# Patient Record
Sex: FEMALE | Race: WHITE | NOT HISPANIC OR LATINO | Employment: UNEMPLOYED | URBAN - METROPOLITAN AREA
[De-identification: names, ages, dates, MRNs, and addresses within clinical notes are randomized per-mention and may not be internally consistent; named-entity substitution may affect disease eponyms.]

---

## 2023-04-09 ENCOUNTER — HOSPITAL ENCOUNTER (EMERGENCY)
Facility: HOSPITAL | Age: 63
Discharge: HOME | End: 2023-04-09
Attending: EMERGENCY MEDICINE
Payer: COMMERCIAL

## 2023-04-09 VITALS
HEIGHT: 63 IN | SYSTOLIC BLOOD PRESSURE: 179 MMHG | TEMPERATURE: 98 F | BODY MASS INDEX: 27.46 KG/M2 | OXYGEN SATURATION: 98 % | WEIGHT: 155 LBS | RESPIRATION RATE: 18 BRPM | HEART RATE: 101 BPM | DIASTOLIC BLOOD PRESSURE: 89 MMHG

## 2023-04-09 DIAGNOSIS — M25.50 ARTHRALGIA, UNSPECIFIED JOINT: Primary | ICD-10-CM

## 2023-04-09 DIAGNOSIS — R52 PAIN: ICD-10-CM

## 2023-04-09 LAB
BASOPHILS # BLD AUTO: 0.09 K/UL (ref 0–0.2)
BASOPHILS NFR BLD: 1 % (ref 0–1.9)
CRP SERPL-MCNC: 1.77 MG/DL
DIFFERENTIAL METHOD: ABNORMAL
EOSINOPHIL # BLD AUTO: 0.1 K/UL (ref 0–0.5)
EOSINOPHIL NFR BLD: 1.5 % (ref 0–8)
ERYTHROCYTE [DISTWIDTH] IN BLOOD BY AUTOMATED COUNT: 12.9 % (ref 11.5–14.5)
ERYTHROCYTE [SEDIMENTATION RATE] IN BLOOD BY WESTERGREN METHOD: 72 MM/HR (ref 0–20)
HCT VFR BLD AUTO: 30.9 % (ref 37–48.5)
HGB BLD-MCNC: 10.3 G/DL (ref 12–16)
IMM GRANULOCYTES # BLD AUTO: 0.02 K/UL (ref 0–0.04)
IMM GRANULOCYTES NFR BLD AUTO: 0.2 % (ref 0–0.5)
LYMPHOCYTES # BLD AUTO: 2.2 K/UL (ref 1–4.8)
LYMPHOCYTES NFR BLD: 24.7 % (ref 18–48)
MCH RBC QN AUTO: 31.9 PG (ref 27–31)
MCHC RBC AUTO-ENTMCNC: 33.3 G/DL (ref 32–36)
MCV RBC AUTO: 96 FL (ref 82–98)
MONOCYTES # BLD AUTO: 0.8 K/UL (ref 0.3–1)
MONOCYTES NFR BLD: 9.2 % (ref 4–15)
NEUTROPHILS # BLD AUTO: 5.6 K/UL (ref 1.8–7.7)
NEUTROPHILS NFR BLD: 63.4 % (ref 38–73)
NRBC BLD-RTO: 0 /100 WBC
OB PNL STL: NEGATIVE
PLATELET # BLD AUTO: 484 K/UL (ref 150–450)
PMV BLD AUTO: 8.5 FL (ref 9.2–12.9)
RBC # BLD AUTO: 3.23 M/UL (ref 4–5.4)
WBC # BLD AUTO: 8.79 K/UL (ref 3.9–12.7)

## 2023-04-09 PROCEDURE — 85651 RBC SED RATE NONAUTOMATED: CPT | Performed by: EMERGENCY MEDICINE

## 2023-04-09 PROCEDURE — 86140 C-REACTIVE PROTEIN: CPT | Performed by: EMERGENCY MEDICINE

## 2023-04-09 PROCEDURE — 82272 OCCULT BLD FECES 1-3 TESTS: CPT | Performed by: EMERGENCY MEDICINE

## 2023-04-09 PROCEDURE — 99284 EMERGENCY DEPT VISIT MOD MDM: CPT

## 2023-04-09 PROCEDURE — 85025 COMPLETE CBC W/AUTO DIFF WBC: CPT | Performed by: EMERGENCY MEDICINE

## 2023-04-09 RX ORDER — MELOXICAM 15 MG/1
15 TABLET ORAL DAILY
Qty: 14 TABLET | Refills: 0 | Status: SHIPPED | OUTPATIENT
Start: 2023-04-09 | End: 2023-04-19

## 2023-04-09 NOTE — DISCHARGE INSTRUCTIONS
Take 20 mg Pepcid twice daily.  Watch for any other dark or bloody stools.  Contact Dr. Fall's office for an appointment.

## 2023-04-09 NOTE — ED PROVIDER NOTES
Encounter Date: 4/9/2023       History     Chief Complaint   Patient presents with    Joint Pain     ALL JOINTS X 6 MOS. 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  No history of trauma.  No fever or chills.  She states that she is not seen a physician for over 20 years and last she did see was OBGYN.  No known history of any connective tissue disease.  No severe restricted range of motion, however has discomfort with range of motion of the right knee and hip along with her fingers and wrist.    Review of patient's allergies indicates:  No Known Allergies  No past medical history on file.  No past surgical history on file.  No family history on file.  Social History     Tobacco Use    Smoking status: Every Day     Types: Cigarettes    Smokeless tobacco: Current    Tobacco comments:     1 PACK PER DAY   Substance Use Topics    Alcohol use: Yes     Comment: OCCASIONALLY     Review of Systems   Constitutional:  Positive for activity change. Negative for chills, diaphoresis and fever.   HENT: Negative.  Negative for sore throat.    Respiratory: Negative.  Negative for shortness of breath.    Cardiovascular: Negative.  Negative for chest pain.   Gastrointestinal:  Positive for blood in stool. Negative for nausea and rectal pain.   Genitourinary:  Negative for dysuria and frequency.   Musculoskeletal:  Positive for arthralgias. Negative for back pain, joint swelling and myalgias.   Skin:  Negative for rash.   Neurological:  Negative for dizziness, weakness and light-headedness.   Hematological:  Does not bruise/bleed easily.   All other systems reviewed and are negative.    Physical Exam     Initial Vitals [04/09/23 0741]   BP Pulse Resp Temp SpO2   (!) 197/105 (!) 117 18 97.9 °F (36.6 °C) 99 %      MAP       --         Physical  Exam    Vitals reviewed.  Constitutional: She appears well-developed and well-nourished. No distress.   HENT:   Head: Normocephalic and atraumatic.   Eyes: Conjunctivae are normal. Pupils are equal, round, and reactive to light.   Neck: Neck supple.   Normal range of motion.  Cardiovascular:  Normal rate, regular rhythm, normal heart sounds and intact distal pulses.           Pulmonary/Chest: Breath sounds normal. No respiratory distress. She has no wheezes. She has no rhonchi. She has no rales. She exhibits no tenderness.   Abdominal: Abdomen is soft. Bowel sounds are normal. She exhibits no distension. There is no abdominal tenderness. There is no rebound and no guarding.   Musculoskeletal:      Cervical back: Normal range of motion and neck supple.      Comments: Painful range of motion to the wrist, fingers, hip and right knee.  Neurovascular status intact.     Neurological: She is alert and oriented to person, place, and time.   Skin: Skin is warm and dry. Capillary refill takes less than 2 seconds.       ED Course   Procedures  Labs Reviewed   CBC W/ AUTO DIFFERENTIAL - Abnormal; Notable for the following components:       Result Value    RBC 3.23 (*)     Hemoglobin 10.3 (*)     Hematocrit 30.9 (*)     MCH 31.9 (*)     Platelets 484 (*)     MPV 8.5 (*)     All other components within normal limits   SEDIMENTATION RATE - Abnormal; Notable for the following components:    Sed Rate 72 (*)     All other components within normal limits   C-REACTIVE PROTEIN - Abnormal; Notable for the following components:    CRP 1.77 (*)     All other components within normal limits   OCCULT BLOOD X 1, STOOL          Imaging Results              X-Ray Knee Complete 4 or more Views Right (Final result)  Result time 04/09/23 09:10:25   Procedure changed from X-Ray Knee 3 View Right     Final result by Mario Sifuentes MD (04/09/23 09:10:25)                   Impression:      Right knee effusion.      Electronically signed by: Mario  Bear NOLEN  Date:    04/09/2023  Time:    09:10               Narrative:    EXAMINATION:  XR KNEE COMP 4 OR MORE VIEWS RIGHT    CLINICAL HISTORY:  discomfort; Pain, unspecified    FINDINGS:  Four views of right knee show no fracture, dislocation, or destructive osseous lesion. Right knee joint effusion is present.  Minimal calcification posterior to distal right femur may be of vascular etiology.                                       X-Ray Wrist Complete Bilateral (Final result)  Result time 04/09/23 09:11:58   Procedure changed from X-Ray Wrist Complete Left     Final result by Mario Sifuentes MD (04/09/23 09:11:58)                   Impression:      1. No acute abnormality throughout bilateral wrists.  2. 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      Electronically signed by: Mario Sifuentes MD  Date:    04/09/2023  Time:    09:11               Narrative:    EXAMINATION:  XR WRIST COMPLETE 3 VIEWS BILATERAL    CLINICAL HISTORY:  fall; Pain, unspecified    FINDINGS:  Four views of bilateral wrists    Left wrist:    No acute fracture or dislocation.  Corticated ossicle adjacent to ulnar styloid suggest old ununited fracture.  Severe joint space narrowing marginal osteophyte formation affects left 1st CMC joint, with mild osteoarthrosis at left 1st MCP joint.  Bones appear mildly diffusely demineralized.  Soft tissues are unremarkable.    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  No acute fracture or dislocation.  Moderate joint space narrowing and marginal osteophyte formation affects the 1st CMC joint.  Soft tissues are unremarkable.                                       X-Ray Hip 2 or 3 views Right (with Pelvis when performed) (Final result)  Result time 04/09/23 09:00:37      Final result by Mario Sifuentes MD (04/09/23 09:00:37)                   Impression:      Negative right hip.      Electronically signed by: Mario Sifuentes  MD  Date:    04/09/2023  Time:    09:00               Narrative:    EXAMINATION:  XR HIP WITH PELVIS WHEN PERFORMED, 2 OR 3  VIEWS RIGHT    CLINICAL HISTORY:  Pain, unspecified    FINDINGS:  AP pelvis and two views of right hip show no fracture, dislocation, or destructive osseous lesion. Soft tissues are unremarkable. Hip joint spaces are maintained as is pubic symphysis and sacroiliac joints.  Few pelvic phleboliths are noted.                                       Medications - No data to display             Attending Attestation:             Attending ED Notes:   ED course an MDM:  This patient presents with a six-month history of diffuse joint pain and who has never seen a physician, has a good potential of this being secondary to some connective tissue disease.  The patient stated that she had some dark stool however on digital exam today was light brown mucus which is heme-negative.  She had labs obtained showed a CRP is slightly elevated at 1.77 and a sed rate of 72 which is elevated.  She has a normal white count of 8.79 and an H&H of 10.3 and 30.9.  X-rays of the hands show osteoarthritis.  The right knee has an effusion in the right hip has no apparent acute abnormalities.  The patient will be discharged and given Mobic and advised to follow up with Rheumatology.    Differential diagnosis includes osteoarthritis, rheumatoid arthritis, mixed connective tissue disease                 Clinical Impression:   Final diagnoses:  [R52] Pain  [M25.50] Arthralgia, unspecified joint (Primary)        ED Disposition Condition    Discharge Stable          ED Prescriptions       Medication Sig Dispense Start Date End Date Auth. Provider    meloxicam (MOBIC) 15 MG tablet Take 1 tablet (15 mg total) by mouth once daily. 14 tablet 4/9/2023 -- Otoniel Leal Jr., MD          Follow-up Information       Follow up With Specialties Details Why Contact Info    Fatuma Fall MD Rheumatology Schedule an appointment as  soon as possible for a visit   [address] [facility]  [address] [place] [id number]  [phone]               Otoniel Leal Jr., MD  04/09/23 1034

## 2023-04-09 NOTE — Clinical Note
"Crow Huitron"Donny was seen and treated in our emergency department on 4/9/2023.  She may return to work on 04/13/2023.       If you have any questions or concerns, please don't hesitate to call.      Otoniel Leal Jr., MD"

## 2023-04-13 ENCOUNTER — PATIENT MESSAGE (OUTPATIENT)
Dept: FAMILY MEDICINE | Facility: CLINIC | Age: 63
End: 2023-04-13
Payer: COMMERCIAL

## 2023-04-13 ENCOUNTER — TELEPHONE (OUTPATIENT)
Dept: FAMILY MEDICINE | Facility: CLINIC | Age: 63
End: 2023-04-13
Payer: COMMERCIAL

## 2023-04-13 NOTE — TELEPHONE ENCOUNTER
Patient was called and message left for him to call back regarding the need to change him appointment to Dr Chika Powell. Since he did not answer, I also sent him a message through Epic regarding the change in his appointment to Dr Powell 04/19/2023 at 3:00 with details of address and what to bring to the appointment.

## 2023-04-19 ENCOUNTER — OFFICE VISIT (OUTPATIENT)
Dept: FAMILY MEDICINE | Facility: CLINIC | Age: 63
End: 2023-04-19
Payer: COMMERCIAL

## 2023-04-19 VITALS
WEIGHT: 127.19 LBS | BODY MASS INDEX: 22.54 KG/M2 | HEART RATE: 91 BPM | TEMPERATURE: 99 F | SYSTOLIC BLOOD PRESSURE: 150 MMHG | HEIGHT: 63 IN | DIASTOLIC BLOOD PRESSURE: 82 MMHG | OXYGEN SATURATION: 96 %

## 2023-04-19 DIAGNOSIS — R79.82 ELEVATED C-REACTIVE PROTEIN (CRP): Primary | ICD-10-CM

## 2023-04-19 DIAGNOSIS — F17.200 TOBACCO DEPENDENCE: ICD-10-CM

## 2023-04-19 DIAGNOSIS — R00.0 TACHYCARDIA: ICD-10-CM

## 2023-04-19 DIAGNOSIS — R70.0 ELEVATED SED RATE: ICD-10-CM

## 2023-04-19 DIAGNOSIS — R63.4 WEIGHT LOSS, UNINTENTIONAL: ICD-10-CM

## 2023-04-19 DIAGNOSIS — M25.462 EFFUSION OF BOTH KNEE JOINTS: ICD-10-CM

## 2023-04-19 DIAGNOSIS — M25.50 ARTHRALGIA, UNSPECIFIED JOINT: ICD-10-CM

## 2023-04-19 DIAGNOSIS — M25.461 EFFUSION OF BOTH KNEE JOINTS: ICD-10-CM

## 2023-04-19 DIAGNOSIS — Z00.00 ENCOUNTER FOR MEDICAL EXAMINATION TO ESTABLISH CARE: ICD-10-CM

## 2023-04-19 DIAGNOSIS — I10 HYPERTENSION, UNSPECIFIED TYPE: ICD-10-CM

## 2023-04-19 PROCEDURE — 1159F MED LIST DOCD IN RCRD: CPT | Mod: ,,, | Performed by: FAMILY MEDICINE

## 2023-04-19 PROCEDURE — 99205 OFFICE O/P NEW HI 60 MIN: CPT | Mod: ,,, | Performed by: FAMILY MEDICINE

## 2023-04-19 PROCEDURE — 3079F PR MOST RECENT DIASTOLIC BLOOD PRESSURE 80-89 MM HG: ICD-10-PCS | Mod: ,,, | Performed by: FAMILY MEDICINE

## 2023-04-19 PROCEDURE — 3008F PR BODY MASS INDEX (BMI) DOCUMENTED: ICD-10-PCS | Mod: ,,, | Performed by: FAMILY MEDICINE

## 2023-04-19 PROCEDURE — 3079F DIAST BP 80-89 MM HG: CPT | Mod: ,,, | Performed by: FAMILY MEDICINE

## 2023-04-19 PROCEDURE — 3077F PR MOST RECENT SYSTOLIC BLOOD PRESSURE >= 140 MM HG: ICD-10-PCS | Mod: ,,, | Performed by: FAMILY MEDICINE

## 2023-04-19 PROCEDURE — 4010F ACE/ARB THERAPY RXD/TAKEN: CPT | Mod: ,,, | Performed by: FAMILY MEDICINE

## 2023-04-19 PROCEDURE — 1159F PR MEDICATION LIST DOCUMENTED IN MEDICAL RECORD: ICD-10-PCS | Mod: ,,, | Performed by: FAMILY MEDICINE

## 2023-04-19 PROCEDURE — 3008F BODY MASS INDEX DOCD: CPT | Mod: ,,, | Performed by: FAMILY MEDICINE

## 2023-04-19 PROCEDURE — 99205 PR OFFICE/OUTPT VISIT, NEW, LEVL V, 60-74 MIN: ICD-10-PCS | Mod: ,,, | Performed by: FAMILY MEDICINE

## 2023-04-19 PROCEDURE — 3077F SYST BP >= 140 MM HG: CPT | Mod: ,,, | Performed by: FAMILY MEDICINE

## 2023-04-19 PROCEDURE — 4010F PR ACE/ARB THEARPY RXD/TAKEN: ICD-10-PCS | Mod: ,,, | Performed by: FAMILY MEDICINE

## 2023-04-19 RX ORDER — MULTIVITAMIN
1 TABLET ORAL DAILY
Status: ON HOLD | COMMUNITY
End: 2024-02-26 | Stop reason: HOSPADM

## 2023-04-19 RX ORDER — LISINOPRIL 20 MG/1
20 TABLET ORAL DAILY
Qty: 90 TABLET | Refills: 3 | Status: SHIPPED | OUTPATIENT
Start: 2023-04-19 | End: 2024-02-24

## 2023-04-19 RX ORDER — MULTIVIT WITH IRON,MINERALS
TABLET,CHEWABLE ORAL
COMMUNITY
End: 2024-02-24

## 2023-04-19 RX ORDER — IBUPROFEN 200 MG
600 TABLET ORAL 2 TIMES DAILY
COMMUNITY
End: 2023-05-22

## 2023-04-19 RX ORDER — METOPROLOL SUCCINATE 25 MG/1
25 TABLET, EXTENDED RELEASE ORAL DAILY
Qty: 30 TABLET | Refills: 11 | Status: SHIPPED | OUTPATIENT
Start: 2023-04-19 | End: 2023-07-27

## 2023-04-19 NOTE — PROGRESS NOTES
Subjective:       Patient ID: Crow Hines is a 62 y.o. female.    Chief Complaint: Establish Care (Pt here to est.care htn,joint pain/Went to ER 4-9-23 see summary) and Hypertension    Hypertension  Pertinent negatives include no chest pain, palpitations or shortness of breath.   Review of Systems   Constitutional:  Positive for fatigue. Negative for activity change, appetite change, chills, diaphoresis and fever.   HENT:  Negative for congestion, ear pain, postnasal drip, rhinorrhea and sore throat.         Voice changing over the years, very gravely   Eyes:  Negative for pain, discharge, redness and itching.   Respiratory:  Negative for cough and shortness of breath.    Cardiovascular:  Negative for chest pain, palpitations and leg swelling.        Tachycardia   Gastrointestinal:  Negative for abdominal distention, abdominal pain, constipation, diarrhea and nausea.        Gas pain   Genitourinary:  Negative for difficulty urinating, dysuria, frequency and urgency.   Musculoskeletal:  Positive for arthralgias, back pain, joint swelling and myalgias.        Right hip pain, bilateral knee pain, hand pain   Skin:  Negative for color change, rash and wound.   Neurological:  Positive for dizziness.   Psychiatric/Behavioral:  Negative for decreased concentration. The patient is not hyperactive.    All other systems reviewed and are negative.    There is no problem list on file for this patient.      Objective:      Physical Exam  Constitutional:       Comments: thin   HENT:      Head: Normocephalic.      Nose: Nose normal.   Eyes:      Pupils: Pupils are equal, round, and reactive to light.   Cardiovascular:      Rate and Rhythm: Tachycardia present.   Pulmonary:      Breath sounds: No wheezing or rhonchi.      Comments: Decreased breath sounds consistent with severe COPD.  Abdominal:      Palpations: Abdomen is soft.   Musculoskeletal:         General: Swelling present. No tenderness.      Comments: Painful MCP  "joints, knees, neck pain, back pain.   Skin:     General: Skin is warm and dry.   Neurological:      General: No focal deficit present.      Mental Status: She is alert and oriented to person, place, and time.   Psychiatric:         Mood and Affect: Mood normal.         Behavior: Behavior normal.       Lab Results   Component Value Date    WBC 9.64 04/26/2023    HGB 10.4 (L) 04/26/2023    HCT 32.2 (L) 04/26/2023     (H) 04/26/2023    CHOL 168 04/26/2023    TRIG 92 04/26/2023    HDL 39 (L) 04/26/2023    ALT 14 04/26/2023    AST 15 04/26/2023     04/26/2023    K 3.9 04/26/2023     04/26/2023    CREATININE 0.8 04/26/2023    BUN 16 04/26/2023    CO2 25 04/26/2023    TSH 0.432 04/26/2023    HGBA1C 5.2 04/26/2023     The 10-year ASCVD risk score (Jared PEPE, et al., 2019) is: 11.5%    Values used to calculate the score:      Age: 62 years      Sex: Female      Is Non- : No      Diabetic: No      Tobacco smoker: Yes      Systolic Blood Pressure: 128 mmHg      Is BP treated: Yes      HDL Cholesterol: 39 mg/dL      Total Cholesterol: 168 mg/dL  Visit Vitals  BP (!) 150/82 (BP Location: Left arm, Patient Position: Sitting, BP Method: Medium (Manual))   Pulse 91   Temp 98.5 °F (36.9 °C)   Ht 5' 3" (1.6 m)   Wt 57.7 kg (127 lb 3.3 oz)   SpO2 96%   BMI 22.53 kg/m²      Assessment:       1. Elevated C-reactive protein (CRP)    2. Elevated sed rate    3. Encounter for medical examination to establish care    4. Arthralgia, unspecified joint    5. Effusion of both knee joints    6. Tobacco dependence    7. Weight loss, unintentional    8. Hypertension, unspecified type    9. Tachycardia        Plan:       1. Elevated C-reactive protein (CRP)  -     HINA Screen w/Reflex; Future; Expected date: 04/19/2023  -     Rheumatoid Factor; Future; Expected date: 04/19/2023    2. Elevated sed rate  -     HINA Screen w/Reflex; Future; Expected date: 04/19/2023  -     Rheumatoid Factor; Future; Expected " date: 04/19/2023  -     Cyclic citrul peptide antibody, IgG; Future; Expected date: 04/19/2023    3. Encounter for medical examination to establish care  -     Lipid Panel; Future; Expected date: 04/19/2023  -     CBC Auto Differential; Future; Expected date: 04/19/2023  -     Comprehensive Metabolic Panel; Future; Expected date: 04/19/2023  -     Hemoglobin A1C; Future; Expected date: 04/19/2023  -     TSH; Future; Expected date: 04/19/2023  -     Microalbumin/Creatinine Ratio, Urine; Future; Expected date: 04/19/2023    4. Arthralgia, unspecified joint  -     HINA Screen w/Reflex; Future; Expected date: 04/19/2023  -     Rheumatoid Factor; Future; Expected date: 04/19/2023  -     Cyclic citrul peptide antibody, IgG; Future; Expected date: 04/19/2023    5. Effusion of both knee joints  -     Cyclic citrul peptide antibody, IgG; Future; Expected date: 04/19/2023    6. Tobacco dependence  -     Ambulatory referral/consult to Smoking Cessation Program; Future; Expected date: 04/26/2023  -     CT Chest Lung Screening Low Dose; Future; Expected date: 04/19/2023    7. Weight loss, unintentional  -     CT Chest Lung Screening Low Dose; Future; Expected date: 04/19/2023    8. Hypertension, unspecified type  -     lisinopriL (PRINIVIL,ZESTRIL) 20 MG tablet; Take 1 tablet (20 mg total) by mouth once daily.  Dispense: 90 tablet; Refill: 3  -     metoprolol succinate (TOPROL-XL) 25 MG 24 hr tablet; Take 1 tablet (25 mg total) by mouth once daily.  Dispense: 30 tablet; Refill: 11  -     IN OFFICE EKG 12-LEAD (to Muse)    9. Tachycardia  -     IN OFFICE EKG 12-LEAD (to Muse)       Follow up in about 2 weeks (around 5/3/2023), or if symptoms worsen or fail to improve.      Future Appointments       Date Provider Specialty Appt Notes    5/22/2023 Delores Gonzales MD Rheumatology Elevated rheumatoid factor Anti-cyclic citrullinated peptide antibody positive    7/27/2023 Chika Powell MD Family Medicine 3 month ck RA and  f/u on referral

## 2023-04-21 ENCOUNTER — TELEPHONE (OUTPATIENT)
Dept: FAMILY MEDICINE | Facility: CLINIC | Age: 63
End: 2023-04-21
Payer: COMMERCIAL

## 2023-04-21 NOTE — TELEPHONE ENCOUNTER
Returned reply to pre service that CT medically necessary per Dr Powell, attempted also to call and pre service at meeting

## 2023-04-24 ENCOUNTER — PATIENT MESSAGE (OUTPATIENT)
Dept: ADMINISTRATIVE | Facility: HOSPITAL | Age: 63
End: 2023-04-24
Payer: COMMERCIAL

## 2023-04-24 DIAGNOSIS — Z12.11 SCREENING FOR COLON CANCER: ICD-10-CM

## 2023-04-26 ENCOUNTER — CLINICAL SUPPORT (OUTPATIENT)
Dept: FAMILY MEDICINE | Facility: CLINIC | Age: 63
End: 2023-04-26
Payer: COMMERCIAL

## 2023-04-26 ENCOUNTER — LAB VISIT (OUTPATIENT)
Dept: LAB | Facility: CLINIC | Age: 63
End: 2023-04-26
Payer: COMMERCIAL

## 2023-04-26 VITALS — SYSTOLIC BLOOD PRESSURE: 140 MMHG | DIASTOLIC BLOOD PRESSURE: 80 MMHG

## 2023-04-26 DIAGNOSIS — R70.0 ELEVATED SED RATE: ICD-10-CM

## 2023-04-26 DIAGNOSIS — Z01.30 BP CHECK: Primary | ICD-10-CM

## 2023-04-26 DIAGNOSIS — R79.82 ELEVATED C-REACTIVE PROTEIN (CRP): ICD-10-CM

## 2023-04-26 DIAGNOSIS — M25.461 EFFUSION OF BOTH KNEE JOINTS: ICD-10-CM

## 2023-04-26 DIAGNOSIS — Z12.31 OTHER SCREENING MAMMOGRAM: ICD-10-CM

## 2023-04-26 DIAGNOSIS — M25.50 ARTHRALGIA, UNSPECIFIED JOINT: ICD-10-CM

## 2023-04-26 DIAGNOSIS — Z00.00 ENCOUNTER FOR MEDICAL EXAMINATION TO ESTABLISH CARE: ICD-10-CM

## 2023-04-26 DIAGNOSIS — M25.462 EFFUSION OF BOTH KNEE JOINTS: ICD-10-CM

## 2023-04-26 LAB
ALBUMIN SERPL BCP-MCNC: 3.3 G/DL (ref 3.5–5.2)
ALBUMIN/CREAT UR: 7.7 UG/MG (ref 0–30)
ALP SERPL-CCNC: 75 U/L (ref 55–135)
ALT SERPL W/O P-5'-P-CCNC: 14 U/L (ref 10–44)
ANION GAP SERPL CALC-SCNC: 8 MMOL/L (ref 8–16)
AST SERPL-CCNC: 15 U/L (ref 10–40)
BASOPHILS # BLD AUTO: 0.1 K/UL (ref 0–0.2)
BASOPHILS NFR BLD: 1 % (ref 0–1.9)
BILIRUB SERPL-MCNC: 0.3 MG/DL (ref 0.1–1)
BUN SERPL-MCNC: 16 MG/DL (ref 8–23)
CALCIUM SERPL-MCNC: 9.8 MG/DL (ref 8.7–10.5)
CCP AB SER IA-ACNC: 512.4 U/ML
CHLORIDE SERPL-SCNC: 106 MMOL/L (ref 95–110)
CHOLEST SERPL-MCNC: 168 MG/DL (ref 120–199)
CHOLEST/HDLC SERPL: 4.3 {RATIO} (ref 2–5)
CO2 SERPL-SCNC: 25 MMOL/L (ref 23–29)
CREAT SERPL-MCNC: 0.8 MG/DL (ref 0.5–1.4)
CREAT UR-MCNC: 130 MG/DL (ref 15–325)
DIFFERENTIAL METHOD: ABNORMAL
EOSINOPHIL # BLD AUTO: 0.1 K/UL (ref 0–0.5)
EOSINOPHIL NFR BLD: 1.3 % (ref 0–8)
ERYTHROCYTE [DISTWIDTH] IN BLOOD BY AUTOMATED COUNT: 12.6 % (ref 11.5–14.5)
EST. GFR  (NO RACE VARIABLE): >60 ML/MIN/1.73 M^2
ESTIMATED AVG GLUCOSE: 103 MG/DL (ref 68–131)
GLUCOSE SERPL-MCNC: 107 MG/DL (ref 70–110)
HBA1C MFR BLD: 5.2 % (ref 4–5.6)
HCT VFR BLD AUTO: 32.2 % (ref 37–48.5)
HDLC SERPL-MCNC: 39 MG/DL (ref 40–75)
HDLC SERPL: 23.2 % (ref 20–50)
HGB BLD-MCNC: 10.4 G/DL (ref 12–16)
IMM GRANULOCYTES # BLD AUTO: 0.03 K/UL (ref 0–0.04)
IMM GRANULOCYTES NFR BLD AUTO: 0.3 % (ref 0–0.5)
LDLC SERPL CALC-MCNC: 110.6 MG/DL (ref 63–159)
LYMPHOCYTES # BLD AUTO: 2.2 K/UL (ref 1–4.8)
LYMPHOCYTES NFR BLD: 22.9 % (ref 18–48)
MCH RBC QN AUTO: 31.1 PG (ref 27–31)
MCHC RBC AUTO-ENTMCNC: 32.3 G/DL (ref 32–36)
MCV RBC AUTO: 96 FL (ref 82–98)
MICROALBUMIN UR DL<=1MG/L-MCNC: 10 UG/ML
MONOCYTES # BLD AUTO: 0.6 K/UL (ref 0.3–1)
MONOCYTES NFR BLD: 6.6 % (ref 4–15)
NEUTROPHILS # BLD AUTO: 6.5 K/UL (ref 1.8–7.7)
NEUTROPHILS NFR BLD: 67.9 % (ref 38–73)
NONHDLC SERPL-MCNC: 129 MG/DL
NRBC BLD-RTO: 0 /100 WBC
PLATELET # BLD AUTO: 687 K/UL (ref 150–450)
PMV BLD AUTO: 9 FL (ref 9.2–12.9)
POTASSIUM SERPL-SCNC: 3.9 MMOL/L (ref 3.5–5.1)
PROT SERPL-MCNC: 8 G/DL (ref 6–8.4)
RBC # BLD AUTO: 3.34 M/UL (ref 4–5.4)
RHEUMATOID FACT SERPL-ACNC: 235 IU/ML (ref 0–15)
SODIUM SERPL-SCNC: 139 MMOL/L (ref 136–145)
TRIGL SERPL-MCNC: 92 MG/DL (ref 30–150)
TSH SERPL DL<=0.005 MIU/L-ACNC: 0.43 UIU/ML (ref 0.4–4)
WBC # BLD AUTO: 9.64 K/UL (ref 3.9–12.7)

## 2023-04-26 PROCEDURE — 83036 HEMOGLOBIN GLYCOSYLATED A1C: CPT | Performed by: FAMILY MEDICINE

## 2023-04-26 PROCEDURE — 93005 ELECTROCARDIOGRAM TRACING: CPT | Mod: ,,, | Performed by: FAMILY MEDICINE

## 2023-04-26 PROCEDURE — 86038 ANTINUCLEAR ANTIBODIES: CPT | Performed by: FAMILY MEDICINE

## 2023-04-26 PROCEDURE — 84443 ASSAY THYROID STIM HORMONE: CPT | Performed by: FAMILY MEDICINE

## 2023-04-26 PROCEDURE — 93010 EKG 12-LEAD: ICD-10-PCS | Mod: ,,, | Performed by: INTERNAL MEDICINE

## 2023-04-26 PROCEDURE — 86431 RHEUMATOID FACTOR QUANT: CPT | Performed by: FAMILY MEDICINE

## 2023-04-26 PROCEDURE — 82570 ASSAY OF URINE CREATININE: CPT | Performed by: FAMILY MEDICINE

## 2023-04-26 PROCEDURE — 86200 CCP ANTIBODY: CPT | Performed by: FAMILY MEDICINE

## 2023-04-26 PROCEDURE — 85025 COMPLETE CBC W/AUTO DIFF WBC: CPT | Performed by: FAMILY MEDICINE

## 2023-04-26 PROCEDURE — 80061 LIPID PANEL: CPT | Performed by: FAMILY MEDICINE

## 2023-04-26 PROCEDURE — 80053 COMPREHEN METABOLIC PANEL: CPT | Performed by: FAMILY MEDICINE

## 2023-04-26 PROCEDURE — 93005 EKG 12-LEAD: ICD-10-PCS | Mod: ,,, | Performed by: FAMILY MEDICINE

## 2023-04-26 PROCEDURE — 93010 ELECTROCARDIOGRAM REPORT: CPT | Mod: ,,, | Performed by: INTERNAL MEDICINE

## 2023-04-26 NOTE — PROGRESS NOTES
Patient here for B/P check and EKG, also in pain, muscular, patient wearing hand braces and ambulating  with cane

## 2023-04-27 LAB — ANA SER QL IF: NORMAL

## 2023-04-28 ENCOUNTER — OFFICE VISIT (OUTPATIENT)
Dept: FAMILY MEDICINE | Facility: CLINIC | Age: 63
End: 2023-04-28
Payer: COMMERCIAL

## 2023-04-28 VITALS
TEMPERATURE: 98 F | WEIGHT: 126.13 LBS | DIASTOLIC BLOOD PRESSURE: 80 MMHG | HEART RATE: 102 BPM | OXYGEN SATURATION: 95 % | BODY MASS INDEX: 22.35 KG/M2 | HEIGHT: 63 IN | SYSTOLIC BLOOD PRESSURE: 128 MMHG

## 2023-04-28 DIAGNOSIS — M25.561 PAIN IN JOINT INVOLVING RIGHT LOWER LEG: ICD-10-CM

## 2023-04-28 DIAGNOSIS — M47.816 SPONDYLOSIS OF LUMBAR REGION WITHOUT MYELOPATHY OR RADICULOPATHY: ICD-10-CM

## 2023-04-28 DIAGNOSIS — R76.8 ANTI-CYCLIC CITRULLINATED PEPTIDE ANTIBODY POSITIVE: Primary | ICD-10-CM

## 2023-04-28 DIAGNOSIS — R76.8 ELEVATED RHEUMATOID FACTOR: ICD-10-CM

## 2023-04-28 PROCEDURE — 3074F SYST BP LT 130 MM HG: CPT | Mod: ,,, | Performed by: FAMILY MEDICINE

## 2023-04-28 PROCEDURE — 3066F NEPHROPATHY DOC TX: CPT | Mod: ,,, | Performed by: FAMILY MEDICINE

## 2023-04-28 PROCEDURE — 3066F PR DOCUMENTATION OF TREATMENT FOR NEPHROPATHY: ICD-10-PCS | Mod: ,,, | Performed by: FAMILY MEDICINE

## 2023-04-28 PROCEDURE — 1159F PR MEDICATION LIST DOCUMENTED IN MEDICAL RECORD: ICD-10-PCS | Mod: ,,, | Performed by: FAMILY MEDICINE

## 2023-04-28 PROCEDURE — 3079F DIAST BP 80-89 MM HG: CPT | Mod: ,,, | Performed by: FAMILY MEDICINE

## 2023-04-28 PROCEDURE — 99214 OFFICE O/P EST MOD 30 MIN: CPT | Mod: ,,, | Performed by: FAMILY MEDICINE

## 2023-04-28 PROCEDURE — 4010F ACE/ARB THERAPY RXD/TAKEN: CPT | Mod: ,,, | Performed by: FAMILY MEDICINE

## 2023-04-28 PROCEDURE — 3044F HG A1C LEVEL LT 7.0%: CPT | Mod: ,,, | Performed by: FAMILY MEDICINE

## 2023-04-28 PROCEDURE — 3044F PR MOST RECENT HEMOGLOBIN A1C LEVEL <7.0%: ICD-10-PCS | Mod: ,,, | Performed by: FAMILY MEDICINE

## 2023-04-28 PROCEDURE — 99214 PR OFFICE/OUTPT VISIT, EST, LEVL IV, 30-39 MIN: ICD-10-PCS | Mod: ,,, | Performed by: FAMILY MEDICINE

## 2023-04-28 PROCEDURE — 3074F PR MOST RECENT SYSTOLIC BLOOD PRESSURE < 130 MM HG: ICD-10-PCS | Mod: ,,, | Performed by: FAMILY MEDICINE

## 2023-04-28 PROCEDURE — 3008F PR BODY MASS INDEX (BMI) DOCUMENTED: ICD-10-PCS | Mod: ,,, | Performed by: FAMILY MEDICINE

## 2023-04-28 PROCEDURE — 1159F MED LIST DOCD IN RCRD: CPT | Mod: ,,, | Performed by: FAMILY MEDICINE

## 2023-04-28 PROCEDURE — 4010F PR ACE/ARB THEARPY RXD/TAKEN: ICD-10-PCS | Mod: ,,, | Performed by: FAMILY MEDICINE

## 2023-04-28 PROCEDURE — 3079F PR MOST RECENT DIASTOLIC BLOOD PRESSURE 80-89 MM HG: ICD-10-PCS | Mod: ,,, | Performed by: FAMILY MEDICINE

## 2023-04-28 PROCEDURE — 3061F PR NEG MICROALBUMINURIA RESULT DOCUMENTED/REVIEW: ICD-10-PCS | Mod: ,,, | Performed by: FAMILY MEDICINE

## 2023-04-28 PROCEDURE — 3008F BODY MASS INDEX DOCD: CPT | Mod: ,,, | Performed by: FAMILY MEDICINE

## 2023-04-28 PROCEDURE — 3061F NEG MICROALBUMINURIA REV: CPT | Mod: ,,, | Performed by: FAMILY MEDICINE

## 2023-04-28 RX ORDER — GABAPENTIN 100 MG/1
100 CAPSULE ORAL 3 TIMES DAILY
Qty: 90 CAPSULE | Refills: 11 | Status: ON HOLD | OUTPATIENT
Start: 2023-04-28 | End: 2024-03-17 | Stop reason: HOSPADM

## 2023-04-28 RX ORDER — TIZANIDINE 4 MG/1
4 TABLET ORAL EVERY 6 HOURS PRN
Qty: 60 TABLET | Refills: 2 | Status: SHIPPED | OUTPATIENT
Start: 2023-04-28 | End: 2023-07-27

## 2023-04-28 RX ORDER — MELOXICAM 15 MG/1
15 TABLET ORAL DAILY
COMMUNITY
Start: 2023-04-19 | End: 2023-05-22

## 2023-04-28 NOTE — PROGRESS NOTES
Subjective:       Patient ID: Crow Hines is a 62 y.o. female.    Chief Complaint: Follow-up (F/u labs)    Ms. Hines is a 62-year-old female who is here earlier this week to establish care.  She is back today to go over her lab results.  Her labs were done on 04/26/2023 results are as follows:  1. CBC-shows mild anemia  2. CMP-essentially normal except for an albumin of 3.3 which is slightly low   3. Hemoglobin A1c-5.2%  4. TSH 0.432  5. HINA-negative   6. CCP antibodies-elevated at 512  7. RA-elevated at 236  8. Microalbumin creatinine ratio 7.7 (within normal limits)   8. EKG-sinus tachycardia with a nonspecific ST wave abnormality  9. A low-dose CT chest was ordered due to patient's long-term smoking, however, she did not get it done as her co-pay was going to be too high  10. Mammogram was also ordered and not done due to cost to the patient    Today we will discuss the elevated rheumatoid markers, refer to rheumatology if she does not have one.  We will also discuss her x-rays that were done earlier in the week.  She had a right hip x-ray done which was negative.  She had a right knee x-ray which showed an effusion, arthritic changes, and vascular calcifications were noted in the knee.  This patient has been having a significant amount of trouble with her right lower extremity having spasms, weakness, and neuropathic type pain.  I feel it is due to peripheral vascular disease, but it could also be neuropathy from arthritic changes in the lumbar spine.  She needs a workup to determine the cause of this constant moderate to severe lower extremity pain.  She can not afford to do the studies that are required to get a diagnosis, because she is not been able to work due to the pain in her lower extremities.  She is not worked for over 3 weeks.  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  She works 12 hour shifts in his been unable to physically do the job due to her chronic leg pain.  She is  also having the same type pain in her bilateral upper extremities along with numbness and tingling.    She also has a very gravelly and gruff voice.  I have concerns about laryngeal cancer as well as possible lung issues due to her long-term smoking.  I am going to refer her to care case management and financial assistance so hopefully we can get these issues worked up in a timely manner.    Follow-up  Associated symptoms include arthralgias, coughing, fatigue, joint swelling, myalgias and neck pain.   Review of Systems   Constitutional:  Positive for fatigue.   Respiratory:  Positive for cough, shortness of breath and wheezing.    Cardiovascular:  Positive for leg swelling.   Musculoskeletal:  Positive for arthralgias, back pain, gait problem, joint swelling, myalgias, neck pain and neck stiffness.     There is no problem list on file for this patient.      Objective:      Physical Exam  Constitutional:       General: She is not in acute distress.     Comments: Small, thin and frail-appearing.  Also uses a cane to ambulate   HENT:      Head: Normocephalic and atraumatic.      Mouth/Throat:      Mouth: Mucous membranes are moist.   Eyes:      Pupils: Pupils are equal, round, and reactive to light.   Cardiovascular:      Rate and Rhythm: Regular rhythm. Tachycardia present.   Pulmonary:      Effort: No respiratory distress.      Breath sounds: No wheezing or rhonchi.   Musculoskeletal:         General: Swelling present.   Neurological:      Mental Status: She is oriented to person, place, and time. Mental status is at baseline.      Motor: Weakness present.   Psychiatric:      Comments: Anxiety due to her current financial and physical situation.  Since she is been unable to work she is having anxiety about how to pay her bills, as well as have to get worked up for her current medical problems.  She continues to state I just need to get back to work       Lab Results   Component Value Date    WBC 9.64 04/26/2023     "HGB 10.4 (L) 04/26/2023    HCT 32.2 (L) 04/26/2023     (H) 04/26/2023    CHOL 168 04/26/2023    TRIG 92 04/26/2023    HDL 39 (L) 04/26/2023    ALT 14 04/26/2023    AST 15 04/26/2023     04/26/2023    K 3.9 04/26/2023     04/26/2023    CREATININE 0.8 04/26/2023    BUN 16 04/26/2023    CO2 25 04/26/2023    TSH 0.432 04/26/2023    HGBA1C 5.2 04/26/2023     The 10-year ASCVD risk score (Jared PEPE, et al., 2019) is: 11.5%    Values used to calculate the score:      Age: 62 years      Sex: Female      Is Non- : No      Diabetic: No      Tobacco smoker: Yes      Systolic Blood Pressure: 128 mmHg      Is BP treated: Yes      HDL Cholesterol: 39 mg/dL      Total Cholesterol: 168 mg/dL  Visit Vitals  /80 (BP Location: Right arm, Patient Position: Sitting, BP Method: Medium (Manual))   Pulse 102   Temp 98.4 °F (36.9 °C)   Ht 5' 3" (1.6 m)   Wt 57.2 kg (126 lb 1.7 oz)   SpO2 95%   BMI 22.34 kg/m²      Assessment:       1. Anti-cyclic citrullinated peptide antibody positive    2. Elevated rheumatoid factor    3. Pain in joint involving right lower leg    4. Spondylosis of lumbar region without myelopathy or radiculopathy        Plan:       1. Anti-cyclic citrullinated peptide antibody positive  -     Ambulatory referral/consult to Rheumatology; Future; Expected date: 05/05/2023  -     Ambulatory referral/consult to Outpatient Case Management    2. Elevated rheumatoid factor  -     Ambulatory referral/consult to Rheumatology; Future; Expected date: 05/05/2023  -     Ambulatory referral/consult to Outpatient Case Management    3. Pain in joint involving right lower leg    4. Spondylosis of lumbar region without myelopathy or radiculopathy  -     Ambulatory referral/consult to Outpatient Case Management    Other orders  -     tiZANidine (ZANAFLEX) 4 MG tablet; Take 1 tablet (4 mg total) by mouth every 6 (six) hours as needed.  Dispense: 60 tablet; Refill: 2  -     gabapentin " (NEURONTIN) 100 MG capsule; Take 1 capsule (100 mg total) by mouth 3 (three) times daily.  Dispense: 90 capsule; Refill: 11       Follow up in about 3 months (around 7/28/2023), or if symptoms worsen or fail to improve.      Future Appointments       Date Provider Specialty Appt Notes    7/27/2023 Chika Powell MD Family Medicine 3 month ck RA and f/u on referral

## 2023-05-01 ENCOUNTER — PATIENT MESSAGE (OUTPATIENT)
Dept: ADMINISTRATIVE | Facility: HOSPITAL | Age: 63
End: 2023-05-01
Payer: COMMERCIAL

## 2023-05-02 ENCOUNTER — TELEPHONE (OUTPATIENT)
Dept: FAMILY MEDICINE | Facility: CLINIC | Age: 63
End: 2023-05-02
Payer: COMMERCIAL

## 2023-05-02 NOTE — TELEPHONE ENCOUNTER
Called patient - she states she is needing to kali access to all of her medical records to [facility] [employer]. Explained to patient that she would need to sign release of information form either with [facility] or with [facility] [employer]. Patient states [facility] [employer] already has their own documents/forms that they will be sending over to us. She states that if anything does wind up requiring her signature to please let her know so that she can come sign. As of right now we have not received anything from [employer] that I am aware of.

## 2023-05-02 NOTE — TELEPHONE ENCOUNTER
----- Message from Kvng Hays sent at 5/2/2023  2:32 PM CDT -----  Regarding: permissions  Type:  Needs Medical Advice    Who Called: Pt    Would the patient rather a call back or a response via [facility]? Call back  Best Call Back Number: [phone]     Additional Information: Sts she needs to give permission for [employer] short term Disability to be able to access all her records.  Please advise -- Thank you

## 2023-05-15 ENCOUNTER — TELEPHONE (OUTPATIENT)
Dept: FAMILY MEDICINE | Facility: CLINIC | Age: 63
End: 2023-05-15
Payer: COMMERCIAL

## 2023-05-15 NOTE — TELEPHONE ENCOUNTER
----- Message from Yfn Ahmadi sent at 5/15/2023 12:18 PM CDT -----  Regarding: Return Call  Contact: Patient  Type:  Patient Returning Call    Who Called:Patient  Who Left Message for Patient:office staff please call  Does the patient know what this is regarding?:paper-work for disability  Would the patient rather a call back or a response via [facility]? call  Best Call Back Number:[phone]  Additional Information: Please call patient today due to time limit.

## 2023-05-15 NOTE — TELEPHONE ENCOUNTER
Patient states paperwork was faxed last week to us requesting all of her records for her disability claim. Explained to patient I still do not have anything for her from this company. Gave her information for our medical records department so release of information can be sent to them directly so may be processed. Patient took down phone and fax number for HIM.

## 2023-05-16 ENCOUNTER — TELEPHONE (OUTPATIENT)
Dept: FAMILY MEDICINE | Facility: CLINIC | Age: 63
End: 2023-05-16
Payer: COMMERCIAL

## 2023-05-16 NOTE — TELEPHONE ENCOUNTER
----- Message from Amelia Khoury, Patient Care Assistant sent at 5/16/2023  2:31 PM CDT -----  Regarding: advice  Contact: pt  Type: Needs Medical Advice    Who Called:  pt     Best Call Back Number: [phone] (home)       Additional Information: pt states she would like a callback regarding short term disability form to be completed. Please call pt to advise. Thanks!

## 2023-05-16 NOTE — TELEPHONE ENCOUNTER
Document was received and completed by Dr. Powell. Dr. Powell did have to leave several areas blank due to inability to fill them out with lack of available information. Patient has only been seen by Dr. Powell twice. Faxed document to [phone] as listed on the form. Original entered for scanning into the patient's chart. Dr. Powell also kept a copy of this form. Per Dr. Powell if any additional information is needed may have to wait until patient is seen by specialty provider as she was referred.     Called patient to make aware - explained situation to patient. Patient verbalized understanding.

## 2023-05-17 ENCOUNTER — PATIENT MESSAGE (OUTPATIENT)
Dept: ADMINISTRATIVE | Facility: HOSPITAL | Age: 63
End: 2023-05-17
Payer: COMMERCIAL

## 2023-05-18 NOTE — PROGRESS NOTES
Subjective:      Patient ID: Crow Hines is a 62 y.o. female.    Chief Complaint: Disease Management (Abnormal labs)    HPI    Rheumatologic History:   - Diagnosis/es:   - Seropositive RA diagnosed in 5/2023 and characterized by polyarticular inflammatory arthritis  - Family History: No autoimmune conditions   - Positive serologies: + RF (235), + CCP (512.4)  - Negative serologies: HINA  - Infectious screening labs: -  - Imaging:   - Xray right hip (4/2023) negative   - Xray bilateral wrists (4/2023): severe CMC OA  - Xray right knee (5/2023): right knee effusion  - Previous Treatments:   - Current Treatments:     Interval History:   She has had pain and swelling in her, hands, wrists, feet, knees, hips and shoulders since April 2022. She has been taking ibuprofen 600mg 4 times per day, Tylenol, and Zanaflex since then without relief. She has been ambulating with a cane and has been unable to work as an RN.     Objective:   BP (!) 156/98 Comment: asymptomatic/ pt took B/P meds this AM  Pulse (!) 116   Wt 56.6 kg (124 lb 11.1 oz)   BMI 22.09 kg/m²   Physical Exam   Constitutional: normal appearance.   HENT:   Head: Normocephalic and atraumatic.   Cardiovascular: Normal rate, regular rhythm and normal heart sounds.   Pulmonary/Chest: Effort normal and breath sounds normal.   Musculoskeletal:      Comments: Tenderness and swelling in bilateral shoulders, elbows, 1st to 5th MCPs, wrists, knees, and ankles   Neurological: She is alert.   Skin: Skin is warm and dry. No rash noted.      5/22/2023   Tender (READ-28) 18 / 28    Swollen (READ-28) 14 / 28    Provider Global 90 mm   Patient Global 90 mm   ESR 72 mm/hr   CRP 1.77 mg/L   READ-28 (ESR) 7.68 (High disease activity)   READ-28 (CRP) 6.01 (High disease activity)   CDAI Score 50      Labs reviewed by me (4/9/23)  CBC Hgb 10.4, Plt 687 <- 484, WBC WNL  CMP albumin 3.3  CRP 1.77  ESR 72    Assessment:     1. Seropositive rheumatoid arthritis    2. Tobacco abuse  counseling    3. Immunization counseling    4. High risk medication use    5. Rheumatoid arthritis flare    6. Immunosuppression      This is a 62-year-old woman with history of anemia, tobacco use (cutting down), who was referred to Rheumatology for polyarthralgia, positive RF (236) and, and a positive CCP (512). She has seropositive nonerosive RA. CDAI, DAS28 ESR and CRP are consistent with high disease activity.  I will have her start methotrexate and prednisone.    Plan:     Problem List Items Addressed This Visit          Immunology/Multi System    Seropositive rheumatoid arthritis - Primary    Relevant Medications    methotrexate 2.5 MG Tab    folic acid (FOLVITE) 1 MG tablet    predniSONE (DELTASONE) 5 MG tablet    triamcinolone acetonide injection 40 mg    Other Relevant Orders    CBC Auto Differential    Comprehensive Metabolic Panel    Hepatitis B surface antigen    HBcAB    Hepatitis B surface antibody    Hepatitis C antibody    Quantiferon Gold TB    Comprehensive Metabolic Panel     Other Visit Diagnoses       Tobacco abuse counseling        Immunization counseling        High risk medication use        Relevant Medications    methotrexate 2.5 MG Tab    folic acid (FOLVITE) 1 MG tablet    Other Relevant Orders    CBC Auto Differential    Comprehensive Metabolic Panel    Hepatitis B surface antigen    HBcAB    Hepatitis B surface antibody    Hepatitis C antibody    Quantiferon Gold TB    Comprehensive Metabolic Panel    Rheumatoid arthritis flare        Relevant Medications    predniSONE (DELTASONE) 5 MG tablet    Immunosuppression              - Start methotrexate 15mg weekly plus folic acid once pre-DMARD labs result. I discussed potential side effects including infection, blood count abnormalities, liver/kidney abnormalities, and GI upset. I discussed that methotrexate should be taken only once a week and should be taken with daily folic acid to prevent medication toxicity. I discussed that monthly  monitoring will be necessary with each dose increase, and that monitoring will transition to every 3 months once on a stable dose.  The ACR patient information sheet on methotrexate was provided for additional information.  - Kenalog 40mg IM now  - Prednisone 10mg PO daily until MTX takes effect then plan to taper  - CBC, CMP, ESR, CRP now, in 4 weeks, then every 12 weeks  - Pre-DMARD labs  - DEXA at follow up  - Immunizations:  I recommended the flu, COVID, pneumonia, and shingles vaccines.  She declined on the basis of Yarsani reasons.   - attending Physician statement filled out today    Follow up in 3 months    70 minutes of total time spent on the encounter, which includes face to face time and non-face to face time preparing to see the patient (eg, review of tests), Obtaining and/or reviewing separately obtained history, Documenting clinical information in the electronic or other health record, Independently interpreting results (not separately reported) and communicating results to the patient/family/caregiver, or Care coordination (not separately reported).       Delores Gonzales M.D.  Rheumatology Dept  [place], [place]

## 2023-05-22 ENCOUNTER — OFFICE VISIT (OUTPATIENT)
Dept: RHEUMATOLOGY | Facility: CLINIC | Age: 63
End: 2023-05-22
Payer: COMMERCIAL

## 2023-05-22 ENCOUNTER — LAB VISIT (OUTPATIENT)
Dept: LAB | Facility: HOSPITAL | Age: 63
End: 2023-05-22
Attending: STUDENT IN AN ORGANIZED HEALTH CARE EDUCATION/TRAINING PROGRAM
Payer: COMMERCIAL

## 2023-05-22 VITALS
HEART RATE: 116 BPM | BODY MASS INDEX: 22.09 KG/M2 | SYSTOLIC BLOOD PRESSURE: 156 MMHG | DIASTOLIC BLOOD PRESSURE: 98 MMHG | WEIGHT: 124.69 LBS

## 2023-05-22 DIAGNOSIS — Z71.6 TOBACCO ABUSE COUNSELING: ICD-10-CM

## 2023-05-22 DIAGNOSIS — M05.9 SEROPOSITIVE RHEUMATOID ARTHRITIS: Primary | ICD-10-CM

## 2023-05-22 DIAGNOSIS — M06.9 RHEUMATOID ARTHRITIS FLARE: ICD-10-CM

## 2023-05-22 DIAGNOSIS — M05.9 SEROPOSITIVE RHEUMATOID ARTHRITIS: ICD-10-CM

## 2023-05-22 DIAGNOSIS — Z79.899 HIGH RISK MEDICATION USE: ICD-10-CM

## 2023-05-22 DIAGNOSIS — D84.9 IMMUNOSUPPRESSION: ICD-10-CM

## 2023-05-22 DIAGNOSIS — Z71.85 IMMUNIZATION COUNSELING: ICD-10-CM

## 2023-05-22 PROCEDURE — 3061F NEG MICROALBUMINURIA REV: CPT | Mod: CPTII,S$GLB,, | Performed by: STUDENT IN AN ORGANIZED HEALTH CARE EDUCATION/TRAINING PROGRAM

## 2023-05-22 PROCEDURE — 3044F HG A1C LEVEL LT 7.0%: CPT | Mod: CPTII,S$GLB,, | Performed by: STUDENT IN AN ORGANIZED HEALTH CARE EDUCATION/TRAINING PROGRAM

## 2023-05-22 PROCEDURE — 1160F PR REVIEW ALL MEDS BY PRESCRIBER/CLIN PHARMACIST DOCUMENTED: ICD-10-PCS | Mod: CPTII,S$GLB,, | Performed by: STUDENT IN AN ORGANIZED HEALTH CARE EDUCATION/TRAINING PROGRAM

## 2023-05-22 PROCEDURE — 3080F PR MOST RECENT DIASTOLIC BLOOD PRESSURE >= 90 MM HG: ICD-10-PCS | Mod: CPTII,S$GLB,, | Performed by: STUDENT IN AN ORGANIZED HEALTH CARE EDUCATION/TRAINING PROGRAM

## 2023-05-22 PROCEDURE — 3044F PR MOST RECENT HEMOGLOBIN A1C LEVEL <7.0%: ICD-10-PCS | Mod: CPTII,S$GLB,, | Performed by: STUDENT IN AN ORGANIZED HEALTH CARE EDUCATION/TRAINING PROGRAM

## 2023-05-22 PROCEDURE — 3077F PR MOST RECENT SYSTOLIC BLOOD PRESSURE >= 140 MM HG: ICD-10-PCS | Mod: CPTII,S$GLB,, | Performed by: STUDENT IN AN ORGANIZED HEALTH CARE EDUCATION/TRAINING PROGRAM

## 2023-05-22 PROCEDURE — 99205 OFFICE O/P NEW HI 60 MIN: CPT | Mod: 25,S$GLB,, | Performed by: STUDENT IN AN ORGANIZED HEALTH CARE EDUCATION/TRAINING PROGRAM

## 2023-05-22 PROCEDURE — 86480 TB TEST CELL IMMUN MEASURE: CPT | Performed by: STUDENT IN AN ORGANIZED HEALTH CARE EDUCATION/TRAINING PROGRAM

## 2023-05-22 PROCEDURE — 3080F DIAST BP >= 90 MM HG: CPT | Mod: CPTII,S$GLB,, | Performed by: STUDENT IN AN ORGANIZED HEALTH CARE EDUCATION/TRAINING PROGRAM

## 2023-05-22 PROCEDURE — 3061F PR NEG MICROALBUMINURIA RESULT DOCUMENTED/REVIEW: ICD-10-PCS | Mod: CPTII,S$GLB,, | Performed by: STUDENT IN AN ORGANIZED HEALTH CARE EDUCATION/TRAINING PROGRAM

## 2023-05-22 PROCEDURE — 99999 PR PBB SHADOW E&M-EST. PATIENT-LVL IV: CPT | Mod: PBBFAC,,, | Performed by: STUDENT IN AN ORGANIZED HEALTH CARE EDUCATION/TRAINING PROGRAM

## 2023-05-22 PROCEDURE — 4010F ACE/ARB THERAPY RXD/TAKEN: CPT | Mod: CPTII,S$GLB,, | Performed by: STUDENT IN AN ORGANIZED HEALTH CARE EDUCATION/TRAINING PROGRAM

## 2023-05-22 PROCEDURE — 3008F PR BODY MASS INDEX (BMI) DOCUMENTED: ICD-10-PCS | Mod: CPTII,S$GLB,, | Performed by: STUDENT IN AN ORGANIZED HEALTH CARE EDUCATION/TRAINING PROGRAM

## 2023-05-22 PROCEDURE — 1159F MED LIST DOCD IN RCRD: CPT | Mod: CPTII,S$GLB,, | Performed by: STUDENT IN AN ORGANIZED HEALTH CARE EDUCATION/TRAINING PROGRAM

## 2023-05-22 PROCEDURE — 3066F PR DOCUMENTATION OF TREATMENT FOR NEPHROPATHY: ICD-10-PCS | Mod: CPTII,S$GLB,, | Performed by: STUDENT IN AN ORGANIZED HEALTH CARE EDUCATION/TRAINING PROGRAM

## 2023-05-22 PROCEDURE — 3077F SYST BP >= 140 MM HG: CPT | Mod: CPTII,S$GLB,, | Performed by: STUDENT IN AN ORGANIZED HEALTH CARE EDUCATION/TRAINING PROGRAM

## 2023-05-22 PROCEDURE — 1159F PR MEDICATION LIST DOCUMENTED IN MEDICAL RECORD: ICD-10-PCS | Mod: CPTII,S$GLB,, | Performed by: STUDENT IN AN ORGANIZED HEALTH CARE EDUCATION/TRAINING PROGRAM

## 2023-05-22 PROCEDURE — 3008F BODY MASS INDEX DOCD: CPT | Mod: CPTII,S$GLB,, | Performed by: STUDENT IN AN ORGANIZED HEALTH CARE EDUCATION/TRAINING PROGRAM

## 2023-05-22 PROCEDURE — 1160F RVW MEDS BY RX/DR IN RCRD: CPT | Mod: CPTII,S$GLB,, | Performed by: STUDENT IN AN ORGANIZED HEALTH CARE EDUCATION/TRAINING PROGRAM

## 2023-05-22 PROCEDURE — 96372 THER/PROPH/DIAG INJ SC/IM: CPT | Mod: S$GLB,,, | Performed by: STUDENT IN AN ORGANIZED HEALTH CARE EDUCATION/TRAINING PROGRAM

## 2023-05-22 PROCEDURE — 99205 PR OFFICE/OUTPT VISIT, NEW, LEVL V, 60-74 MIN: ICD-10-PCS | Mod: 25,S$GLB,, | Performed by: STUDENT IN AN ORGANIZED HEALTH CARE EDUCATION/TRAINING PROGRAM

## 2023-05-22 PROCEDURE — 99999 PR PBB SHADOW E&M-EST. PATIENT-LVL IV: ICD-10-PCS | Mod: PBBFAC,,, | Performed by: STUDENT IN AN ORGANIZED HEALTH CARE EDUCATION/TRAINING PROGRAM

## 2023-05-22 PROCEDURE — 3066F NEPHROPATHY DOC TX: CPT | Mod: CPTII,S$GLB,, | Performed by: STUDENT IN AN ORGANIZED HEALTH CARE EDUCATION/TRAINING PROGRAM

## 2023-05-22 PROCEDURE — 4010F PR ACE/ARB THEARPY RXD/TAKEN: ICD-10-PCS | Mod: CPTII,S$GLB,, | Performed by: STUDENT IN AN ORGANIZED HEALTH CARE EDUCATION/TRAINING PROGRAM

## 2023-05-22 PROCEDURE — 96372 PR INJECTION,THERAP/PROPH/DIAG2ST, IM OR SUBCUT: ICD-10-PCS | Mod: S$GLB,,, | Performed by: STUDENT IN AN ORGANIZED HEALTH CARE EDUCATION/TRAINING PROGRAM

## 2023-05-22 RX ORDER — METHOTREXATE 2.5 MG/1
15 TABLET ORAL
Qty: 72 TABLET | Refills: 1 | Status: SHIPPED | OUTPATIENT
Start: 2023-05-22 | End: 2023-08-31

## 2023-05-22 RX ORDER — FOLIC ACID 1 MG/1
1 TABLET ORAL DAILY
Qty: 90 TABLET | Refills: 3 | Status: ON HOLD | OUTPATIENT
Start: 2023-05-22 | End: 2024-03-22 | Stop reason: HOSPADM

## 2023-05-22 RX ORDER — TRIAMCINOLONE ACETONIDE 40 MG/ML
40 INJECTION, SUSPENSION INTRA-ARTICULAR; INTRAMUSCULAR
Status: COMPLETED | OUTPATIENT
Start: 2023-05-22 | End: 2023-05-22

## 2023-05-22 RX ORDER — PREDNISONE 5 MG/1
10 TABLET ORAL DAILY
Qty: 180 TABLET | Refills: 1 | Status: SHIPPED | OUTPATIENT
Start: 2023-05-22 | End: 2023-11-08 | Stop reason: SDUPTHER

## 2023-05-22 RX ADMIN — TRIAMCINOLONE ACETONIDE 40 MG: 40 INJECTION, SUSPENSION INTRA-ARTICULAR; INTRAMUSCULAR at 10:05

## 2023-05-24 LAB
GAMMA INTERFERON BACKGROUND BLD IA-ACNC: 0 IU/ML
M TB IFN-G CD4+ BCKGRND COR BLD-ACNC: 0.01 IU/ML
MITOGEN IGNF BCKGRD COR BLD-ACNC: 2.27 IU/ML
TB GOLD PLUS: NEGATIVE
TB2 - NIL: 0.02 IU/ML

## 2023-05-25 ENCOUNTER — PATIENT MESSAGE (OUTPATIENT)
Dept: ADMINISTRATIVE | Facility: HOSPITAL | Age: 63
End: 2023-05-25
Payer: COMMERCIAL

## 2023-05-26 ENCOUNTER — PATIENT MESSAGE (OUTPATIENT)
Dept: FAMILY MEDICINE | Facility: CLINIC | Age: 63
End: 2023-05-26
Payer: COMMERCIAL

## 2023-05-30 ENCOUNTER — PATIENT MESSAGE (OUTPATIENT)
Dept: ADMINISTRATIVE | Facility: HOSPITAL | Age: 63
End: 2023-05-30
Payer: COMMERCIAL

## 2023-06-21 ENCOUNTER — LAB VISIT (OUTPATIENT)
Dept: LAB | Facility: CLINIC | Age: 63
End: 2023-06-21
Payer: COMMERCIAL

## 2023-06-21 DIAGNOSIS — Z79.899 HIGH RISK MEDICATION USE: ICD-10-CM

## 2023-06-21 DIAGNOSIS — M05.9 SEROPOSITIVE RHEUMATOID ARTHRITIS: ICD-10-CM

## 2023-06-21 LAB
ALBUMIN SERPL BCP-MCNC: 3.7 G/DL (ref 3.5–5.2)
ALP SERPL-CCNC: 72 U/L (ref 55–135)
ALT SERPL W/O P-5'-P-CCNC: 16 U/L (ref 10–44)
ANION GAP SERPL CALC-SCNC: 12 MMOL/L (ref 8–16)
AST SERPL-CCNC: 15 U/L (ref 10–40)
BASOPHILS # BLD AUTO: 0.07 K/UL (ref 0–0.2)
BASOPHILS NFR BLD: 0.5 % (ref 0–1.9)
BILIRUB SERPL-MCNC: 0.3 MG/DL (ref 0.1–1)
BUN SERPL-MCNC: 14 MG/DL (ref 8–23)
CALCIUM SERPL-MCNC: 10.1 MG/DL (ref 8.7–10.5)
CHLORIDE SERPL-SCNC: 106 MMOL/L (ref 95–110)
CO2 SERPL-SCNC: 22 MMOL/L (ref 23–29)
CREAT SERPL-MCNC: 0.8 MG/DL (ref 0.5–1.4)
DIFFERENTIAL METHOD: ABNORMAL
EOSINOPHIL # BLD AUTO: 0.1 K/UL (ref 0–0.5)
EOSINOPHIL NFR BLD: 0.7 % (ref 0–8)
ERYTHROCYTE [DISTWIDTH] IN BLOOD BY AUTOMATED COUNT: 15.8 % (ref 11.5–14.5)
EST. GFR  (NO RACE VARIABLE): >60 ML/MIN/1.73 M^2
GLUCOSE SERPL-MCNC: 109 MG/DL (ref 70–110)
HCT VFR BLD AUTO: 35.7 % (ref 37–48.5)
HGB BLD-MCNC: 11 G/DL (ref 12–16)
IMM GRANULOCYTES # BLD AUTO: 0.05 K/UL (ref 0–0.04)
IMM GRANULOCYTES NFR BLD AUTO: 0.4 % (ref 0–0.5)
LYMPHOCYTES # BLD AUTO: 2.8 K/UL (ref 1–4.8)
LYMPHOCYTES NFR BLD: 20.6 % (ref 18–48)
MCH RBC QN AUTO: 30.4 PG (ref 27–31)
MCHC RBC AUTO-ENTMCNC: 30.8 G/DL (ref 32–36)
MCV RBC AUTO: 99 FL (ref 82–98)
MONOCYTES # BLD AUTO: 0.8 K/UL (ref 0.3–1)
MONOCYTES NFR BLD: 5.7 % (ref 4–15)
NEUTROPHILS # BLD AUTO: 9.7 K/UL (ref 1.8–7.7)
NEUTROPHILS NFR BLD: 72.1 % (ref 38–73)
NRBC BLD-RTO: 0 /100 WBC
PLATELET # BLD AUTO: 575 K/UL (ref 150–450)
PMV BLD AUTO: 8.4 FL (ref 9.2–12.9)
POTASSIUM SERPL-SCNC: 4.3 MMOL/L (ref 3.5–5.1)
PROT SERPL-MCNC: 7.9 G/DL (ref 6–8.4)
RBC # BLD AUTO: 3.62 M/UL (ref 4–5.4)
SODIUM SERPL-SCNC: 140 MMOL/L (ref 136–145)
WBC # BLD AUTO: 13.48 K/UL (ref 3.9–12.7)

## 2023-06-21 PROCEDURE — 80053 COMPREHEN METABOLIC PANEL: CPT | Performed by: STUDENT IN AN ORGANIZED HEALTH CARE EDUCATION/TRAINING PROGRAM

## 2023-06-21 PROCEDURE — 85025 COMPLETE CBC W/AUTO DIFF WBC: CPT | Performed by: STUDENT IN AN ORGANIZED HEALTH CARE EDUCATION/TRAINING PROGRAM

## 2023-06-23 ENCOUNTER — PATIENT MESSAGE (OUTPATIENT)
Dept: RHEUMATOLOGY | Facility: CLINIC | Age: 63
End: 2023-06-23
Payer: COMMERCIAL

## 2023-06-23 ENCOUNTER — PATIENT MESSAGE (OUTPATIENT)
Dept: FAMILY MEDICINE | Facility: CLINIC | Age: 63
End: 2023-06-23
Payer: COMMERCIAL

## 2023-06-23 DIAGNOSIS — Z79.899 HIGH RISK MEDICATION USE: ICD-10-CM

## 2023-06-23 DIAGNOSIS — M05.9 SEROPOSITIVE RHEUMATOID ARTHRITIS: Primary | ICD-10-CM

## 2023-06-23 DIAGNOSIS — M06.9 RHEUMATOID ARTHRITIS FLARE: ICD-10-CM

## 2023-06-25 ENCOUNTER — PATIENT MESSAGE (OUTPATIENT)
Dept: FAMILY MEDICINE | Facility: CLINIC | Age: 63
End: 2023-06-25
Payer: COMMERCIAL

## 2023-07-27 ENCOUNTER — TELEPHONE (OUTPATIENT)
Dept: FAMILY MEDICINE | Facility: CLINIC | Age: 63
End: 2023-07-27
Payer: COMMERCIAL

## 2023-07-27 ENCOUNTER — OFFICE VISIT (OUTPATIENT)
Dept: FAMILY MEDICINE | Facility: CLINIC | Age: 63
End: 2023-07-27
Payer: COMMERCIAL

## 2023-07-27 VITALS
RESPIRATION RATE: 18 BRPM | HEART RATE: 122 BPM | DIASTOLIC BLOOD PRESSURE: 90 MMHG | SYSTOLIC BLOOD PRESSURE: 136 MMHG | BODY MASS INDEX: 21.76 KG/M2 | TEMPERATURE: 98 F | OXYGEN SATURATION: 97 % | WEIGHT: 122.81 LBS | HEIGHT: 63 IN

## 2023-07-27 DIAGNOSIS — R00.0 TACHYCARDIA: ICD-10-CM

## 2023-07-27 DIAGNOSIS — M06.9 RHEUMATOID ARTHRITIS, INVOLVING UNSPECIFIED SITE, UNSPECIFIED WHETHER RHEUMATOID FACTOR PRESENT: Primary | ICD-10-CM

## 2023-07-27 PROCEDURE — 3044F PR MOST RECENT HEMOGLOBIN A1C LEVEL <7.0%: ICD-10-PCS | Mod: ,,, | Performed by: FAMILY MEDICINE

## 2023-07-27 PROCEDURE — 3075F SYST BP GE 130 - 139MM HG: CPT | Mod: ,,, | Performed by: FAMILY MEDICINE

## 2023-07-27 PROCEDURE — 1159F MED LIST DOCD IN RCRD: CPT | Mod: ,,, | Performed by: FAMILY MEDICINE

## 2023-07-27 PROCEDURE — 3080F PR MOST RECENT DIASTOLIC BLOOD PRESSURE >= 90 MM HG: ICD-10-PCS | Mod: ,,, | Performed by: FAMILY MEDICINE

## 2023-07-27 PROCEDURE — 3066F PR DOCUMENTATION OF TREATMENT FOR NEPHROPATHY: ICD-10-PCS | Mod: ,,, | Performed by: FAMILY MEDICINE

## 2023-07-27 PROCEDURE — 99214 OFFICE O/P EST MOD 30 MIN: CPT | Mod: ,,, | Performed by: FAMILY MEDICINE

## 2023-07-27 PROCEDURE — 4010F ACE/ARB THERAPY RXD/TAKEN: CPT | Mod: ,,, | Performed by: FAMILY MEDICINE

## 2023-07-27 PROCEDURE — 3066F NEPHROPATHY DOC TX: CPT | Mod: ,,, | Performed by: FAMILY MEDICINE

## 2023-07-27 PROCEDURE — 3080F DIAST BP >= 90 MM HG: CPT | Mod: ,,, | Performed by: FAMILY MEDICINE

## 2023-07-27 PROCEDURE — 3008F BODY MASS INDEX DOCD: CPT | Mod: ,,, | Performed by: FAMILY MEDICINE

## 2023-07-27 PROCEDURE — 4010F PR ACE/ARB THEARPY RXD/TAKEN: ICD-10-PCS | Mod: ,,, | Performed by: FAMILY MEDICINE

## 2023-07-27 PROCEDURE — 1159F PR MEDICATION LIST DOCUMENTED IN MEDICAL RECORD: ICD-10-PCS | Mod: ,,, | Performed by: FAMILY MEDICINE

## 2023-07-27 PROCEDURE — 3008F PR BODY MASS INDEX (BMI) DOCUMENTED: ICD-10-PCS | Mod: ,,, | Performed by: FAMILY MEDICINE

## 2023-07-27 PROCEDURE — 3061F PR NEG MICROALBUMINURIA RESULT DOCUMENTED/REVIEW: ICD-10-PCS | Mod: ,,, | Performed by: FAMILY MEDICINE

## 2023-07-27 PROCEDURE — 3075F PR MOST RECENT SYSTOLIC BLOOD PRESS GE 130-139MM HG: ICD-10-PCS | Mod: ,,, | Performed by: FAMILY MEDICINE

## 2023-07-27 PROCEDURE — 3061F NEG MICROALBUMINURIA REV: CPT | Mod: ,,, | Performed by: FAMILY MEDICINE

## 2023-07-27 PROCEDURE — 99214 PR OFFICE/OUTPT VISIT, EST, LEVL IV, 30-39 MIN: ICD-10-PCS | Mod: ,,, | Performed by: FAMILY MEDICINE

## 2023-07-27 PROCEDURE — 3044F HG A1C LEVEL LT 7.0%: CPT | Mod: ,,, | Performed by: FAMILY MEDICINE

## 2023-07-27 RX ORDER — TIZANIDINE 4 MG/1
4 TABLET ORAL NIGHTLY
Qty: 90 TABLET | Refills: 3 | Status: ON HOLD | OUTPATIENT
Start: 2023-07-27 | End: 2024-02-26 | Stop reason: HOSPADM

## 2023-07-27 RX ORDER — ATENOLOL 50 MG/1
50 TABLET ORAL DAILY
Qty: 30 TABLET | Refills: 11 | Status: ON HOLD | OUTPATIENT
Start: 2023-07-27 | End: 2024-03-22 | Stop reason: HOSPADM

## 2023-07-27 NOTE — PROGRESS NOTES
Subjective:       Patient ID: Crow Hines is a 63 y.o. female.    Chief Complaint: Follow-up (Pt presents to the clinic for a 3m f/u. /Pt states she has stopped taking her metoporolol 2.5 months ago due to irregular heart beat and chest heaviness when she was taking the RX) and burp (Pt c/o frequent burping lately. PT states this is something new. /)    Ms. Hines is a 63-year-old female who is here today for her three-month follow-up.  She has a diagnosis of seropositive rheumatoid arthritis, hypertension and peripheral neuropathy.  She is up-to-date on labs as they were last done on 06/21/2023.  These labs have been reviewed and the results are as follows:    1. CBC was remarkable for a WBC 13.48, platelets 575, hemoglobin 11 and hematocrit of 35.7  2. CMP was within normal limits   3. Lipid panel (04/26/2023) total cholesterol 168 HDL 39  triglycerides 92 total/HDL ratio 5.2    Ms. Hines was on metoprolol as well as losartan for her blood pressure however metoprolol gave her an irregular pulse and pressure on her chest so she quit taking today her blood pressure is 136/90 and her pulse is 122.  We discussed the benefits versus risks of beta-blockers and she is willing to try a different beta-blocker so we will go with atenolol.  Ms. Hines he is not working currently due to her rheumatoid arthritis, and chronic knee pain.  She is worried that she will lose her insurance as she is not been able to pay her monthly insurance premium.  Atenolol is an older beta-blocker and inexpensive, so atenolol was sent to her pharmacy today    Follow-up  Associated symptoms include arthralgias and myalgias. Pertinent negatives include no abdominal pain, chest pain, chills, congestion, coughing, diaphoresis, fever, nausea, rash or sore throat.   Review of Systems   Constitutional:  Negative for activity change, appetite change, chills, diaphoresis and fever.   HENT:  Negative for congestion, ear pain, postnasal drip,  rhinorrhea and sore throat.    Eyes:  Negative for pain, discharge, redness and itching.   Respiratory:  Negative for cough and shortness of breath.    Cardiovascular:  Negative for chest pain, palpitations and leg swelling.        Tachycardia and when she took toprol 25mg it made her HR irregular   Gastrointestinal:  Negative for abdominal distention, abdominal pain, constipation, diarrhea and nausea.   Genitourinary:  Negative for difficulty urinating, dysuria, frequency and urgency.   Musculoskeletal:  Positive for arthralgias and myalgias.        Chronic right knee pain due to RA   Skin:  Negative for color change, rash and wound.   All other systems reviewed and are negative.    Patient Active Problem List   Diagnosis    Seropositive rheumatoid arthritis       Objective:      Physical Exam  Vitals and nursing note reviewed.   Constitutional:       Appearance: Normal appearance. She is normal weight.   HENT:      Head: Normocephalic.      Nose: Nose normal.   Eyes:      Extraocular Movements: Extraocular movements intact.      Conjunctiva/sclera: Conjunctivae normal.      Pupils: Pupils are equal, round, and reactive to light.   Cardiovascular:      Rate and Rhythm: Regular rhythm. Tachycardia present.      Heart sounds: Normal heart sounds. No murmur heard.  Pulmonary:      Effort: Pulmonary effort is normal. No respiratory distress.      Breath sounds: Normal breath sounds.   Musculoskeletal:         General: Swelling and tenderness present.      Cervical back: Normal range of motion and neck supple.      Comments: Right knee pain and swelling   Skin:     General: Skin is warm and dry.   Neurological:      General: No focal deficit present.      Mental Status: She is alert and oriented to person, place, and time.   Psychiatric:         Mood and Affect: Mood normal.         Behavior: Behavior normal.       Lab Results   Component Value Date    WBC 13.48 (H) 06/21/2023    HGB 11.0 (L) 06/21/2023    HCT 35.7 (L)  "06/21/2023     (H) 06/21/2023    CHOL 168 04/26/2023    TRIG 92 04/26/2023    HDL 39 (L) 04/26/2023    ALT 16 06/21/2023    AST 15 06/21/2023     06/21/2023    K 4.3 06/21/2023     06/21/2023    CREATININE 0.8 06/21/2023    BUN 14 06/21/2023    CO2 22 (L) 06/21/2023    TSH 0.432 04/26/2023    HGBA1C 5.2 04/26/2023     The 10-year ASCVD risk score (Jared PEPE, et al., 2019) is: 13.8%    Values used to calculate the score:      Age: 63 years      Sex: Female      Is Non- : No      Diabetic: No      Tobacco smoker: Yes      Systolic Blood Pressure: 136 mmHg      Is BP treated: Yes      HDL Cholesterol: 39 mg/dL      Total Cholesterol: 168 mg/dL  Visit Vitals  BP (!) 136/90 (BP Location: Left arm, Patient Position: Sitting, BP Method: Medium (Manual))   Pulse (!) 122   Temp 98 °F (36.7 °C) (Oral)   Resp 18   Ht 5' 3" (1.6 m)   Wt 55.7 kg (122 lb 12.7 oz)   SpO2 97%   BMI 21.75 kg/m²      Assessment:       1. Rheumatoid arthritis, involving unspecified site, unspecified whether rheumatoid factor present    2. Tachycardia        Plan:       1. Rheumatoid arthritis, involving unspecified site, unspecified whether rheumatoid factor present  -     KNEE BRACE FOR HOME USE    2. Tachycardia  -     Ambulatory referral/consult to Cardiology; Future; Expected date: 08/03/2023  -     atenoloL (TENORMIN) 50 MG tablet; Take 1 tablet (50 mg total) by mouth once daily.  Dispense: 30 tablet; Refill: 11    Other orders  -     tiZANidine (ZANAFLEX) 4 MG tablet; Take 1 tablet (4 mg total) by mouth every evening.  Dispense: 90 tablet; Refill: 3       Follow up in about 6 months (around 1/27/2024), or if symptoms worsen or fail to improve.      Future Appointments       Date Provider Specialty Appt Notes    8/25/2023  Lab dr perez labs    8/31/2023 Delores Gonzales MD Rheumatology              "

## 2023-07-28 ENCOUNTER — TELEPHONE (OUTPATIENT)
Dept: FAMILY MEDICINE | Facility: CLINIC | Age: 63
End: 2023-07-28
Payer: COMMERCIAL

## 2023-07-31 ENCOUNTER — TELEPHONE (OUTPATIENT)
Dept: FAMILY MEDICINE | Facility: CLINIC | Age: 63
End: 2023-07-31
Payer: COMMERCIAL

## 2023-08-25 ENCOUNTER — LAB VISIT (OUTPATIENT)
Dept: LAB | Facility: CLINIC | Age: 63
End: 2023-08-25
Payer: COMMERCIAL

## 2023-08-25 DIAGNOSIS — M06.9 RHEUMATOID ARTHRITIS FLARE: ICD-10-CM

## 2023-08-25 DIAGNOSIS — Z79.899 HIGH RISK MEDICATION USE: ICD-10-CM

## 2023-08-25 DIAGNOSIS — M05.9 SEROPOSITIVE RHEUMATOID ARTHRITIS: ICD-10-CM

## 2023-08-25 LAB
ALBUMIN SERPL BCP-MCNC: 3.8 G/DL (ref 3.5–5.2)
ALP SERPL-CCNC: 67 U/L (ref 55–135)
ALT SERPL W/O P-5'-P-CCNC: 17 U/L (ref 10–44)
ANION GAP SERPL CALC-SCNC: 11 MMOL/L (ref 8–16)
AST SERPL-CCNC: 19 U/L (ref 10–40)
BASOPHILS # BLD AUTO: 0.07 K/UL (ref 0–0.2)
BASOPHILS NFR BLD: 0.6 % (ref 0–1.9)
BILIRUB SERPL-MCNC: 0.4 MG/DL (ref 0.1–1)
BUN SERPL-MCNC: 16 MG/DL (ref 8–23)
CALCIUM SERPL-MCNC: 10.1 MG/DL (ref 8.7–10.5)
CHLORIDE SERPL-SCNC: 104 MMOL/L (ref 95–110)
CO2 SERPL-SCNC: 25 MMOL/L (ref 23–29)
CREAT SERPL-MCNC: 1 MG/DL (ref 0.5–1.4)
DIFFERENTIAL METHOD: ABNORMAL
EOSINOPHIL # BLD AUTO: 0.1 K/UL (ref 0–0.5)
EOSINOPHIL NFR BLD: 1.1 % (ref 0–8)
ERYTHROCYTE [DISTWIDTH] IN BLOOD BY AUTOMATED COUNT: 15.9 % (ref 11.5–14.5)
EST. GFR  (NO RACE VARIABLE): >60 ML/MIN/1.73 M^2
GLUCOSE SERPL-MCNC: 88 MG/DL (ref 70–110)
HCT VFR BLD AUTO: 38.1 % (ref 37–48.5)
HGB BLD-MCNC: 12.1 G/DL (ref 12–16)
IMM GRANULOCYTES # BLD AUTO: 0.06 K/UL (ref 0–0.04)
IMM GRANULOCYTES NFR BLD AUTO: 0.5 % (ref 0–0.5)
LYMPHOCYTES # BLD AUTO: 3.8 K/UL (ref 1–4.8)
LYMPHOCYTES NFR BLD: 32.9 % (ref 18–48)
MCH RBC QN AUTO: 33.4 PG (ref 27–31)
MCHC RBC AUTO-ENTMCNC: 31.8 G/DL (ref 32–36)
MCV RBC AUTO: 105 FL (ref 82–98)
MONOCYTES # BLD AUTO: 0.6 K/UL (ref 0.3–1)
MONOCYTES NFR BLD: 5.4 % (ref 4–15)
NEUTROPHILS # BLD AUTO: 6.8 K/UL (ref 1.8–7.7)
NEUTROPHILS NFR BLD: 59.5 % (ref 38–73)
NRBC BLD-RTO: 0 /100 WBC
PLATELET # BLD AUTO: 532 K/UL (ref 150–450)
PMV BLD AUTO: 8.9 FL (ref 9.2–12.9)
POTASSIUM SERPL-SCNC: 4 MMOL/L (ref 3.5–5.1)
PROT SERPL-MCNC: 7.8 G/DL (ref 6–8.4)
RBC # BLD AUTO: 3.62 M/UL (ref 4–5.4)
SODIUM SERPL-SCNC: 140 MMOL/L (ref 136–145)
WBC # BLD AUTO: 11.44 K/UL (ref 3.9–12.7)

## 2023-08-25 PROCEDURE — 80053 COMPREHEN METABOLIC PANEL: CPT | Performed by: STUDENT IN AN ORGANIZED HEALTH CARE EDUCATION/TRAINING PROGRAM

## 2023-08-25 PROCEDURE — 85025 COMPLETE CBC W/AUTO DIFF WBC: CPT | Performed by: STUDENT IN AN ORGANIZED HEALTH CARE EDUCATION/TRAINING PROGRAM

## 2023-08-30 NOTE — PROGRESS NOTES
"Subjective:      Patient ID: Crow Hines is a 63 y.o. female.    Chief Complaint: Disease Management    HPI    Rheumatologic History:   - Diagnosis/es:              - Seropositive RA diagnosed in 5/2023 and characterized by polyarticular inflammatory arthritis  - Family History: No autoimmune conditions   - Positive serologies: + RF (235), + CCP (512.4)  - Negative serologies: HINA  - Infectious screening labs:  Negative hepatitis-B, C, and QuantiFERON (05/2023)  - Imaging:              - Xray right hip (4/2023) negative              - Xray bilateral wrists (4/2023): severe CMC OA  - Xray right knee (5/2023): right knee effusion  - Previous Treatments: -  - Current Treatments:   - methotrexate 15mg weekly plus folic acid (5/2023- )  - Prednisone 10mg PO daily  Interval History:   She has had 30% improvement in her joint pain and swelling since starting methotrexate but still has significant pain and swelling in her right knee.     Objective:   BP (!) 159/92   Pulse 95   Ht 5' 3" (1.6 m)   Wt 57 kg (125 lb 10.6 oz)   BMI 22.26 kg/m²   Physical Exam   Constitutional: normal appearance.   HENT:   Head: Normocephalic and atraumatic.   Pulmonary/Chest: Effort normal.   Musculoskeletal:      Comments: Warmth, tenderness and swelling of the right knee   Neurological: She is alert.   Skin: Skin is warm and dry. No rash noted.      5/22/2023 8/31/2023   Tender (READ-28) 18 / 28 1 / 28    Swollen (READ-28) 14 / 28 1 / 28    Provider Global 90 mm 80 mm   Patient Global 90 mm 80 mm   ESR 72 mm/hr --   CRP 1.77 mg/L --   READ-28 (ESR) 7.68 (High disease activity) --   READ-28 (CRP) 6.01 (High disease activity) --   CDAI Score 50  18      Labs independently reviewed by me (08/25/2023)  CBC Hgb and WBC WNL, Plt 532 <- 575  CMP WNL     Assessment:     1. Seropositive rheumatoid arthritis    2. Rheumatoid arthritis flare    3. High risk medication use    4. Immunosuppression      This is a 62-year-old woman with history of " anemia, tobacco use (cutting down), and seropositive RA on MTX 15mg weekly plus folic acid daily and prednisone 10mg daily. CDAI is consistent with moderate disease activity.  Given monoarticular nature, I attempted joint aspiration but was not able to obtain any fluid.  CSI was completed.  Will increase methotrexate to 20 mg split dose weekly and add tramadol p.r.n. for now    Plan:     Problem List Items Addressed This Visit          Immunology/Multi System    Seropositive rheumatoid arthritis - Primary     Other Visit Diagnoses       Rheumatoid arthritis flare        High risk medication use        Immunosuppression              - Right knee CSI completed   - methotrexate 20mg split dose weekly plus folic acid  - Prednisone 10mg PO daily  - CBC, CMP, ESR, CRP every 12 weeks  - Pre-DMARD labs due 5/2024  - Immunizations: I recommended the flu, COVID, pneumonia, and shingles vaccines. She declined on the basis of Sabianist reasons.   - Obtain DEXA    Follow up in 3 months    30 minutes of total time spent on the encounter, which includes face to face time and non-face to face time preparing to see the patient (eg, review of tests), Obtaining and/or reviewing separately obtained history, Documenting clinical information in the electronic or other health record, Independently interpreting results (not separately reported) and communicating results to the patient/family/caregiver, or Care coordination (not separately reported).       Delores Gonzales M.D.  Rheumatology Dept  [place], [place]

## 2023-08-31 ENCOUNTER — OFFICE VISIT (OUTPATIENT)
Dept: RHEUMATOLOGY | Facility: CLINIC | Age: 63
End: 2023-08-31
Payer: COMMERCIAL

## 2023-08-31 VITALS
HEART RATE: 95 BPM | SYSTOLIC BLOOD PRESSURE: 159 MMHG | DIASTOLIC BLOOD PRESSURE: 92 MMHG | HEIGHT: 63 IN | WEIGHT: 125.69 LBS | BODY MASS INDEX: 22.27 KG/M2

## 2023-08-31 DIAGNOSIS — M05.9 SEROPOSITIVE RHEUMATOID ARTHRITIS: Primary | ICD-10-CM

## 2023-08-31 DIAGNOSIS — M06.9 RHEUMATOID ARTHRITIS FLARE: ICD-10-CM

## 2023-08-31 DIAGNOSIS — Z78.0 ASYMPTOMATIC MENOPAUSE: ICD-10-CM

## 2023-08-31 DIAGNOSIS — Z79.899 HIGH RISK MEDICATION USE: ICD-10-CM

## 2023-08-31 DIAGNOSIS — D84.9 IMMUNOSUPPRESSION: ICD-10-CM

## 2023-08-31 PROCEDURE — 3061F PR NEG MICROALBUMINURIA RESULT DOCUMENTED/REVIEW: ICD-10-PCS | Mod: CPTII,S$GLB,, | Performed by: STUDENT IN AN ORGANIZED HEALTH CARE EDUCATION/TRAINING PROGRAM

## 2023-08-31 PROCEDURE — 3008F BODY MASS INDEX DOCD: CPT | Mod: CPTII,S$GLB,, | Performed by: STUDENT IN AN ORGANIZED HEALTH CARE EDUCATION/TRAINING PROGRAM

## 2023-08-31 PROCEDURE — 20610 LARGE JOINT ASPIRATION/INJECTION: R KNEE: ICD-10-PCS | Mod: RT,S$GLB,, | Performed by: STUDENT IN AN ORGANIZED HEALTH CARE EDUCATION/TRAINING PROGRAM

## 2023-08-31 PROCEDURE — 99215 PR OFFICE/OUTPT VISIT, EST, LEVL V, 40-54 MIN: ICD-10-PCS | Mod: 25,S$GLB,, | Performed by: STUDENT IN AN ORGANIZED HEALTH CARE EDUCATION/TRAINING PROGRAM

## 2023-08-31 PROCEDURE — 99999 PR PBB SHADOW E&M-EST. PATIENT-LVL IV: CPT | Mod: PBBFAC,,, | Performed by: STUDENT IN AN ORGANIZED HEALTH CARE EDUCATION/TRAINING PROGRAM

## 2023-08-31 PROCEDURE — 1159F PR MEDICATION LIST DOCUMENTED IN MEDICAL RECORD: ICD-10-PCS | Mod: CPTII,S$GLB,, | Performed by: STUDENT IN AN ORGANIZED HEALTH CARE EDUCATION/TRAINING PROGRAM

## 2023-08-31 PROCEDURE — 3044F PR MOST RECENT HEMOGLOBIN A1C LEVEL <7.0%: ICD-10-PCS | Mod: CPTII,S$GLB,, | Performed by: STUDENT IN AN ORGANIZED HEALTH CARE EDUCATION/TRAINING PROGRAM

## 2023-08-31 PROCEDURE — 20610 DRAIN/INJ JOINT/BURSA W/O US: CPT | Mod: RT,S$GLB,, | Performed by: STUDENT IN AN ORGANIZED HEALTH CARE EDUCATION/TRAINING PROGRAM

## 2023-08-31 PROCEDURE — 3066F NEPHROPATHY DOC TX: CPT | Mod: CPTII,S$GLB,, | Performed by: STUDENT IN AN ORGANIZED HEALTH CARE EDUCATION/TRAINING PROGRAM

## 2023-08-31 PROCEDURE — 4010F ACE/ARB THERAPY RXD/TAKEN: CPT | Mod: CPTII,S$GLB,, | Performed by: STUDENT IN AN ORGANIZED HEALTH CARE EDUCATION/TRAINING PROGRAM

## 2023-08-31 PROCEDURE — 99999 PR PBB SHADOW E&M-EST. PATIENT-LVL IV: ICD-10-PCS | Mod: PBBFAC,,, | Performed by: STUDENT IN AN ORGANIZED HEALTH CARE EDUCATION/TRAINING PROGRAM

## 2023-08-31 PROCEDURE — 3066F PR DOCUMENTATION OF TREATMENT FOR NEPHROPATHY: ICD-10-PCS | Mod: CPTII,S$GLB,, | Performed by: STUDENT IN AN ORGANIZED HEALTH CARE EDUCATION/TRAINING PROGRAM

## 2023-08-31 PROCEDURE — 1160F PR REVIEW ALL MEDS BY PRESCRIBER/CLIN PHARMACIST DOCUMENTED: ICD-10-PCS | Mod: CPTII,S$GLB,, | Performed by: STUDENT IN AN ORGANIZED HEALTH CARE EDUCATION/TRAINING PROGRAM

## 2023-08-31 PROCEDURE — 99215 OFFICE O/P EST HI 40 MIN: CPT | Mod: 25,S$GLB,, | Performed by: STUDENT IN AN ORGANIZED HEALTH CARE EDUCATION/TRAINING PROGRAM

## 2023-08-31 PROCEDURE — 3080F DIAST BP >= 90 MM HG: CPT | Mod: CPTII,S$GLB,, | Performed by: STUDENT IN AN ORGANIZED HEALTH CARE EDUCATION/TRAINING PROGRAM

## 2023-08-31 PROCEDURE — 3077F SYST BP >= 140 MM HG: CPT | Mod: CPTII,S$GLB,, | Performed by: STUDENT IN AN ORGANIZED HEALTH CARE EDUCATION/TRAINING PROGRAM

## 2023-08-31 PROCEDURE — 3080F PR MOST RECENT DIASTOLIC BLOOD PRESSURE >= 90 MM HG: ICD-10-PCS | Mod: CPTII,S$GLB,, | Performed by: STUDENT IN AN ORGANIZED HEALTH CARE EDUCATION/TRAINING PROGRAM

## 2023-08-31 PROCEDURE — 3061F NEG MICROALBUMINURIA REV: CPT | Mod: CPTII,S$GLB,, | Performed by: STUDENT IN AN ORGANIZED HEALTH CARE EDUCATION/TRAINING PROGRAM

## 2023-08-31 PROCEDURE — 1159F MED LIST DOCD IN RCRD: CPT | Mod: CPTII,S$GLB,, | Performed by: STUDENT IN AN ORGANIZED HEALTH CARE EDUCATION/TRAINING PROGRAM

## 2023-08-31 PROCEDURE — 3008F PR BODY MASS INDEX (BMI) DOCUMENTED: ICD-10-PCS | Mod: CPTII,S$GLB,, | Performed by: STUDENT IN AN ORGANIZED HEALTH CARE EDUCATION/TRAINING PROGRAM

## 2023-08-31 PROCEDURE — 3044F HG A1C LEVEL LT 7.0%: CPT | Mod: CPTII,S$GLB,, | Performed by: STUDENT IN AN ORGANIZED HEALTH CARE EDUCATION/TRAINING PROGRAM

## 2023-08-31 PROCEDURE — 4010F PR ACE/ARB THEARPY RXD/TAKEN: ICD-10-PCS | Mod: CPTII,S$GLB,, | Performed by: STUDENT IN AN ORGANIZED HEALTH CARE EDUCATION/TRAINING PROGRAM

## 2023-08-31 PROCEDURE — 3077F PR MOST RECENT SYSTOLIC BLOOD PRESSURE >= 140 MM HG: ICD-10-PCS | Mod: CPTII,S$GLB,, | Performed by: STUDENT IN AN ORGANIZED HEALTH CARE EDUCATION/TRAINING PROGRAM

## 2023-08-31 PROCEDURE — 1160F RVW MEDS BY RX/DR IN RCRD: CPT | Mod: CPTII,S$GLB,, | Performed by: STUDENT IN AN ORGANIZED HEALTH CARE EDUCATION/TRAINING PROGRAM

## 2023-08-31 RX ORDER — LIDOCAINE HYDROCHLORIDE 10 MG/ML
4 INJECTION INFILTRATION; PERINEURAL
Status: DISCONTINUED | OUTPATIENT
Start: 2023-08-31 | End: 2023-08-31 | Stop reason: HOSPADM

## 2023-08-31 RX ORDER — TRAMADOL HYDROCHLORIDE 50 MG/1
50 TABLET ORAL
Qty: 30 TABLET | Refills: 0 | Status: SHIPPED | OUTPATIENT
Start: 2023-08-31 | End: 2023-12-13 | Stop reason: SDUPTHER

## 2023-08-31 RX ORDER — METHOTREXATE 2.5 MG/1
20 TABLET ORAL
Qty: 96 TABLET | Refills: 1 | Status: SHIPPED | OUTPATIENT
Start: 2023-08-31 | End: 2023-10-19 | Stop reason: SDUPTHER

## 2023-08-31 RX ORDER — TRIAMCINOLONE ACETONIDE 40 MG/ML
40 INJECTION, SUSPENSION INTRA-ARTICULAR; INTRAMUSCULAR
Status: DISCONTINUED | OUTPATIENT
Start: 2023-08-31 | End: 2023-08-31 | Stop reason: HOSPADM

## 2023-08-31 RX ADMIN — LIDOCAINE HYDROCHLORIDE 4 ML: 10 INJECTION INFILTRATION; PERINEURAL at 01:08

## 2023-08-31 RX ADMIN — TRIAMCINOLONE ACETONIDE 40 MG: 40 INJECTION, SUSPENSION INTRA-ARTICULAR; INTRAMUSCULAR at 01:08

## 2023-08-31 NOTE — PROCEDURES
Large Joint Aspiration/Injection: R knee    Date/Time: 8/31/2023 1:30 PM    Performed by: Delores Gonzales MD  Authorized by: Delores Gonzales MD    Consent Done?:  Yes (Verbal)  Indications:  Pain  Site marked: the procedure site was marked    Timeout: prior to procedure the correct patient, procedure, and site was verified      Details:  Needle Size:  25 G  Ultrasonic Guidance for needle placement?: No    Approach:  Anterolateral  Location:  Knee  Site:  R knee  Medications:  4 mL LIDOcaine HCL 10 mg/ml (1%) 10 mg/mL (1 %); 40 mg triamcinolone acetonide 40 mg/mL  Patient tolerance:  Patient tolerated the procedure well with no immediate complications

## 2023-09-09 ENCOUNTER — PATIENT MESSAGE (OUTPATIENT)
Dept: RHEUMATOLOGY | Facility: CLINIC | Age: 63
End: 2023-09-09
Payer: COMMERCIAL

## 2023-09-11 DIAGNOSIS — D84.9 IMMUNOSUPPRESSION: ICD-10-CM

## 2023-09-11 DIAGNOSIS — Z79.899 HIGH RISK MEDICATION USE: ICD-10-CM

## 2023-09-11 DIAGNOSIS — M05.9 SEROPOSITIVE RHEUMATOID ARTHRITIS: Primary | ICD-10-CM

## 2023-10-03 ENCOUNTER — LAB VISIT (OUTPATIENT)
Dept: LAB | Facility: HOSPITAL | Age: 63
End: 2023-10-03
Attending: STUDENT IN AN ORGANIZED HEALTH CARE EDUCATION/TRAINING PROGRAM
Payer: COMMERCIAL

## 2023-10-03 DIAGNOSIS — M05.9 SEROPOSITIVE RHEUMATOID ARTHRITIS: ICD-10-CM

## 2023-10-03 DIAGNOSIS — Z79.899 HIGH RISK MEDICATION USE: ICD-10-CM

## 2023-10-03 DIAGNOSIS — D84.9 IMMUNOSUPPRESSION: ICD-10-CM

## 2023-10-03 DIAGNOSIS — M06.9 RHEUMATOID ARTHRITIS FLARE: ICD-10-CM

## 2023-10-03 LAB
ALBUMIN SERPL BCP-MCNC: 3.7 G/DL (ref 3.5–5.2)
ALP SERPL-CCNC: 76 U/L (ref 55–135)
ALT SERPL W/O P-5'-P-CCNC: 13 U/L (ref 10–44)
ANION GAP SERPL CALC-SCNC: 10 MMOL/L (ref 8–16)
AST SERPL-CCNC: 18 U/L (ref 10–40)
BASOPHILS # BLD AUTO: 0.09 K/UL (ref 0–0.2)
BASOPHILS NFR BLD: 0.7 % (ref 0–1.9)
BILIRUB SERPL-MCNC: 0.2 MG/DL (ref 0.1–1)
BUN SERPL-MCNC: 16 MG/DL (ref 8–23)
CALCIUM SERPL-MCNC: 10.3 MG/DL (ref 8.7–10.5)
CHLORIDE SERPL-SCNC: 102 MMOL/L (ref 95–110)
CO2 SERPL-SCNC: 27 MMOL/L (ref 23–29)
CREAT SERPL-MCNC: 1 MG/DL (ref 0.5–1.4)
CRP SERPL-MCNC: 3.3 MG/L (ref 0–8.2)
DIFFERENTIAL METHOD: ABNORMAL
EOSINOPHIL # BLD AUTO: 0.1 K/UL (ref 0–0.5)
EOSINOPHIL NFR BLD: 0.8 % (ref 0–8)
ERYTHROCYTE [DISTWIDTH] IN BLOOD BY AUTOMATED COUNT: 13.7 % (ref 11.5–14.5)
ERYTHROCYTE [SEDIMENTATION RATE] IN BLOOD BY WESTERGREN METHOD: 62 MM/HR (ref 0–20)
EST. GFR  (NO RACE VARIABLE): >60 ML/MIN/1.73 M^2
GLUCOSE SERPL-MCNC: 119 MG/DL (ref 70–110)
HCT VFR BLD AUTO: 40.9 % (ref 37–48.5)
HGB BLD-MCNC: 13.4 G/DL (ref 12–16)
IMM GRANULOCYTES # BLD AUTO: 0.09 K/UL (ref 0–0.04)
IMM GRANULOCYTES NFR BLD AUTO: 0.7 % (ref 0–0.5)
LYMPHOCYTES # BLD AUTO: 2.8 K/UL (ref 1–4.8)
LYMPHOCYTES NFR BLD: 20.8 % (ref 18–48)
MCH RBC QN AUTO: 34.8 PG (ref 27–31)
MCHC RBC AUTO-ENTMCNC: 32.8 G/DL (ref 32–36)
MCV RBC AUTO: 106 FL (ref 82–98)
MONOCYTES # BLD AUTO: 1.2 K/UL (ref 0.3–1)
MONOCYTES NFR BLD: 9.2 % (ref 4–15)
NEUTROPHILS # BLD AUTO: 9.1 K/UL (ref 1.8–7.7)
NEUTROPHILS NFR BLD: 67.8 % (ref 38–73)
NRBC BLD-RTO: 0 /100 WBC
PLATELET # BLD AUTO: 514 K/UL (ref 150–450)
PMV BLD AUTO: 8.7 FL (ref 9.2–12.9)
POTASSIUM SERPL-SCNC: 4 MMOL/L (ref 3.5–5.1)
PROT SERPL-MCNC: 7.5 G/DL (ref 6–8.4)
RBC # BLD AUTO: 3.85 M/UL (ref 4–5.4)
SODIUM SERPL-SCNC: 139 MMOL/L (ref 136–145)
WBC # BLD AUTO: 13.32 K/UL (ref 3.9–12.7)

## 2023-10-03 PROCEDURE — 36415 COLL VENOUS BLD VENIPUNCTURE: CPT | Mod: PO | Performed by: STUDENT IN AN ORGANIZED HEALTH CARE EDUCATION/TRAINING PROGRAM

## 2023-10-03 PROCEDURE — 80053 COMPREHEN METABOLIC PANEL: CPT | Performed by: STUDENT IN AN ORGANIZED HEALTH CARE EDUCATION/TRAINING PROGRAM

## 2023-10-03 PROCEDURE — 85651 RBC SED RATE NONAUTOMATED: CPT | Mod: PO | Performed by: STUDENT IN AN ORGANIZED HEALTH CARE EDUCATION/TRAINING PROGRAM

## 2023-10-03 PROCEDURE — 86140 C-REACTIVE PROTEIN: CPT | Performed by: STUDENT IN AN ORGANIZED HEALTH CARE EDUCATION/TRAINING PROGRAM

## 2023-10-03 PROCEDURE — 85025 COMPLETE CBC W/AUTO DIFF WBC: CPT | Performed by: STUDENT IN AN ORGANIZED HEALTH CARE EDUCATION/TRAINING PROGRAM

## 2023-10-19 ENCOUNTER — PATIENT MESSAGE (OUTPATIENT)
Dept: ADMINISTRATIVE | Facility: HOSPITAL | Age: 63
End: 2023-10-19

## 2023-10-19 DIAGNOSIS — Z79.899 HIGH RISK MEDICATION USE: ICD-10-CM

## 2023-10-19 DIAGNOSIS — M05.9 SEROPOSITIVE RHEUMATOID ARTHRITIS: ICD-10-CM

## 2023-10-19 RX ORDER — METHOTREXATE 2.5 MG/1
20 TABLET ORAL
Qty: 96 TABLET | Refills: 1 | Status: SHIPPED | OUTPATIENT
Start: 2023-10-19 | End: 2023-12-13 | Stop reason: SDUPTHER

## 2023-10-19 NOTE — TELEPHONE ENCOUNTER
Pharmacy requesting refill on Methotrexate 2.5mg  Pt's LOV 08/31/2023  Pt's NOV 12/13/2023  Medication pending

## 2023-11-08 DIAGNOSIS — M05.9 SEROPOSITIVE RHEUMATOID ARTHRITIS: ICD-10-CM

## 2023-11-08 DIAGNOSIS — M06.9 RHEUMATOID ARTHRITIS FLARE: ICD-10-CM

## 2023-11-08 RX ORDER — PREDNISONE 5 MG/1
10 TABLET ORAL DAILY
Qty: 180 TABLET | Refills: 1 | Status: ON HOLD | OUTPATIENT
Start: 2023-11-08 | End: 2024-02-26 | Stop reason: HOSPADM

## 2023-12-01 ENCOUNTER — LAB VISIT (OUTPATIENT)
Dept: LAB | Facility: HOSPITAL | Age: 63
End: 2023-12-01
Attending: STUDENT IN AN ORGANIZED HEALTH CARE EDUCATION/TRAINING PROGRAM

## 2023-12-01 DIAGNOSIS — D84.9 IMMUNOSUPPRESSION: ICD-10-CM

## 2023-12-01 DIAGNOSIS — Z79.899 HIGH RISK MEDICATION USE: ICD-10-CM

## 2023-12-01 DIAGNOSIS — M05.9 SEROPOSITIVE RHEUMATOID ARTHRITIS: ICD-10-CM

## 2023-12-01 DIAGNOSIS — M06.9 RHEUMATOID ARTHRITIS FLARE: ICD-10-CM

## 2023-12-01 LAB
ALBUMIN SERPL BCP-MCNC: 3.4 G/DL (ref 3.5–5.2)
ALP SERPL-CCNC: 77 U/L (ref 55–135)
ALT SERPL W/O P-5'-P-CCNC: 13 U/L (ref 10–44)
ANION GAP SERPL CALC-SCNC: 15 MMOL/L (ref 8–16)
AST SERPL-CCNC: 23 U/L (ref 10–40)
BASOPHILS # BLD AUTO: 0.09 K/UL (ref 0–0.2)
BASOPHILS NFR BLD: 0.6 % (ref 0–1.9)
BILIRUB SERPL-MCNC: 0.4 MG/DL (ref 0.1–1)
BUN SERPL-MCNC: 12 MG/DL (ref 8–23)
CALCIUM SERPL-MCNC: 10.5 MG/DL (ref 8.7–10.5)
CHLORIDE SERPL-SCNC: 102 MMOL/L (ref 95–110)
CO2 SERPL-SCNC: 22 MMOL/L (ref 23–29)
CREAT SERPL-MCNC: 0.8 MG/DL (ref 0.5–1.4)
CRP SERPL-MCNC: 28.1 MG/L (ref 0–8.2)
DIFFERENTIAL METHOD: ABNORMAL
EOSINOPHIL # BLD AUTO: 0.2 K/UL (ref 0–0.5)
EOSINOPHIL NFR BLD: 1 % (ref 0–8)
ERYTHROCYTE [DISTWIDTH] IN BLOOD BY AUTOMATED COUNT: 12.4 % (ref 11.5–14.5)
ERYTHROCYTE [SEDIMENTATION RATE] IN BLOOD BY WESTERGREN METHOD: 99 MM/HR (ref 0–20)
EST. GFR  (NO RACE VARIABLE): >60 ML/MIN/1.73 M^2
GLUCOSE SERPL-MCNC: 110 MG/DL (ref 70–110)
HCT VFR BLD AUTO: 38.4 % (ref 37–48.5)
HGB BLD-MCNC: 12.3 G/DL (ref 12–16)
IMM GRANULOCYTES # BLD AUTO: 0.06 K/UL (ref 0–0.04)
IMM GRANULOCYTES NFR BLD AUTO: 0.4 % (ref 0–0.5)
LYMPHOCYTES # BLD AUTO: 1.2 K/UL (ref 1–4.8)
LYMPHOCYTES NFR BLD: 7.9 % (ref 18–48)
MCH RBC QN AUTO: 33.4 PG (ref 27–31)
MCHC RBC AUTO-ENTMCNC: 32 G/DL (ref 32–36)
MCV RBC AUTO: 104 FL (ref 82–98)
MONOCYTES # BLD AUTO: 0.4 K/UL (ref 0.3–1)
MONOCYTES NFR BLD: 2.9 % (ref 4–15)
NEUTROPHILS # BLD AUTO: 13.2 K/UL (ref 1.8–7.7)
NEUTROPHILS NFR BLD: 87.2 % (ref 38–73)
NRBC BLD-RTO: 0 /100 WBC
PLATELET # BLD AUTO: 651 K/UL (ref 150–450)
PMV BLD AUTO: 9.2 FL (ref 9.2–12.9)
POTASSIUM SERPL-SCNC: 4.2 MMOL/L (ref 3.5–5.1)
PROT SERPL-MCNC: 7.5 G/DL (ref 6–8.4)
RBC # BLD AUTO: 3.68 M/UL (ref 4–5.4)
SODIUM SERPL-SCNC: 139 MMOL/L (ref 136–145)
WBC # BLD AUTO: 15.12 K/UL (ref 3.9–12.7)

## 2023-12-01 PROCEDURE — 86140 C-REACTIVE PROTEIN: CPT | Performed by: STUDENT IN AN ORGANIZED HEALTH CARE EDUCATION/TRAINING PROGRAM

## 2023-12-01 PROCEDURE — 80053 COMPREHEN METABOLIC PANEL: CPT | Performed by: STUDENT IN AN ORGANIZED HEALTH CARE EDUCATION/TRAINING PROGRAM

## 2023-12-01 PROCEDURE — 85651 RBC SED RATE NONAUTOMATED: CPT | Mod: PO | Performed by: STUDENT IN AN ORGANIZED HEALTH CARE EDUCATION/TRAINING PROGRAM

## 2023-12-01 PROCEDURE — 85025 COMPLETE CBC W/AUTO DIFF WBC: CPT | Performed by: STUDENT IN AN ORGANIZED HEALTH CARE EDUCATION/TRAINING PROGRAM

## 2023-12-01 PROCEDURE — 36415 COLL VENOUS BLD VENIPUNCTURE: CPT | Mod: PO | Performed by: STUDENT IN AN ORGANIZED HEALTH CARE EDUCATION/TRAINING PROGRAM

## 2023-12-04 ENCOUNTER — PATIENT MESSAGE (OUTPATIENT)
Dept: RHEUMATOLOGY | Facility: CLINIC | Age: 63
End: 2023-12-04

## 2023-12-11 NOTE — PROGRESS NOTES
"Subjective:      Patient ID: Crow Hines is a 63 y.o. female.    Chief Complaint: Disease Management    HPI    Rheumatologic History:   - Diagnosis/es:              - Seropositive RA diagnosed in 5/2023 and characterized by polyarticular inflammatory arthritis  - Family History: No autoimmune conditions   - Positive serologies: + RF (235), + CCP (512.4)  - Negative serologies: HINA  - Infectious screening labs:  Negative hepatitis-B, C, and QuantiFERON (05/2023)  - Imaging:              - Xray right hip (4/2023) negative              - Xray bilateral wrists (4/2023): severe CMC OA  - Xray right knee (5/2023): right knee effusion  - Previous Treatments: -  - Current Treatments:   - methotrexate 15mg weekly plus folic acid (5/2023- ): higher doses caused GI upset  - Prednisone 10mg PO daily  Interval History:   The patient states that when she was on 20 mg of methotrexate, she developed gastrointestinal upset and tarry stools.  Since decreasing her methotrexate to 15 mg weekly, her gastrointestinal issues have resolved.  However, she has had worsening of her joint pain and swelling in the hands, wrists, shoulders, and knees.  She is still smoking but has cut down to 10-12 cigarettes per day.    Objective:   /75   Pulse 105   Ht 5' 3" (1.6 m)   Wt 56 kg (123 lb 7.3 oz)   BMI 21.87 kg/m²   Physical Exam   Constitutional: normal appearance.   HENT:   Head: Normocephalic and atraumatic.   Cardiovascular: Normal rate, regular rhythm and normal heart sounds.   Pulmonary/Chest: Effort normal and breath sounds normal.   Musculoskeletal:      Comments:  Tenderness and swelling of bilateral wrists, bilateral 2nd and 3rd MCPs, and bilateral knees right greater than left   Neurological: She is alert.   Skin: Skin is warm and dry. No rash noted.        5/22/2023 8/31/2023 12/13/2023   Tender (READ-28) 18 / 28  1 / 28  10 / 28    Swollen (READ-28) 14 / 28 1 / 28 8 / 28    Provider Global 90 mm 80 mm 70 mm   Patient " Global 90 mm 80 mm 70 mm   ESR 72 mm/hr -- 99 mm/hr   CRP 1.77 mg/L -- 28.1 mg/L   READ-28 (ESR) 7.68 (High disease activity) -- 6.76 (High disease activity)   READ-28 (CRP) 6.01 (High disease activity) -- 5.72 (High disease activity)   CDAI Score 50  18  32      Labs independently reviewed by me (12/1/2023)   CBC WBC 15.12, , HGB WNL  CMP CR, LFTs WNL   ESR 99 <- 62  CRP 28.1 <- 3.3    Assessment:     1. Seropositive rheumatoid arthritis    2. High risk medication use    3. Immunosuppression    4. Rheumatoid arthritis flare    5. Tobacco abuse counseling    6. Uninsured      This is a 63-year-old woman with history of anemia, tobacco use (cutting down), and seropositive RA on MTX 15mg weekly plus folic acid daily and prednisone 10mg daily. DAS28 ESR, CRP, and CDAI  are consistent with high disease activity.  She was intolerant of full-dose methotrexate, and I do not think she will tolerate leflunomide.  She needs to be on a biologic.  She recently lost her insurance due to inability to work.  I will see if we can get financial assistance to add Humira.    Plan:     Problem List Items Addressed This Visit          Immunology/Multi System    Seropositive rheumatoid arthritis - Primary    Relevant Medications    adalimumab (HUMIRA,CF, PEN) 40 mg/0.4 mL PnKt    methotrexate 2.5 MG Tab    Other Relevant Orders    CBC Auto Differential    Comprehensive Metabolic Panel    Sedimentation rate    C-Reactive Protein     Other Visit Diagnoses       High risk medication use        Relevant Medications    methotrexate 2.5 MG Tab    Other Relevant Orders    CBC Auto Differential    Comprehensive Metabolic Panel    Sedimentation rate    C-Reactive Protein    Immunosuppression        Relevant Orders    CBC Auto Differential    Comprehensive Metabolic Panel    Sedimentation rate    C-Reactive Protein    Rheumatoid arthritis flare        Tobacco abuse counseling        Uninsured              - Request PA for Humira biweekly. I  discussed potential side effects including injection site reactions, malignancy, infection, blood count, liver and kidney function abnormalities.  Hold for infection. ACR patient information sheet on TNF inhibitors was provided.  - methotrexate 15mg weekly plus folic acid  - Prednisone 10mg PO daily  - CBC, CMP, ESR, CRP every 12 weeks  - Pre-DMARD labs due 5/2024  - Immunizations: I recommended the flu, COVID, pneumonia, and shingles vaccines. She declined on the basis of Cheondoism reasons.   - Obtain DEXA once patient has insurance again  - I strongly encouraged smoking cessation     Follow up in 3 months    30 minutes of total time spent on the encounter, which includes face to face time and non-face to face time preparing to see the patient (eg, review of tests), Obtaining and/or reviewing separately obtained history, Documenting clinical information in the electronic or other health record, Independently interpreting results (not separately reported) and communicating results to the patient/family/caregiver, or Care coordination (not separately reported).     This note was prepared with [employer] voice recognition transcription software. Garbled syntax, mangled pronouns, and other bizarre constructions may be attributed to that software system       Delores Gonzales M.D.  Rheumatology Dept  [place], [place]     [id number]

## 2023-12-13 ENCOUNTER — OFFICE VISIT (OUTPATIENT)
Dept: RHEUMATOLOGY | Facility: CLINIC | Age: 63
End: 2023-12-13

## 2023-12-13 VITALS
WEIGHT: 123.44 LBS | HEART RATE: 105 BPM | HEIGHT: 63 IN | BODY MASS INDEX: 21.87 KG/M2 | DIASTOLIC BLOOD PRESSURE: 75 MMHG | SYSTOLIC BLOOD PRESSURE: 110 MMHG

## 2023-12-13 DIAGNOSIS — Z71.6 TOBACCO ABUSE COUNSELING: ICD-10-CM

## 2023-12-13 DIAGNOSIS — M05.9 SEROPOSITIVE RHEUMATOID ARTHRITIS: Primary | ICD-10-CM

## 2023-12-13 DIAGNOSIS — Z59.89 UNINSURED: ICD-10-CM

## 2023-12-13 DIAGNOSIS — M06.9 RHEUMATOID ARTHRITIS FLARE: ICD-10-CM

## 2023-12-13 DIAGNOSIS — Z79.899 HIGH RISK MEDICATION USE: ICD-10-CM

## 2023-12-13 DIAGNOSIS — D84.9 IMMUNOSUPPRESSION: ICD-10-CM

## 2023-12-13 PROCEDURE — 99999 PR PBB SHADOW E&M-EST. PATIENT-LVL IV: CPT | Mod: PBBFAC,,, | Performed by: STUDENT IN AN ORGANIZED HEALTH CARE EDUCATION/TRAINING PROGRAM

## 2023-12-13 PROCEDURE — 99215 OFFICE O/P EST HI 40 MIN: CPT | Mod: S$PBB,,, | Performed by: STUDENT IN AN ORGANIZED HEALTH CARE EDUCATION/TRAINING PROGRAM

## 2023-12-13 PROCEDURE — 99999 PR PBB SHADOW E&M-EST. PATIENT-LVL IV: ICD-10-PCS | Mod: PBBFAC,,, | Performed by: STUDENT IN AN ORGANIZED HEALTH CARE EDUCATION/TRAINING PROGRAM

## 2023-12-13 PROCEDURE — 99215 PR OFFICE/OUTPT VISIT, EST, LEVL V, 40-54 MIN: ICD-10-PCS | Mod: S$PBB,,, | Performed by: STUDENT IN AN ORGANIZED HEALTH CARE EDUCATION/TRAINING PROGRAM

## 2023-12-13 PROCEDURE — 99214 OFFICE O/P EST MOD 30 MIN: CPT | Mod: PBBFAC,PN | Performed by: STUDENT IN AN ORGANIZED HEALTH CARE EDUCATION/TRAINING PROGRAM

## 2023-12-13 RX ORDER — TRAMADOL HYDROCHLORIDE 50 MG/1
50 TABLET ORAL
Qty: 90 TABLET | Refills: 1 | Status: ON HOLD | OUTPATIENT
Start: 2023-12-13 | End: 2024-02-26 | Stop reason: HOSPADM

## 2023-12-13 RX ORDER — ADALIMUMAB 40MG/0.4ML
40 KIT SUBCUTANEOUS
Qty: 6 PEN | Refills: 1 | Status: ON HOLD | COMMUNITY
Start: 2023-12-13 | End: 2024-02-26 | Stop reason: HOSPADM

## 2023-12-13 RX ORDER — METHOTREXATE 2.5 MG/1
15 TABLET ORAL
Qty: 72 TABLET | Refills: 1 | Status: ON HOLD | OUTPATIENT
Start: 2023-12-13 | End: 2024-02-26 | Stop reason: HOSPADM

## 2023-12-13 SDOH — SOCIAL DETERMINANTS OF HEALTH (SDOH): OTHER PROBLEMS RELATED TO HOUSING AND ECONOMIC CIRCUMSTANCES: Z59.89

## 2023-12-14 ENCOUNTER — PATIENT MESSAGE (OUTPATIENT)
Dept: RHEUMATOLOGY | Facility: CLINIC | Age: 63
End: 2023-12-14

## 2023-12-20 NOTE — TELEPHONE ENCOUNTER
Rx for Humira was sent 12/13/23 to [facility]. Sent message to [facility] to check on status of Humira rx. Awaiting response    Patient is also able to call [facility] at [phone] to check on status of Humira rx

## 2023-12-21 ENCOUNTER — PATIENT MESSAGE (OUTPATIENT)
Dept: RHEUMATOLOGY | Facility: CLINIC | Age: 63
End: 2023-12-21

## 2024-01-22 ENCOUNTER — PATIENT MESSAGE (OUTPATIENT)
Dept: RHEUMATOLOGY | Facility: CLINIC | Age: 64
End: 2024-01-22

## 2024-01-24 ENCOUNTER — TELEPHONE (OUTPATIENT)
Dept: RHEUMATOLOGY | Facility: CLINIC | Age: 64
End: 2024-01-24

## 2024-01-24 ENCOUNTER — PATIENT MESSAGE (OUTPATIENT)
Dept: RHEUMATOLOGY | Facility: CLINIC | Age: 64
End: 2024-01-24

## 2024-02-24 ENCOUNTER — HOSPITAL ENCOUNTER (INPATIENT)
Facility: HOSPITAL | Age: 64
LOS: 2 days | Discharge: ANOTHER HOSPITAL | DRG: 435 | End: 2024-02-27
Attending: EMERGENCY MEDICINE | Admitting: HOSPITALIST
Payer: MEDICAID

## 2024-02-24 DIAGNOSIS — C79.70 MALIGNANT NEOPLASM METASTATIC TO ADRENAL GLAND, UNSPECIFIED LATERALITY: ICD-10-CM

## 2024-02-24 DIAGNOSIS — R07.9 CHEST PAIN: ICD-10-CM

## 2024-02-24 DIAGNOSIS — Z87.39 HISTORY OF RHEUMATOID ARTHRITIS: ICD-10-CM

## 2024-02-24 DIAGNOSIS — R10.9 ABDOMINAL PAIN: Primary | ICD-10-CM

## 2024-02-24 DIAGNOSIS — R91.8 PULMONARY MASS: ICD-10-CM

## 2024-02-24 PROBLEM — I10 HTN (HYPERTENSION): Status: ACTIVE | Noted: 2024-02-24

## 2024-02-24 PROBLEM — M48.56XA COMPRESSION FRACTURE OF LUMBAR SPINE, NON-TRAUMATIC: Status: ACTIVE | Noted: 2024-02-24

## 2024-02-24 PROBLEM — C79.9 METASTATIC NEOPLASTIC DISEASE: Status: ACTIVE | Noted: 2024-02-24

## 2024-02-24 LAB
ALBUMIN SERPL BCP-MCNC: 3.6 G/DL (ref 3.5–5.2)
ALP SERPL-CCNC: 203 U/L (ref 55–135)
ALT SERPL W/O P-5'-P-CCNC: 40 U/L (ref 10–44)
ANION GAP SERPL CALC-SCNC: 11 MMOL/L (ref 8–16)
AST SERPL-CCNC: 57 U/L (ref 10–40)
BASOPHILS # BLD AUTO: 0.04 K/UL (ref 0–0.2)
BASOPHILS NFR BLD: 0.3 % (ref 0–1.9)
BILIRUB SERPL-MCNC: 0.5 MG/DL (ref 0.1–1)
BILIRUB UR QL STRIP: NEGATIVE
BUN SERPL-MCNC: 11 MG/DL (ref 8–23)
CALCIUM SERPL-MCNC: 9.5 MG/DL (ref 8.7–10.5)
CHLORIDE SERPL-SCNC: 98 MMOL/L (ref 95–110)
CLARITY UR: CLEAR
CO2 SERPL-SCNC: 23 MMOL/L (ref 23–29)
COLOR UR: YELLOW
CREAT SERPL-MCNC: 0.6 MG/DL (ref 0.5–1.4)
DIFFERENTIAL METHOD BLD: ABNORMAL
EOSINOPHIL # BLD AUTO: 0 K/UL (ref 0–0.5)
EOSINOPHIL NFR BLD: 0.2 % (ref 0–8)
ERYTHROCYTE [DISTWIDTH] IN BLOOD BY AUTOMATED COUNT: 13.3 % (ref 11.5–14.5)
EST. GFR  (NO RACE VARIABLE): >60 ML/MIN/1.73 M^2
GLUCOSE SERPL-MCNC: 92 MG/DL (ref 70–110)
GLUCOSE UR QL STRIP: NEGATIVE
HCT VFR BLD AUTO: 37.8 % (ref 37–48.5)
HGB BLD-MCNC: 12.3 G/DL (ref 12–16)
HGB UR QL STRIP: NEGATIVE
IMM GRANULOCYTES # BLD AUTO: 0.06 K/UL (ref 0–0.04)
IMM GRANULOCYTES NFR BLD AUTO: 0.5 % (ref 0–0.5)
KETONES UR QL STRIP: ABNORMAL
LEUKOCYTE ESTERASE UR QL STRIP: NEGATIVE
LIPASE SERPL-CCNC: 39 U/L (ref 4–60)
LYMPHOCYTES # BLD AUTO: 0.6 K/UL (ref 1–4.8)
LYMPHOCYTES NFR BLD: 5.3 % (ref 18–48)
MCH RBC QN AUTO: 32.3 PG (ref 27–31)
MCHC RBC AUTO-ENTMCNC: 32.5 G/DL (ref 32–36)
MCV RBC AUTO: 99 FL (ref 82–98)
MONOCYTES # BLD AUTO: 0.8 K/UL (ref 0.3–1)
MONOCYTES NFR BLD: 6.3 % (ref 4–15)
NEUTROPHILS # BLD AUTO: 10.6 K/UL (ref 1.8–7.7)
NEUTROPHILS NFR BLD: 87.4 % (ref 38–73)
NITRITE UR QL STRIP: NEGATIVE
NRBC BLD-RTO: 0 /100 WBC
PH UR STRIP: 7 [PH] (ref 5–8)
PLATELET # BLD AUTO: 586 K/UL (ref 150–450)
PMV BLD AUTO: 8.3 FL (ref 9.2–12.9)
POTASSIUM SERPL-SCNC: 4.1 MMOL/L (ref 3.5–5.1)
PROT SERPL-MCNC: 7.1 G/DL (ref 6–8.4)
PROT UR QL STRIP: NEGATIVE
RBC # BLD AUTO: 3.81 M/UL (ref 4–5.4)
SODIUM SERPL-SCNC: 132 MMOL/L (ref 136–145)
SP GR UR STRIP: 1.03 (ref 1–1.03)
TROPONIN I SERPL HS-MCNC: 5.2 PG/ML (ref 0–14.9)
URN SPEC COLLECT METH UR: ABNORMAL
UROBILINOGEN UR STRIP-ACNC: NEGATIVE EU/DL
WBC # BLD AUTO: 12.1 K/UL (ref 3.9–12.7)

## 2024-02-24 PROCEDURE — 93005 ELECTROCARDIOGRAM TRACING: CPT | Performed by: INTERNAL MEDICINE

## 2024-02-24 PROCEDURE — 99285 EMERGENCY DEPT VISIT HI MDM: CPT | Mod: 25

## 2024-02-24 PROCEDURE — 80053 COMPREHEN METABOLIC PANEL: CPT | Performed by: EMERGENCY MEDICINE

## 2024-02-24 PROCEDURE — 96361 HYDRATE IV INFUSION ADD-ON: CPT

## 2024-02-24 PROCEDURE — 83690 ASSAY OF LIPASE: CPT | Performed by: EMERGENCY MEDICINE

## 2024-02-24 PROCEDURE — 25500020 PHARM REV CODE 255: Performed by: EMERGENCY MEDICINE

## 2024-02-24 PROCEDURE — 25000003 PHARM REV CODE 250: Performed by: INTERNAL MEDICINE

## 2024-02-24 PROCEDURE — 81003 URINALYSIS AUTO W/O SCOPE: CPT | Performed by: EMERGENCY MEDICINE

## 2024-02-24 PROCEDURE — 63600175 PHARM REV CODE 636 W HCPCS: Performed by: NURSE PRACTITIONER

## 2024-02-24 PROCEDURE — 99204 OFFICE O/P NEW MOD 45 MIN: CPT | Mod: ,,, | Performed by: INTERNAL MEDICINE

## 2024-02-24 PROCEDURE — G0378 HOSPITAL OBSERVATION PER HR: HCPCS

## 2024-02-24 PROCEDURE — 25000003 PHARM REV CODE 250: Performed by: EMERGENCY MEDICINE

## 2024-02-24 PROCEDURE — 93010 ELECTROCARDIOGRAM REPORT: CPT | Mod: ,,, | Performed by: INTERNAL MEDICINE

## 2024-02-24 PROCEDURE — 84484 ASSAY OF TROPONIN QUANT: CPT | Performed by: EMERGENCY MEDICINE

## 2024-02-24 PROCEDURE — 96374 THER/PROPH/DIAG INJ IV PUSH: CPT

## 2024-02-24 PROCEDURE — 63600175 PHARM REV CODE 636 W HCPCS: Performed by: EMERGENCY MEDICINE

## 2024-02-24 PROCEDURE — 85025 COMPLETE CBC W/AUTO DIFF WBC: CPT | Performed by: EMERGENCY MEDICINE

## 2024-02-24 PROCEDURE — 25000003 PHARM REV CODE 250: Performed by: NURSE PRACTITIONER

## 2024-02-24 PROCEDURE — 96375 TX/PRO/DX INJ NEW DRUG ADDON: CPT

## 2024-02-24 RX ORDER — ACETAMINOPHEN 325 MG/1
650 TABLET ORAL EVERY 4 HOURS PRN
Status: DISCONTINUED | OUTPATIENT
Start: 2024-02-24 | End: 2024-02-27 | Stop reason: HOSPADM

## 2024-02-24 RX ORDER — OXYCODONE HYDROCHLORIDE 5 MG/1
10 TABLET ORAL EVERY 6 HOURS PRN
Status: DISCONTINUED | OUTPATIENT
Start: 2024-02-24 | End: 2024-02-24

## 2024-02-24 RX ORDER — PREDNISONE 5 MG/1
10 TABLET ORAL DAILY
Status: DISCONTINUED | OUTPATIENT
Start: 2024-02-24 | End: 2024-02-25

## 2024-02-24 RX ORDER — DIGOXIN 0.25 MG/ML
250 INJECTION INTRAMUSCULAR; INTRAVENOUS
Status: COMPLETED | OUTPATIENT
Start: 2024-02-24 | End: 2024-02-24

## 2024-02-24 RX ORDER — ATENOLOL 25 MG/1
50 TABLET ORAL DAILY
Status: DISCONTINUED | OUTPATIENT
Start: 2024-02-24 | End: 2024-02-27 | Stop reason: HOSPADM

## 2024-02-24 RX ORDER — MORPHINE SULFATE 4 MG/ML
4 INJECTION, SOLUTION INTRAMUSCULAR; INTRAVENOUS
Status: COMPLETED | OUTPATIENT
Start: 2024-02-24 | End: 2024-02-24

## 2024-02-24 RX ORDER — METOPROLOL TARTRATE 1 MG/ML
2.5 INJECTION, SOLUTION INTRAVENOUS
Status: COMPLETED | OUTPATIENT
Start: 2024-02-24 | End: 2024-02-24

## 2024-02-24 RX ORDER — SODIUM CHLORIDE, SODIUM LACTATE, POTASSIUM CHLORIDE, CALCIUM CHLORIDE 600; 310; 30; 20 MG/100ML; MG/100ML; MG/100ML; MG/100ML
1000 INJECTION, SOLUTION INTRAVENOUS
Status: COMPLETED | OUTPATIENT
Start: 2024-02-24 | End: 2024-02-24

## 2024-02-24 RX ORDER — SODIUM,POTASSIUM PHOSPHATES 280-250MG
2 POWDER IN PACKET (EA) ORAL
Status: DISCONTINUED | OUTPATIENT
Start: 2024-02-24 | End: 2024-02-27 | Stop reason: HOSPADM

## 2024-02-24 RX ORDER — TRAMADOL HYDROCHLORIDE 50 MG/1
50 TABLET ORAL
Status: DISCONTINUED | OUTPATIENT
Start: 2024-02-24 | End: 2024-02-24

## 2024-02-24 RX ORDER — SODIUM CHLORIDE 9 MG/ML
INJECTION, SOLUTION INTRAVENOUS CONTINUOUS
Status: DISCONTINUED | OUTPATIENT
Start: 2024-02-24 | End: 2024-02-24

## 2024-02-24 RX ORDER — ONDANSETRON HYDROCHLORIDE 2 MG/ML
4 INJECTION, SOLUTION INTRAVENOUS
Status: COMPLETED | OUTPATIENT
Start: 2024-02-24 | End: 2024-02-24

## 2024-02-24 RX ORDER — TALC
6 POWDER (GRAM) TOPICAL NIGHTLY PRN
Status: DISCONTINUED | OUTPATIENT
Start: 2024-02-24 | End: 2024-02-27 | Stop reason: HOSPADM

## 2024-02-24 RX ORDER — HYDROCODONE BITARTRATE AND ACETAMINOPHEN 5; 325 MG/1; MG/1
1 TABLET ORAL EVERY 6 HOURS PRN
Status: DISCONTINUED | OUTPATIENT
Start: 2024-02-24 | End: 2024-02-26

## 2024-02-24 RX ORDER — GADOBUTROL 604.72 MG/ML
4 INJECTION INTRAVENOUS
Status: DISCONTINUED | OUTPATIENT
Start: 2024-02-24 | End: 2024-02-27 | Stop reason: HOSPADM

## 2024-02-24 RX ORDER — FOLIC ACID 1 MG/1
1 TABLET ORAL DAILY
Status: DISCONTINUED | OUTPATIENT
Start: 2024-02-24 | End: 2024-02-27 | Stop reason: HOSPADM

## 2024-02-24 RX ORDER — FAMOTIDINE 20 MG/1
20 TABLET, FILM COATED ORAL 2 TIMES DAILY
Status: DISCONTINUED | OUTPATIENT
Start: 2024-02-24 | End: 2024-02-27 | Stop reason: HOSPADM

## 2024-02-24 RX ORDER — ONDANSETRON HYDROCHLORIDE 2 MG/ML
4 INJECTION, SOLUTION INTRAVENOUS EVERY 6 HOURS PRN
Status: DISCONTINUED | OUTPATIENT
Start: 2024-02-24 | End: 2024-02-27 | Stop reason: HOSPADM

## 2024-02-24 RX ORDER — SODIUM CHLORIDE 0.9 % (FLUSH) 0.9 %
2 SYRINGE (ML) INJECTION EVERY 12 HOURS PRN
Status: DISCONTINUED | OUTPATIENT
Start: 2024-02-24 | End: 2024-02-27 | Stop reason: HOSPADM

## 2024-02-24 RX ORDER — ALUMINUM HYDROXIDE, MAGNESIUM HYDROXIDE, AND SIMETHICONE 1200; 120; 1200 MG/30ML; MG/30ML; MG/30ML
30 SUSPENSION ORAL 4 TIMES DAILY PRN
Status: DISCONTINUED | OUTPATIENT
Start: 2024-02-24 | End: 2024-02-27 | Stop reason: HOSPADM

## 2024-02-24 RX ORDER — IBUPROFEN 200 MG
16 TABLET ORAL
Status: DISCONTINUED | OUTPATIENT
Start: 2024-02-24 | End: 2024-02-24

## 2024-02-24 RX ORDER — LANOLIN ALCOHOL/MO/W.PET/CERES
800 CREAM (GRAM) TOPICAL
Status: DISCONTINUED | OUTPATIENT
Start: 2024-02-24 | End: 2024-02-27 | Stop reason: HOSPADM

## 2024-02-24 RX ORDER — ACETAMINOPHEN 325 MG/1
650 TABLET ORAL EVERY 8 HOURS PRN
Status: DISCONTINUED | OUTPATIENT
Start: 2024-02-24 | End: 2024-02-27 | Stop reason: HOSPADM

## 2024-02-24 RX ORDER — GABAPENTIN 100 MG/1
100 CAPSULE ORAL 3 TIMES DAILY
Status: DISCONTINUED | OUTPATIENT
Start: 2024-02-24 | End: 2024-02-27 | Stop reason: HOSPADM

## 2024-02-24 RX ORDER — SODIUM CHLORIDE 9 MG/ML
INJECTION, SOLUTION INTRAVENOUS CONTINUOUS
Status: DISCONTINUED | OUTPATIENT
Start: 2024-02-24 | End: 2024-02-25

## 2024-02-24 RX ORDER — SODIUM CHLORIDE 9 MG/ML
1000 INJECTION, SOLUTION INTRAVENOUS
Status: COMPLETED | OUTPATIENT
Start: 2024-02-24 | End: 2024-02-24

## 2024-02-24 RX ORDER — HYDROMORPHONE HYDROCHLORIDE 1 MG/ML
0.5 INJECTION, SOLUTION INTRAMUSCULAR; INTRAVENOUS; SUBCUTANEOUS EVERY 6 HOURS PRN
Status: DISCONTINUED | OUTPATIENT
Start: 2024-02-24 | End: 2024-02-25

## 2024-02-24 RX ORDER — NALOXONE HCL 0.4 MG/ML
0.02 VIAL (ML) INJECTION
Status: DISCONTINUED | OUTPATIENT
Start: 2024-02-24 | End: 2024-02-27 | Stop reason: HOSPADM

## 2024-02-24 RX ORDER — ENOXAPARIN SODIUM 100 MG/ML
40 INJECTION SUBCUTANEOUS EVERY 24 HOURS
Status: DISCONTINUED | OUTPATIENT
Start: 2024-02-24 | End: 2024-02-24

## 2024-02-24 RX ORDER — TIZANIDINE 4 MG/1
4 TABLET ORAL NIGHTLY
Status: DISCONTINUED | OUTPATIENT
Start: 2024-02-24 | End: 2024-02-24

## 2024-02-24 RX ORDER — GLUCAGON 1 MG
1 KIT INJECTION
Status: DISCONTINUED | OUTPATIENT
Start: 2024-02-24 | End: 2024-02-24

## 2024-02-24 RX ORDER — IBUPROFEN 200 MG
24 TABLET ORAL
Status: DISCONTINUED | OUTPATIENT
Start: 2024-02-24 | End: 2024-02-24

## 2024-02-24 RX ADMIN — FOLIC ACID 1 MG: 1 TABLET ORAL at 04:02

## 2024-02-24 RX ADMIN — DIGOXIN 250 MCG: 0.25 INJECTION INTRAMUSCULAR; INTRAVENOUS at 01:02

## 2024-02-24 RX ADMIN — IOHEXOL 100 ML: 350 INJECTION, SOLUTION INTRAVENOUS at 11:02

## 2024-02-24 RX ADMIN — METOPROLOL TARTRATE 2.5 MG: 1 INJECTION, SOLUTION INTRAVENOUS at 01:02

## 2024-02-24 RX ADMIN — SODIUM CHLORIDE 1000 ML: 9 INJECTION, SOLUTION INTRAVENOUS at 09:02

## 2024-02-24 RX ADMIN — MORPHINE SULFATE 4 MG: 4 INJECTION, SOLUTION INTRAMUSCULAR; INTRAVENOUS at 01:02

## 2024-02-24 RX ADMIN — PREDNISONE 10 MG: 5 TABLET ORAL at 04:02

## 2024-02-24 RX ADMIN — GABAPENTIN 100 MG: 100 CAPSULE ORAL at 04:02

## 2024-02-24 RX ADMIN — FAMOTIDINE 20 MG: 20 TABLET ORAL at 10:02

## 2024-02-24 RX ADMIN — ATENOLOL 50 MG: 25 TABLET ORAL at 04:02

## 2024-02-24 RX ADMIN — MULTIVITAMIN TABLET 1 TABLET: TABLET at 04:02

## 2024-02-24 RX ADMIN — SODIUM CHLORIDE, POTASSIUM CHLORIDE, SODIUM LACTATE AND CALCIUM CHLORIDE 1000 ML: 600; 310; 30; 20 INJECTION, SOLUTION INTRAVENOUS at 01:02

## 2024-02-24 RX ADMIN — ONDANSETRON 4 MG: 2 INJECTION INTRAMUSCULAR; INTRAVENOUS at 01:02

## 2024-02-24 RX ADMIN — GABAPENTIN 100 MG: 100 CAPSULE ORAL at 10:02

## 2024-02-24 RX ADMIN — SODIUM CHLORIDE: 9 INJECTION, SOLUTION INTRAVENOUS at 04:02

## 2024-02-24 NOTE — H&P
[facility] - Emergency Dept  Hospital Medicine  History & Physical    Patient Name: Crow Hines  MRN: [id number]  Patient Class: OP- Observation  Admission Date: 2/24/2024  Attending Physician: Osman Lou MD   Primary Care Provider: Chika Powell MD         Patient information was obtained from patient and ER records.     Subjective:     Principal Problem:Abdominal pain    Chief Complaint:   Chief Complaint   Patient presents with    Abdominal Pain        HPI: 63-year-old female with pMHx of HTN, and rheumatoid arthritis who presented to the ED with complaints everything hurts.   Patient states that 9 months ago she started seeing Dr. Slaughter, rheumatologist who diagnosed her with RA and began treating her using methotrexate.  The patient states that she noticed a continually declined since that time.  She reports that she has been bed-bound for the last 2 months r/t difficult to walk stating I knew I was bad.   She states that her rheumatologist believed that she was not tolerating the methotrexate.  Today she reports that she could not handle her abdominal pain any longer so her son picked her up and brought her to the hospital rolling her in on her Rollator for evaluation.  Lab work performed revealed alk phos 203, AST 57, sodium 132, platelets 586. Chest x-ray revealed linear and ill-defined hazy opacities of the right mid to lower lung is felt to reflect infiltrate.  Enlarged right pulmonary hilar structures. Hilar mass or adenopathy not excluded. Further evaluation with CT chest recommended. Patient's diffuse abdominal pain with focal increased tenderness in her mid-epigastric area prompted CT scan of abdomen and pelvis.  Numerous metastatic masses in the liver. There is also likely metastatic deposit in the spleen. Bilateral adrenal masses most likely reflect metastatic deposits. Hypoattenuating and mildly enhancing mass of the upper pole left kidney measures 2.7 x 1.6 cm.  Intra-abdominal enlarged lymph nodes, consistent with metastatic infiltration. Masses of the right lung base with consolidation of the visualized right renal lower lobe. Right lower lobe 9 mm nodule. Lungs are not fully evaluated in this exam. Compression fracture of the L2 vertebra with mild sclerosis of the vertebra is likely reflecting a pathologic fracture. Faint sclerosis of the superior endplate of S1 likely reflects metastatic infiltration. Finding on examination of abdominal tenderness with diagnostic testing of CT scan of abdomen and pelvis with metastatic disease with unknown primary warranting continued evaluation.      Past Medical History:   Diagnosis Date    Arthritis        Past Surgical History:   Procedure Laterality Date    TUBAL LIGATION  2002       Review of patient's allergies indicates:  No Known Allergies    No current facility-administered medications on file prior to encounter.     Current Outpatient Medications on File Prior to Encounter   Medication Sig    adalimumab (HUMIRA,CF, PEN) 40 mg/0.4 mL PnKt Inject 0.4 mLs (40 mg total) into the skin every 14 (fourteen) days.    atenoloL (TENORMIN) 50 MG tablet Take 1 tablet (50 mg total) by mouth once daily.    folic acid (FOLVITE) 1 MG tablet Take 1 tablet (1 mg total) by mouth once daily.    gabapentin (NEURONTIN) 100 MG capsule Take 1 capsule (100 mg total) by mouth 3 (three) times daily.    methotrexate 2.5 MG Tab Take 6 tablets (15 mg total) by mouth every 7 days.    multivitamin (THERAGRAN) per tablet Take 1 tablet by mouth once daily.    predniSONE (DELTASONE) 5 MG tablet Take 2 tablets (10 mg total) by mouth once daily.    tiZANidine (ZANAFLEX) 4 MG tablet Take 1 tablet (4 mg total) by mouth every evening.    traMADoL (ULTRAM) 50 mg tablet Take 1 tablet (50 mg total) by mouth every 24 hours as needed for Pain.    TUMERIC-GING-OLIVE-OREG-CAPRYL ORAL Take by mouth.    [DISCONTINUED] glucosamine-chondroitin 250-200 mg Tab Take by mouth.     [DISCONTINUED] lisinopriL (PRINIVIL,ZESTRIL) 20 MG tablet Take 1 tablet (20 mg total) by mouth once daily.     Family History    None       Tobacco Use    Smoking status: Every Day     Current packs/day: 0.50     Types: Cigarettes    Smokeless tobacco: Current    Tobacco comments:     3/4 PPD   Substance and Sexual Activity    Alcohol use: Not Currently     Comment: OCCASIONALLY    Drug use: Never    Sexual activity: Not Currently     Review of Systems   Constitutional:  Positive for fatigue. Negative for activity change, chills, fever and unexpected weight change.        Generalized discomfort   HENT:  Negative for congestion, sneezing and sore throat.    Eyes:  Negative for visual disturbance.   Respiratory:  Positive for cough. Negative for shortness of breath.    Cardiovascular:  Negative for chest pain and palpitations.   Gastrointestinal:  Positive for abdominal pain. Negative for nausea and vomiting.   Genitourinary:  Negative for decreased urine volume, difficulty urinating, flank pain and frequency.   Musculoskeletal:  Positive for arthralgias, back pain, gait problem, joint swelling and myalgias.   Skin:  Negative for wound.   Neurological:  Positive for weakness. Negative for dizziness, numbness and headaches.   Psychiatric/Behavioral:  Negative for suicidal ideas. The patient is not nervous/anxious.      Objective:     Vital Signs (Most Recent):  Temp: 97.7 °F (36.5 °C) (02/24/24 0737)  Pulse: 92 (02/24/24 1323)  Resp: 18 (02/24/24 1323)  BP: (!) 151/93 (02/24/24 1323)  SpO2: 95 % (02/24/24 1237) Vital Signs (24h Range):  Temp:  [97.7 °F (36.5 °C)] 97.7 °F (36.5 °C)  Pulse:  [88-98] 92  Resp:  [16-25] 18  SpO2:  [95 %-98 %] 95 %  BP: (147-180)/() 151/93     Weight: 47.6 kg (105 lb)  Body mass index is 18.6 kg/m².     Physical Exam  Vitals reviewed.   Constitutional:       General: She is not in acute distress.     Appearance: Normal appearance. She is not toxic-appearing.   HENT:      Head:  Normocephalic and atraumatic.      Nose: Nose normal.      Mouth/Throat:      Mouth: Mucous membranes are dry.      Dentition: Abnormal dentition.      Pharynx: Oropharynx is clear.   Eyes:      Extraocular Movements: Extraocular movements intact.      Pupils: Pupils are equal, round, and reactive to light.   Cardiovascular:      Rate and Rhythm: Normal rate and regular rhythm.      Pulses: Normal pulses.      Heart sounds: Normal heart sounds.   Pulmonary:      Effort: Pulmonary effort is normal.      Breath sounds: Normal breath sounds.   Abdominal:      General: Abdomen is flat. There is no distension.      Palpations: Abdomen is soft. There is mass (see CT scan report).      Tenderness: There is abdominal tenderness. There is guarding.   Musculoskeletal:         General: No swelling. Normal range of motion.      Cervical back: Normal range of motion and neck supple.   Skin:     General: Skin is warm and dry.      Capillary Refill: Capillary refill takes less than 2 seconds.   Neurological:      Mental Status: She is alert and oriented to person, place, and time.      Motor: Weakness present.   Psychiatric:         Mood and Affect: Mood normal.         Behavior: Behavior normal.         Judgment: Judgment normal.              CRANIAL NERVES     CN III, IV, VI   Pupils are equal, round, and reactive to light.       Significant Labs: All pertinent labs within the past 24 hours have been reviewed.  Recent Lab Results         02/24/24  1255   02/24/24  0933        Albumin   3.6       ALP   203       ALT   40       Anion Gap   11       Appearance, UA Clear         AST   57       Baso #   0.04       Basophil %   0.3       Bilirubin (UA) Negative         BILIRUBIN TOTAL   0.5  Comment: For infants and newborns, interpretation of results should be based  on gestational age, weight and in agreement with clinical  observations.    Premature Infant recommended reference ranges:  Up to 24 hours.............<8.0 mg/dL  Up to  48 hours............<12.0 mg/dL  3-5 days..................<15.0 mg/dL  6-29 days.................<15.0 mg/dL         BUN   11       Calcium   9.5       Chloride   98       CO2   23       Color, UA Yellow         Creatinine   0.6       Differential Method   Automated       eGFR   >60.0       Eos #   0.0       Eos %   0.2       Glucose   92       Glucose, UA Negative         Gran # (ANC)   10.6       Gran %   87.4       Hematocrit   37.8       Hemoglobin   12.3       Immature Grans (Abs)   0.06  Comment: Mild elevation in immature granulocytes is non specific and   can be seen in a variety of conditions including stress response,   acute inflammation, trauma and pregnancy. Correlation with other   laboratory and clinical findings is essential.         Immature Granulocytes   0.5       Ketones, UA 1+         Leukocyte Esterase, UA Negative         Lipase   39       Lymph #   0.6       Lymph %   5.3       MCH   32.3       MCHC   32.5       MCV   99       Mono #   0.8       Mono %   6.3       MPV   8.3       NITRITE UA Negative         nRBC   0       Blood, UA Negative         pH, UA 7.0         Platelet Count   586       Potassium   4.1       PROTEIN TOTAL   7.1       Protein, UA Negative  Comment: Recommend a 24 hour urine protein or a urine   protein/creatinine ratio if globulin induced proteinuria is  clinically suspected.           RBC   3.81       RDW   13.3       Sodium   132       Specific [place], UA 1.030         Specimen UA Urine, Clean Catch         Troponin I High Sensitivity   5.2  Comment: Troponin results differ between methods. Do not use   results between Troponin methods interchangeably.    Alkaline Phospatase levels above 400 U/L may   cause false positive results.    Access hsTnI should not be used for patients taking   Asfotase donavan (Strensiq).         UROBILINOGEN UA Negative         WBC   12.10               Significant Imaging: I have reviewed all pertinent imaging results/findings within the  past 24 hours.  Assessment/Plan:     * Abdominal pain        Seropositive rheumatoid arthritis   Medications:continue present medication of Methotrexate  The patient has a documented glucocorticoid management plan within the past 12 months. Prednisone to complete 5/2024        Compression fracture of lumbar spine, non-traumatic  CT of abdomen and pelvis revealed compression fracture of L2 vertebrae with mild scoliosis of superior endplate of S1 reflecting possible metastatic infiltration  Compression fracture likely impeding patient's gait  Consult neurosurgery for evaluation          HTN (hypertension)  Chronic, controlled. Latest blood pressure and vitals reviewed-     Temp:  [97.7 °F (36.5 °C)]   Pulse:  [88-98]   Resp:  [16-25]   BP: (147-180)/()   SpO2:  [95 %-98 %] .   Home meds for hypertension were reviewed and noted below.   Hypertension Medications               atenoloL (TENORMIN) 50 MG tablet Take 1 tablet (50 mg total) by mouth once daily.            While in the hospital, will manage blood pressure as follows; Continue home antihypertensive regimen    Will utilize p.r.n. blood pressure medication only if patient's blood pressure greater than 160/100 and she develops symptoms such as worsening chest pain or shortness of breath.    Metastatic neoplastic disease  Patient has metastatic cancer of unknown primary. The cancer has metastasized to multiple areas. The patient is not under the care of an outpatient oncologist. The patient is not undergoing active chemotherapy.  Consult [facility] who recommends patient get MRI brain and Liver Biopsy. Will hydrate patient overnight and get a CT of chest with contrast in am.    Their staging information is listed below.   Cancer Staging   No matching staging information was found for the patient. This is a new finding    CT abdomen and pelvis 2/24/24  IMPRESSION:     1. Numerous metastatic masses in the liver. There is also likely metastatic deposit in the spleen  as described.   2.  Bilateral adrenal masses most likely reflect metastatic deposits.   3.  Hypoattenuating and mildly enhancing mass of the upper pole left kidney measures 2.7 x 1.6 cm.   4.  Intra-abdominal enlarged lymph nodes, consistent with metastatic infiltration.   5.  Masses of the right lung base with consolidation of the visualized right renal lower lobe. Right lower lobe 9 mm nodule. Lungs are not fully evaluated in this exam.   6.  Compression fracture of the L2 vertebra with mild sclerosis of the vertebra is likely reflecting a pathologic fracture. Faint sclerosis of the superior endplate of S1 likely reflects metastatic infiltration.     Electronically signed by:  Travon Headley DO  02/24/2024 11:48 AM CST Workstation: [id number]                CXR 02/24/24    IMPRESSION:     1.  Linear and ill-defined hazy opacities of the right mid to lower lung is felt to reflect infiltrate.  2.  Enlarged right pulmonary hilar structures. Hilar mass or adenopathy not excluded. Further evaluation with CT chest recommended.     Electronically signed by:  Travon Headley DO  02/24/2024 08:52 AM CST Workstation: [id number]      VTE Risk Mitigation (From admission, onward)           Ordered     IP VTE HIGH RISK PATIENT  Once         02/24/24 1335     Place sequential compression device  Until discontinued         02/24/24 1335                         On 02/24/2024, patient should be placed in hospital observation services under my care in collaboration with Dr. Lou.           Franchesca Hadley NP  Department of Hospital Medicine  [facility] - Emergency Dept

## 2024-02-24 NOTE — ED PROVIDER NOTES
Encounter Date: 2/24/2024       History     Chief Complaint   Patient presents with    Abdominal Pain     63-year-old female who has a history of rheumatoid arthritis, and who is not a good historian, presents with complaints primarily of abdominal pain to which she thinks is due to her methotrexate use.  The patient admits that she has had some progressive weight loss and anorexia.  She states she had had a bowel movement in 4 days.  She states she did have some vomiting 4-5 days ago has not persisted.  She readily admits that her pain which she has difficulty describing as to its character, is worsened whenever she eats.  She has had a prior tubal ligation but no other intra-abdominal surgeries.  She does admit smoking half pack cigarettes daily and denies any alcohol use.  No trouble urinating.  No complaints of any coughing or shortness of breath.  No chest pain or palpitations.  Denies any earache headache sore throat or sinus congestion.      Review of patient's allergies indicates:  No Known Allergies  No past medical history on file.  Past Surgical History:   Procedure Laterality Date    TUBAL LIGATION  2002     No family history on file.  Social History     Tobacco Use    Smoking status: Every Day     Current packs/day: 0.50     Types: Cigarettes    Smokeless tobacco: Current    Tobacco comments:     3/4 PPD   Substance Use Topics    Alcohol use: Not Currently     Comment: OCCASIONALLY     Review of Systems   Constitutional:  Positive for appetite change and fatigue. Negative for chills, diaphoresis and fever.   HENT:  Negative for congestion and trouble swallowing.    Respiratory:  Negative for cough, chest tightness and shortness of breath.    Cardiovascular:  Negative for chest pain.   Gastrointestinal:  Positive for abdominal pain. Negative for abdominal distention, blood in stool, nausea and vomiting.   Genitourinary:  Negative for difficulty urinating, dysuria and hematuria.   Musculoskeletal:   Negative for back pain and neck pain.   Skin:  Negative for rash.   Neurological:  Positive for weakness. Negative for dizziness, light-headedness and headaches.   Hematological:  Does not bruise/bleed easily.   All other systems reviewed and are negative.      Physical Exam     Initial Vitals [02/24/24 0737]   BP Pulse Resp Temp SpO2   (!) 165/87 92 18 97.7 °F (36.5 °C) 97 %      MAP       --         Physical Exam    Vitals reviewed.  Constitutional: She appears well-developed and well-nourished. She is not diaphoretic. No distress.   HENT:   Head: Normocephalic and atraumatic.   Nose: Nose normal.   Mouth/Throat: Oropharynx is clear and moist. No oropharyngeal exudate.   Eyes: Conjunctivae are normal. Pupils are equal, round, and reactive to light.   Neck: Neck supple. No thyromegaly present. No tracheal deviation present. No JVD present.   Normal range of motion.  Cardiovascular:  Normal rate, regular rhythm, normal heart sounds and intact distal pulses.           Pulmonary/Chest: Breath sounds normal. No stridor. No respiratory distress. She has no wheezes. She has no rhonchi. She has no rales. She exhibits no tenderness.   Abdominal: Abdomen is soft. Bowel sounds are normal. She exhibits no distension and no mass. There is abdominal tenderness. There is no rebound and no guarding.   Musculoskeletal:         General: No tenderness or edema. Normal range of motion.      Cervical back: Normal range of motion and neck supple.     Lymphadenopathy:     She has no cervical adenopathy.   Neurological: She is alert and oriented to person, place, and time. She has normal strength. GCS score is 15. GCS eye subscore is 4. GCS verbal subscore is 5. GCS motor subscore is 6.   Skin: Skin is warm and dry. Capillary refill takes less than 2 seconds. No rash noted. No erythema. No pallor.   Psychiatric: She has a normal mood and affect. Her behavior is normal. Judgment and thought content normal.         ED Course    Procedures  Labs Reviewed   CBC W/ AUTO DIFFERENTIAL - Abnormal; Notable for the following components:       Result Value    RBC 3.81 (*)     MCV 99 (*)     MCH 32.3 (*)     Platelets 586 (*)     MPV 8.3 (*)     Gran # (ANC) 10.6 (*)     Immature Grans (Abs) 0.06 (*)     Lymph # 0.6 (*)     Gran % 87.4 (*)     Lymph % 5.3 (*)     All other components within normal limits   COMPREHENSIVE METABOLIC PANEL - Abnormal; Notable for the following components:    Sodium 132 (*)     Alkaline Phosphatase 203 (*)     AST 57 (*)     All other components within normal limits   LIPASE   TROPONIN I HIGH SENSITIVITY   URINALYSIS, REFLEX TO URINE CULTURE        ECG Results              EKG 12-lead (In process)        Collection Time Result Time QRS Duration OHS QTC Calculation    02/24/24 08:07:10 02/24/24 08:34:39 78 430                     In process by Interface, Lab In [facility] (02/24/24 08:34:46)                   Narrative:    Test Reason : R10.9,    Vent. Rate : 088 BPM     Atrial Rate : 088 BPM     P-R Int : 126 ms          QRS Dur : 078 ms      QT Int : 356 ms       P-R-T Axes : 068 -05 043 degrees     QTc Int : 430 ms    Normal sinus rhythm  Possible Left atrial enlargement  Borderline Abnormal ECG  When compared with ECG of 26-APR-2023 08:28,  Nonspecific T wave abnormality now evident in Anterior leads    Referred By: [email or web address]   SELF           Confirmed By:                                   Imaging Results              CT Abdomen Pelvis With IV Contrast NO Oral Contrast (Final result)  Result time 02/24/24 11:48:52      Final result by Travon Headley MD (02/24/24 11:48:52)                   Narrative:    CMS MANDATED QUALITY DATA - CT RADIATION - 436    All CT scans at this facility utilize dose modulation, iterative reconstruction, and/or weight based dosing when appropriate to reduce radiation dose to as low as reasonably achievable.        Reason: Abdominal pain    TECHNIQUE: CT abdomen and pelvis with 100 mL  Omnipaque 350.    COMPARISON: None    FINDINGS:    There are heterogeneously enhancing masses of the right lung base with consolidation of the visualized right lower lobe. Nodule of the anterior right lower lobe measures 9 mm and does not fully evaluate this exam. These heterogeneously enhancing masses measure 6.4 cm and 4.3 cm. Small right pleural effusion. Left lung is clear. Heart size is normal.    There are numerous hypoenhancing masses throughout the liver felt to reflect metastatic deposits. Largest of these is in the right hepatic lobe measures 4.8 x 4.0 cm. The gallbladder and common bile duct are unremarkable. The pancreas is unremarkable. The spleen is normal size. Small focal area of hypoenhancement in the medial spleen measures 0.7 cm and could reflect a small metastatic deposit. There are bilateral adrenal masses measuring 1.5 cm on the right and 1.8 cm on the left. The kidneys are normal size. There are hypoattenuating lesions of the upper pole right kidney measuring 1.6 cm and 1.1 cm, most likely reflecting renal cysts. Ill-defined hypoattenuating lesion of the upper pole left kidney measures 2.7 x 1.6 cm with faint enhancement and is felt to reflect a renal mass (series 3 image 57). There is no hydronephrosis or nephrolithiasis. The ureters are normal caliber without obstructions. The bladder is fluid-filled with no focal abnormalities. The uterus and adnexal structures are unremarkable. No free fluid in the pelvis.    The large and small bowel are normal caliber. The appendix is normal caliber. No bowel wall thickening observed. The stomach is unremarkable.    The abdominal aorta is normal caliber with atherosclerosis. There are hypoenhancing enlarged periportal lymph nodes, largest measuring 2.7 x 1.7 cm. There is a and enlarged lymph node in the region of the left omental fat measuring 1.4 cm (series 3 image 67).    There are degenerative changes of the spine. There is a compression fracture of  the L2 vertebra with mild sclerosis of the vertebra. Most likely this reflects a pathologic fracture with metastatic deposit of the lumbar vertebra. There is faint sclerosis of the superior endplate of S1, possibly reflecting a metastatic infiltration.    IMPRESSION:    1.  Numerous metastatic masses in the liver. There is also likely metastatic deposit in the spleen as described.  2.  Bilateral adrenal masses most likely reflect metastatic deposits.  3.  Hypoattenuating and mildly enhancing mass of the upper pole left kidney measures 2.7 x 1.6 cm.  4.  Intra-abdominal enlarged lymph nodes, consistent with metastatic infiltration.  5.  Masses of the right lung base with consolidation of the visualized right renal lower lobe. Right lower lobe 9 mm nodule. Lungs are not fully evaluated in this exam.  6.  Compression fracture of the L2 vertebra with mild sclerosis of the vertebra is likely reflecting a pathologic fracture. Faint sclerosis of the superior endplate of S1 likely reflects metastatic infiltration.    Electronically signed by:  Travon Headley DO  02/24/2024 11:48 AM CST Workstation: [id number]                                     X-Ray Chest AP Portable (Final result)  Result time 02/24/24 08:52:56      Final result by Travon Headley MD (02/24/24 08:52:56)                   Narrative:    Reason: Pain    FINDINGS:    Portable chest without comparisons show normal cardiomediastinal silhouette.  There are linear and ill-defined hazy opacities of the right mid to lower lung. Enlarged right pulmonary hilar structures. Mild interstitial thickening of the left lung. Pulmonary vasculature is normal. No acute osseous abnormality.    IMPRESSION:    1.  Linear and ill-defined hazy opacities of the right mid to lower lung is felt to reflect infiltrate.  2.  Enlarged right pulmonary hilar structures. Hilar mass or adenopathy not excluded. Further evaluation with CT chest recommended.    Electronically signed by:   Travon Headley DO  02/24/2024 08:52 AM CST Workstation: [id number]                                     Medications   0.9%  NaCl infusion (1,000 mLs Intravenous New Bag 2/24/24 0931)   iohexoL (OMNIPAQUE 350) injection 100 mL (100 mLs Intravenous Given 2/24/24 1117)   morphine injection 4 mg (4 mg Intravenous Given 2/24/24 1323)   ondansetron injection 4 mg (4 mg Intravenous Given 2/24/24 1323)   lactated ringers infusion (1,000 mLs Intravenous New Bag 2/24/24 1324)   metoprolol injection 2.5 mg (2.5 mg Intravenous Given 2/24/24 1323)   digoxin injection 250 mcg (250 mcg Intravenous Given 2/24/24 1324)     Medical Decision Making  Amount and/or Complexity of Data Reviewed  Labs: ordered.  Radiology: ordered.    Risk  Prescription drug management.              Attending Attestation:             Attending ED Notes:   ED course and MDM:  This 63-year-old female who history of rheumatoid arthritis, presented with complaints of abdominal pain weight loss and anorexia.  The patient's screening labs obtained showed a mild elevation of AST at 57, alk-phos 203.  CBC had a white count of 12.1.  The patient had a CT scan of the abdomen and pelvis with contrast which showed diffuse metastatic disease involving the liver adrenals left upper pole of the kidney and masses in the right lung base.  Additionally there is evidence of intra-abdominal enlarged lymph nodes suggestive of metastatic disease.  During the ED course the patient was hydrated and given Zofran and morphine for pain.  A consult placed to Hospital Medicine for admission.  The patient will require workup to determine the primary source and as to whether or not she would be a candidate for either chemotherapy or other modalities.                               Clinical Impression:  Final diagnoses:  [R10.9] Abdominal pain (Primary)  [C79.70] Malignant neoplasm metastatic to adrenal gland, unspecified laterality  [R91.8] Pulmonary mass  [Z87.39] History of rheumatoid  arthritis          ED Disposition Condition    Observation Stable                Otoniel Leal Jr., MD  02/24/24 1326       Otoniel Leal Jr., MD  02/24/24 1275

## 2024-02-24 NOTE — PLAN OF CARE
HPI:  HPI: 63-year-old female with pMHx of HTN, and rheumatoid arthritis who presented to the ED with complaints everything hurts.   Patient states that 9 months ago she started seeing Dr. Slaughter, rheumatologist who diagnosed her with RA and began treating her using methotrexate.  The patient states that she noticed a continually declined since that time.  She reports that she has been bed-bound for the last 2 months r/t difficult to walk stating I knew I was bad.   She states that her rheumatologist believed that she was not tolerating the methotrexate.  Today she reports that she could not handle her abdominal pain any longer so her son picked her up and brought her to the hospital rolling her in on her Rollator for evaluation.  Lab work performed revealed alk phos 203, AST 57, sodium 132, platelets 586. Chest x-ray revealed linear and ill-defined hazy opacities of the right mid to lower lung is felt to reflect infiltrate.  Enlarged right pulmonary hilar structures. Hilar mass or adenopathy not excluded. Further evaluation with CT chest recommended. Patient's diffuse abdominal pain with focal increased tenderness in her mid-epigastric area prompted CT scan of abdomen and pelvis.  Numerous metastatic masses in the liver. There is also likely metastatic deposit in the spleen. Bilateral adrenal masses most likely reflect metastatic deposits. Hypoattenuating and mildly enhancing mass of the upper pole left kidney measures 2.7 x 1.6 cm. Intra-abdominal enlarged lymph nodes, consistent with metastatic infiltration. Masses of the right lung base with consolidation of the visualized right renal lower lobe. Right lower lobe 9 mm nodule. Lungs are not fully evaluated in this exam. Compression fracture of the L2 vertebra with mild sclerosis of the vertebra is likely reflecting a pathologic fracture. Faint sclerosis of the superior endplate of S1 likely reflects metastatic infiltration. Finding on examination of  abdominal tenderness with diagnostic testing of CT scan of abdomen and pelvis with metastatic disease with unknown primary warranting continued evaluation.      NSGY consulted for L2 compression fracture.    CT C/A/P: L2 compression deformity with 40% loss of height centrally, no retropulsion and no involvement of the posterior elements.  Numerous lesions in the liver, spleen, and adrenals concerning for metastatic disease.    Plan  TLSO brace while upright/OOB  MRI T/L with and without contrast  Upright/supine thoracolumbar xrays in brace  Pain control per primary  Consider IR biopsy of the metastatic lesions for diagnosis and to guide treatment.

## 2024-02-24 NOTE — ASSESSMENT & PLAN NOTE
Medications:continue present medication of Methotrexate  The patient has a documented glucocorticoid management plan within the past 12 months. Prednisone to complete 5/2024

## 2024-02-24 NOTE — Clinical Note
Diagnosis: Abdominal pain [999905]   Future Attending Provider: ROMY DAVIS [[id number]]   Place in Observation: [facility] [[id number]]

## 2024-02-24 NOTE — PLAN OF CARE
Case Management Note.    Pt has recently diagnosed with metastatic cancer. Request for resources for special needs son. Presented at pt's bedside discuss needs of son. Provided information on waiver services and insurance due to son not having insurance.     CM will continue to follow for further discharge planning needs

## 2024-02-24 NOTE — PROGRESS NOTES
Facial xray taken.spoke with our Radiologist Dr. Headley and we are not able to perform the MRI due to the metal object's close proximity to the pt's eye. The metal object could heat up or move inside the pt and it is too dangerous for us to perform.
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
## 2024-02-24 NOTE — PLAN OF CARE
[facility] - Emergency Dept  Initial Discharge Assessment       Primary Care Provider: Chika Powell MD    Admission Diagnosis: Abdominal pain [R10.9]    Admission Date: 2/24/2024  Expected Discharge Date:     Transition of Care Barriers: Unisured    Payor: /     Extended Emergency Contact Information  Primary Emergency Contact: dominick turner  Mobile Phone: [phone]  Relation: Daughter   needed? No    Discharge Plan A: Home  Discharge Plan B: Hospice/home      [id number] - [place], [place] - [address] AT [facility]  [address]  [place] [address]  Phone: [phone] Fax: [phone]      Initial Assessment (most recent)       Adult Discharge Assessment - 02/24/24 1636          Discharge Assessment    Assessment Type Discharge Planning Assessment     Confirmed/corrected address, phone number and insurance Yes     Confirmed Demographics Correct on Facesheet     Source of Information patient;health record     Reason For Admission Abdominal pain     People in Home child(vin), adult     Facility Arrived From: Home     Do you have help at home or someone to help you manage your care at home? No     Prior to hospitilization cognitive status: Alert/Oriented     Current cognitive status: Alert/Oriented     Walking or Climbing Stairs Difficulty no     Dressing/Bathing Difficulty no     Equipment Currently Used at Home none     Readmission within 30 days? No     Patient currently being followed by outpatient case management? No     Do you currently have service(s) that help you manage your care at home? No     Do you take prescription medications? No     Do you have prescription coverage? No     Do you have any problems affording any of your prescribed medications? TBD     Is the patient taking medications as prescribed? no     How do you get to doctors appointments? car, drives self     Are you on dialysis? No     Do you take coumadin? No     Discharge Plan A  Home     Discharge Plan B Hospice/home     DME Needed Upon Discharge  none     Discharge Plan discussed with: Patient     Transition of Care Barriers Unisured     SDOH Lack of primary/family support

## 2024-02-24 NOTE — PHARMACY MED REC
"Admission Medication History     The home medication history was taken by Ash Hwang.    You may go to "Admission" then "Reconcile Home Medications" tabs to review and/or act upon these items.     The home medication list has been updated by the Pharmacy department.   Please read ALL comments highlighted in yellow.   Please address this information as you see fit.    Feel free to contact us if you have any questions or require assistance.        Medications listed below were obtained from: Patient/family and Analytic software- [employer]  No current facility-administered medications on file prior to encounter.     Current Outpatient Medications on File Prior to Encounter   Medication Sig Dispense Refill    atenoloL (TENORMIN) 50 MG tablet Take 1 tablet (50 mg total) by mouth once daily. 30 tablet 11    folic acid (FOLVITE) 1 MG tablet Take 1 tablet (1 mg total) by mouth once daily. 90 tablet 3    gabapentin (NEURONTIN) 100 MG capsule Take 1 capsule (100 mg total) by mouth 3 (three) times daily. 90 capsule 11    multivitamin (THERAGRAN) per tablet Take 1 tablet by mouth once daily.      predniSONE (DELTASONE) 5 MG tablet Take 2 tablets (10 mg total) by mouth once daily. 180 tablet 1    tiZANidine (ZANAFLEX) 4 MG tablet Take 1 tablet (4 mg total) by mouth every evening. 90 tablet 3    traMADoL (ULTRAM) 50 mg tablet Take 1 tablet (50 mg total) by mouth every 24 hours as needed for Pain. 90 tablet 1    adalimumab (HUMIRA,CF, PEN) 40 mg/0.4 mL PnKt Inject 0.4 mLs (40 mg total) into the skin every 14 (fourteen) days. 6 pen 1    methotrexate 2.5 MG Tab Take 6 tablets (15 mg total) by mouth every 7 days. (Patient not taking: Reported on 2/24/2024) 72 tablet 1    [DISCONTINUED] glucosamine-chondroitin 250-200 mg Tab Take by mouth.      [DISCONTINUED] lisinopriL (PRINIVIL,ZESTRIL) 20 MG tablet Take 1 tablet (20 mg total) by mouth once daily. 90 tablet 3    [DISCONTINUED] TUMERIC-GING-OLIVE-OREG-CAPRYL ORAL Take by mouth.   "             Ash Hwang  EXT 1924                .

## 2024-02-24 NOTE — SUBJECTIVE & OBJECTIVE
Past Medical History:   Diagnosis Date    Arthritis        Past Surgical History:   Procedure Laterality Date    TUBAL LIGATION  2002       Review of patient's allergies indicates:  No Known Allergies    No current facility-administered medications on file prior to encounter.     Current Outpatient Medications on File Prior to Encounter   Medication Sig    adalimumab (HUMIRA,CF, PEN) 40 mg/0.4 mL PnKt Inject 0.4 mLs (40 mg total) into the skin every 14 (fourteen) days.    atenoloL (TENORMIN) 50 MG tablet Take 1 tablet (50 mg total) by mouth once daily.    folic acid (FOLVITE) 1 MG tablet Take 1 tablet (1 mg total) by mouth once daily.    gabapentin (NEURONTIN) 100 MG capsule Take 1 capsule (100 mg total) by mouth 3 (three) times daily.    methotrexate 2.5 MG Tab Take 6 tablets (15 mg total) by mouth every 7 days.    multivitamin (THERAGRAN) per tablet Take 1 tablet by mouth once daily.    predniSONE (DELTASONE) 5 MG tablet Take 2 tablets (10 mg total) by mouth once daily.    tiZANidine (ZANAFLEX) 4 MG tablet Take 1 tablet (4 mg total) by mouth every evening.    traMADoL (ULTRAM) 50 mg tablet Take 1 tablet (50 mg total) by mouth every 24 hours as needed for Pain.    TUMERIC-GING-OLIVE-OREG-CAPRYL ORAL Take by mouth.    [DISCONTINUED] glucosamine-chondroitin 250-200 mg Tab Take by mouth.    [DISCONTINUED] lisinopriL (PRINIVIL,ZESTRIL) 20 MG tablet Take 1 tablet (20 mg total) by mouth once daily.     Family History    None       Tobacco Use    Smoking status: Every Day     Current packs/day: 0.50     Types: Cigarettes    Smokeless tobacco: Current    Tobacco comments:     3/4 PPD   Substance and Sexual Activity    Alcohol use: Not Currently     Comment: OCCASIONALLY    Drug use: Never    Sexual activity: Not Currently     Review of Systems   Constitutional:  Positive for fatigue. Negative for activity change, chills, fever and unexpected weight change.        Generalized discomfort   HENT:  Negative for congestion,  sneezing and sore throat.    Eyes:  Negative for visual disturbance.   Respiratory:  Positive for cough. Negative for shortness of breath.    Cardiovascular:  Negative for chest pain and palpitations.   Gastrointestinal:  Positive for abdominal pain. Negative for nausea and vomiting.   Genitourinary:  Negative for decreased urine volume, difficulty urinating, flank pain and frequency.   Musculoskeletal:  Positive for arthralgias, back pain, gait problem, joint swelling and myalgias.   Skin:  Negative for wound.   Neurological:  Positive for weakness. Negative for dizziness, numbness and headaches.   Psychiatric/Behavioral:  Negative for suicidal ideas. The patient is not nervous/anxious.      Objective:     Vital Signs (Most Recent):  Temp: 97.7 °F (36.5 °C) (02/24/24 0737)  Pulse: 92 (02/24/24 1323)  Resp: 18 (02/24/24 1323)  BP: (!) 151/93 (02/24/24 1323)  SpO2: 95 % (02/24/24 1237) Vital Signs (24h Range):  Temp:  [97.7 °F (36.5 °C)] 97.7 °F (36.5 °C)  Pulse:  [88-98] 92  Resp:  [16-25] 18  SpO2:  [95 %-98 %] 95 %  BP: (147-180)/() 151/93     Weight: 47.6 kg (105 lb)  Body mass index is 18.6 kg/m².     Physical Exam  Vitals reviewed.   Constitutional:       General: She is not in acute distress.     Appearance: Normal appearance. She is not toxic-appearing.   HENT:      Head: Normocephalic and atraumatic.      Nose: Nose normal.      Mouth/Throat:      Mouth: Mucous membranes are dry.      Dentition: Abnormal dentition.      Pharynx: Oropharynx is clear.   Eyes:      Extraocular Movements: Extraocular movements intact.      Pupils: Pupils are equal, round, and reactive to light.   Cardiovascular:      Rate and Rhythm: Normal rate and regular rhythm.      Pulses: Normal pulses.      Heart sounds: Normal heart sounds.   Pulmonary:      Effort: Pulmonary effort is normal.      Breath sounds: Normal breath sounds.   Abdominal:      General: Abdomen is flat. There is no distension.      Palpations: Abdomen is  soft. There is mass (see CT scan report).      Tenderness: There is abdominal tenderness. There is guarding.   Musculoskeletal:         General: No swelling. Normal range of motion.      Cervical back: Normal range of motion and neck supple.   Skin:     General: Skin is warm and dry.      Capillary Refill: Capillary refill takes less than 2 seconds.   Neurological:      Mental Status: She is alert and oriented to person, place, and time.      Motor: Weakness present.   Psychiatric:         Mood and Affect: Mood normal.         Behavior: Behavior normal.         Judgment: Judgment normal.              CRANIAL NERVES     CN III, IV, VI   Pupils are equal, round, and reactive to light.       Significant Labs: All pertinent labs within the past 24 hours have been reviewed.  Recent Lab Results         02/24/24  1255   02/24/24  0933        Albumin   3.6       ALP   203       ALT   40       Anion Gap   11       Appearance, UA Clear         AST   57       Baso #   0.04       Basophil %   0.3       Bilirubin (UA) Negative         BILIRUBIN TOTAL   0.5  Comment: For infants and newborns, interpretation of results should be based  on gestational age, weight and in agreement with clinical  observations.    Premature Infant recommended reference ranges:  Up to 24 hours.............<8.0 mg/dL  Up to 48 hours............<12.0 mg/dL  3-5 days..................<15.0 mg/dL  6-29 days.................<15.0 mg/dL         BUN   11       Calcium   9.5       Chloride   98       CO2   23       Color, UA Yellow         Creatinine   0.6       Differential Method   Automated       eGFR   >60.0       Eos #   0.0       Eos %   0.2       Glucose   92       Glucose, UA Negative         Gran # (ANC)   10.6       Gran %   87.4       Hematocrit   37.8       Hemoglobin   12.3       Immature Grans (Abs)   0.06  Comment: Mild elevation in immature granulocytes is non specific and   can be seen in a variety of conditions including stress response,    acute inflammation, trauma and pregnancy. Correlation with other   laboratory and clinical findings is essential.         Immature Granulocytes   0.5       Ketones, UA 1+         Leukocyte Esterase, UA Negative         Lipase   39       Lymph #   0.6       Lymph %   5.3       MCH   32.3       MCHC   32.5       MCV   99       Mono #   0.8       Mono %   6.3       MPV   8.3       NITRITE UA Negative         nRBC   0       Blood, UA Negative         pH, UA 7.0         Platelet Count   586       Potassium   4.1       PROTEIN TOTAL   7.1       Protein, UA Negative  Comment: Recommend a 24 hour urine protein or a urine   protein/creatinine ratio if globulin induced proteinuria is  clinically suspected.           RBC   3.81       RDW   13.3       Sodium   132       Specific [place], UA 1.030         Specimen UA Urine, Clean Catch         Troponin I High Sensitivity   5.2  Comment: Troponin results differ between methods. Do not use   results between Troponin methods interchangeably.    Alkaline Phospatase levels above 400 U/L may   cause false positive results.    Access hsTnI should not be used for patients taking   Asfotase donavan (Strensiq).         UROBILINOGEN UA Negative         WBC   12.10               Significant Imaging: I have reviewed all pertinent imaging results/findings within the past 24 hours.

## 2024-02-24 NOTE — HPI
63-year-old female with pMHx of HTN, and rheumatoid arthritis who presented to the ED with complaints everything hurts.   Patient states that 9 months ago she started seeing Dr. Slaughter, rheumatologist who diagnosed her with RA and began treating her using methotrexate.  The patient states that she noticed a continually declined since that time.  She reports that she has been bed-bound for the last 2 months r/t difficult to walk stating I knew I was bad.   She states that her rheumatologist believed that she was not tolerating the methotrexate.  Today she reports that she could not handle her abdominal pain any longer so her son picked her up and brought her to the hospital rolling her in on her Rollator for evaluation.  Lab work performed revealed alk phos 203, AST 57, sodium 132, platelets 586. Chest x-ray revealed linear and ill-defined hazy opacities of the right mid to lower lung is felt to reflect infiltrate.  Enlarged right pulmonary hilar structures. Hilar mass or adenopathy not excluded. Further evaluation with CT chest recommended. Patient's diffuse abdominal pain with focal increased tenderness in her mid-epigastric area prompted CT scan of abdomen and pelvis.  Numerous metastatic masses in the liver. There is also likely metastatic deposit in the spleen. Bilateral adrenal masses most likely reflect metastatic deposits. Hypoattenuating and mildly enhancing mass of the upper pole left kidney measures 2.7 x 1.6 cm. Intra-abdominal enlarged lymph nodes, consistent with metastatic infiltration. Masses of the right lung base with consolidation of the visualized right renal lower lobe. Right lower lobe 9 mm nodule. Lungs are not fully evaluated in this exam. Compression fracture of the L2 vertebra with mild sclerosis of the vertebra is likely reflecting a pathologic fracture. Faint sclerosis of the superior endplate of S1 likely reflects metastatic infiltration. Finding on examination of abdominal  tenderness with diagnostic testing of CT scan of abdomen and pelvis with metastatic disease with unknown primary warranting continued evaluation.

## 2024-02-24 NOTE — ASSESSMENT & PLAN NOTE
CT of abdomen and pelvis revealed compression fracture of L2 vertebrae with mild scoliosis of superior endplate of S1 reflecting possible metastatic infiltration  Compression fracture likely impeding patient's gait  Consult neurosurgery for evaluation

## 2024-02-24 NOTE — ASSESSMENT & PLAN NOTE
Patient has metastatic cancer of unknown primary. The cancer has metastasized to multiple areas. The patient is not under the care of an outpatient oncologist. The patient is not undergoing active chemotherapy.  Consult Hemoc who recommends patient get MRI brain and Liver Biopsy. Will hydrate patient overnight and get a CT of chest with contrast in am.    Their staging information is listed below.   Cancer Staging   No matching staging information was found for the patient. This is a new finding    CT abdomen and pelvis 2/24/24  IMPRESSION:     1. Numerous metastatic masses in the liver. There is also likely metastatic deposit in the spleen as described.   2.  Bilateral adrenal masses most likely reflect metastatic deposits.   3.  Hypoattenuating and mildly enhancing mass of the upper pole left kidney measures 2.7 x 1.6 cm.   4.  Intra-abdominal enlarged lymph nodes, consistent with metastatic infiltration.   5.  Masses of the right lung base with consolidation of the visualized right renal lower lobe. Right lower lobe 9 mm nodule. Lungs are not fully evaluated in this exam.   6.  Compression fracture of the L2 vertebra with mild sclerosis of the vertebra is likely reflecting a pathologic fracture. Faint sclerosis of the superior endplate of S1 likely reflects metastatic infiltration.     Electronically signed by:  Travon Headley DO  02/24/2024 11:48 AM CST Workstation: [id number]                CXR 02/24/24    IMPRESSION:     1.  Linear and ill-defined hazy opacities of the right mid to lower lung is felt to reflect infiltrate.  2.  Enlarged right pulmonary hilar structures. Hilar mass or adenopathy not excluded. Further evaluation with CT chest recommended.     Electronically signed by:  Travon Headley DO  02/24/2024 08:52 AM CST Workstation: [id number]

## 2024-02-24 NOTE — ASSESSMENT & PLAN NOTE
Chronic, controlled. Latest blood pressure and vitals reviewed-     Temp:  [97.7 °F (36.5 °C)]   Pulse:  [88-98]   Resp:  [16-25]   BP: (147-180)/()   SpO2:  [95 %-98 %] .   Home meds for hypertension were reviewed and noted below.   Hypertension Medications               atenoloL (TENORMIN) 50 MG tablet Take 1 tablet (50 mg total) by mouth once daily.            While in the hospital, will manage blood pressure as follows; Continue home antihypertensive regimen    Will utilize p.r.n. blood pressure medication only if patient's blood pressure greater than 160/100 and she develops symptoms such as worsening chest pain or shortness of breath.

## 2024-02-25 PROBLEM — R10.9 ABDOMINAL PAIN: Status: RESOLVED | Noted: 2024-02-24 | Resolved: 2024-02-25

## 2024-02-25 PROBLEM — Z71.89 ACP (ADVANCE CARE PLANNING): Status: ACTIVE | Noted: 2024-02-25

## 2024-02-25 PROBLEM — D49.6 NEOPLASM OF BRAIN CAUSING MASS EFFECT ON ADJACENT STRUCTURES: Status: ACTIVE | Noted: 2024-02-25

## 2024-02-25 PROBLEM — I26.99 ACUTE PULMONARY EMBOLISM WITHOUT ACUTE COR PULMONALE: Status: ACTIVE | Noted: 2024-02-25

## 2024-02-25 LAB
ALBUMIN SERPL BCP-MCNC: 3.4 G/DL (ref 3.5–5.2)
ALP SERPL-CCNC: 200 U/L (ref 55–135)
ALT SERPL W/O P-5'-P-CCNC: 38 U/L (ref 10–44)
ANION GAP SERPL CALC-SCNC: 9 MMOL/L (ref 8–16)
AST SERPL-CCNC: 53 U/L (ref 10–40)
BASOPHILS # BLD AUTO: 0.05 K/UL (ref 0–0.2)
BASOPHILS NFR BLD: 0.4 % (ref 0–1.9)
BILIRUB SERPL-MCNC: 0.4 MG/DL (ref 0.1–1)
BUN SERPL-MCNC: 7 MG/DL (ref 8–23)
CALCIUM SERPL-MCNC: 9.2 MG/DL (ref 8.7–10.5)
CHLORIDE SERPL-SCNC: 99 MMOL/L (ref 95–110)
CO2 SERPL-SCNC: 25 MMOL/L (ref 23–29)
CREAT SERPL-MCNC: 0.7 MG/DL (ref 0.5–1.4)
DIFFERENTIAL METHOD BLD: ABNORMAL
EOSINOPHIL # BLD AUTO: 0 K/UL (ref 0–0.5)
EOSINOPHIL NFR BLD: 0.2 % (ref 0–8)
ERYTHROCYTE [DISTWIDTH] IN BLOOD BY AUTOMATED COUNT: 13.2 % (ref 11.5–14.5)
EST. GFR  (NO RACE VARIABLE): >60 ML/MIN/1.73 M^2
GLUCOSE SERPL-MCNC: 93 MG/DL (ref 70–110)
HCT VFR BLD AUTO: 36.2 % (ref 37–48.5)
HGB BLD-MCNC: 11.5 G/DL (ref 12–16)
IMM GRANULOCYTES # BLD AUTO: 0.08 K/UL (ref 0–0.04)
IMM GRANULOCYTES NFR BLD AUTO: 0.7 % (ref 0–0.5)
LYMPHOCYTES # BLD AUTO: 0.9 K/UL (ref 1–4.8)
LYMPHOCYTES NFR BLD: 7.7 % (ref 18–48)
MAGNESIUM SERPL-MCNC: 1.8 MG/DL (ref 1.6–2.6)
MCH RBC QN AUTO: 31.7 PG (ref 27–31)
MCHC RBC AUTO-ENTMCNC: 31.8 G/DL (ref 32–36)
MCV RBC AUTO: 100 FL (ref 82–98)
MONOCYTES # BLD AUTO: 0.9 K/UL (ref 0.3–1)
MONOCYTES NFR BLD: 7.2 % (ref 4–15)
NEUTROPHILS # BLD AUTO: 10.2 K/UL (ref 1.8–7.7)
NEUTROPHILS NFR BLD: 83.8 % (ref 38–73)
NRBC BLD-RTO: 0 /100 WBC
PLATELET # BLD AUTO: 620 K/UL (ref 150–450)
PMV BLD AUTO: 8.4 FL (ref 9.2–12.9)
POTASSIUM SERPL-SCNC: 4.4 MMOL/L (ref 3.5–5.1)
PROT SERPL-MCNC: 6.7 G/DL (ref 6–8.4)
RBC # BLD AUTO: 3.63 M/UL (ref 4–5.4)
SODIUM SERPL-SCNC: 133 MMOL/L (ref 136–145)
WBC # BLD AUTO: 12.14 K/UL (ref 3.9–12.7)

## 2024-02-25 PROCEDURE — 83735 ASSAY OF MAGNESIUM: CPT | Performed by: NURSE PRACTITIONER

## 2024-02-25 PROCEDURE — 99232 SBSQ HOSP IP/OBS MODERATE 35: CPT | Mod: ,,, | Performed by: STUDENT IN AN ORGANIZED HEALTH CARE EDUCATION/TRAINING PROGRAM

## 2024-02-25 PROCEDURE — 25000003 PHARM REV CODE 250: Performed by: NURSE PRACTITIONER

## 2024-02-25 PROCEDURE — 63600175 PHARM REV CODE 636 W HCPCS: Performed by: STUDENT IN AN ORGANIZED HEALTH CARE EDUCATION/TRAINING PROGRAM

## 2024-02-25 PROCEDURE — 25000003 PHARM REV CODE 250: Performed by: STUDENT IN AN ORGANIZED HEALTH CARE EDUCATION/TRAINING PROGRAM

## 2024-02-25 PROCEDURE — 25500020 PHARM REV CODE 255: Performed by: HOSPITALIST

## 2024-02-25 PROCEDURE — 85025 COMPLETE CBC W/AUTO DIFF WBC: CPT | Performed by: NURSE PRACTITIONER

## 2024-02-25 PROCEDURE — 63600175 PHARM REV CODE 636 W HCPCS: Performed by: INTERNAL MEDICINE

## 2024-02-25 PROCEDURE — 25000003 PHARM REV CODE 250: Performed by: INTERNAL MEDICINE

## 2024-02-25 PROCEDURE — 20000000 HC ICU ROOM

## 2024-02-25 PROCEDURE — 80053 COMPREHEN METABOLIC PANEL: CPT | Performed by: NURSE PRACTITIONER

## 2024-02-25 PROCEDURE — 63600175 PHARM REV CODE 636 W HCPCS: Performed by: NURSE PRACTITIONER

## 2024-02-25 PROCEDURE — 25000003 PHARM REV CODE 250: Performed by: HOSPITALIST

## 2024-02-25 PROCEDURE — 96374 THER/PROPH/DIAG INJ IV PUSH: CPT | Mod: 59

## 2024-02-25 PROCEDURE — 36415 COLL VENOUS BLD VENIPUNCTURE: CPT | Performed by: NURSE PRACTITIONER

## 2024-02-25 PROCEDURE — 96361 HYDRATE IV INFUSION ADD-ON: CPT

## 2024-02-25 RX ORDER — HYDROMORPHONE HYDROCHLORIDE 1 MG/ML
1 INJECTION, SOLUTION INTRAMUSCULAR; INTRAVENOUS; SUBCUTANEOUS EVERY 4 HOURS PRN
Status: DISCONTINUED | OUTPATIENT
Start: 2024-02-25 | End: 2024-02-26

## 2024-02-25 RX ORDER — DEXAMETHASONE SODIUM PHOSPHATE 4 MG/ML
4 INJECTION, SOLUTION INTRA-ARTICULAR; INTRALESIONAL; INTRAMUSCULAR; INTRAVENOUS; SOFT TISSUE EVERY 6 HOURS
Status: DISCONTINUED | OUTPATIENT
Start: 2024-02-25 | End: 2024-02-27 | Stop reason: HOSPADM

## 2024-02-25 RX ORDER — MUPIROCIN 20 MG/G
OINTMENT TOPICAL 2 TIMES DAILY
Status: DISCONTINUED | OUTPATIENT
Start: 2024-02-25 | End: 2024-02-27 | Stop reason: HOSPADM

## 2024-02-25 RX ORDER — NICARDIPINE HYDROCHLORIDE 0.2 MG/ML
0-15 INJECTION INTRAVENOUS CONTINUOUS
Status: DISCONTINUED | OUTPATIENT
Start: 2024-02-25 | End: 2024-02-27 | Stop reason: HOSPADM

## 2024-02-25 RX ORDER — LEVETIRACETAM 500 MG/1
500 TABLET ORAL 2 TIMES DAILY
Status: DISCONTINUED | OUTPATIENT
Start: 2024-02-25 | End: 2024-02-27 | Stop reason: HOSPADM

## 2024-02-25 RX ORDER — DEXAMETHASONE SODIUM PHOSPHATE 100 MG/10ML
10 INJECTION INTRAMUSCULAR; INTRAVENOUS ONCE
Status: COMPLETED | OUTPATIENT
Start: 2024-02-25 | End: 2024-02-25

## 2024-02-25 RX ORDER — POLYETHYLENE GLYCOL 3350 17 G/17G
17 POWDER, FOR SOLUTION ORAL 2 TIMES DAILY
Status: DISCONTINUED | OUTPATIENT
Start: 2024-02-25 | End: 2024-02-27 | Stop reason: HOSPADM

## 2024-02-25 RX ADMIN — HYDROCODONE BITARTRATE AND ACETAMINOPHEN 1 TABLET: 5; 325 TABLET ORAL at 09:02

## 2024-02-25 RX ADMIN — HYDROCODONE BITARTRATE AND ACETAMINOPHEN 1 TABLET: 5; 325 TABLET ORAL at 03:02

## 2024-02-25 RX ADMIN — DEXAMETHASONE SODIUM PHOSPHATE 10 MG: 10 INJECTION INTRAMUSCULAR; INTRAVENOUS at 03:02

## 2024-02-25 RX ADMIN — IOHEXOL 100 ML: 350 INJECTION, SOLUTION INTRAVENOUS at 10:02

## 2024-02-25 RX ADMIN — MULTIVITAMIN TABLET 1 TABLET: TABLET at 09:02

## 2024-02-25 RX ADMIN — GABAPENTIN 100 MG: 100 CAPSULE ORAL at 02:02

## 2024-02-25 RX ADMIN — SODIUM CHLORIDE: 9 INJECTION, SOLUTION INTRAVENOUS at 02:02

## 2024-02-25 RX ADMIN — FAMOTIDINE 20 MG: 20 TABLET ORAL at 09:02

## 2024-02-25 RX ADMIN — NICARDIPINE HYDROCHLORIDE 2.5 MG/HR: 0.2 INJECTION INTRAVENOUS at 09:02

## 2024-02-25 RX ADMIN — LEVETIRACETAM 500 MG: 500 TABLET, FILM COATED ORAL at 10:02

## 2024-02-25 RX ADMIN — HYDROMORPHONE HYDROCHLORIDE 1 MG: 1 INJECTION, SOLUTION INTRAMUSCULAR; INTRAVENOUS; SUBCUTANEOUS at 02:02

## 2024-02-25 RX ADMIN — FOLIC ACID 1 MG: 1 TABLET ORAL at 09:02

## 2024-02-25 RX ADMIN — GABAPENTIN 100 MG: 100 CAPSULE ORAL at 09:02

## 2024-02-25 RX ADMIN — HYDROMORPHONE HYDROCHLORIDE 0.5 MG: 0.5 INJECTION, SOLUTION INTRAMUSCULAR; INTRAVENOUS; SUBCUTANEOUS at 08:02

## 2024-02-25 RX ADMIN — HYDROMORPHONE HYDROCHLORIDE 1 MG: 1 INJECTION, SOLUTION INTRAMUSCULAR; INTRAVENOUS; SUBCUTANEOUS at 09:02

## 2024-02-25 RX ADMIN — MUPIROCIN 1 G: 20 OINTMENT TOPICAL at 10:02

## 2024-02-25 RX ADMIN — POLYETHYLENE GLYCOL 3350 17 G: 17 POWDER, FOR SOLUTION ORAL at 09:02

## 2024-02-25 NOTE — CARE UPDATE
Notified by radiology a large temporal lobe mass with mass effect and midline shift.  I discussed findings with neurosurgeon, Dr. Goff.  He recommends initiation of IV decadron 10 mg now; 4 mg q 6 hours.  No indication for antiepileptic at this time.    Discussed with patient.  She knows she has evidence of metastasis diffusely.  While she seems to NOT have unrealistic expectations regarding widely metastatic disease, but does want more info and management options-- particularly in light of brain mass.  She is agreeable to recommendation for transfer.  The transfer center was contacted.  She has been accepted for ED to ED transfer as recommended by neurosurgery.     There are no focal deficits on exam and mentation is stable.  She requests pain medication which has been ordered.    UPDATE: patient declining transfer until care arrives for her autistic son.  Transfer center and neurosurgery notified. Will arrange for  transfer as soon as family arrives.

## 2024-02-25 NOTE — CONSULTS
[facility] - Emergency Dept  Neurosurgery  Consult Note    Inpatient consult to Neurosurgery  Consult performed by: Michael Goff DO  Consult ordered by: Franchesca Hadley NP        Subjective:     Chief Complaint/Reason for Admission: Abdominal pain, generalized weakness, chronic back pain    History of Present Illness: 63-year-old female with pMHx of HTN, and rheumatoid arthritis who presented to the ED with complaints everything hurts.   Patient states that 9 months ago she started seeing Dr. Slaughter, rheumatologist who diagnosed her with RA and began treating her using methotrexate.  The patient states that she noticed a continually declined since that time.  She reports that she has been bed-bound for the last 2 months r/t difficult to walk stating I knew I was bad.   She states that her rheumatologist believed that she was not tolerating the methotrexate.  Today she reports that she could not handle her abdominal pain any longer so her son picked her up and brought her to the hospital rolling her in on her Rollator for evaluation.  Lab work performed revealed alk phos 203, AST 57, sodium 132, platelets 586. Chest x-ray revealed linear and ill-defined hazy opacities of the right mid to lower lung is felt to reflect infiltrate.  Enlarged right pulmonary hilar structures. Hilar mass or adenopathy not excluded. Further evaluation with CT chest recommended. Patient's diffuse abdominal pain with focal increased tenderness in her mid-epigastric area prompted CT scan of abdomen and pelvis.  Numerous metastatic masses in the liver. There is also likely metastatic deposit in the spleen. Bilateral adrenal masses most likely reflect metastatic deposits. Hypoattenuating and mildly enhancing mass of the upper pole left kidney measures 2.7 x 1.6 cm. Intra-abdominal enlarged lymph nodes, consistent with metastatic infiltration. Masses of the right lung base with consolidation of the visualized right  renal lower lobe. Right lower lobe 9 mm nodule. Lungs are not fully evaluated in this exam. Compression fracture of the L2 vertebra with mild sclerosis of the vertebra is likely reflecting a pathologic fracture. Faint sclerosis of the superior endplate of S1 likely reflects metastatic infiltration. Finding on examination of abdominal tenderness with diagnostic testing of CT scan of abdomen and pelvis with metastatic disease with unknown primary warranting continued evaluation.       NSGY consulted for L2 compression fracture.      Pt reports over 2 months of chronic back pain and around 3 weeks of worsening generalized weakness.  She recalls no trauma or falls in the past 2 months.  She has pain in bilateral knees but no radicular leg pain or numbness.  She has chronic urinary incontinence but no saddle anesthesia.  She feels that she is unable to ambulate d/t her back pain and generalized weakness. She is a smoker.    (Not in a hospital admission)      Review of patient's allergies indicates:  No Known Allergies    Past Medical History:   Diagnosis Date    Arthritis      Past Surgical History:   Procedure Laterality Date    TUBAL LIGATION  2002     Family History    None       Tobacco Use    Smoking status: Every Day     Current packs/day: 0.50     Types: Cigarettes    Smokeless tobacco: Current    Tobacco comments:     3/4 PPD   Substance and Sexual Activity    Alcohol use: Not Currently     Comment: OCCASIONALLY    Drug use: Never    Sexual activity: Not Currently     Review of Systems  14 point ROS was negative  Objective:     Weight: 47.6 kg (105 lb)  Body mass index is 18.6 kg/m².  Vital Signs (Most Recent):  Temp: 97.7 °F (36.5 °C) (02/24/24 0737)  Pulse: 85 (02/25/24 0600)  Resp: 18 (02/25/24 0801)  BP: 138/84 (02/25/24 0600)  SpO2: 95 % (02/25/24 0600) Vital Signs (24h Range):  Pulse:  [] 85  Resp:  [13-29] 18  SpO2:  [89 %-97 %] 95 %  BP: (122-180)/() 138/84                           "    Neurosurgery Physical Exam  AOX3  CNII-XII intact  5/5 in BUE  4+/5 in right HF (pain limited) otherwise 5/5 throughout  SILT  No frankel's or clonus  Reflexes 2+ throughout    Significant Labs:  Recent Labs   Lab 02/24/24  0933 02/25/24  0658   GLU 92 93   * 133*   K 4.1 4.4   CL 98 99   CO2 23 25   BUN 11 7*   CREATININE 0.6 0.7   CALCIUM 9.5 9.2   MG  --  1.8     Recent Labs   Lab 02/24/24  0933 02/25/24  0658   WBC 12.10 12.14   HGB 12.3 11.5*   HCT 37.8 36.2*   * 620*     No results for input(s): "LABPT", "INR", "APTT" in the last 48 hours.  Microbiology Results (last 7 days)       ** No results found for the last 168 hours. **            Significant Diagnostics:  L2 compression deformity with 40% loss of height centrally, no retropulsion and no involvement of the posterior elements.  Some sclerosis of the endplates at L2.  No bowing of the posterior cortex of L2.  Numerous lesions in the liver, spleen, and adrenals concerning for metastatic disease.   Reviewed    Assessment/Plan:     Active Diagnoses:    Diagnosis Date Noted POA    PRINCIPAL PROBLEM:  Abdominal pain [R10.9] 02/24/2024 Yes    Metastatic neoplastic disease [C79.9] 02/24/2024 Yes    HTN (hypertension) [I10] 02/24/2024 Yes    Compression fracture of lumbar spine, non-traumatic [M48.56XA] 02/24/2024 Yes    Seropositive rheumatoid arthritis [M05.9] 05/22/2023 Yes      Problems Resolved During this Admission:     63 F with hx of tobacco abuse, RA, chronic back pain who presents with generalized weakness and severe abdominal pain.  Her imaging is described above and she has multiple lesions concerning for metastatic process.  The L2 compression fracture doesn't appear overtly pathologic.  Ideally would obtain MRI T/L w and w/o to assess for acuity of the fracture and to assess for pathologic origin however, this isn't possible.  She has no clinical signs of cord compression or cauda equina.  Admission to medicine for mets khan  IR " biopsy of lesions  CT T/L myelogram  Upright/supine TL xrays in brace  TLSO brace while upright and OOB  PT/OT as tolerated        Michael Goff DO  Neurosurgery  Former[facility] - Emergency Dept

## 2024-02-25 NOTE — ASSESSMENT & PLAN NOTE
CT of abdomen and pelvis revealed compression fracture of L2 vertebrae with mild scoliosis of superior endplate of S1 reflecting possible metastatic infiltration  Compression fracture likely impeding patient's gait  Neurosurgery following  CT myelogram planned but should not delay transfer to [facility]

## 2024-02-25 NOTE — ASSESSMENT & PLAN NOTE
Chronic- maintained on methotrexate and prednisone outpatient  -Holding now; she is on dexamethasone acutely.

## 2024-02-25 NOTE — ASSESSMENT & PLAN NOTE
Patient has metastatic cancer of unknown primary. The cancer has metastasized to multiple areas. The patient is not under the care of an outpatient oncologist. The patient is not undergoing active chemotherapy. Reviewed heme/onc notes.  Unable to undergo MRI, but did undergo contrasted CT Head revealing for brain mass in the LEFT temporal region with vasogenic edema and midline shift.    Their staging information is listed below.    Cancer Staging   No matching staging information was found for the patient. This is a new finding    CT abdomen and pelvis 2/24/24  IMPRESSION:     1. Numerous metastatic masses in the liver. There is also likely metastatic deposit in the spleen as described.   2.  Bilateral adrenal masses most likely reflect metastatic deposits.   3.  Hypoattenuating and mildly enhancing mass of the upper pole left kidney measures 2.7 x 1.6 cm.   4.  Intra-abdominal enlarged lymph nodes, consistent with metastatic infiltration.   5.  Masses of the right lung base with consolidation of the visualized right renal lower lobe. Right lower lobe 9 mm nodule. Lungs are not fully evaluated in this exam.   6.  Compression fracture of the L2 vertebra with mild sclerosis of the vertebra is likely reflecting a pathologic fracture. Faint sclerosis of the superior endplate of S1 likely reflects metastatic infiltration.     Electronically signed by:  Travon Headley DO  02/24/2024 11:48 AM CST Workstation: [id number]                CT Chest reviewed as below:    1. Large irregular lobular enhancing mass in the posterior right lower lobe, highly suspicious for a primary malignancy.  2. Bulky conglomerate right hilar and mediastinal lymphadenopathy with index nodes outlined above. This appears to cause obstruction of the right lower lobe airway with complete atelectasis of the right lower lobe.  3. Small volume of pulmonary thromboembolus in the right middle lobe without finding of right heart strain/failure.  4.  Findings of diffuse metastatic disease (right upper lobe, throughout the liver, both adrenal glands, spleen, upper abdominal lymph nodes, and possibly upper pole the left kidney).    -A CT T/L myelogram is planned.  -Will need tissue diagnosis however this should not delay transfer to [facility] in light of brain findings.  -will transfer to [facility] tomorrow for ongoing workup.

## 2024-02-25 NOTE — HOSPITAL COURSE
Patient was admitted to hospital medicine after which a CT head w/wo showed a contrast enhancing brain mass with edema and shift.  Neurosurgery was consulted and recommended transfer to main [place] and initiation of steroids.  Transfer was delayed due to lack of caretaker availability for her autistic son.  Overnight, Pt had episodic confusion without focal neuro deficits.  She went for stat CT head which was unchanged.  She was back to her mental baseline upon arrival back to the floor and was transferred to ICU per Neurosurgery recommendations for q.1 hour neuro checks.  Did require initiation of Cardene infusion which was able to be titrated off.  She was initiated on Keppra per Neurosurgery recommendations given unknown cause of episodic confusion with no seizure activity witnessed.  Pt is at her mental baseline today.  She is very clear and lucid with no focal deficits.  She was able to secure  for her son and transfer center was updated.  She was transferred [facility] for ongoing evaluation and care.  Her code status was changed to DNR during this visit per her wishes.  She will need to follow up with palliative care at [facility] to complete healthcare power-of- designation per her wishes.  Dr. Chavira (Neuro ICU) was updated on condition and overnight events on day of transfer and has accepted the patient to neuro ICU.    Pt seen by myself and attending prior to transfer and is stable for transfer to [facility] for ongoing care.

## 2024-02-25 NOTE — ASSESSMENT & PLAN NOTE
Picked up on contrasted CT chest:  -Small volume of pulmonary thromboembolus in the right middle lobe without finding of right heart strain/failure.    Discussed with neurosurgery who recommends holding off on anticoagulation in light of brain mass and pending treatment plans if patient without evidence of respiratory distress (She is doing fine from this standpoint)  -would benefit from ECHO eventually (again should not delay transfer)  -Further decision making regarding anticoagulation pending clinical course.

## 2024-02-25 NOTE — PROGRESS NOTES
[facility] Medicine  Progress Note    Patient Name: Crow Hines  MRN: [id number]  Patient Class: IP- Inpatient   Admission Date: 2/24/2024  Length of Stay: 0 days  Attending Physician: Ai Eduardo MD  Primary Care Provider: Chika Powell MD        Subjective:     Principal Problem:Metastatic neoplastic disease        HPI:  63-year-old female with pMHx of HTN, and rheumatoid arthritis who presented to the ED with complaints everything hurts.   Patient states that 9 months ago she started seeing Dr. Slaughter, rheumatologist who diagnosed her with RA and began treating her using methotrexate.  The patient states that she noticed a continually declined since that time.  She reports that she has been bed-bound for the last 2 months r/t difficult to walk stating I knew I was bad.   She states that her rheumatologist believed that she was not tolerating the methotrexate.  Today she reports that she could not handle her abdominal pain any longer so her son picked her up and brought her to the hospital rolling her in on her Rollator for evaluation.  Lab work performed revealed alk phos 203, AST 57, sodium 132, platelets 586. Chest x-ray revealed linear and ill-defined hazy opacities of the right mid to lower lung is felt to reflect infiltrate.  Enlarged right pulmonary hilar structures. Hilar mass or adenopathy not excluded. Further evaluation with CT chest recommended. Patient's diffuse abdominal pain with focal increased tenderness in her mid-epigastric area prompted CT scan of abdomen and pelvis.  Numerous metastatic masses in the liver. There is also likely metastatic deposit in the spleen. Bilateral adrenal masses most likely reflect metastatic deposits. Hypoattenuating and mildly enhancing mass of the upper pole left kidney measures 2.7 x 1.6 cm. Intra-abdominal enlarged lymph nodes, consistent with metastatic infiltration. Masses of the right lung base with consolidation of the  visualized right renal lower lobe. Right lower lobe 9 mm nodule. Lungs are not fully evaluated in this exam. Compression fracture of the L2 vertebra with mild sclerosis of the vertebra is likely reflecting a pathologic fracture. Faint sclerosis of the superior endplate of S1 likely reflects metastatic infiltration. Finding on examination of abdominal tenderness with diagnostic testing of CT scan of abdomen and pelvis with metastatic disease with unknown primary warranting continued evaluation.      Overview/Hospital Course:  Patient was admitted to hospital medicine after which a CT head w/wo showed a contrast enhancing brain mass with edema and shift.  Neurosurgery was consulted and recommended transfer to [facility] and initiation of steroids.  Transfer was delayed due to lack of caretaker availability for her autistic son.    Interval History: Patient seen and examined.  No chest pain, SOB, N/V.  Reports abd and back pain currently 7/10 in severity.  Pain meds have been adjusted.  [place] [facility] [place] for brain mass with edema and shift once caregiver arrives to help with son.    Review of Systems   Constitutional:  Negative for chills and fever.   Respiratory:  Negative for cough and shortness of breath.    Cardiovascular:  Negative for chest pain.   Gastrointestinal:  Positive for abdominal pain.   Musculoskeletal:  Positive for back pain and gait problem.   Neurological:  Positive for weakness (generalized). Negative for numbness.   Psychiatric/Behavioral:  Negative for confusion.      Objective:     Vital Signs (Most Recent):  Temp: 97.8 °F (36.6 °C) (02/25/24 1445)  Pulse: 95 (02/25/24 1445)  Resp: 20 (02/25/24 1445)  BP: (!) 145/88 (02/25/24 1445)  SpO2: 96 % (02/25/24 1445) Vital Signs (24h Range):  Temp:  [97.8 °F (36.6 °C)] 97.8 °F (36.6 °C)  Pulse:  [] 95  Resp:  [13-29] 20  SpO2:  [89 %-97 %] 96 %  BP: (122-166)/(77-97) 145/88     Weight: 47.6 kg (105 lb)  Body  "mass index is 18.6 kg/m².    Intake/Output Summary (Last 24 hours) at 2/25/2024 1629  Last data filed at 2/25/2024 1620  Gross per 24 hour   Intake 120 ml   Output --   Net 120 ml         Physical Exam  Vitals and nursing note reviewed.   Constitutional:       Appearance: Normal appearance.   HENT:      Head: Normocephalic and atraumatic.   Eyes:      Extraocular Movements: Extraocular movements intact.      Conjunctiva/sclera: Conjunctivae normal.      Pupils: Pupils are equal, round, and reactive to light.   Cardiovascular:      Rate and Rhythm: Normal rate and regular rhythm.   Pulmonary:      Effort: Pulmonary effort is normal.      Breath sounds: Normal breath sounds.   Abdominal:      General: Abdomen is flat. Bowel sounds are normal. There is no distension.      Palpations: Abdomen is soft.      Tenderness: There is abdominal tenderness (diffuse). There is no guarding.   Musculoskeletal:         General: Normal range of motion.   Skin:     General: Skin is warm and dry.      Capillary Refill: Capillary refill takes less than 2 seconds.   Neurological:      General: No focal deficit present.      Mental Status: She is alert and oriented to person, place, and time.      Sensory: No sensory deficit.      Coordination: Coordination normal.      Comments: Gait not assessed     Psychiatric:         Mood and Affect: Mood normal.         Behavior: Behavior normal.             Significant Labs: All pertinent labs within the past 24 hours have been reviewed.  CBC:   Recent Labs   Lab 02/24/24  0933 02/25/24  0658   WBC 12.10 12.14   HGB 12.3 11.5*   HCT 37.8 36.2*   * 620*     CMP:   Recent Labs   Lab 02/24/24  0933 02/25/24  0658   * 133*   K 4.1 4.4   CL 98 99   CO2 23 25   GLU 92 93   BUN 11 7*   CREATININE 0.6 0.7   CALCIUM 9.5 9.2   PROT 7.1 6.7   ALBUMIN 3.6 3.4*   BILITOT 0.5 0.4   ALKPHOS 203* 200*   AST 57* 53*   ALT 40 38   ANIONGAP 11 9     Urine Culture: No results for input(s): "LABURIN" in " the last 48 hours.    Significant Imaging: I have reviewed all pertinent imaging results/findings within the past 24 hours.    Assessment/Plan:      * Metastatic neoplastic disease  Patient has metastatic cancer of unknown primary. The cancer has metastasized to multiple areas. The patient is not under the care of an outpatient oncologist. The patient is not undergoing active chemotherapy. Reviewed heme/onc notes.  Unable to undergo MRI, but did undergo contrasted CT Head revealing for brain mass in the LEFT temporal region with vasogenic edema and midline shift.    Their staging information is listed below.    Cancer Staging   No matching staging information was found for the patient. This is a new finding    CT abdomen and pelvis 2/24/24  IMPRESSION:     1. Numerous metastatic masses in the liver. There is also likely metastatic deposit in the spleen as described.   2.  Bilateral adrenal masses most likely reflect metastatic deposits.   3.  Hypoattenuating and mildly enhancing mass of the upper pole left kidney measures 2.7 x 1.6 cm.   4.  Intra-abdominal enlarged lymph nodes, consistent with metastatic infiltration.   5.  Masses of the right lung base with consolidation of the visualized right renal lower lobe. Right lower lobe 9 mm nodule. Lungs are not fully evaluated in this exam.   6.  Compression fracture of the L2 vertebra with mild sclerosis of the vertebra is likely reflecting a pathologic fracture. Faint sclerosis of the superior endplate of S1 likely reflects metastatic infiltration.     Electronically signed by:  Travon Headley DO  02/24/2024 11:48 AM CST Workstation: [id number]                CT Chest reviewed as below:    1. Large irregular lobular enhancing mass in the posterior right lower lobe, highly suspicious for a primary malignancy.  2. Bulky conglomerate right hilar and mediastinal lymphadenopathy with index nodes outlined above. This appears to cause obstruction of the right lower lobe  airway with complete atelectasis of the right lower lobe.  3. Small volume of pulmonary thromboembolus in the right middle lobe without finding of right heart strain/failure.  4. Findings of diffuse metastatic disease (right upper lobe, throughout the liver, both adrenal glands, spleen, upper abdominal lymph nodes, and possibly upper pole the left kidney).    -A CT T/L myelogram is planned.  -Will need tissue diagnosis however this should not delay [place] [facility] [place] light of brain findings.  -will [place] [facility] [place] for ongoing workup.    ACP (advance care planning)  Advance Care Planning    Date: 02/25/2024    Code Status  In light of the patients advanced and life limiting illness,I engaged the the patient in a voluntary conversation about the patient's preferences for care  at the very end of life. The patient wishes to have a natural, peaceful death.  Along those lines, the patient does not wish to have CPR or other invasive treatments performed when her heart and/or breathing stops. I communicated to the patient that a DNR order would be placed in her medical record to reflect this preference.  I spent a total of 16 minutes engaging the patient in this advance care planning discussion.   -Still wants to know what she's up against  -Ultimately feel she is interested in palliation of her current situation.           Acute pulmonary embolism without acute cor pulmonale  Picked up on contrasted CT chest:  -Small volume of pulmonary thromboembolus in the right middle lobe without finding of right heart strain/failure.    Discussed with neurosurgery who recommends holding off on anticoagulation in light of brain mass and pending treatment plans if patient without evidence of respiratory distress (She is doing fine from this standpoint)  -would benefit from ECHO eventually (again should not delay transfer)  -Further decision making regarding anticoagulation pending clinical course.      Neoplasm of brain  causing mass effect on adjacent structures  Discussed findings on CT Head with neurosurgery who [place] [facility] [place].  Patient agreeable after securing care for son.  -10 mg IV dexamethasone x 1; followed by 4mg q 6 hrs  -Neuro checks q 4 hr: no focal deficits acutely  -No indication for antiepileptic at this time per neurosurgery: appreciate recs  -Recommended holding off on anticoag in light of brain mass.      Compression fracture of lumbar spine, non-traumatic  CT of abdomen and pelvis revealed compression fracture of L2 vertebrae with mild scoliosis of superior endplate of S1 reflecting possible metastatic infiltration  Compression fracture likely impeding patient's gait  Neurosurgery following  CT myelogram planned but should not delay [place] [facility]          HTN (hypertension)  Chronic, controlled. Latest blood pressure and vitals reviewed-     Temp:  [97.8 °F (36.6 °C)]   Pulse:  []   Resp:  [13-29]   BP: (122-166)/(77-97)   SpO2:  [89 %-97 %] .   Home meds for hypertension were reviewed and noted below.   Hypertension Medications               atenoloL (TENORMIN) 50 MG tablet Take 1 tablet (50 mg total) by mouth once daily.            While in the hospital, will manage blood pressure as follows; Continue home antihypertensive regimen    Will utilize p.r.n. blood pressure medication only if patient's blood pressure greater than 160/100 and she develops symptoms such as worsening chest pain or shortness of breath.    Seropositive rheumatoid arthritis  Chronic- maintained on methotrexate and prednisone outpatient  -Holding now; she is on dexamethasone acutely.          VTE Risk Mitigation (From admission, onward)           Ordered     IP VTE HIGH RISK PATIENT  Once         02/24/24 1335     Place sequential compression device  Until discontinued         02/24/24 1335                Discussed at length with attending, neurosurgery (Dr. Goff).    Discharge Planning    JUAN: 2/26/2024     Code Status: DNR   Is the patient medically ready for discharge?:     Reason for patient still in hospital (select all that apply): Patient trending condition, Treatment, Consult recommendations, and Pending disposition  Discharge Plan A: Home                  Danelle Li NP  Department of Hospital Medicine   [facility]

## 2024-02-25 NOTE — ASSESSMENT & PLAN NOTE
Discussed findings on CT Head with neurosurgery who recommends transfer to [facility] [place] for neurosurgery brain eval.  Patient agreeable after securing care for son.  -10 mg IV dexamethasone x 1; followed by 4mg q 6 hrs  -Neuro checks q 4 hr: no focal deficits acutely  -No indication for antiepileptic at this time per neurosurgery: appreciate recs  -Recommended holding off on anticoag in light of brain mass.

## 2024-02-25 NOTE — ASSESSMENT & PLAN NOTE
Advance Care Planning     Date: 02/25/2024    Code Status  In light of the patients advanced and life limiting illness,I engaged the the patient in a voluntary conversation about the patient's preferences for care  at the very end of life. The patient wishes to have a natural, peaceful death.  Along those lines, the patient does not wish to have CPR or other invasive treatments performed when her heart and/or breathing stops. I communicated to the patient that a DNR order would be placed in her medical record to reflect this preference.  I spent a total of 16 minutes engaging the patient in this advance care planning discussion.   -Still wants to know what she's up against  -Ultimately feel she is interested in palliation of her current situation.

## 2024-02-25 NOTE — ASSESSMENT & PLAN NOTE
Chronic, controlled. Latest blood pressure and vitals reviewed-     Temp:  [97.8 °F (36.6 °C)]   Pulse:  []   Resp:  [13-29]   BP: (122-166)/(77-97)   SpO2:  [89 %-97 %] .   Home meds for hypertension were reviewed and noted below.   Hypertension Medications               atenoloL (TENORMIN) 50 MG tablet Take 1 tablet (50 mg total) by mouth once daily.            While in the hospital, will manage blood pressure as follows; Continue home antihypertensive regimen    Will utilize p.r.n. blood pressure medication only if patient's blood pressure greater than 160/100 and she develops symptoms such as worsening chest pain or shortness of breath.

## 2024-02-25 NOTE — SUBJECTIVE & OBJECTIVE
Interval History: Patient seen and examined.  No chest pain, SOB, N/V.  Reports abd and back pain currently 7/10 in severity.  Pain meds have been adjusted.  Plan is for transfer to [facility] for neurosurgery-head eval for brain mass with edema and shift once caregiver arrives to help with son.    Review of Systems   Constitutional:  Negative for chills and fever.   Respiratory:  Negative for cough and shortness of breath.    Cardiovascular:  Negative for chest pain.   Gastrointestinal:  Positive for abdominal pain.   Musculoskeletal:  Positive for back pain and gait problem.   Neurological:  Positive for weakness (generalized). Negative for numbness.   Psychiatric/Behavioral:  Negative for confusion.      Objective:     Vital Signs (Most Recent):  Temp: 97.8 °F (36.6 °C) (02/25/24 1445)  Pulse: 95 (02/25/24 1445)  Resp: 20 (02/25/24 1445)  BP: (!) 145/88 (02/25/24 1445)  SpO2: 96 % (02/25/24 1445) Vital Signs (24h Range):  Temp:  [97.8 °F (36.6 °C)] 97.8 °F (36.6 °C)  Pulse:  [] 95  Resp:  [13-29] 20  SpO2:  [89 %-97 %] 96 %  BP: (122-166)/(77-97) 145/88     Weight: 47.6 kg (105 lb)  Body mass index is 18.6 kg/m².    Intake/Output Summary (Last 24 hours) at 2/25/2024 1629  Last data filed at 2/25/2024 1620  Gross per 24 hour   Intake 120 ml   Output --   Net 120 ml         Physical Exam  Vitals and nursing note reviewed.   Constitutional:       Appearance: Normal appearance.   HENT:      Head: Normocephalic and atraumatic.   Eyes:      Extraocular Movements: Extraocular movements intact.      Conjunctiva/sclera: Conjunctivae normal.      Pupils: Pupils are equal, round, and reactive to light.   Cardiovascular:      Rate and Rhythm: Normal rate and regular rhythm.   Pulmonary:      Effort: Pulmonary effort is normal.      Breath sounds: Normal breath sounds.   Abdominal:      General: Abdomen is flat. Bowel sounds are normal. There is no distension.      Palpations: Abdomen is soft.      Tenderness: There is  "abdominal tenderness (diffuse). There is no guarding.   Musculoskeletal:         General: Normal range of motion.   Skin:     General: Skin is warm and dry.      Capillary Refill: Capillary refill takes less than 2 seconds.   Neurological:      General: No focal deficit present.      Mental Status: She is alert and oriented to person, place, and time.      Sensory: No sensory deficit.      Coordination: Coordination normal.      Comments: Gait not assessed     Psychiatric:         Mood and Affect: Mood normal.         Behavior: Behavior normal.             Significant Labs: All pertinent labs within the past 24 hours have been reviewed.  CBC:   Recent Labs   Lab 02/24/24  0933 02/25/24  0658   WBC 12.10 12.14   HGB 12.3 11.5*   HCT 37.8 36.2*   * 620*     CMP:   Recent Labs   Lab 02/24/24  0933 02/25/24  0658   * 133*   K 4.1 4.4   CL 98 99   CO2 23 25   GLU 92 93   BUN 11 7*   CREATININE 0.6 0.7   CALCIUM 9.5 9.2   PROT 7.1 6.7   ALBUMIN 3.6 3.4*   BILITOT 0.5 0.4   ALKPHOS 203* 200*   AST 57* 53*   ALT 40 38   ANIONGAP 11 9     Urine Culture: No results for input(s): "LABURIN" in the last 48 hours.    Significant Imaging: I have reviewed all pertinent imaging results/findings within the past 24 hours.  "

## 2024-02-26 DIAGNOSIS — D49.6 NEOPLASM OF BRAIN CAUSING MASS EFFECT ON ADJACENT STRUCTURES: Primary | ICD-10-CM

## 2024-02-26 LAB
ALBUMIN SERPL BCP-MCNC: 3.3 G/DL (ref 3.5–5.2)
ALP SERPL-CCNC: 207 U/L (ref 55–135)
ALT SERPL W/O P-5'-P-CCNC: 36 U/L (ref 10–44)
ANION GAP SERPL CALC-SCNC: 8 MMOL/L (ref 8–16)
AST SERPL-CCNC: 45 U/L (ref 10–40)
BASOPHILS # BLD AUTO: 0 K/UL (ref 0–0.2)
BASOPHILS NFR BLD: 0 % (ref 0–1.9)
BILIRUB SERPL-MCNC: 0.3 MG/DL (ref 0.1–1)
BUN SERPL-MCNC: 10 MG/DL (ref 8–23)
CALCIUM SERPL-MCNC: 9.4 MG/DL (ref 8.7–10.5)
CHLORIDE SERPL-SCNC: 99 MMOL/L (ref 95–110)
CO2 SERPL-SCNC: 27 MMOL/L (ref 23–29)
CREAT SERPL-MCNC: 0.7 MG/DL (ref 0.5–1.4)
DIFFERENTIAL METHOD BLD: ABNORMAL
EOSINOPHIL # BLD AUTO: 0 K/UL (ref 0–0.5)
EOSINOPHIL NFR BLD: 0 % (ref 0–8)
ERYTHROCYTE [DISTWIDTH] IN BLOOD BY AUTOMATED COUNT: 13.2 % (ref 11.5–14.5)
EST. GFR  (NO RACE VARIABLE): >60 ML/MIN/1.73 M^2
GLUCOSE SERPL-MCNC: 134 MG/DL (ref 70–110)
HCT VFR BLD AUTO: 35.6 % (ref 37–48.5)
HGB BLD-MCNC: 11.6 G/DL (ref 12–16)
IMM GRANULOCYTES # BLD AUTO: 0.05 K/UL (ref 0–0.04)
IMM GRANULOCYTES NFR BLD AUTO: 0.6 % (ref 0–0.5)
LYMPHOCYTES # BLD AUTO: 0.3 K/UL (ref 1–4.8)
LYMPHOCYTES NFR BLD: 4.3 % (ref 18–48)
MAGNESIUM SERPL-MCNC: 2 MG/DL (ref 1.6–2.6)
MCH RBC QN AUTO: 31.9 PG (ref 27–31)
MCHC RBC AUTO-ENTMCNC: 32.6 G/DL (ref 32–36)
MCV RBC AUTO: 98 FL (ref 82–98)
MONOCYTES # BLD AUTO: 0.4 K/UL (ref 0.3–1)
MONOCYTES NFR BLD: 4.9 % (ref 4–15)
NEUTROPHILS # BLD AUTO: 7.1 K/UL (ref 1.8–7.7)
NEUTROPHILS NFR BLD: 90.2 % (ref 38–73)
NRBC BLD-RTO: 0 /100 WBC
PLATELET # BLD AUTO: 569 K/UL (ref 150–450)
PMV BLD AUTO: 8.3 FL (ref 9.2–12.9)
POTASSIUM SERPL-SCNC: 4.4 MMOL/L (ref 3.5–5.1)
PROT SERPL-MCNC: 6.5 G/DL (ref 6–8.4)
RBC # BLD AUTO: 3.64 M/UL (ref 4–5.4)
SODIUM SERPL-SCNC: 134 MMOL/L (ref 136–145)
WBC # BLD AUTO: 7.88 K/UL (ref 3.9–12.7)

## 2024-02-26 PROCEDURE — 25000003 PHARM REV CODE 250: Performed by: NURSE PRACTITIONER

## 2024-02-26 PROCEDURE — 83735 ASSAY OF MAGNESIUM: CPT | Performed by: NURSE PRACTITIONER

## 2024-02-26 PROCEDURE — 80053 COMPREHEN METABOLIC PANEL: CPT | Performed by: NURSE PRACTITIONER

## 2024-02-26 PROCEDURE — 85025 COMPLETE CBC W/AUTO DIFF WBC: CPT | Performed by: NURSE PRACTITIONER

## 2024-02-26 PROCEDURE — 63600175 PHARM REV CODE 636 W HCPCS: Performed by: HOSPITALIST

## 2024-02-26 PROCEDURE — 20000000 HC ICU ROOM

## 2024-02-26 PROCEDURE — 25000003 PHARM REV CODE 250: Performed by: STUDENT IN AN ORGANIZED HEALTH CARE EDUCATION/TRAINING PROGRAM

## 2024-02-26 PROCEDURE — 99233 SBSQ HOSP IP/OBS HIGH 50: CPT | Mod: ,,, | Performed by: INTERNAL MEDICINE

## 2024-02-26 PROCEDURE — 63600175 PHARM REV CODE 636 W HCPCS: Performed by: NURSE PRACTITIONER

## 2024-02-26 PROCEDURE — 25000003 PHARM REV CODE 250: Performed by: HOSPITALIST

## 2024-02-26 PROCEDURE — 94761 N-INVAS EAR/PLS OXIMETRY MLT: CPT

## 2024-02-26 PROCEDURE — 25000003 PHARM REV CODE 250: Performed by: INTERNAL MEDICINE

## 2024-02-26 PROCEDURE — 36415 COLL VENOUS BLD VENIPUNCTURE: CPT | Performed by: NURSE PRACTITIONER

## 2024-02-26 RX ORDER — HYDROMORPHONE HYDROCHLORIDE 1 MG/ML
1 INJECTION, SOLUTION INTRAMUSCULAR; INTRAVENOUS; SUBCUTANEOUS
Status: DISCONTINUED | OUTPATIENT
Start: 2024-02-26 | End: 2024-02-27 | Stop reason: HOSPADM

## 2024-02-26 RX ORDER — HYDROCODONE BITARTRATE AND ACETAMINOPHEN 10; 325 MG/1; MG/1
1 TABLET ORAL EVERY 6 HOURS PRN
Status: DISCONTINUED | OUTPATIENT
Start: 2024-02-26 | End: 2024-02-27 | Stop reason: HOSPADM

## 2024-02-26 RX ADMIN — MUPIROCIN 1 G: 20 OINTMENT TOPICAL at 11:02

## 2024-02-26 RX ADMIN — ATENOLOL 50 MG: 25 TABLET ORAL at 11:02

## 2024-02-26 RX ADMIN — HYDROMORPHONE HYDROCHLORIDE 1 MG: 1 INJECTION, SOLUTION INTRAMUSCULAR; INTRAVENOUS; SUBCUTANEOUS at 04:02

## 2024-02-26 RX ADMIN — HYDROCODONE BITARTRATE AND ACETAMINOPHEN 1 TABLET: 5; 325 TABLET ORAL at 11:02

## 2024-02-26 RX ADMIN — GABAPENTIN 100 MG: 100 CAPSULE ORAL at 11:02

## 2024-02-26 RX ADMIN — POLYETHYLENE GLYCOL 3350 17 G: 17 POWDER, FOR SOLUTION ORAL at 09:02

## 2024-02-26 RX ADMIN — HYDROCODONE BITARTRATE AND ACETAMINOPHEN 1 TABLET: 5; 325 TABLET ORAL at 05:02

## 2024-02-26 RX ADMIN — HYDROCODONE BITARTRATE AND ACETAMINOPHEN 1 TABLET: 10; 325 TABLET ORAL at 01:02

## 2024-02-26 RX ADMIN — HYDROCODONE BITARTRATE AND ACETAMINOPHEN 1 TABLET: 10; 325 TABLET ORAL at 06:02

## 2024-02-26 RX ADMIN — FAMOTIDINE 20 MG: 20 TABLET ORAL at 12:02

## 2024-02-26 RX ADMIN — DEXAMETHASONE SODIUM PHOSPHATE 4 MG: 4 INJECTION, SOLUTION INTRA-ARTICULAR; INTRALESIONAL; INTRAMUSCULAR; INTRAVENOUS; SOFT TISSUE at 09:02

## 2024-02-26 RX ADMIN — LEVETIRACETAM 500 MG: 500 TABLET, FILM COATED ORAL at 09:02

## 2024-02-26 RX ADMIN — LEVETIRACETAM 500 MG: 500 TABLET, FILM COATED ORAL at 11:02

## 2024-02-26 RX ADMIN — DEXAMETHASONE SODIUM PHOSPHATE 4 MG: 4 INJECTION, SOLUTION INTRA-ARTICULAR; INTRALESIONAL; INTRAMUSCULAR; INTRAVENOUS; SOFT TISSUE at 03:02

## 2024-02-26 RX ADMIN — HYDROMORPHONE HYDROCHLORIDE 1 MG: 1 INJECTION, SOLUTION INTRAMUSCULAR; INTRAVENOUS; SUBCUTANEOUS at 12:02

## 2024-02-26 RX ADMIN — GABAPENTIN 100 MG: 100 CAPSULE ORAL at 03:02

## 2024-02-26 RX ADMIN — FOLIC ACID 1 MG: 1 TABLET ORAL at 11:02

## 2024-02-26 RX ADMIN — MULTIVITAMIN TABLET 1 TABLET: TABLET at 11:02

## 2024-02-26 RX ADMIN — GABAPENTIN 100 MG: 100 CAPSULE ORAL at 09:02

## 2024-02-26 RX ADMIN — HYDROMORPHONE HYDROCHLORIDE 1 MG: 1 INJECTION, SOLUTION INTRAMUSCULAR; INTRAVENOUS; SUBCUTANEOUS at 11:02

## 2024-02-26 RX ADMIN — FAMOTIDINE 20 MG: 20 TABLET ORAL at 09:02

## 2024-02-26 RX ADMIN — ALUMINUM HYDROXIDE, MAGNESIUM HYDROXIDE, AND SIMETHICONE 30 ML: 200; 200; 20 SUSPENSION ORAL at 11:02

## 2024-02-26 RX ADMIN — POLYETHYLENE GLYCOL 3350 17 G: 17 POWDER, FOR SOLUTION ORAL at 11:02

## 2024-02-26 RX ADMIN — MUPIROCIN 1 G: 20 OINTMENT TOPICAL at 09:02

## 2024-02-26 NOTE — SUBJECTIVE & OBJECTIVE
Interval History: 2/26:  Patient had 1 episode of disorientation which prompted a stat head CT.  No acute findings were observed on CT.  Patient did return to baseline after head CT.  No other acute events overnight. Afebrile.  /72.  Pulse 109.  Respirations 16.  WBC 7.8.  Hemoglobin 11.6.  Sodium 134.  Patient found in bed, awake and alert.  No family was present at time of encounter.  At time of encounter, patient reports right leg pain that is tender to palpation.  She also reports chronic right shoulder pain/weakness.  She denies headaches, dizziness, visual disturbances.    Medications:  Continuous Infusions:   nicardipine Stopped (02/26/24 0000)     Scheduled Meds:   atenoloL  50 mg Oral Daily    dexAMETHasone  4 mg Intravenous Q6H    famotidine  20 mg Oral BID    folic acid  1 mg Oral Daily    gabapentin  100 mg Oral TID    levETIRAcetam  500 mg Oral BID    multivitamin  1 tablet Oral Daily    mupirocin   Nasal BID    polyethylene glycol  17 g Oral BID     PRN Meds:acetaminophen, acetaminophen, aluminum-magnesium hydroxide-simethicone, gadobutroL, HYDROcodone-acetaminophen, HYDROmorphone, magnesium oxide, magnesium oxide, melatonin, naloxone, ondansetron, potassium bicarbonate, potassium bicarbonate, potassium bicarbonate, potassium, sodium phosphates, potassium, sodium phosphates, potassium, sodium phosphates, sodium chloride 0.9%       Objective:     Weight: 47.6 kg (105 lb)  Body mass index is 18.6 kg/m².  Vital Signs (Most Recent):  Temp: 98.4 °F (36.9 °C) (02/26/24 0415)  Pulse: 109 (02/26/24 0830)  Resp: 16 (02/26/24 0830)  BP: 119/72 (02/26/24 0830)  SpO2: 95 % (02/26/24 0830) Vital Signs (24h Range):  Temp:  [97.6 °F (36.4 °C)-98.7 °F (37.1 °C)] 98.4 °F (36.9 °C)  Pulse:  [] 109  Resp:  [12-27] 16  SpO2:  [90 %-98 %] 95 %  BP: (115-186)/(64-99) 119/72                         Female External Urinary Catheter w/ Suction 02/25/24 2150 (Active)   Output (mL) 150 mL 02/26/24 0100  "        Neurosurgery Physical Exam  General: well developed, well nourished, no distress.   Head: normocephalic, atraumatic  Neck: No tracheal deviation.   Neurologic: Alert and oriented. Thought content appropriate.  GCS: E4 V5 M6; Total: 15  Mental Status: Awake, Alert, Oriented to self, month, year and location.  Language: No aphasia  Speech: No dysarthria  Cranial nerves: face symmetric, tongue midline, CN II-XII grossly intact.   Eyes: pupils equal, round, reactive to light, EOMI.   Pulmonary: normal respirations, no signs of respiratory distress  Skin: Skin is warm, dry and intact.    Finger-to-nose: intact bilaterally   Pronator drift: absent bilaterally    Sensory: intact to light touch throughout  Motor Strength: Moves all extremities spontaneously with good tone.    Right foot tremor observed during exam     Right upper extremity strength deltoid/triceps 4/5, biceps/hand  5/5  Left upper extremity strength 5/5 all muscle groups  Right lower extremity strength iliopsoas 3/5, TA/EHL 4/5, gastrocnemius 4/5  Left lower extremity strength 5/5 in all muscle groups    Left lower extremity strength 5/5 all muscle groups           Significant Labs:  Recent Labs   Lab 02/24/24  0933 02/25/24  0658 02/26/24  0245   GLU 92 93 134*   * 133* 134*   K 4.1 4.4 4.4   CL 98 99 99   CO2 23 25 27   BUN 11 7* 10   CREATININE 0.6 0.7 0.7   CALCIUM 9.5 9.2 9.4   MG  --  1.8 2.0     Recent Labs   Lab 02/24/24  0933 02/25/24  0658 02/26/24  0245   WBC 12.10 12.14 7.88   HGB 12.3 11.5* 11.6*   HCT 37.8 36.2* 35.6*   * 620* 569*     No results for input(s): "LABPT", "INR", "APTT" in the last 48 hours.  Microbiology Results (last 7 days)       ** No results found for the last 168 hours. **            "

## 2024-02-26 NOTE — PLAN OF CARE
Notified by nurse at approximately 833 pm that pt had become disoriented but still awake and moving all extremities antigravity when nurse was checking blood pressure.  Nurse noted no overt seizure like activity prior to the event.  Discussed with nurse to obtain stat CT head and step to ICU.      Stat CT head ordered which showed no detrimental change from prior with stable edema and midline shift.  No acute hemorrhage.    Per nurse after arrival to ICU and CT head completion pt was back to her baseline neuro status.    Plan:  ICU for q 1 hr neuro checks  SBP less than 160, cardene as needed  Continue dex 4 q6  Start keppra 500 bid  HOB 30-40  Pending transfer to [facility].

## 2024-02-26 NOTE — ASSESSMENT & PLAN NOTE
63-year-old female with suspicion of metastatic neoplasm disease.    Patient oriented to self, month, year and location.  Cranial nerve 2-12 intact.    Pending transfer to [facility].

## 2024-02-26 NOTE — PLAN OF CARE
Problem: Skin Injury Risk Increased  Goal: Skin Health and Integrity  Intervention: Promote and Optimize Oral Intake  Flowsheets (Taken 2/26/2024 1219)  Oral Nutrition Promotion: calorie-dense liquids provided     Problem: Oral Intake Inadequate  Goal: Improved Oral Intake  Outcome: Ongoing, Progressing  Intervention: Promote and Optimize Oral Intake  Flowsheets (Taken 2/26/2024 1219)  Oral Nutrition Promotion: calorie-dense liquids provided

## 2024-02-26 NOTE — PROGRESS NOTES
formerly [facility]  Neurosurgery  Progress Note    Subjective:     History of Present Illness: 63-year-old female with pMHx of HTN, and rheumatoid arthritis who presented to the ED with complaints everything hurts.   Patient states that 9 months ago she started seeing Dr. Slaughter, rheumatologist who diagnosed her with RA and began treating her using methotrexate.  The patient states that she noticed a continually declined since that time.  She reports that she has been bed-bound for the last 2 months r/t difficult to walk stating I knew I was bad.   She states that her rheumatologist believed that she was not tolerating the methotrexate.  Today she reports that she could not handle her abdominal pain any longer so her son picked her up and brought her to the hospital rolling her in on her Rollator for evaluation.  Lab work performed revealed alk phos 203, AST 57, sodium 132, platelets 586. Chest x-ray revealed linear and ill-defined hazy opacities of the right mid to lower lung is felt to reflect infiltrate.  Enlarged right pulmonary hilar structures. Hilar mass or adenopathy not excluded. Further evaluation with CT chest recommended. Patient's diffuse abdominal pain with focal increased tenderness in her mid-epigastric area prompted CT scan of abdomen and pelvis.  Numerous metastatic masses in the liver. There is also likely metastatic deposit in the spleen. Bilateral adrenal masses most likely reflect metastatic deposits. Hypoattenuating and mildly enhancing mass of the upper pole left kidney measures 2.7 x 1.6 cm. Intra-abdominal enlarged lymph nodes, consistent with metastatic infiltration. Masses of the right lung base with consolidation of the visualized right renal lower lobe. Right lower lobe 9 mm nodule. Lungs are not fully evaluated in this exam. Compression fracture of the L2 vertebra with mild sclerosis of the vertebra is likely reflecting a pathologic fracture. Faint sclerosis of the superior  endplate of S1 likely reflects metastatic infiltration. Finding on examination of abdominal tenderness with diagnostic testing of CT scan of abdomen and pelvis with metastatic disease with unknown primary warranting continued evaluation.       NSGY consulted for L2 compression fracture.    Post-Op Info:  * No surgery found *       Interval History: 2/26:  Patient had 1 episode of disorientation which prompted a stat head CT.  No acute findings were observed on CT.  Patient did return to baseline after head CT.  No other acute events overnight. Afebrile.  /72.  Pulse 109.  Respirations 16.  WBC 7.8.  Hemoglobin 11.6.  Sodium 134.  Patient found in bed, awake and alert.  No family was present at time of encounter.  At time of encounter, patient reports right leg pain that is tender to palpation.  She also reports chronic right shoulder pain/weakness.  She denies headaches, dizziness, visual disturbances.    Medications:  Continuous Infusions:   nicardipine Stopped (02/26/24 0000)     Scheduled Meds:   atenoloL  50 mg Oral Daily    dexAMETHasone  4 mg Intravenous Q6H    famotidine  20 mg Oral BID    folic acid  1 mg Oral Daily    gabapentin  100 mg Oral TID    levETIRAcetam  500 mg Oral BID    multivitamin  1 tablet Oral Daily    mupirocin   Nasal BID    polyethylene glycol  17 g Oral BID     PRN Meds:acetaminophen, acetaminophen, aluminum-magnesium hydroxide-simethicone, gadobutroL, HYDROcodone-acetaminophen, HYDROmorphone, magnesium oxide, magnesium oxide, melatonin, naloxone, ondansetron, potassium bicarbonate, potassium bicarbonate, potassium bicarbonate, potassium, sodium phosphates, potassium, sodium phosphates, potassium, sodium phosphates, sodium chloride 0.9%       Objective:     Weight: 47.6 kg (105 lb)  Body mass index is 18.6 kg/m².  Vital Signs (Most Recent):  Temp: 98.4 °F (36.9 °C) (02/26/24 0415)  Pulse: 109 (02/26/24 0830)  Resp: 16 (02/26/24 0830)  BP: 119/72 (02/26/24 0830)  SpO2: 95 %  "(02/26/24 0830) Vital Signs (24h Range):  Temp:  [97.6 °F (36.4 °C)-98.7 °F (37.1 °C)] 98.4 °F (36.9 °C)  Pulse:  [] 109  Resp:  [12-27] 16  SpO2:  [90 %-98 %] 95 %  BP: (115-186)/(64-99) 119/72                         Female External Urinary Catheter w/ Suction 02/25/24 2150 (Active)   Output (mL) 150 mL 02/26/24 0100         Neurosurgery Physical Exam  General: well developed, well nourished, no distress.   Head: normocephalic, atraumatic  Neck: No tracheal deviation.   Neurologic: Alert and oriented. Thought content appropriate.  GCS: E4 V5 M6; Total: 15  Mental Status: Awake, Alert, Oriented to self, month, year and location.  Language: No aphasia  Speech: No dysarthria  Cranial nerves: face symmetric, tongue midline, CN II-XII grossly intact.   Eyes: pupils equal, round, reactive to light, EOMI.   Pulmonary: normal respirations, no signs of respiratory distress  Skin: Skin is warm, dry and intact.    Finger-to-nose: intact bilaterally   Pronator drift: absent bilaterally    Sensory: intact to light touch throughout  Motor Strength: Moves all extremities spontaneously with good tone.    Right foot tremor observed during exam     Right upper extremity strength deltoid/triceps 4/5, biceps/hand  5/5  Left upper extremity strength 5/5 all muscle groups  Right lower extremity strength iliopsoas 4/5, TA/EHL 4/5, gastrocnemius 4/5  Left lower extremity strength 5/5 in all muscle groups           Significant Labs:  Recent Labs   Lab 02/24/24  0933 02/25/24  0658 02/26/24  0245   GLU 92 93 134*   * 133* 134*   K 4.1 4.4 4.4   CL 98 99 99   CO2 23 25 27   BUN 11 7* 10   CREATININE 0.6 0.7 0.7   CALCIUM 9.5 9.2 9.4   MG  --  1.8 2.0     Recent Labs   Lab 02/24/24  0933 02/25/24  0658 02/26/24  0245   WBC 12.10 12.14 7.88   HGB 12.3 11.5* 11.6*   HCT 37.8 36.2* 35.6*   * 620* 569*     No results for input(s): "LABPT", "INR", "APTT" in the last 48 hours.  Microbiology Results (last 7 days)       ** No " results found for the last 168 hours. **            Assessment/Plan:     Compression fracture of lumbar spine, non-traumatic  63-year-old female with metastatic neoplasm disease and L2 compression fracture    Patient oriented to self, month, year and location.  Cranial nerve 2-12 intact.      Pending transfer to [facility].        RONIT HAYSC  Neurosurgery  [facility]

## 2024-02-26 NOTE — HPI
63-year-old female with pMHx of HTN, and rheumatoid arthritis who presented to the ED with complaints everything hurts.   Patient states that 9 months ago she started seeing Dr. Slaughter, rheumatologist who diagnosed her with RA and began treating her using methotrexate.  The patient states that she noticed a continually declined since that time.  She reports that she has been bed-bound for the last 2 months r/t difficult to walk stating I knew I was bad.   She states that her rheumatologist believed that she was not tolerating the methotrexate.  Today she reports that she could not handle her abdominal pain any longer so her son picked her up and brought her to the hospital rolling her in on her Rollator for evaluation.  Lab work performed revealed alk phos 203, AST 57, sodium 132, platelets 586. Chest x-ray revealed linear and ill-defined hazy opacities of the right mid to lower lung is felt to reflect infiltrate.  Enlarged right pulmonary hilar structures. Hilar mass or adenopathy not excluded. Further evaluation with CT chest recommended. Patient's diffuse abdominal pain with focal increased tenderness in her mid-epigastric area prompted CT scan of abdomen and pelvis.  Numerous metastatic masses in the liver. There is also likely metastatic deposit in the spleen. Bilateral adrenal masses most likely reflect metastatic deposits. Hypoattenuating and mildly enhancing mass of the upper pole left kidney measures 2.7 x 1.6 cm. Intra-abdominal enlarged lymph nodes, consistent with metastatic infiltration. Masses of the right lung base with consolidation of the visualized right renal lower lobe. Right lower lobe 9 mm nodule. Lungs are not fully evaluated in this exam. Compression fracture of the L2 vertebra with mild sclerosis of the vertebra is likely reflecting a pathologic fracture. Faint sclerosis of the superior endplate of S1 likely reflects metastatic infiltration. Finding on examination of abdominal  tenderness with diagnostic testing of CT scan of abdomen and pelvis with metastatic disease with unknown primary warranting continued evaluation.       NSGY consulted for L2 compression fracture.
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
## 2024-02-26 NOTE — PLAN OF CARE
02/26/24 0830   Final Note   Assessment Type Final Discharge Note   Anticipated Discharge Disposition Short Term     Patient transferring to [facility] for higher level of care - arranged thru the transfer center

## 2024-02-26 NOTE — HOSPITAL COURSE
2/26:  Patient had 1 episode of disorientation which prompted a stat head CT.  No acute findings were observed on CT.  Patient did return to baseline after head CT.  No other acute events overnight. Afebrile.  /72.  Pulse 109.  Respirations 16.  WBC 7.8.  Hemoglobin 11.6.  Sodium 134.  Patient found in bed, awake and alert.  No family was present at time of encounter.  At time of encounter, patient reports right leg pain that is tender to palpation.  She also reports chronic right shoulder pain/weakness.  She denies headaches, dizziness, visual disturbances.

## 2024-02-27 ENCOUNTER — HOSPITAL ENCOUNTER (INPATIENT)
Facility: HOSPITAL | Age: 64
LOS: 19 days | Discharge: ANOTHER HOSPITAL | End: 2024-03-17
Attending: PSYCHIATRY & NEUROLOGY | Admitting: PSYCHIATRY & NEUROLOGY
Payer: MEDICAID

## 2024-02-27 VITALS
BODY MASS INDEX: 18.61 KG/M2 | WEIGHT: 105 LBS | DIASTOLIC BLOOD PRESSURE: 71 MMHG | TEMPERATURE: 98 F | HEART RATE: 86 BPM | RESPIRATION RATE: 20 BRPM | HEIGHT: 63 IN | OXYGEN SATURATION: 96 % | SYSTOLIC BLOOD PRESSURE: 124 MMHG

## 2024-02-27 DIAGNOSIS — Z71.89 ADVANCE CARE PLANNING: ICD-10-CM

## 2024-02-27 DIAGNOSIS — G93.89 BRAIN MASS: ICD-10-CM

## 2024-02-27 DIAGNOSIS — R90.0 INTRACRANIAL MASS: ICD-10-CM

## 2024-02-27 DIAGNOSIS — Z51.5 PALLIATIVE CARE ENCOUNTER: ICD-10-CM

## 2024-02-27 DIAGNOSIS — R52 PAIN: ICD-10-CM

## 2024-02-27 DIAGNOSIS — R53.81 DEBILITY: ICD-10-CM

## 2024-02-27 DIAGNOSIS — G93.6 CEREBRAL EDEMA: Primary | ICD-10-CM

## 2024-02-27 PROBLEM — E44.0 MODERATE MALNUTRITION: Status: ACTIVE | Noted: 2024-02-27

## 2024-02-27 PROBLEM — R90.89 MIDLINE SHIFT OF BRAIN: Status: ACTIVE | Noted: 2024-02-27

## 2024-02-27 LAB
ABO + RH BLD: NORMAL
ALBUMIN SERPL BCP-MCNC: 3.3 G/DL (ref 3.5–5.2)
ALP SERPL-CCNC: 232 U/L (ref 55–135)
ALT SERPL W/O P-5'-P-CCNC: 44 U/L (ref 10–44)
ANION GAP SERPL CALC-SCNC: 11 MMOL/L (ref 8–16)
APTT PPP: 22.5 SEC (ref 21–32)
AST SERPL-CCNC: 49 U/L (ref 10–40)
BACTERIA #/AREA URNS AUTO: ABNORMAL /HPF
BASOPHILS # BLD AUTO: 0.01 K/UL (ref 0–0.2)
BASOPHILS NFR BLD: 0.1 % (ref 0–1.9)
BILIRUB SERPL-MCNC: 0.2 MG/DL (ref 0.1–1)
BILIRUB UR QL STRIP: NEGATIVE
BLD GP AB SCN CELLS X3 SERPL QL: NORMAL
BUN SERPL-MCNC: 12 MG/DL (ref 8–23)
CALCIUM SERPL-MCNC: 9.5 MG/DL (ref 8.7–10.5)
CHLORIDE SERPL-SCNC: 99 MMOL/L (ref 95–110)
CHOLEST SERPL-MCNC: 181 MG/DL (ref 120–199)
CHOLEST/HDLC SERPL: 3.8 {RATIO} (ref 2–5)
CLARITY UR REFRACT.AUTO: CLEAR
CO2 SERPL-SCNC: 25 MMOL/L (ref 23–29)
COLOR UR AUTO: YELLOW
CREAT SERPL-MCNC: 0.6 MG/DL (ref 0.5–1.4)
DIFFERENTIAL METHOD BLD: ABNORMAL
EOSINOPHIL # BLD AUTO: 0 K/UL (ref 0–0.5)
EOSINOPHIL NFR BLD: 0.1 % (ref 0–8)
ERYTHROCYTE [DISTWIDTH] IN BLOOD BY AUTOMATED COUNT: 13.7 % (ref 11.5–14.5)
EST. GFR  (NO RACE VARIABLE): >60 ML/MIN/1.73 M^2
ESTIMATED AVG GLUCOSE: 111 MG/DL (ref 68–131)
GLUCOSE SERPL-MCNC: 151 MG/DL (ref 70–110)
GLUCOSE UR QL STRIP: NEGATIVE
HBA1C MFR BLD: 5.5 % (ref 4–5.6)
HCT VFR BLD AUTO: 33.7 % (ref 37–48.5)
HDLC SERPL-MCNC: 48 MG/DL (ref 40–75)
HDLC SERPL: 26.5 % (ref 20–50)
HGB BLD-MCNC: 11.2 G/DL (ref 12–16)
HGB UR QL STRIP: NEGATIVE
IMM GRANULOCYTES # BLD AUTO: 0.05 K/UL (ref 0–0.04)
IMM GRANULOCYTES NFR BLD AUTO: 0.4 % (ref 0–0.5)
INR PPP: 1 (ref 0.8–1.2)
KETONES UR QL STRIP: NEGATIVE
LDLC SERPL CALC-MCNC: 103.8 MG/DL (ref 63–159)
LEUKOCYTE ESTERASE UR QL STRIP: ABNORMAL
LYMPHOCYTES # BLD AUTO: 0.3 K/UL (ref 1–4.8)
LYMPHOCYTES NFR BLD: 2.8 % (ref 18–48)
MAGNESIUM SERPL-MCNC: 2 MG/DL (ref 1.6–2.6)
MCH RBC QN AUTO: 32.6 PG (ref 27–31)
MCHC RBC AUTO-ENTMCNC: 33.2 G/DL (ref 32–36)
MCV RBC AUTO: 98 FL (ref 82–98)
MICROSCOPIC COMMENT: ABNORMAL
MONOCYTES # BLD AUTO: 0.7 K/UL (ref 0.3–1)
MONOCYTES NFR BLD: 5.7 % (ref 4–15)
NEUTROPHILS # BLD AUTO: 11.2 K/UL (ref 1.8–7.7)
NEUTROPHILS NFR BLD: 90.9 % (ref 38–73)
NITRITE UR QL STRIP: NEGATIVE
NONHDLC SERPL-MCNC: 133 MG/DL
NRBC BLD-RTO: 0 /100 WBC
PH UR STRIP: 6 [PH] (ref 5–8)
PLATELET # BLD AUTO: 591 K/UL (ref 150–450)
PMV BLD AUTO: 8.4 FL (ref 9.2–12.9)
POCT GLUCOSE: 127 MG/DL (ref 70–110)
POTASSIUM SERPL-SCNC: 4.3 MMOL/L (ref 3.5–5.1)
PROT SERPL-MCNC: 6.3 G/DL (ref 6–8.4)
PROT UR QL STRIP: NEGATIVE
PROTHROMBIN TIME: 10.9 SEC (ref 9–12.5)
RBC # BLD AUTO: 3.44 M/UL (ref 4–5.4)
RBC #/AREA URNS AUTO: 2 /HPF (ref 0–4)
SODIUM SERPL-SCNC: 135 MMOL/L (ref 136–145)
SP GR UR STRIP: 1.01 (ref 1–1.03)
SPECIMEN OUTDATE: NORMAL
SQUAMOUS #/AREA URNS AUTO: 5 /HPF
TRIGL SERPL-MCNC: 146 MG/DL (ref 30–150)
TSH SERPL DL<=0.005 MIU/L-ACNC: 0.78 UIU/ML (ref 0.4–4)
URN SPEC COLLECT METH UR: ABNORMAL
WBC # BLD AUTO: 12.3 K/UL (ref 3.9–12.7)
WBC #/AREA URNS AUTO: 6 /HPF (ref 0–5)

## 2024-02-27 PROCEDURE — 97165 OT EVAL LOW COMPLEX 30 MIN: CPT

## 2024-02-27 PROCEDURE — 63600175 PHARM REV CODE 636 W HCPCS: Performed by: STUDENT IN AN ORGANIZED HEALTH CARE EDUCATION/TRAINING PROGRAM

## 2024-02-27 PROCEDURE — 86901 BLOOD TYPING SEROLOGIC RH(D): CPT | Performed by: REGISTERED NURSE

## 2024-02-27 PROCEDURE — 85610 PROTHROMBIN TIME: CPT | Performed by: REGISTERED NURSE

## 2024-02-27 PROCEDURE — 63600175 PHARM REV CODE 636 W HCPCS: Performed by: NURSE PRACTITIONER

## 2024-02-27 PROCEDURE — 25000003 PHARM REV CODE 250: Performed by: STUDENT IN AN ORGANIZED HEALTH CARE EDUCATION/TRAINING PROGRAM

## 2024-02-27 PROCEDURE — 94761 N-INVAS EAR/PLS OXIMETRY MLT: CPT

## 2024-02-27 PROCEDURE — 92523 SPEECH SOUND LANG COMPREHEN: CPT

## 2024-02-27 PROCEDURE — 99254 IP/OBS CNSLTJ NEW/EST MOD 60: CPT | Mod: ,,, | Performed by: PHYSICIAN ASSISTANT

## 2024-02-27 PROCEDURE — 85025 COMPLETE CBC W/AUTO DIFF WBC: CPT | Performed by: NURSE PRACTITIONER

## 2024-02-27 PROCEDURE — 36415 COLL VENOUS BLD VENIPUNCTURE: CPT | Performed by: REGISTERED NURSE

## 2024-02-27 PROCEDURE — 25000003 PHARM REV CODE 250: Performed by: PSYCHIATRY & NEUROLOGY

## 2024-02-27 PROCEDURE — 25500020 PHARM REV CODE 255: Performed by: PSYCHIATRY & NEUROLOGY

## 2024-02-27 PROCEDURE — 25000003 PHARM REV CODE 250

## 2024-02-27 PROCEDURE — 25000003 PHARM REV CODE 250: Performed by: NURSE PRACTITIONER

## 2024-02-27 PROCEDURE — 99223 1ST HOSP IP/OBS HIGH 75: CPT | Mod: ,,, | Performed by: INTERNAL MEDICINE

## 2024-02-27 PROCEDURE — 63600175 PHARM REV CODE 636 W HCPCS: Performed by: HOSPITALIST

## 2024-02-27 PROCEDURE — 83735 ASSAY OF MAGNESIUM: CPT | Performed by: NURSE PRACTITIONER

## 2024-02-27 PROCEDURE — 80061 LIPID PANEL: CPT | Performed by: REGISTERED NURSE

## 2024-02-27 PROCEDURE — 97530 THERAPEUTIC ACTIVITIES: CPT

## 2024-02-27 PROCEDURE — 92610 EVALUATE SWALLOWING FUNCTION: CPT

## 2024-02-27 PROCEDURE — 97535 SELF CARE MNGMENT TRAINING: CPT

## 2024-02-27 PROCEDURE — 83036 HEMOGLOBIN GLYCOSYLATED A1C: CPT | Performed by: REGISTERED NURSE

## 2024-02-27 PROCEDURE — 36415 COLL VENOUS BLD VENIPUNCTURE: CPT | Performed by: NURSE PRACTITIONER

## 2024-02-27 PROCEDURE — 63600175 PHARM REV CODE 636 W HCPCS

## 2024-02-27 PROCEDURE — 63600175 PHARM REV CODE 636 W HCPCS: Performed by: REGISTERED NURSE

## 2024-02-27 PROCEDURE — 81001 URINALYSIS AUTO W/SCOPE: CPT | Performed by: REGISTERED NURSE

## 2024-02-27 PROCEDURE — 99291 CRITICAL CARE FIRST HOUR: CPT | Mod: ,,, | Performed by: REGISTERED NURSE

## 2024-02-27 PROCEDURE — 85730 THROMBOPLASTIN TIME PARTIAL: CPT | Performed by: REGISTERED NURSE

## 2024-02-27 PROCEDURE — 20000000 HC ICU ROOM

## 2024-02-27 PROCEDURE — 97162 PT EVAL MOD COMPLEX 30 MIN: CPT

## 2024-02-27 PROCEDURE — 84443 ASSAY THYROID STIM HORMONE: CPT | Performed by: REGISTERED NURSE

## 2024-02-27 PROCEDURE — 25000003 PHARM REV CODE 250: Performed by: REGISTERED NURSE

## 2024-02-27 PROCEDURE — 80053 COMPREHEN METABOLIC PANEL: CPT | Performed by: NURSE PRACTITIONER

## 2024-02-27 RX ORDER — ENOXAPARIN SODIUM 100 MG/ML
30 INJECTION SUBCUTANEOUS EVERY 24 HOURS
Status: DISCONTINUED | OUTPATIENT
Start: 2024-02-27 | End: 2024-02-29

## 2024-02-27 RX ORDER — MORPHINE SULFATE 2 MG/ML
2 INJECTION, SOLUTION INTRAMUSCULAR; INTRAVENOUS EVERY 4 HOURS PRN
Status: DISCONTINUED | OUTPATIENT
Start: 2024-02-27 | End: 2024-02-29

## 2024-02-27 RX ORDER — DEXAMETHASONE SODIUM PHOSPHATE 4 MG/ML
4 INJECTION, SOLUTION INTRA-ARTICULAR; INTRALESIONAL; INTRAMUSCULAR; INTRAVENOUS; SOFT TISSUE EVERY 6 HOURS
Status: DISCONTINUED | OUTPATIENT
Start: 2024-02-27 | End: 2024-02-27

## 2024-02-27 RX ORDER — BISACODYL 10 MG/1
10 SUPPOSITORY RECTAL DAILY PRN
Status: DISCONTINUED | OUTPATIENT
Start: 2024-02-27 | End: 2024-03-17 | Stop reason: HOSPADM

## 2024-02-27 RX ORDER — MUPIROCIN 20 MG/G
OINTMENT TOPICAL 2 TIMES DAILY
Status: DISPENSED | OUTPATIENT
Start: 2024-02-27 | End: 2024-03-03

## 2024-02-27 RX ORDER — LANOLIN ALCOHOL/MO/W.PET/CERES
800 CREAM (GRAM) TOPICAL
Status: DISCONTINUED | OUTPATIENT
Start: 2024-02-27 | End: 2024-03-03

## 2024-02-27 RX ORDER — LEVETIRACETAM 500 MG/5ML
500 INJECTION, SOLUTION, CONCENTRATE INTRAVENOUS EVERY 12 HOURS
Status: DISCONTINUED | OUTPATIENT
Start: 2024-02-27 | End: 2024-02-27

## 2024-02-27 RX ORDER — AMOXICILLIN 250 MG
1 CAPSULE ORAL 2 TIMES DAILY
Status: DISCONTINUED | OUTPATIENT
Start: 2024-02-27 | End: 2024-02-28

## 2024-02-27 RX ORDER — SODIUM,POTASSIUM PHOSPHATES 280-250MG
2 POWDER IN PACKET (EA) ORAL
Status: DISCONTINUED | OUTPATIENT
Start: 2024-02-27 | End: 2024-03-03

## 2024-02-27 RX ORDER — HYDRALAZINE HYDROCHLORIDE 20 MG/ML
10 INJECTION INTRAMUSCULAR; INTRAVENOUS EVERY 4 HOURS PRN
Status: DISCONTINUED | OUTPATIENT
Start: 2024-02-27 | End: 2024-03-01

## 2024-02-27 RX ORDER — OXYCODONE HYDROCHLORIDE 5 MG/1
5 TABLET ORAL EVERY 6 HOURS PRN
Status: DISCONTINUED | OUTPATIENT
Start: 2024-02-27 | End: 2024-03-01

## 2024-02-27 RX ORDER — SODIUM CHLORIDE 9 MG/ML
INJECTION, SOLUTION INTRAVENOUS CONTINUOUS
Status: DISCONTINUED | OUTPATIENT
Start: 2024-02-27 | End: 2024-02-27

## 2024-02-27 RX ORDER — OXYCODONE HYDROCHLORIDE 5 MG/1
5 TABLET ORAL EVERY 6 HOURS PRN
Status: DISCONTINUED | OUTPATIENT
Start: 2024-02-27 | End: 2024-02-27

## 2024-02-27 RX ORDER — ACETAMINOPHEN 325 MG/1
650 TABLET ORAL EVERY 6 HOURS PRN
Status: DISCONTINUED | OUTPATIENT
Start: 2024-02-27 | End: 2024-03-17 | Stop reason: HOSPADM

## 2024-02-27 RX ORDER — SODIUM CHLORIDE 0.9 % (FLUSH) 0.9 %
10 SYRINGE (ML) INJECTION
Status: DISCONTINUED | OUTPATIENT
Start: 2024-02-27 | End: 2024-02-27

## 2024-02-27 RX ORDER — MORPHINE SULFATE 2 MG/ML
2 INJECTION, SOLUTION INTRAMUSCULAR; INTRAVENOUS EVERY 4 HOURS PRN
Status: DISCONTINUED | OUTPATIENT
Start: 2024-02-27 | End: 2024-02-27

## 2024-02-27 RX ORDER — ONDANSETRON HYDROCHLORIDE 2 MG/ML
4 INJECTION, SOLUTION INTRAVENOUS EVERY 8 HOURS PRN
Status: DISCONTINUED | OUTPATIENT
Start: 2024-02-27 | End: 2024-03-17 | Stop reason: HOSPADM

## 2024-02-27 RX ORDER — OXYCODONE HYDROCHLORIDE 5 MG/1
10 TABLET ORAL EVERY 6 HOURS PRN
Status: DISCONTINUED | OUTPATIENT
Start: 2024-02-27 | End: 2024-03-01

## 2024-02-27 RX ORDER — LEVETIRACETAM 500 MG/1
500 TABLET ORAL 2 TIMES DAILY
Status: DISCONTINUED | OUTPATIENT
Start: 2024-02-27 | End: 2024-03-17 | Stop reason: HOSPADM

## 2024-02-27 RX ORDER — LABETALOL HCL 20 MG/4 ML
10 SYRINGE (ML) INTRAVENOUS EVERY 4 HOURS PRN
Status: DISCONTINUED | OUTPATIENT
Start: 2024-02-27 | End: 2024-03-01

## 2024-02-27 RX ORDER — FAMOTIDINE 20 MG/1
20 TABLET, FILM COATED ORAL 2 TIMES DAILY
Status: DISCONTINUED | OUTPATIENT
Start: 2024-02-27 | End: 2024-03-17 | Stop reason: HOSPADM

## 2024-02-27 RX ORDER — DEXAMETHASONE SODIUM PHOSPHATE 4 MG/ML
4 INJECTION, SOLUTION INTRA-ARTICULAR; INTRALESIONAL; INTRAMUSCULAR; INTRAVENOUS; SOFT TISSUE EVERY 6 HOURS
Status: DISCONTINUED | OUTPATIENT
Start: 2024-02-27 | End: 2024-03-01

## 2024-02-27 RX ADMIN — LEVETIRACETAM 500 MG: 100 INJECTION, SOLUTION INTRAVENOUS at 09:02

## 2024-02-27 RX ADMIN — LEVETIRACETAM 500 MG: 500 TABLET, FILM COATED ORAL at 08:02

## 2024-02-27 RX ADMIN — MUPIROCIN: 20 OINTMENT TOPICAL at 09:02

## 2024-02-27 RX ADMIN — OXYCODONE 10 MG: 5 TABLET ORAL at 09:02

## 2024-02-27 RX ADMIN — DEXAMETHASONE SODIUM PHOSPHATE 4 MG: 4 INJECTION INTRA-ARTICULAR; INTRALESIONAL; INTRAMUSCULAR; INTRAVENOUS; SOFT TISSUE at 12:02

## 2024-02-27 RX ADMIN — FAMOTIDINE 20 MG: 20 TABLET ORAL at 08:02

## 2024-02-27 RX ADMIN — IOHEXOL 75 ML: 350 INJECTION, SOLUTION INTRAVENOUS at 03:02

## 2024-02-27 RX ADMIN — MORPHINE SULFATE 2 MG: 2 INJECTION, SOLUTION INTRAMUSCULAR; INTRAVENOUS at 12:02

## 2024-02-27 RX ADMIN — ENOXAPARIN SODIUM 30 MG: 30 INJECTION SUBCUTANEOUS at 06:02

## 2024-02-27 RX ADMIN — HYDROCODONE BITARTRATE AND ACETAMINOPHEN 1 TABLET: 10; 325 TABLET ORAL at 01:02

## 2024-02-27 RX ADMIN — OXYCODONE 5 MG: 5 TABLET ORAL at 08:02

## 2024-02-27 RX ADMIN — FAMOTIDINE 20 MG: 20 TABLET ORAL at 10:02

## 2024-02-27 RX ADMIN — OXYCODONE 5 MG: 5 TABLET ORAL at 10:02

## 2024-02-27 RX ADMIN — HYDROMORPHONE HYDROCHLORIDE 1 MG: 1 INJECTION, SOLUTION INTRAMUSCULAR; INTRAVENOUS; SUBCUTANEOUS at 04:02

## 2024-02-27 RX ADMIN — MUPIROCIN: 20 OINTMENT TOPICAL at 08:02

## 2024-02-27 RX ADMIN — OXYCODONE 10 MG: 5 TABLET ORAL at 03:02

## 2024-02-27 RX ADMIN — DEXAMETHASONE SODIUM PHOSPHATE 4 MG: 4 INJECTION INTRA-ARTICULAR; INTRALESIONAL; INTRAMUSCULAR; INTRAVENOUS; SOFT TISSUE at 06:02

## 2024-02-27 RX ADMIN — DEXAMETHASONE SODIUM PHOSPHATE 4 MG: 4 INJECTION, SOLUTION INTRA-ARTICULAR; INTRALESIONAL; INTRAMUSCULAR; INTRAVENOUS; SOFT TISSUE at 03:02

## 2024-02-27 RX ADMIN — DOCUSATE SODIUM AND SENNOSIDES 1 TABLET: 8.6; 5 TABLET, FILM COATED ORAL at 09:02

## 2024-02-27 RX ADMIN — SODIUM CHLORIDE: 9 INJECTION, SOLUTION INTRAVENOUS at 09:02

## 2024-02-27 RX ADMIN — MORPHINE SULFATE 2 MG: 2 INJECTION, SOLUTION INTRAMUSCULAR; INTRAVENOUS at 07:02

## 2024-02-27 RX ADMIN — MORPHINE SULFATE 2 MG: 2 INJECTION, SOLUTION INTRAMUSCULAR; INTRAVENOUS at 06:02

## 2024-02-27 NOTE — SUBJECTIVE & OBJECTIVE
Medications Prior to Admission   Medication Sig Dispense Refill Last Dose    atenoloL (TENORMIN) 50 MG tablet Take 1 tablet (50 mg total) by mouth once daily. 30 tablet 11     folic acid (FOLVITE) 1 MG tablet Take 1 tablet (1 mg total) by mouth once daily. 90 tablet 3     gabapentin (NEURONTIN) 100 MG capsule Take 1 capsule (100 mg total) by mouth 3 (three) times daily. 90 capsule 11        Review of patient's allergies indicates:  No Known Allergies    Past Medical History:   Diagnosis Date    Arthritis      Past Surgical History:   Procedure Laterality Date    TUBAL LIGATION  2002     Family History    None       Tobacco Use    Smoking status: Every Day     Current packs/day: 0.50     Types: Cigarettes    Smokeless tobacco: Current    Tobacco comments:     3/4 PPD   Substance and Sexual Activity    Alcohol use: Not Currently     Comment: OCCASIONALLY    Drug use: Never    Sexual activity: Not Currently     Review of Systems  Objective:        There is no height or weight on file to calculate BMI.  Vital Signs (Most Recent):  Pulse: 89 (02/27/24 0701)  Resp: 18 (02/27/24 0716)  BP: 129/72 (02/27/24 0701)  SpO2: (!) 94 % (02/27/24 0701) Vital Signs (24h Range):  Temp:  [97.8 °F (36.6 °C)-98.3 °F (36.8 °C)] 98.3 °F (36.8 °C)  Pulse:  [] 89  Resp:  [9-27] 18  SpO2:  [93 %-98 %] 94 %  BP: (101-151)/(57-85) 129/72                         Female External Urinary Catheter w/ Suction 02/25/24 2150 (Active)   Skin no redness;no breakdown 02/27/24 0701   Tolerance no signs/symptoms of discomfort 02/27/24 0701   Suction Continuous suction at 70 mmHg 02/27/24 0701   Date of last wick change 02/26/24 02/27/24 0701   Time of last wick change 0600 02/26/24 1901   Output (mL) 0 mL 02/27/24 0400          Physical Exam         Neurosurgery Physical Exam  General: well developed, well nourished, no distress.   Head: normocephalic, atraumatic  Neurologic: Alert and oriented. Thought content appropriate. Higher order confusion    GCS: Motor: 6/Verbal: 5/Eyes: 4 GCS Total: 15  Mental Status: Awake, Alert, Oriented x 4  Language: No aphasia  Speech: No dysarthria  Cranial nerves: face symmetric, tongue midline, CN II-XII grossly intact.   Eyes: pupils equal, round, reactive to light with accommodation, EOMI.   Pulmonary: normal respirations, no signs of respiratory distress  Abdomen: soft, non-distended, not tender to palpation  Skin: Skin is warm, dry and intact.  Sensory: intact to light touch throughout    Motor Strength:Moves all extremities spontaneously with good tone.  No abnormal movements seen.     Strength  Deltoids Triceps Biceps Wrist Extension Wrist Flexion Hand    Upper: R 4+/5 4+/5 4+/5 5/5 5/5 5/5    L 5/5 5/5 5/5 5/5 5/5 5/5     Iliopsoas Quadriceps Knee  Flexion Tibialis  anterior Gastro- cnemius EHL   Lower: R 4+/5 5/5 5/5 5/5 5/5 5/5    L 5/5 5/5 5/5 5/5 5/5 5/5   Pain limited weakness in right shoulder and right hip      Cerebellar:   Finger-to-nose: intact bilaterally   Pronator drift: absent bilaterally      Significant Labs:  Recent Labs   Lab 02/26/24  0245 02/27/24  0346   * 151*   * 135*   K 4.4 4.3   CL 99 99   CO2 27 25   BUN 10 12   CREATININE 0.7 0.6   CALCIUM 9.4 9.5   MG 2.0 2.0     Recent Labs   Lab 02/26/24  0245 02/27/24  0346   WBC 7.88 12.30   HGB 11.6* 11.2*   HCT 35.6* 33.7*   * 591*     Recent Labs   Lab 02/27/24  0707   INR 1.0   APTT 22.5     Microbiology Results (last 7 days)       ** No results found for the last 168 hours. **          All pertinent labs from the last 24 hours have been reviewed.    Significant Diagnostics:

## 2024-02-27 NOTE — PLAN OF CARE
Problem: Physical Therapy  Goal: Physical Therapy Goal  Description: Goals to be met by: 3/12/2024     Patient will increase functional independence with mobility by performin. Supine to sit with [place]  2. Sit to supine with [place]  3. Rolling to Left and Right with [place].  4. Sit to stand transfer with Contact Guard Assistance with RW  5. Bed to chair transfer with Contact Guard Assistance using Rolling Walker  6. Gait  x 50 feet with Contact Guard Assistance using Rolling Walker.   7. Ascend/descend 3 stair with right Handrails Minimal Assistance using No Assistive Device.   8. Lower extremity exercise program x10 reps per handout, with supervision        Outcome: Ongoing, Progressing

## 2024-02-27 NOTE — NURSING
Nurses Note -- 4 Eyes      2/27/2024   4:47 AM      Skin assessed during: Transfer      [x] No Altered Skin Integrity Present    [x]Prevention Measures Documented      [] Yes- Altered Skin Integrity Present or Discovered   [] LDA Added if Not in Epic (Describe Wound)   [] New Altered Skin Integrity was Present on Admit and Documented in LDA   [] Wound Image Taken    Wound Care Consulted? No    Attending Nurse:  Darby Costa RN/Staff Member:   Fat[place]eh

## 2024-02-27 NOTE — ASSESSMENT & PLAN NOTE
-CT abd/pelvis w/ numerous metastatic masses in the liver, likely metastatic deposit in the spleen; bilateral adrenal masses, hypoattenuating and mildly enhancing mass of upper pole L kidney, intra-abdominal enlarged lymph nodes consistent w/ metastatic infiltration.   -Also w/ masses to R lung base w/ consolidation of visualized R renal lower lobe; R lower lobe 9mm nodule  - L2 vertebra compression fracture w/ mild sclerosis of the vertebra, likely reflecting a pathologic fracture; faint sclerosis of the superior endplate of S1 likely reflects metastatic infiltration  -Heme/Onc consulted  -Morphine PRN pain

## 2024-02-27 NOTE — PLAN OF CARE
Pt engaged fairly in evaluative session, motivated but limited by pain.    Problem: Occupational Therapy  Goal: Occupational Therapy Goal  Description: Goals to be met by: 3/27/24     Patient will increase functional independence with ADLs by performing:    UE Dressing with Minimal Assistance.  LE Dressing with Maximum Assistance.  Grooming while EOB with Moderate Assistance.  Toileting from bedside commode with Maximum Assistance for hygiene and clothing management.   Rolling to Bilateral with Moderate Assistance.   Supine to sit with Contact Guard Assistance.  Stand pivot transfers with Maximum Assistance.  Step transfer with Maximum Assistance  Toilet transfer to [facility] with Maximum Assistance.    Outcome: Ongoing, Progressing
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
## 2024-02-27 NOTE — CONSULTS
Inpatient consult to Physical Medicine Rehab  Consult performed by: Sonia Ashton NP  Consult ordered by: Kylah Montanez NP  Reason for consult: Rehab      Consult received. PM&R following.     CHEY Beal, FNP-C  Physical Medicine & Rehabilitation   02/27/2024

## 2024-02-27 NOTE — HPI
Ms. Crow Hines is a 63 y.o. female who was transferred from [place] for management of a newly for left temporal brain mass.  Patient initially presented with several months of diffuse chest, abdominal, and back pain. CT C/A/P showed diffuse metastatic disease with lung, liver, spleen, and kidney lesions. Also revealing L2 compression fracture for which Neurosurgery in [place] was originally consulted for. Patient had an episode of acute confusion and CT head was ordered, left temporal mass with significant edema was identified. Transfer to [facility] for Neurosurgery evaluation.

## 2024-02-27 NOTE — ACP (ADVANCE CARE PLANNING)
1. Check your blood sugar first thing in the morning and before both lunch and dinner.      2. Have your fasting labs done this month.     3. Continue to take your oral medications.      4. Take Lantus 30 units nightly.     5. Call me in two weeks with your blood sugar readings.     IKE Powell, ANP-BC  Nurse Practitioner  [facility]          Lab Result Notifications    · If labs were ordered at your appointment today, we will contact you with results once all your labs have resulted. Please note certain hormone labs can take up to 10 business days to result.     Prescription Policy     Our goal is to serve you on a more timely basis. Currently, our office receives a large volume of phone requests for medication refills. We are requesting your cooperation with the following:    · Look over your medications, diabetic supplies, etc. before coming to your appointment to see if you need any refills.    · Request your medication (or supply) refills during your office visit.    · Outside of an office visit, requests should be directed to your pharmacy even if you have zero refills. Call your pharmacy after 72 hours to see if your prescription is ready for pick-up.     Pharmacy Refills: All prescriptions take 48-72 hours to refill.          Our Patients are Important    We want to improve and you can help. You may receive a survey asking you about this visit. Please complete this survey; we will use your feedback to make improvements. Thank you.     Advance Care Planning     Date: 02/27/2024    [facility]  I engaged the patient in a voluntary conversation about advance care planning and we specifically addressed what the goals of care would be moving forward, in light of the patient's change in clinical status, specifically metastatic neoplastic disease.  We did not specifically address the patient's likely prognosis, which is fair .  We explored the patient's values and preferences for future care.  The patient endorses that what is most important right now is to focus on curative/life-prolongation (regardless of treatment burdens) and would like to explore/understand treatment options prior to more decisions being made.    Accordingly, we have decided that the best plan to meet the patient's goals includes continuing with treatment    I did not explain the role for hospice care at this stage of the patient's illness, including its ability to help the patient live with the best quality of life possible.  We will not be making a hospice referral.    I spent a total of 15 minutes engaging the patient in this advance care planning discussion.    Kylah Montanez NP  Neurocritical Care

## 2024-02-27 NOTE — NURSING
Pt tx to [facility] via [facility], no c/o, resp even and unlabored, vvs as charted, all personal belongings sent with pt
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
## 2024-02-27 NOTE — PLAN OF CARE
Recommendations     1.) Recommend continuing with Regular diet as tolerated.      2.) If PO intake is <50%, recommend Boost Plus BID (or Boost alternate) to help meet needs.      3.) RD to monitor wt, PO intake, skin, labs.        Goals: to meet % of EEN/EPN by next RD f/u  Nutrition Goal Status: new  Communication of RD Recs:  (POC)

## 2024-02-27 NOTE — CONSULTS
[facility]  Hematology/Oncology  Consult Note    Patient Name: Crow Hines  MRN: [id number]  Admission Date: 2/27/2024  Hospital Length of Stay: 0 days  Code Status: Full Code   Attending Provider: Garrison Gunn MD  Consulting Provider: Sekou Salazar MD  Primary Care Physician: Chika Powell MD  Principal Problem:Brain mass    Inpatient consult to Hematology/Oncology  Consult performed by: Sekou Salazar MD  Consult ordered by: Kylah Montanez NP        Subjective:     HPI:  Ms. Crow Hines is a 63 y.o. female who was transferred from [place] for management of a newly for left temporal brain mass.  Patient initially presented with several months of diffuse chest, abdominal, and back pain. CT C/A/P showed diffuse metastatic disease with lung, liver, spleen, and kidney lesions. Also revealing L2 compression fracture for which Neurosurgery in [place] was originally consulted for. Patient had an episode of acute confusion and CT head was ordered, left temporal mass with significant edema was identified. Transfer to [facility] for Neurosurgery evaluation.     Oncology Treatment Plan:   [No matching plan found]    Medications:  Continuous Infusions:  Scheduled Meds:   dexAMETHasone  4 mg Intravenous Q6H    enoxparin  30 mg Subcutaneous Q24H (prophylaxis, 1700)    famotidine  20 mg Oral BID    levETIRAcetam  500 mg Oral BID    mupirocin   Nasal BID    senna-docusate 8.6-50 mg  1 tablet Oral BID     PRN Meds:acetaminophen, bisacodyL, hydrALAZINE, labetalol, magnesium oxide, magnesium oxide, morphine, ondansetron, oxyCODONE, oxyCODONE, potassium bicarbonate, potassium bicarbonate, potassium bicarbonate, potassium, sodium phosphates, potassium, sodium phosphates, potassium, sodium phosphates     Review of patient's allergies indicates:  No Known Allergies     Past Medical History:   Diagnosis Date    Arthritis      Past Surgical History:   Procedure Laterality Date    TUBAL LIGATION   2002     Family History    None       Tobacco Use    Smoking status: Every Day     Current packs/day: 0.50     Types: Cigarettes    Smokeless tobacco: Current    Tobacco comments:     3/4 PPD   Substance and Sexual Activity    Alcohol use: Not Currently     Comment: OCCASIONALLY    Drug use: Never    Sexual activity: Not Currently       Review of Systems   Constitutional:  Negative for chills and fever.   HENT:  Negative for congestion and sore throat.    Eyes:  Negative for pain.   Respiratory:  Negative for cough and shortness of breath.    Cardiovascular:  Negative for chest pain, palpitations and leg swelling.   Gastrointestinal:  Positive for abdominal pain. Negative for constipation, diarrhea, nausea and vomiting.   Genitourinary:  Negative for difficulty urinating, dysuria and hematuria.   Musculoskeletal:  Positive for myalgias. Negative for back pain.   Skin:  Negative for rash.   Neurological:  Positive for weakness. Negative for light-headedness and headaches.   Hematological:  Does not bruise/bleed easily.   Psychiatric/Behavioral:  Negative for agitation.      Objective:     Vital Signs (Most Recent):  Temp: 97.1 °F (36.2 °C) (02/27/24 0701)  Pulse: 85 (02/27/24 1001)  Resp: 18 (02/27/24 1021)  BP: 138/86 (02/27/24 1001)  SpO2: (!) 94 % (02/27/24 1001) Vital Signs (24h Range):  Temp:  [97.1 °F (36.2 °C)-98.3 °F (36.8 °C)] 97.1 °F (36.2 °C)  Pulse:  [] 85  Resp:  [9-27] 18  SpO2:  [93 %-97 %] 94 %  BP: (101-151)/(57-86) 138/86        There is no height or weight on file to calculate BMI.  There is no height or weight on file to calculate BSA.      Intake/Output Summary (Last 24 hours) at 2/27/2024 1209  Last data filed at 2/27/2024 1001  Gross per 24 hour   Intake 22.66 ml   Output 200 ml   Net -177.34 ml        Physical Exam  Constitutional:       Appearance: Normal appearance.   HENT:      Head: Normocephalic and atraumatic.      Mouth/Throat:      Mouth: Mucous membranes are moist.   Eyes:       Extraocular Movements: Extraocular movements intact.      Pupils: Pupils are equal, round, and reactive to light.   Cardiovascular:      Rate and Rhythm: Normal rate and regular rhythm.      Pulses: Normal pulses.      Heart sounds: Normal heart sounds.   Pulmonary:      Effort: Pulmonary effort is normal. No respiratory distress.      Breath sounds: Normal breath sounds.   Abdominal:      General: Abdomen is flat. There is no distension.      Palpations: Abdomen is soft.      Tenderness: There is abdominal tenderness.   Musculoskeletal:         General: Normal range of motion.      Cervical back: Normal range of motion and neck supple.      Right lower leg: No edema.      Left lower leg: No edema.   Skin:     General: Skin is warm.   Neurological:      General: No focal deficit present.      Mental Status: She is alert and oriented to person, place, and time.      Motor: Weakness present.   Psychiatric:         Mood and Affect: Mood normal.         Behavior: Behavior normal.          Significant Labs:   All pertinent labs from the last 24 hours have been reviewed.    Diagnostic Results:  I have reviewed all pertinent imaging results/findings within the past 24 hours.  Assessment/Plan:     Metastatic neoplastic disease  - Patients imaging consistent with metastatic cancer, unclear primary at this time (most likely lung)   - Unfortunately can't discuss prognosis or treatment options at this time without an official diagnosis   - Plan for OR on 2/29 for surgical resection of brain mass   - Will follow-up biopsy results and arrange the appropriate follow-up with oncology   - If biopsy results are not back by the time patient discharges please order Ambulatory Referral/Consult to Suspected Oncology Diagnosis Clinic         Thank you for your consult. I will follow-up with patient. Please contact us if you have any additional questions.    eSkou Salazar MD  Hematology/Oncology  [facility]

## 2024-02-27 NOTE — PT/OT/SLP EVAL
"Occupational Therapy  Co -  Evaluation and Treatment with PT    Co-evaluation/treatment performed due to patient's multiple deficits requiring two skilled therapists to appropriately and safely assess patient's strength and endurance while facilitating functional tasks in addition to accommodating for patient's activity tolerance.       Name: Crow Hines  MRN: [id number]  Admitting Diagnosis: Brain mass  Recent Surgery: * No surgery found *      Recommendations:     Discharge Recommendations: High Intensity Therapy  Discharge Equipment Recommendations:  hospital bed, wheelchair, lift device  Barriers to discharge:       Assessment:     Crow Hines is a 63 y.o. female with a medical diagnosis of Brain mass.  She presents with performance deficits affecting function: weakness, impaired endurance, impaired self care skills, impaired functional mobility, impaired balance, decreased safety awareness, pain, decreased lower extremity function, decreased upper extremity function, orthopedic precautions.  Pt engaged with therapy session well, though limited by pain. Pt reported significant increase in pain while sitting EOB, presenting with increased anxiety and declining further mobility and returning to supine.  Patient presents with good participation and motivation to return to prior level of function with high intensity therapy.  The patient demonstrates appropriate strength to participate in up to 3 hours or 15hrs of combined therapy post acute.        Rehab Prognosis: Good; patient would benefit from acute skilled OT services to address these deficits and reach maximum level of function.       Plan:     Patient to be seen 3 x/week to address the above listed problems via self-care/home management, therapeutic exercises, therapeutic activities, neuromuscular re-education  Plan of Care Expires: 03/27/24  Plan of Care Reviewed with: patient    Subjective     Chief Complaint: "I can move if you'd " "like"  Patient/Family Comments/goals: Get better, return home     Occupational Profile:  Living Environment: Pt lives with 22 yo son who is autistic, in mobile home with 2-3 JUAN DANIEL at the back door, RHR, tub/sh combo with 0 gb  Previous level of function: Pt's son able to assist with transfers with 50% A.  Pt reports being bedbound for last 1.5mo  Roles and Routines: mother  Equipment Used at Home: rollator  Assistance upon Discharge: Son    Pain/Comfort:  Pain Rating 1: 9/10  Location - Side 1: Right  Location - Orientation 1: generalized  Location 1:  R side of back and lower R chest, pt reports feeling like she needs to "burp"  Pain Addressed 1: Reposition, Cessation of Activity, Nurse notified, Distraction  Pain Rating Post-Intervention 1:  not quantified    Patients cultural, spiritual, Amish conflicts given the current situation: no    Objective:     Communicated with: RN prior to session.  Patient found HOB elevated with telemetry, peripheral IV, pulse ox (continuous), blood pressure cuff, bed alarm upon OT entry to room.    General Precautions: Standard, aspiration, fall  Orthopedic Precautions: spinal precautions  Braces: LSO (for comfort)  Respiratory Status: Room air    Occupational Performance:    Bed Mobility:    Patient completed Rolling/Turning to Left with  stand by assistance  Patient completed Rolling/Turning to Right with stand by assistance  Patient completed Scooting/Bridging   To EOB with SBA and verbal cues, limited by pain   To HOB with max A x2  Patient completed Supine to Sit with stand by assistance  Patient completed Sit to Supine with minimum assistance  Pt able to sit EOB with SBA ~5min    Functional Mobility/Transfers:  Declined 2* pain this date    Activities of Daily Living:  Grooming: set up  grooming tools on tabletop, pt politely declined grooming at the moment   Upper Body Dressing: maximal assistance donning LSO  Lower Body Dressing: total assistance donning bilateral " socks  Toileting: total assistance managing [place]    Cognitive/Visual Perceptual:  Cognitive/Psychosocial Skills:     -       Oriented to: AOx4   -       Follows Commands/attention:Follows two-step commands  -       Communication: clear/fluent  -       Memory: Poor immediate recall and word finding  -       Safety awareness/insight to disability: impaired   -       Mood/Affect/Coping skills/emotional control: Pleasant  Visual/Perceptual:      -Intact      Physical Exam:  Balance:    -       sit EOB with good balance  Postural examination/scapula alignment:    -       Rounded shoulders  -       Forward head  Skin integrity: Visible skin intact  Edema:  None noted  Sensation:    -       Intact  Motor Planning:    -       limited by pain  Dominant hand:    -       Right  Upper Extremity Range of Motion:     -       Right Upper Extremity: WFL  -       Left Upper Extremity: WFL  Upper Extremity Strength:    -       Right Upper Extremity: WFL  -       Left Upper Extremity: WFL   Strength:    -       Right Upper Extremity: WFL  -       Left Upper Extremity: WFL  Neurological:    -       Impaired    AMPAC 6 Click ADL:  AMPAC Total Score: 16    Treatment & Education:  Pt educated on role of OT, POC, and goals for therapy.    POC was dicussed with patient/caregiver, who was included in its development and is in agreement with the identified goals and treatment plan.   Patient and family aware of patient's deficits and therapy progression.   Time provided for therapeutic counseling and discussion of health disposition.   Educated on importance of EOB/OOB mobility, maintaining routine, sitting up in chair, and maximizing independence with ADLs during admission   Pt completed ADLs and functional mobility for treatment session as noted above   Pt/caregiver verbalized understanding and expressed no further concerns/questions.  Updated communication board with level of assist required       Patient left HOB elevated with all  lines intact, call button in reach, bed alarm on, and RN notified    GOALS:   Multidisciplinary Problems       Occupational Therapy Goals          Problem: Occupational Therapy    Goal Priority Disciplines Outcome Interventions   Occupational Therapy Goal     OT, PT/OT Ongoing, Progressing    Description: Goals to be met by: 3/27/24     Patient will increase functional independence with ADLs by performing:    UE Dressing with Minimal Assistance.  LE Dressing with Maximum Assistance.  Grooming while EOB with Moderate Assistance.  Toileting from bedside commode with Maximum Assistance for hygiene and clothing management.   Rolling to Bilateral with Moderate Assistance.   Supine to sit with Contact Guard Assistance.  Stand pivot transfers with Maximum Assistance.  Step transfer with Maximum Assistance  Toilet transfer to [facility] with Maximum Assistance.                         History:     Past Medical History:   Diagnosis Date    Arthritis          Past Surgical History:   Procedure Laterality Date    TUBAL LIGATION  2002       Time Tracking:     OT Date of Treatment: 02/27/24  OT Start Time: 0836  OT Stop Time: 0854  OT Total Time (min): 18 min    Billable Minutes:Evaluation 8  Self Care/Home Management 10    2/27/2024

## 2024-02-27 NOTE — CONSULTS
[facility]  Adult Nutrition  Consult Note    SUMMARY     Recommendations    1.) Recommend continuing with Regular diet as tolerated.     2.) If PO intake is <50%, recommend Boost Plus BID (or Boost alternate) to help meet needs.     3.) RD to monitor wt, PO intake, skin, labs.      Goals: to meet % of EEN/EPN by next RD f/u  Nutrition Goal Status: new  Communication of RD Recs:  (POC)    Assessment and Plan    Endocrine  Moderate malnutrition  Malnutrition Type:  Context: chronic illness  Level: moderate    Related to (etiology):   Brain Mass    Signs and Symptoms (as evidenced by):   Mild wasting muscle/fat and -15% wt loss x 3mo     Malnutrition Characteristic Summary:  Weight Loss (Malnutrition): greater than 7.5% in 3 months (-15% x 3mo)  Subcutaneous Fat (Malnutrition): mild depletion  Muscle Mass (Malnutrition): mild depletion      Interventions/Recommendations (treatment strategy):  1.) Recommend continuing with Regular diet as tolerated. 2.) If PO intake is <50%, recommend Boost Plus BID (or Boost alternate) to help meet needs. 3.) RD to monitor wt, PO intake, skin, labs.    Nutrition Diagnosis Status:   New       Malnutrition Assessment  Malnutrition Context: chronic illness  Malnutrition Level: moderate      Micronutrient Evaluation Summary: no deficiencies   Weight Loss (Malnutrition): greater than 7.5% in 3 months (-15% x 3mo)  Subcutaneous Fat (Malnutrition): mild depletion  Muscle Mass (Malnutrition): mild depletion   Orbital Region (Subcutaneous Fat Loss): mild depletion   [place] Region (Muscle Loss): mild depletion  Clavicle Bone Region (Muscle Loss): mild depletion  Clavicle and Acromion Bone Region (Muscle Loss): mild depletion  Dorsal Hand (Muscle Loss): mild depletion     Reason for Assessment    Reason For Assessment: consult  Diagnosis: cancer diagnosis/related complications (left temporal brain mass)  Relevant Medical History: HTN, RA  Interdisciplinary Rounds:  did not attend  General Information Comments: RD consulted for nutritional assessment.     Pt denies n/v/d/c. Pt endorses pain. Pt appeared uncomfortable during RD interview, requesting more medicine. Pt stated that -130#, confirmed via chart review. #.  Note that pt had -15% wt loss x 3mo (sig). Visual NFPE performed on 2/27: RD feels pt meets the criteria for moderate malnutrition in the context of chronic illness. Please see PES for details. Pt is scheduled  for surgical resection of brain mass on 2/29.    Nutrition Discharge Planning: adequate PO intake    Nutrition Risk Screen    Nutrition Risk Screen: no indicators present    Nutrition/Diet History    Patient Reported Diet/Restrictions/Preferences: general  Typical Food/Fluid Intake: 2-3 small meals  Spiritual, Cultural Beliefs, Hindu Practices, Values that Affect Care: no    Anthropometrics    Temp: 97.8 °F (36.6 °C)    Lab/Procedures/Meds    Pertinent Labs Reviewed: reviewed  Pertinent Labs Comments: Na: 135, gluc: 151, ALP: 232, alb: 3.3, AST: 49    Pertinent Medications Reviewed: reviewed  Pertinent Medications Comments: Dexamethasone, enoxaparin, famotidine, senna-docusate    Estimated/Assessed Needs    Weight Used For Calorie Calculations: 47.6 kg (104 lb 15 oz)  Energy Calorie Requirements (kcal): 1428- 1666 kcal  Energy Need Method: Kcal/kg (30-35 kcal/kg)    Protein Requirements: 57- 72g (1.2-1.5g/kg)  Weight Used For Protein Calculations: 47.6 kg (104 lb 15 oz)    Fluid Requirements (mL): 1ml/1kcal or per MD  Estimated Fluid Requirement Method: RDA Method  RDA Method (mL): 1428    Nutrition Prescription Ordered    Current Diet Order: Regular diet    Evaluation of Received Nutrient/Fluid Intake    I/O: incomplete  Energy Calories Required: not meeting needs (diet just advanced)  Protein Required: not meeting needs (diet just advanced)  Fluid Required:  (as per MD)  Comments: LBM 2/26  Tolerance: tolerating  % Intake of Estimated  Energy Needs: 0 - 25 %  % Meal Intake: Other: Diet was just advanced    Nutrition Risk    Level of Risk/Frequency of Follow-up:  (RD to f/u x 1/week)     Monitor and Evaluation    Food and Nutrient Intake: energy intake, food and beverage intake  Food and Nutrient Adminstration: diet order  Physical Activity and Function: nutrition-related ADLs and IADLs  Anthropometric Measurements: weight change, weight, body mass index  Biochemical Data, Medical Tests and Procedures: electrolyte and renal panel, gastrointestinal profile, glucose/endocrine profile, inflammatory profile  Nutrition-Focused Physical Findings: overall appearance, skin     Nutrition Follow-Up    RD Follow-up?: Yes

## 2024-02-27 NOTE — ASSESSMENT & PLAN NOTE
- Patients imaging consistent with metastatic cancer, unclear primary at this time (most likely lung)   - Unfortunately can't discuss prognosis or treatment options at this time without an official diagnosis   - Plan for OR on 2/29 for surgical resection of brain mass   - Will follow-up biopsy results and arrange the appropriate follow-up with oncology   - If biopsy results are not back by the time patient discharges please order Ambulatory Referral/Consult to Suspected Oncology Diagnosis Clinic

## 2024-02-27 NOTE — SUBJECTIVE & OBJECTIVE
Oncology Treatment Plan:   [No matching plan found]    Medications:  Continuous Infusions:  Scheduled Meds:   dexAMETHasone  4 mg Intravenous Q6H    enoxparin  30 mg Subcutaneous Q24H (prophylaxis, 1700)    famotidine  20 mg Oral BID    levETIRAcetam  500 mg Oral BID    mupirocin   Nasal BID    senna-docusate 8.6-50 mg  1 tablet Oral BID     PRN Meds:acetaminophen, bisacodyL, hydrALAZINE, labetalol, magnesium oxide, magnesium oxide, morphine, ondansetron, oxyCODONE, oxyCODONE, potassium bicarbonate, potassium bicarbonate, potassium bicarbonate, potassium, sodium phosphates, potassium, sodium phosphates, potassium, sodium phosphates     Review of patient's allergies indicates:  No Known Allergies     Past Medical History:   Diagnosis Date    Arthritis      Past Surgical History:   Procedure Laterality Date    TUBAL LIGATION  2002     Family History    None       Tobacco Use    Smoking status: Every Day     Current packs/day: 0.50     Types: Cigarettes    Smokeless tobacco: Current    Tobacco comments:     3/4 PPD   Substance and Sexual Activity    Alcohol use: Not Currently     Comment: OCCASIONALLY    Drug use: Never    Sexual activity: Not Currently       Review of Systems   Constitutional:  Negative for chills and fever.   HENT:  Negative for congestion and sore throat.    Eyes:  Negative for pain.   Respiratory:  Negative for cough and shortness of breath.    Cardiovascular:  Negative for chest pain, palpitations and leg swelling.   Gastrointestinal:  Positive for abdominal pain. Negative for constipation, diarrhea, nausea and vomiting.   Genitourinary:  Negative for difficulty urinating, dysuria and hematuria.   Musculoskeletal:  Positive for myalgias. Negative for back pain.   Skin:  Negative for rash.   Neurological:  Positive for weakness. Negative for light-headedness and headaches.   Hematological:  Does not bruise/bleed easily.   Psychiatric/Behavioral:  Negative for agitation.      Objective:     Vital  Signs (Most Recent):  Temp: 97.1 °F (36.2 °C) (02/27/24 0701)  Pulse: 85 (02/27/24 1001)  Resp: 18 (02/27/24 1021)  BP: 138/86 (02/27/24 1001)  SpO2: (!) 94 % (02/27/24 1001) Vital Signs (24h Range):  Temp:  [97.1 °F (36.2 °C)-98.3 °F (36.8 °C)] 97.1 °F (36.2 °C)  Pulse:  [] 85  Resp:  [9-27] 18  SpO2:  [93 %-97 %] 94 %  BP: (101-151)/(57-86) 138/86        There is no height or weight on file to calculate BMI.  There is no height or weight on file to calculate BSA.      Intake/Output Summary (Last 24 hours) at 2/27/2024 1209  Last data filed at 2/27/2024 1001  Gross per 24 hour   Intake 22.66 ml   Output 200 ml   Net -177.34 ml        Physical Exam  Constitutional:       Appearance: Normal appearance.   HENT:      Head: Normocephalic and atraumatic.      Mouth/Throat:      Mouth: Mucous membranes are moist.   Eyes:      Extraocular Movements: Extraocular movements intact.      Pupils: Pupils are equal, round, and reactive to light.   Cardiovascular:      Rate and Rhythm: Normal rate and regular rhythm.      Pulses: Normal pulses.      Heart sounds: Normal heart sounds.   Pulmonary:      Effort: Pulmonary effort is normal. No respiratory distress.      Breath sounds: Normal breath sounds.   Abdominal:      General: Abdomen is flat. There is no distension.      Palpations: Abdomen is soft.      Tenderness: There is abdominal tenderness.   Musculoskeletal:         General: Normal range of motion.      Cervical back: Normal range of motion and neck supple.      Right lower leg: No edema.      Left lower leg: No edema.   Skin:     General: Skin is warm.   Neurological:      General: No focal deficit present.      Mental Status: She is alert and oriented to person, place, and time.      Motor: Weakness present.   Psychiatric:         Mood and Affect: Mood normal.         Behavior: Behavior normal.          Significant Labs:   All pertinent labs from the last 24 hours have been reviewed.    Diagnostic Results:  I  have reviewed all pertinent imaging results/findings within the past 24 hours.

## 2024-02-27 NOTE — ASSESSMENT & PLAN NOTE
Malnutrition Type:  Context: chronic illness  Level: moderate    Related to (etiology):   Brain Mass    Signs and Symptoms (as evidenced by):   Mild wasting muscle/fat and -15% wt loss x 3mo     Malnutrition Characteristic Summary:  Weight Loss (Malnutrition): greater than 7.5% in 3 months (-15% x 3mo)  Subcutaneous Fat (Malnutrition): mild depletion  Muscle Mass (Malnutrition): mild depletion      Interventions/Recommendations (treatment strategy):  1.) Recommend continuing with Regular diet as tolerated. 2.) If PO intake is <50%, recommend Boost Plus BID (or Boost alternate) to help meet needs. 3.) RD to monitor wt, PO intake, skin, labs.    Nutrition Diagnosis Status:   New

## 2024-02-27 NOTE — PLAN OF CARE
[facility] - Neuro Critical Care  Initial Discharge Assessment       Primary Care Provider: Chika Powell MD    Admission Diagnosis: Intracranial mass [R90.0]    Admission Date: 2/27/2024  Expected Discharge Date: 3/6/2024    Transition of Care Barriers: Unisured, No family/friends to help    Payor: /     Extended Emergency Contact Information  Primary Emergency Contact: dominick turner  Mobile Phone: [phone]  Relation: Daughter   needed? No    Discharge Plan A: Home  Discharge Plan B: Hospice/home      [id number] - [place], [place] - [address] AT [facility]  [address]  [place] [address]  Phone: [phone] Fax: [phone]      Transferred from:  [facility]     Past Medical History:   Diagnosis Date    Arthritis          CM met with patient in room for Discharge Planning Assessment.  Patient is able to answer questions.  Per patient, she lives with her 18 year old son (autism)  in a mobile home with 3 step(s) to enter.   Per patient, she has required assistance with ADLS has been mostly bed-bound for the past 4-6 weeks.  Patient stated she uses hand held assist for ambulation to the bathroom (son provides).  Patient will not have assistance upon discharge.  Per patient, patient's daughter also has cancer and is unable to assist.  All questions addressed.  CM will follow for needs.      Discharge Plan A and Plan B have been determined by review of patient's clinical status, future medical and therapeutic needs, and coverage/benefits for post-acute care in coordination with multidisciplinary team members.        Initial Assessment (most recent)       Adult Discharge Assessment - 02/27/24 1510          Discharge Assessment    Assessment Type Discharge Planning Assessment     Confirmed/corrected address, phone number and insurance Yes     Confirmed Demographics Correct on Facesheet     Source of Information patient     Communicated JUAN with patient/caregiver Date not  available/Unable to determine     Reason For Admission Brain Mass     People in Home child(vin), adult     Facility Arrived From: [facility]     Do you expect to return to your current living situation? Yes     Do you have help at home or someone to help you manage your care at home? No     Prior to hospitilization cognitive status: Alert/Oriented     Current cognitive status: Alert/Oriented     Walking or Climbing Stairs Difficulty yes     Walking or Climbing Stairs ambulation difficulty, assistance 1 person;stair climbing difficulty, assistance 1 person;transferring difficulty, assistance 1 person     Mobility Management hand held (son)  furniture, rollator     Dressing/Bathing Difficulty yes     Dressing/Bathing bathing difficulty, assistance 1 person;dressing difficulty, assistance 1 person     Dressing/Bathing Management son     Do you have any problems with: Errands/Grocery     Home Accessibility stairs to enter home     Number of Stairs, Main Entrance three     Stair Railings, Main Entrance railings on both sides of stairs     Home Layout Able to live on 1st floor     Equipment Currently Used at Home rollator     Readmission within 30 days? No     Patient currently being followed by outpatient case management? No     Do you currently have service(s) that help you manage your care at home? No     Do you take prescription medications? No     Do you have prescription coverage? No     Do you have any problems affording any of your prescribed medications? TBD     Is the patient taking medications as prescribed? no     How do you get to doctors appointments? family or friend will provide     Are you on dialysis? No     Do you take coumadin? No     Discharge Plan A Home     Discharge Plan B Hospice/home     DME Needed Upon Discharge  other (see comments)   tbd    Discharge Plan discussed with: Patient     Transition of Care Barriers Unisured;No family/friends to help     SDOH Lack of primary/family support         Physical Activity    On average, how many days per week do you engage in moderate to strenuous exercise (like a brisk walk)? 0 days     On average, how many minutes do you engage in exercise at this level? 0 min        Financial Resource Strain    How hard is it for you to pay for the very basics like food, housing, medical care, and heating? Very hard        Housing Stability    In the last 12 months, was there a time when you were not able to pay the mortgage or rent on time? No     In the last 12 months, how many places have you lived? 1     In the last 12 months, was there a time when you did not have a steady place to sleep or slept in a shelter (including now)? No        Transportation Needs    In the past 12 months, has lack of transportation kept you from medical appointments or from getting medications? No     In the past 12 months, has lack of transportation kept you from meetings, work, or from getting things needed for daily living? No        Food Insecurity    Within the past 12 months, you worried that your food would run out before you got the money to buy more. Never true     Within the past 12 months, the food you bought just didn't last and you didn't have money to get more. Never true        Stress    Do you feel stress - tense, restless, nervous, or anxious, or unable to sleep at night because your mind is troubled all the time - these days? Not at all        Social Connections    In a typical week, how many times do you talk on the phone with family, friends, or neighbors? Never     How often do you get together with friends or relatives? Never     How often do you attend Anglican or Yazdanism services? Never     Do you belong to any clubs or organizations such as Anglican groups, unions, fraternal or athletic groups, or school groups? No     How often do you attend meetings of the clubs or organizations you belong to? Never     Are you , , , , never , or  living with a partner?         Alcohol Use    Q1: How often do you have a drink containing alcohol? Never     Q2: How many drinks containing alcohol do you have on a typical day when you are drinking? Patient does not drink     Q3: How often do you have six or more drinks on one occasion? Never        OTHER    Name(s) of People in Home 18 year old son (autism)                   Discharge Plan A and Plan B have been determined by review of patient's clinical status, future medical and therapeutic needs, and coverage/benefits for post-acute care in coordination with multidisciplinary team members.      Ene Blunt RN, CCRN-K, [facility]  Neuro-Critical Care   [phone]

## 2024-02-27 NOTE — SUBJECTIVE & OBJECTIVE
Past Medical History:   Diagnosis Date    Arthritis      Past Surgical History:   Procedure Laterality Date    TUBAL LIGATION  2002      Current Facility-Administered Medications on File Prior to Encounter   Medication Dose Route Frequency Provider Last Rate Last Admin    [DISCONTINUED] acetaminophen tablet 650 mg  650 mg Oral Q8H PRN Franchesca Hadley NP        [DISCONTINUED] acetaminophen tablet 650 mg  650 mg Oral Q4H PRN Franchesca Hadley NP        [DISCONTINUED] aluminum-magnesium hydroxide-simethicone 200-200-20 mg/5 mL suspension 30 mL  30 mL Oral QID PRN Franchesca Hadley NP   30 mL at 02/26/24 2335    [DISCONTINUED] atenoloL tablet 50 mg  50 mg Oral Daily Franchesca Hadley NP   50 mg at 02/26/24 1147    [DISCONTINUED] dexAMETHasone injection 4 mg  4 mg Intravenous Q6H Danelle Li NP   4 mg at 02/27/24 0355    [DISCONTINUED] famotidine tablet 20 mg  20 mg Oral BID Osman Lou MD   20 mg at 02/26/24 2153    [DISCONTINUED] folic acid tablet 1 mg  1 mg Oral Daily Franchesca Hadley NP   1 mg at 02/26/24 1147    [DISCONTINUED] gabapentin capsule 100 mg  100 mg Oral TID Franchesca Hadley NP   100 mg at 02/26/24 2153    [DISCONTINUED] gadobutroL (GADAVIST) injection 4 mL  4 mL Intravenous ONCE PRN Osman Luo MD        [DISCONTINUED] HYDROcodone-acetaminophen  mg per tablet 1 tablet  1 tablet Oral Q6H PRN Danelle Li NP   1 tablet at 02/27/24 0103    [DISCONTINUED] HYDROcodone-acetaminophen 5-325 mg per tablet 1 tablet  1 tablet Oral Q6H PRN Franchesca Hadley NP   1 tablet at 02/26/24 1149    [DISCONTINUED] HYDROmorphone injection 1 mg  1 mg Intravenous Q4H PRN Danelle Li NP   1 mg at 02/26/24 1235    [DISCONTINUED] HYDROmorphone injection 1 mg  1 mg Intravenous Q3H PRN Ai Eduardo MD   1 mg at 02/27/24 0400    [DISCONTINUED] levETIRAcetam tablet 500 mg  500 mg Oral BID Michael Goff DO   500 mg at 02/26/24 2100    [DISCONTINUED] magnesium oxide tablet 800 mg  800 mg Oral  PRN Franchesca Hadley NP        [DISCONTINUED] magnesium oxide tablet 800 mg  800 mg Oral PRN Franchesca Hadley NP        [DISCONTINUED] melatonin tablet 6 mg  6 mg Oral Nightly PRN Franchesca Hadley NP        [DISCONTINUED] multivitamin tablet  1 tablet Oral Daily Franchesca Hadley NP   1 tablet at 02/26/24 1147    [DISCONTINUED] mupirocin 2 % ointment   Nasal BID Ai Eduardo MD   1 g at 02/26/24 2153    [DISCONTINUED] naloxone 0.4 mg/mL injection 0.02 mg  0.02 mg Intravenous PRN Franchesca Hadley NP        [DISCONTINUED] niCARdipine 40 mg/200 mL (0.2 mg/mL) infusion  0-15 mg/hr Intravenous Continuous Mcihael Goff DO   Stopped at 02/26/24 0000    [DISCONTINUED] ondansetron injection 4 mg  4 mg Intravenous Q6H PRFranchesca Arora NP        [DISCONTINUED] polyethylene glycol packet 17 g  17 g Oral BID Danelle Li NP   17 g at 02/26/24 2154    [DISCONTINUED] potassium bicarbonate disintegrating tablet 35 mEq  35 mEq Oral PRN Franchesca Hadley NP        [DISCONTINUED] potassium bicarbonate disintegrating tablet 50 mEq  50 mEq Oral PRN Franchesca Hadley NP        [DISCONTINUED] potassium bicarbonate disintegrating tablet 60 mEq  60 mEq Oral PRFranchesca Arora NP        [DISCONTINUED] potassium, sodium phosphates 280-160-250 mg packet 2 packet  2 packet Oral PRFranchesca Arora NP        [DISCONTINUED] potassium, sodium phosphates 280-160-250 mg packet 2 packet  2 packet Oral PRN Franchesca Hadley NP        [DISCONTINUED] potassium, sodium phosphates 280-160-250 mg packet 2 packet  2 packet Oral PRN Franchesca Hadley NP        [DISCONTINUED] sodium chloride 0.9% flush 2 mL  2 mL Intravenous Q12H PRFranchesca Arora NP         Current Outpatient Medications on File Prior to Encounter   Medication Sig Dispense Refill    atenoloL (TENORMIN) 50 MG tablet Take 1 tablet (50 mg total) by mouth once daily. 30 tablet 11    folic acid (FOLVITE) 1 MG tablet Take 1 tablet (1 mg total) by mouth once  daily. 90 tablet 3    gabapentin (NEURONTIN) 100 MG capsule Take 1 capsule (100 mg total) by mouth 3 (three) times daily. 90 capsule 11      Allergies: Patient has no known allergies.  No family history on file.  Social History     Tobacco Use    Smoking status: Every Day     Current packs/day: 0.50     Types: Cigarettes    Smokeless tobacco: Current    Tobacco comments:     3/4 PPD   Substance Use Topics    Alcohol use: Not Currently     Comment: OCCASIONALLY    Drug use: Never     Review of Systems   Respiratory:  Negative for cough and shortness of breath.    Cardiovascular:  Negative for chest pain.   Gastrointestinal:  Positive for abdominal pain. Negative for nausea and vomiting.   Musculoskeletal:  Positive for myalgias.   Neurological:  Positive for weakness (R sided). Negative for light-headedness and headaches.     Objective:     Vitals:    Pulse: 90  Rhythm: normal sinus rhythm  BP: 124/71  MAP (mmHg): 92  Resp: 18  SpO2: 95 %    Temp  Min: 97.8 °F (36.6 °C)  Max: 98.3 °F (36.8 °C)  Pulse  Min: 77  Max: 112  BP  Min: 101/57  Max: 151/83  MAP (mmHg)  Min: 74  Max: 110  Resp  Min: 9  Max: 27  SpO2  Min: 92 %  Max: 98 %    No intake/output data recorded.            Physical Exam  Vitals and nursing note reviewed.   Eyes:      Extraocular Movements: Extraocular movements intact.      Pupils: Pupils are equal, round, and reactive to light.   Cardiovascular:      Rate and Rhythm: Normal rate and regular rhythm.      Pulses: Normal pulses.      Heart sounds: Normal heart sounds.   Pulmonary:      Effort: Pulmonary effort is normal.      Breath sounds: Normal breath sounds.   Abdominal:      General: Bowel sounds are normal.      Palpations: Abdomen is soft. There is mass (LUQ).      Tenderness: There is abdominal tenderness in the left upper quadrant.   Musculoskeletal:         General: Normal range of motion.      Cervical back: Normal range of motion.   Skin:     General: Skin is warm and dry.      Capillary  Refill: Capillary refill takes less than 2 seconds.   Neurological:      Mental Status: She is alert and oriented to person, place, and time.      GCS: GCS eye subscore is 4. GCS verbal subscore is 5. GCS motor subscore is 6.      Cranial Nerves: Cranial nerves 2-12 are intact.      Sensory: Sensory deficit (R side diminished) present.      Motor: Weakness (R sided) present.      Comments:   -E4 V5 M6  -PERRL  -Oriented to person, place, time, and situation  -Intermittent confused conversation  -Follows commands in all extremities; L > R  -LONG spontaneously/purposefully  -RUE 4/5  -LUE 5/5  -RLE 4/5  -LLE 5/5            Today I personally reviewed pertinent medications, lines/drains/airways, imaging, cardiology results, laboratory results, notably: CTH; CXR; CBC; CMP; CT abd/pelvis

## 2024-02-27 NOTE — NURSING
Patient arrived to [facility] from <<< [id number]     Report received from: ED, RN     Type of stroke/diagnosis:  L2 lumbar fracture     Current symptoms: aaox4, slurred speech, right sided weakness, pt c/o right hip pain and back pain,     Skin Assessment done: Yes  Wounds noted:  *If wounds noted, was Wound Care consulted? Y/N  *If wounds noted, LDA placed? Y/N  Skin Assessment Verified by:  Yung Alfonso Completed? Pending     Patient Belongings on Admit: pair of white crocs, red walker, grey purse, black backpack, silver metal cup, brown towels, two kristyn of white underwear, brown mattress waffle     NCC notified: PATRICIA Montanez

## 2024-02-27 NOTE — HPI
Crow Hines is a 63 y.o. female with past medical history significant for HTN and rheumatoid arthritis who presents as a transfer from [facility] for left temporal brain mass. Patient initially presented with several months of diffuse chest, abdominal, and back pain. CT C/A/P showed diffuse metastatic disease with lung, liver, spleen, and kidney lesions. Also revealing L2 compression fracture for which Neurosurgery in [place] was originally consulted for. Patient had an episode of acute confusion and CT head was ordered, left temporal mass with significant edema was identified. Transfer to [facility] for Neurosurgery and NCC was initiated.     Today patient reports several month history of chest and right sided hip/back pain. Also endorses right shoulder pain. Denies any history of cancer. States she has had headaches for the past several weeks. Denies seizure like activity, n/v, or focal weakness. No reported blood thinners.

## 2024-02-27 NOTE — HPI
64 y/o F with pMHx of HTN and rheumatoid arthritis presenting as transfer from [facility]SH for neurosurgery eval of L temporal lobe mass. Initially presented to the ED with complaints that everything hurts.   Reportedly began seeing a rheumatologist 9 months ago where she was diagnosed w/ RA. Methotrexate therapy was initiated. Patient reported a continually decline since that time, leading to her being bed-bound for the last 2 months 2/2 to difficulty walking. She stated her rheumatologist believed that she was not tolerating the methotrexate. On presentation to ED, she reported inability to tolerate abdominal pain any longer. Lab work significant for alk phos 203, AST 57, sodium 132, platelets 586. CXR revealed linear and ill-defined hazy opacities of the right mid to lower lung, felt to reflect infiltrate. Diffuse abdominal pain with focal increased tenderness in mid-epigastric area prompted CT abd/pelvis revealing numerous metastatic masses in the liver, metastatic deposits in the spleen and bilateral adrenal masses, hypoattenuating and mildly enhancing mass of the upper pole L kidney. Also w/ intra-abdominal enlarged lymph nodes, consistent with metastatic infiltration, masses of the R lung base with consolidation of the visualized R renal lower lobe and RLL 9 mm nodule. L2 compression fracture w/ mild sclerosis of the vertebra noted, likely pathologic. CTH completed showing large 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 She was transferred to [facility] for higher level of care and will be admitted to [facility] for further management.

## 2024-02-27 NOTE — ASSESSMENT & PLAN NOTE
-Noted on CT chest w/ contrast - small volume of pulmonary thromboembolus in the right middle lobe without finding of right heart strain/failure   -Hold off on anticoagulation for now in setting of brain mass and pending NSGY plans  -Monitor resp status  -ECHO pending

## 2024-02-27 NOTE — PT/OT/SLP EVAL
Speech Language Pathology Evaluation  Cognitive/Bedside Swallow    Patient Name:  Crow Hines   MRN:  [id number]  Admitting Diagnosis: Brain mass    Recommendations:                  General Recommendations:  Cognitive-linguistic therapy  Diet recommendations:  Regular Diet - IDDSI Level 7, Thin liquids - IDDSI Level 0   Aspiration Precautions: Monitor for s/s of aspiration and Standard aspiration precautions   General Precautions: Standard, aspiration, fall  Communication strategies:  go to room if call light pushed    Assessment:     Crow Hines is a 63 y.o. female with an SLP diagnosis of Cognitive-Linguistic Impairment.      History:     Past Medical History:   Diagnosis Date    Arthritis        Past Surgical History:   Procedure Laterality Date    TUBAL LIGATION  2002       History of Present Illness: 64 y/o F with pMHx of HTN and rheumatoid arthritis presenting as transfer from OSH for neurosurgery eval of L temporal lobe mass. Initially presented to the ED with complaints that everything hurts.   Reportedly began seeing a rheumatologist 9 months ago where she was diagnosed w/ RA. Methotrexate therapy was initiated. Patient reported a continually decline since that time, leading to her being bed-bound for the last 2 months 2/2 to difficulty walking. She stated her rheumatologist believed that she was not tolerating the methotrexate. On presentation to ED, she reported inability to tolerate abdominal pain any longer. Lab work significant for alk phos 203, AST 57, sodium 132, platelets 586. CXR revealed linear and ill-defined hazy opacities of the right mid to lower lung, felt to reflect infiltrate. Diffuse abdominal pain with focal increased tenderness in mid-epigastric area prompted CT abd/pelvis revealing numerous metastatic masses in the liver, metastatic deposits in the spleen and bilateral adrenal masses, hypoattenuating and mildly enhancing mass of the upper pole L kidney. Also w/ intra-abdominal  "enlarged lymph nodes, consistent with metastatic infiltration, masses of the R lung base with consolidation of the visualized R renal lower lobe and RLL 9 mm nodule. L2 compression fracture w/ mild sclerosis of the vertebra noted, likely pathologic. CTH completed showing large 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 She was transferred to [facility] for higher level of care and will be admitted to [facility] for further management.      Prior Intubation HX:  none during this admission    Modified Barium Swallow: none on file    Chest X-Rays: 2/27/24: Cardiac size is normal. Large mass of loss of volume in the right lower lung field extending into the mediastinum can be seen. Some loss of volume is seen at the right lung base. Left lung is clear.     Prior diet: regular diet/thin liquids; passed Alfonso      Subjective     "I forget, I can't remember." Pt reporting increased difficulty with memory.     Pain/Comfort:  Pain Rating 1: 9/10  Location - Side 1: Right  Location - Orientation 1: generalized  Pain Addressed 1: Pre-medicate for activity (pt reported just receiving pain medication prior to SLP entry)    Respiratory Status: Room air    Objective:     Cognitive Status:    Pt was O x 4. General recall q's were answered with 33% accuracy IND/67% accuracy given cues. She immediately recalled series of 3 digits IND, 4 digits given cues, and was unable to immediately recall 5 digit series.   Following a delay, pt recalled 2/3 unrelated words given cues (1/3 IND).  Problem solving and reasoning skills to be assessed.      Receptive Language:   Comprehension: Formal assessment not yet completed. Informally, receptive language appeared WFL.     Expressive Language:  Verbal:    Pt able to express basic wants/needs and some complex  information without evidence of expressive language deficits.Word fluency and naming to be assessed.       Motor Speech:  WFL    Voice:   WFL    Visual-Spatial:  tba    Reading:   tba  "     Written Expression:   tba    Oral Musculature Evaluation  Oral Musculature: WFL  Dentition: scattered dentition  Secretion Management: adequate  Mucosal Quality: adequate  Mandibular Strength and Mobility: WFL  Oral Labial Strength and Mobility: WFL  Lingual Strength and Mobility: WFL  Voice Prior to PO Intake: dry, clear    Bedside Swallow Eval:   Consistencies Assessed:  Thin liquids water via straw   Puree multiple bites of yogurt  Solids multiple bites of sausage melody      Oral Phase:   WFL    Pharyngeal Phase:   no overt clinical signs/symptoms of aspiration  no overt clinical signs/symptoms of pharyngeal dysphagia    Compensatory Strategies  None    Goals:   Multidisciplinary Problems       SLP Goals          Problem: SLP    Goal Priority Disciplines Outcome   SLP Goal     SLP    Description: Speech Language Pathology Goals  Goals expected to be met by 3/5:  1. Pt will complete immediate recall tasks with 70% accuracy given mod cues.   2. Pt will recall 3/3 novel items after a 2 minute delay given mod-max cues.   3. Pt will recall general information with 70% accuracy given mod cues.   4. Pt will participate in ongoing assessment of auditory comprehension, problem solving, reasoning, word fluency, and categorization abilities.   5. Pt will participate in assessment of reading, writing, and visuals spatial abilities.                                Plan:     Patient to be seen:  4 x/week   Plan of Care expires:  03/26/24  Plan of Care reviewed with:  patient   SLP Follow-Up:  Yes       Discharge recommendations:  Therapy Intensity Recommendations at Discharge: High Intensity Therapy     Time Tracking:     SLP Treatment Date:   02/27/24  Speech Start Time:  1020  Speech Stop Time:  1031     Speech Total Time (min):  11 min    Billable Minutes: Eval 6  and Eval Swallow and Oral Function 5    02/27/2024

## 2024-02-27 NOTE — PT/OT/SLP EVAL
Physical Therapy Co-Evaluation  Co-eval performed to appropriately and safely assess patient's strength and endurance while facilitating functional tasks in addition to accommodating for patient's activity/pain tolerance.    Patient Name:  Crow Hines   MRN:  [id number]    Recommendations:     Discharge Recommendations: High Intensity Therapy   Discharge Equipment Recommendations: hospital bed, wheelchair, lift device   Barriers to discharge: Inaccessible home and Decreased caregiver support    Assessment:     Crow Hines is a 63 y.o. female admitted with a medical diagnosis of Brain mass.  She presents with the following impairments/functional limitations: weakness, impaired endurance, impaired self care skills, impaired balance, decreased upper extremity function, impaired functional mobility, decreased lower extremity function, gait instability, pain, decreased safety awareness, orthopedic precautions. Pt primarily limited by pain this date. Significant increased in pain reported upon sitting EOB. Pt unable to tolerate further mobility and returned to supine. Pt would benefit from high intensity/frequency therapy for: Dynamic/static standing/sitting balance through skilled balance training, strengthening with the use of skilled therapeutic exercises interventions, mobility and safety training to ensure safe discharge home through skilled patient education, and mobility through adaptive equipment training. Pt highly motivated to return to modified independent to independent PLOF and can tolerate 3+hours of therapy. Pt continues to benefit from a collaborative multidisciplinary program to improve quality of life and focus on recovery of impairments.      Rehab Prognosis: Good; patient would benefit from acute skilled PT services to address these deficits and reach maximum level of function.    Recent Surgery: * No surgery found *      Plan:     During this hospitalization, patient to be seen 3 x/week to  "address the identified rehab impairments via gait training, therapeutic activities, therapeutic exercises, neuromuscular re-education and progress toward the following goals:    Plan of Care Expires:  03/28/24    Subjective     Chief Complaint: back pain  Patient/Family Comments/goals: pt asking for gas medicine 2/2 pain of feeling like she needs to "burp"  Pain/Comfort:  Pain Rating 1:  (not rated)  Location 1:  (R side of back and lower R chest (pt reports feeling like she needs to "burp"))  Pain Addressed 1: Reposition, Distraction, Nurse notified (pt asking for gas medication)  Pain Rating Post-Intervention 1: other (see comments) (not rated)    Patients cultural, spiritual, Amish conflicts given the current situation: no    Living Environment:  Pt lives with her autistic son in a trailer with 2-3 JUAN DANIEL and a R HR. Pt has a tub/shower with a shower chair and no grab bars.   Prior to admission, patients level of function was modified independent with rollator and independent with ADLs. More recently (~ 2 mos per chart) patient was been limited in walking 2/2 pain and was requiring her son's assistance. Pt drives short distances.  Equipment used at home: rollator.  DME owned (not currently used): single point cane.  Upon discharge, patient will have SOME assistance from son.    Objective:     Communicated with RN prior to session.  Patient found HOB elevated with telemetry, peripheral IV, pulse ox (continuous), blood pressure cuff, bed alarm  upon PT entry to room.    General Precautions: Standard, fall  Orthopedic Precautions:spinal precautions   Braces: LSO (for comfort)  Respiratory Status: Room air    Exams:  Cognitive Exam:  Patient is oriented to Person, Place, Time, and Situation  Gross Motor Coordination:  WFL  Postural Exam:  Patient presented with the following abnormalities:    -       Rounded shoulders  -       Forward head  Sensation:  denies numbness/tingling and endorses normal LT  RLE ROM: " WFL  RLE Strength: Deficits: hip flexion 3/5, knee extension 3+/5, knee extension 4-/5, DF 4/5; pt appeared to have some difficulty following commands for BLEs  LLE ROM: WFL  LLE Strength: Deficits: hip flexion 3/5, knee extension 3+/5, knee extension 4-/5, DF 4/5; pt appeared to have some difficulty following commands for BLEs    Functional Mobility:  Bed Mobility:     Scooting: SBA, pt did not scoot all the way anterior for feet on floor 2/2 pain  Supine to Sit: stand by assistance  Sit to Supine: minimum assistance  Transfers:     Sit to Stand:  deferred, pt unable to tolerate 2/2 progressively increasing pain in sitting  Balance:   Static Sitting: SBA  Dynamic Sitting: SBA-CGA  Static Standing: FOX  Dynamic Standing: FOX      AM-PAC 6 CLICK MOBILITY  Total Score:13       Treatment & Education:  Pt educated on LSO brace. LSO brace donned sitting EOB.  Patient educated on role of therapy, goals of session, and benefits of mobilizing.   Discussed PT plan of care during hospitalization.   Patient educated on calling for assistance.   Patient educated on how their diagnosis impacts their mobility within PT scope of practice.   Communication board up to date.  All questions answered within PT scope of practice.    Patient left HOB elevated with all lines intact, call button in reach, bed alarm on, and RN notified.    GOALS:   Multidisciplinary Problems       Physical Therapy Goals          Problem: Physical Therapy    Goal Priority Disciplines Outcome Goal Variances Interventions   Physical Therapy Goal     PT, PT/OT Ongoing, Progressing     Description: Goals to be met by: 3/12/2024     Patient will increase functional independence with mobility by performin. Supine to sit with Gem  2. Sit to supine with Gem  3. Rolling to Left and Right with Gem.  4. Sit to stand transfer with Contact Guard Assistance with RW  5. Bed to chair transfer with Contact Guard Assistance using Rolling  Walker  6. Gait  x 50 feet with Contact Guard Assistance using Rolling Walker.   7. Ascend/descend 3 stair with right Handrails Minimal Assistance using No Assistive Device.   8. Lower extremity exercise program x10 reps per handout, with supervision                             History:     Past Medical History:   Diagnosis Date    Arthritis        Past Surgical History:   Procedure Laterality Date    TUBAL LIGATION  2002       Time Tracking:     PT Received On: 02/27/24  PT Start Time: 0836     PT Stop Time: 0854  PT Total Time (min): 18 min     Billable Minutes: Evaluation 10 and Therapeutic Activity 8      02/27/2024
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
## 2024-02-28 ENCOUNTER — ANESTHESIA EVENT (OUTPATIENT)
Dept: SURGERY | Facility: HOSPITAL | Age: 64
End: 2024-02-28
Payer: MEDICAID

## 2024-02-28 LAB
ALBUMIN SERPL BCP-MCNC: 2.9 G/DL (ref 3.5–5.2)
ALP SERPL-CCNC: 287 U/L (ref 55–135)
ALT SERPL W/O P-5'-P-CCNC: 66 U/L (ref 10–44)
ANION GAP SERPL CALC-SCNC: 13 MMOL/L (ref 8–16)
AST SERPL-CCNC: 76 U/L (ref 10–40)
BASOPHILS # BLD AUTO: 0.01 K/UL (ref 0–0.2)
BASOPHILS NFR BLD: 0.1 % (ref 0–1.9)
BILIRUB SERPL-MCNC: 0.2 MG/DL (ref 0.1–1)
BUN SERPL-MCNC: 10 MG/DL (ref 8–23)
CALCIUM SERPL-MCNC: 10 MG/DL (ref 8.7–10.5)
CHLORIDE SERPL-SCNC: 101 MMOL/L (ref 95–110)
CO2 SERPL-SCNC: 22 MMOL/L (ref 23–29)
CREAT SERPL-MCNC: 0.7 MG/DL (ref 0.5–1.4)
DIFFERENTIAL METHOD BLD: ABNORMAL
EOSINOPHIL # BLD AUTO: 0 K/UL (ref 0–0.5)
EOSINOPHIL NFR BLD: 0.1 % (ref 0–8)
ERYTHROCYTE [DISTWIDTH] IN BLOOD BY AUTOMATED COUNT: 13.8 % (ref 11.5–14.5)
EST. GFR  (NO RACE VARIABLE): >60 ML/MIN/1.73 M^2
GLUCOSE SERPL-MCNC: 110 MG/DL (ref 70–110)
HCT VFR BLD AUTO: 37.2 % (ref 37–48.5)
HGB BLD-MCNC: 12.1 G/DL (ref 12–16)
IMM GRANULOCYTES # BLD AUTO: 0.06 K/UL (ref 0–0.04)
IMM GRANULOCYTES NFR BLD AUTO: 0.4 % (ref 0–0.5)
LYMPHOCYTES # BLD AUTO: 0.4 K/UL (ref 1–4.8)
LYMPHOCYTES NFR BLD: 3.1 % (ref 18–48)
MAGNESIUM SERPL-MCNC: 2.3 MG/DL (ref 1.6–2.6)
MCH RBC QN AUTO: 31.7 PG (ref 27–31)
MCHC RBC AUTO-ENTMCNC: 32.5 G/DL (ref 32–36)
MCV RBC AUTO: 97 FL (ref 82–98)
MONOCYTES # BLD AUTO: 0.8 K/UL (ref 0.3–1)
MONOCYTES NFR BLD: 5.9 % (ref 4–15)
NEUTROPHILS # BLD AUTO: 12.7 K/UL (ref 1.8–7.7)
NEUTROPHILS NFR BLD: 90.4 % (ref 38–73)
NRBC BLD-RTO: 0 /100 WBC
PHOSPHATE SERPL-MCNC: 3.3 MG/DL (ref 2.7–4.5)
PLATELET # BLD AUTO: 616 K/UL (ref 150–450)
PMV BLD AUTO: 8.7 FL (ref 9.2–12.9)
POTASSIUM SERPL-SCNC: 4.8 MMOL/L (ref 3.5–5.1)
PROT SERPL-MCNC: 7.2 G/DL (ref 6–8.4)
RBC # BLD AUTO: 3.82 M/UL (ref 4–5.4)
SODIUM SERPL-SCNC: 136 MMOL/L (ref 136–145)
WBC # BLD AUTO: 14.06 K/UL (ref 3.9–12.7)

## 2024-02-28 PROCEDURE — 99499 UNLISTED E&M SERVICE: CPT | Mod: ,,, | Performed by: PHYSICIAN ASSISTANT

## 2024-02-28 PROCEDURE — 85025 COMPLETE CBC W/AUTO DIFF WBC: CPT | Performed by: REGISTERED NURSE

## 2024-02-28 PROCEDURE — 25000003 PHARM REV CODE 250

## 2024-02-28 PROCEDURE — 80053 COMPREHEN METABOLIC PANEL: CPT | Performed by: REGISTERED NURSE

## 2024-02-28 PROCEDURE — 94761 N-INVAS EAR/PLS OXIMETRY MLT: CPT

## 2024-02-28 PROCEDURE — 99291 CRITICAL CARE FIRST HOUR: CPT | Mod: FS,,, | Performed by: PSYCHIATRY & NEUROLOGY

## 2024-02-28 PROCEDURE — 63600175 PHARM REV CODE 636 W HCPCS: Performed by: STUDENT IN AN ORGANIZED HEALTH CARE EDUCATION/TRAINING PROGRAM

## 2024-02-28 PROCEDURE — 25000003 PHARM REV CODE 250: Performed by: PHYSICIAN ASSISTANT

## 2024-02-28 PROCEDURE — 63600175 PHARM REV CODE 636 W HCPCS

## 2024-02-28 PROCEDURE — 63600175 PHARM REV CODE 636 W HCPCS: Mod: JZ,JG | Performed by: REGISTERED NURSE

## 2024-02-28 PROCEDURE — 99232 SBSQ HOSP IP/OBS MODERATE 35: CPT | Mod: 57,,, | Performed by: NEUROLOGICAL SURGERY

## 2024-02-28 PROCEDURE — 25000003 PHARM REV CODE 250: Performed by: STUDENT IN AN ORGANIZED HEALTH CARE EDUCATION/TRAINING PROGRAM

## 2024-02-28 PROCEDURE — 20000000 HC ICU ROOM

## 2024-02-28 PROCEDURE — 84100 ASSAY OF PHOSPHORUS: CPT | Performed by: REGISTERED NURSE

## 2024-02-28 PROCEDURE — 83735 ASSAY OF MAGNESIUM: CPT | Performed by: REGISTERED NURSE

## 2024-02-28 PROCEDURE — 25000003 PHARM REV CODE 250: Performed by: PSYCHIATRY & NEUROLOGY

## 2024-02-28 PROCEDURE — 97129 THER IVNTJ 1ST 15 MIN: CPT

## 2024-02-28 RX ORDER — AMOXICILLIN 250 MG
2 CAPSULE ORAL 2 TIMES DAILY
Status: DISCONTINUED | OUTPATIENT
Start: 2024-02-28 | End: 2024-03-12

## 2024-02-28 RX ADMIN — OXYCODONE 10 MG: 5 TABLET ORAL at 03:02

## 2024-02-28 RX ADMIN — MUPIROCIN: 20 OINTMENT TOPICAL at 08:02

## 2024-02-28 RX ADMIN — OXYCODONE 10 MG: 5 TABLET ORAL at 09:02

## 2024-02-28 RX ADMIN — ONDANSETRON 4 MG: 2 INJECTION INTRAMUSCULAR; INTRAVENOUS at 03:02

## 2024-02-28 RX ADMIN — OXYCODONE 5 MG: 5 TABLET ORAL at 07:02

## 2024-02-28 RX ADMIN — DEXAMETHASONE SODIUM PHOSPHATE 4 MG: 4 INJECTION INTRA-ARTICULAR; INTRALESIONAL; INTRAMUSCULAR; INTRAVENOUS; SOFT TISSUE at 05:02

## 2024-02-28 RX ADMIN — OXYCODONE 5 MG: 5 TABLET ORAL at 09:02

## 2024-02-28 RX ADMIN — DOCUSATE SODIUM AND SENNOSIDES 2 TABLET: 8.6; 5 TABLET, FILM COATED ORAL at 08:02

## 2024-02-28 RX ADMIN — LABETALOL HYDROCHLORIDE 10 MG: 5 INJECTION, SOLUTION INTRAVENOUS at 01:02

## 2024-02-28 RX ADMIN — DEXAMETHASONE SODIUM PHOSPHATE 4 MG: 4 INJECTION INTRA-ARTICULAR; INTRALESIONAL; INTRAMUSCULAR; INTRAVENOUS; SOFT TISSUE at 12:02

## 2024-02-28 RX ADMIN — ENOXAPARIN SODIUM 30 MG: 30 INJECTION SUBCUTANEOUS at 05:02

## 2024-02-28 RX ADMIN — MORPHINE SULFATE 2 MG: 2 INJECTION, SOLUTION INTRAMUSCULAR; INTRAVENOUS at 05:02

## 2024-02-28 RX ADMIN — LABETALOL HYDROCHLORIDE 10 MG: 5 INJECTION, SOLUTION INTRAVENOUS at 09:02

## 2024-02-28 RX ADMIN — MORPHINE SULFATE 2 MG: 2 INJECTION, SOLUTION INTRAMUSCULAR; INTRAVENOUS at 07:02

## 2024-02-28 RX ADMIN — FAMOTIDINE 20 MG: 20 TABLET ORAL at 08:02

## 2024-02-28 RX ADMIN — MORPHINE SULFATE 2 MG: 2 INJECTION, SOLUTION INTRAMUSCULAR; INTRAVENOUS at 12:02

## 2024-02-28 RX ADMIN — LEVETIRACETAM 500 MG: 500 TABLET, FILM COATED ORAL at 08:02

## 2024-02-28 RX ADMIN — OXYCODONE 5 MG: 5 TABLET ORAL at 01:02

## 2024-02-28 RX ADMIN — MORPHINE SULFATE 2 MG: 2 INJECTION, SOLUTION INTRAMUSCULAR; INTRAVENOUS at 11:02

## 2024-02-28 NOTE — PLAN OF CARE
"[facility] Care Plan    POC reviewed with Crow Hines  at 0300. Pt verbalized understanding. Questions and concerns addressed. No acute events overnight. Pt progressing toward goals. Will continue to monitor. See below and flowsheets for full assessment and VS info.     -Plan for Crani Thursday 2/29        Is this a stroke patient? no    Neuro:  [place] Coma Scale  Best Eye Response: 4-->(E4) spontaneous  Best Motor Response: 6-->(M6) obeys commands  Best Verbal Response: 5-->(V5) oriented  [place] Coma Scale Score: 15  Assessment Qualifiers: no eye obstruction present, patient not sedated/intubated  Pupil PERRLA: yes     24hr Temp:  [97.1 °F (36.2 °C)-98.3 °F (36.8 °C)]     CV:   Rhythm: normal sinus rhythm  BP goals:   SBP < 160  MAP > 65    Resp:           Plan: N/A    GI/:     Diet/Nutrition Received: regular  Last Bowel Movement: 02/26/24  Voiding Characteristics: voids spontaneously without difficulty, external catheter    Intake/Output Summary (Last 24 hours) at 2/28/2024 0308  Last data filed at 2/27/2024 1701  Gross per 24 hour   Intake 22.66 ml   Output 900 ml   Net -877.34 ml          Labs/Accuchecks:  Recent Labs   Lab 02/28/24  0058   WBC 14.06*   RBC 3.82*   HGB 12.1   HCT 37.2   *      Recent Labs   Lab 02/28/24  0058      K 4.8   CO2 22*      BUN 10   CREATININE 0.7   ALKPHOS 287*   ALT 66*   AST 76*   BILITOT 0.2      Recent Labs   Lab 02/27/24  0707   INR 1.0   APTT 22.5    No results for input(s): "CPK", "CPKMB", "TROPONINI", "MB" in the last 168 hours.    Electrolytes: N/A - electrolytes WDL  Accuchecks: none    Gtts:      LDA/Wounds:    Nurses Note -- 4 Eyes      2/28/2024   3:08 AM      Skin assessed during: Daily Assessment    Is there altered skin present? no   [x] No Altered Skin Integrity Present    [x]Prevention Measures Documented        "

## 2024-02-28 NOTE — PT/OT/SLP PROGRESS
Physical Therapy      Patient Name:  Crow Hines   MRN:  [id number]    Patient not seen today secondary to  (pt delcined 2/2 pain). Will follow-up as appropriate.

## 2024-02-28 NOTE — PLAN OF CARE
"Psychiatric Care Plan    POC reviewed with Crow Hines at 1400. Pt verbalized understanding. Questions and concerns addressed. No acute events today. Pt progressing toward goals. Will continue to monitor. See below and flowsheets for full assessment and VS info.     AAOx4  RA  CT- stealth   Lumbar spine xray  PRN pain management  VSS  NADN        Is this a stroke patient? no    Neuro:  [place] Coma Scale  Best Eye Response: 4-->(E4) spontaneous  Best Motor Response: 6-->(M6) obeys commands  Best Verbal Response: 5-->(V5) oriented  [place] Coma Scale Score: 15  Assessment Qualifiers: no eye obstruction present, patient not sedated/intubated  Pupil PERRLA: yes     24 hr Temp:  [97.1 °F (36.2 °C)-98.3 °F (36.8 °C)]     CV:   Rhythm: normal sinus rhythm  BP goals:   SBP < 140  MAP > 65    Resp:           Plan:  RA    GI/:     Diet/Nutrition Received: NPO  Last Bowel Movement: 02/26/24  Voiding Characteristics: voids spontaneously without difficulty    Intake/Output Summary (Last 24 hours) at 2/27/2024 1835  Last data filed at 2/27/2024 1701  Gross per 24 hour   Intake 22.66 ml   Output 900 ml   Net -877.34 ml          Labs/Accuchecks:  Recent Labs   Lab 02/27/24  0346   WBC 12.30   RBC 3.44*   HGB 11.2*   HCT 33.7*   *      Recent Labs   Lab 02/27/24  0346   *   K 4.3   CO2 25   CL 99   BUN 12   CREATININE 0.6   ALKPHOS 232*   ALT 44   AST 49*   BILITOT 0.2      Recent Labs   Lab 02/27/24  0707   INR 1.0   APTT 22.5    No results for input(s): "CPK", "CPKMB", "TROPONINI", "MB" in the last 168 hours.    Electrolytes: N/A - electrolytes WDL  Accuchecks: none    Gtts:      LDA/Wounds:    Nurses Note -- 4 Eyes      2/27/2024   6:35 PM      Skin assessed during: Q Shift Change    Is there altered skin present? no   [] No Altered Skin Integrity Present    []Prevention Measures Documented    Attending Nurse:      Second RN/Staff Member:        ASHLI      "
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
## 2024-02-28 NOTE — ASSESSMENT & PLAN NOTE
Nutrition consulted. Most recent weight and BMI monitored-     Measurements:  Wt Readings from Last 1 Encounters:   02/28/24 47.6 kg (105 lb)   Body mass index is 18.6 kg/m².    Patient has been screened and assessed by RD.    Malnutrition Type:  Context: chronic illness  Level: moderate    Malnutrition Characteristic Summary:  Weight Loss (Malnutrition): greater than 7.5% in 3 months (-15% x 3mo)  Subcutaneous Fat (Malnutrition): mild depletion  Muscle Mass (Malnutrition): mild depletion    Interventions/Recommendations (treatment strategy):  1.) Recommend continuing with Regular diet as tolerated. 2.) If PO intake is <50%, recommend Boost Plus BID (or Boost alternate) to help meet needs. 3.) RD to monitor wt, PO intake, skin, labs.

## 2024-02-28 NOTE — PLAN OF CARE
"  POC reviewed with Crow Hines and family at 1200. Pt verbalized understanding. Questions and concerns addressed. No acute events today. Pt progressing toward goals. Will continue to monitor. See below and flowsheets for full assessment and VS info.     - Pain meds given around the clock as soon as they were available to patient.   - Removed occluded PIV from L arm and midline placed.   - Labetalol given x1 to maintain SBP goal < 160.   - Pt to be NPO at midnight for OR on 2/29/24.       Neuro:  [place] Coma Scale  Best Eye Response: 4-->(E4) spontaneous  Best Motor Response: 6-->(M6) obeys commands  Best Verbal Response: 5-->(V5) oriented  [place] Coma Scale Score: 15  Assessment Qualifiers: patient not sedated/intubated, no eye obstruction present  Pupil PERRLA: yes     24 hr Temp:  [97.6 °F (36.4 °C)-98.2 °F (36.8 °C)]     CV:   Rhythm: normal sinus rhythm  BP goals:   SBP < 160  MAP > 65    Resp:           Plan: N/A    GI/:     Diet/Nutrition Received: regular  Last Bowel Movement: 02/26/24  Voiding Characteristics: external catheter    Intake/Output Summary (Last 24 hours) at 2/28/2024 1256  Last data filed at 2/28/2024 1101  Gross per 24 hour   Intake 90 ml   Output 1800 ml   Net -1710 ml          Labs/Accuchecks:  Recent Labs   Lab 02/28/24  0058   WBC 14.06*   RBC 3.82*   HGB 12.1   HCT 37.2   *      Recent Labs   Lab 02/28/24  0058      K 4.8   CO2 22*      BUN 10   CREATININE 0.7   ALKPHOS 287*   ALT 66*   AST 76*   BILITOT 0.2      Recent Labs   Lab 02/27/24  0707   INR 1.0   APTT 22.5    No results for input(s): "CPK", "CPKMB", "TROPONINI", "MB" in the last 168 hours.    Electrolytes: N/A - electrolytes WDL  Accuchecks: none    Gtts:      LDA/Wounds:  Lines/Drains/Airways       Drain  Duration             Female External Urinary Catheter w/ Suction 02/25/24 2150 2 days              Peripheral Intravenous Line  Duration                  Peripheral IV - Single Lumen 02/25/24 0655 " 20 G Right Antecubital 3 days         Midline Catheter Insertion/Assessment  - Single Lumen 02/28/24 1100 Left cephalic vein (lateral side of arm) <1 day                  Wounds: No  Wound care consulted: No    Is this a stroke patient? No.      Problem: Adult Inpatient Plan of Care  Goal: Plan of Care Review  Outcome: Ongoing, Progressing     Problem: Adult Inpatient Plan of Care  Goal: Optimal Comfort and Wellbeing  Outcome: Ongoing, Progressing     Problem: Communication Impairment   Goal: Effective Communication Skills  Outcome: Ongoing, Progressing     Problem: Pain   Goal: Acceptable Pain Control  Outcome: Ongoing, Progressing
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
## 2024-02-28 NOTE — ANESTHESIA PREPROCEDURE EVALUATION
[facility]  Anesthesia Pre-Operative Evaluation         Patient Name: Crow Hines  YOB: 1960  MRN: [id number]    SUBJECTIVE:     Pre-operative evaluation for Procedure(s) (LRB):  CRANIOTOMY, WITH NEOPLASM EXCISION USING COMPUTER-ASSISTED NAVIGATION (Left)    02/28/2024    Crow Hines is a 63 y.o. female w/ a significant PMHx of HTN and rheumatoid arthritis who presents as a transfer from [facility] for left temporal brain mass. Metastatic disease throughout lungs, liver, spleen, and kidneys     Patient now presents for the above procedure(s).    NO PRIOR ECHO    LDA:        Peripheral IV - Single Lumen 02/25/24 0655 20 G Right Antecubital (Active)   Site Assessment Clean;Dry;Intact;No redness;No swelling 02/28/24 0901   Extremity Assessment Distal to IV No abnormal discoloration;No redness;No swelling;No warmth 02/28/24 0901   Line Status Flushed;Saline locked 02/28/24 0901   Dressing Status Clean;Dry;Intact 02/28/24 0901   Dressing Intervention Integrity maintained 02/28/24 0901   Dressing Change Due 02/29/24 02/28/24 0901   Site Change Due 02/29/24 02/28/24 0901   Reason Not Rotated Not due 02/28/24 0901   Number of days: 3            Peripheral IV - Single Lumen 02/26/24 0540 18 G Distal;Left;Lateral Forearm (Active)   Site Assessment Clean;Dry;Intact;No redness;No swelling 02/28/24 0901   Extremity Assessment Distal to IV No abnormal discoloration;No redness;No swelling;No warmth 02/28/24 0901   Line Status Occluded 02/28/24 0901   Dressing Status Clean;Dry;Intact 02/28/24 0901   Dressing Intervention Integrity maintained 02/28/24 0901   Dressing Change Due 03/01/24 02/28/24 0901   Site Change Due 03/01/24 02/28/24 0901   Reason Not Rotated Not due 02/28/24 0901   Number of days: 2         CT Head  Enhancing partially calcified lesion along the left temporal lobe measuring 2.4 x 3.6 cm (series 6, image 59) similar to prior.  Lesion abuts the dura similar to  prior.  There is large region of surrounding vasogenic edema with localized mass effect and 5 mm rightward midline shift, also similar to prior.  No new enhancing lesions identified elsewhere.         Patient Active Problem List   Diagnosis    Seropositive rheumatoid arthritis    Metastatic neoplastic disease    HTN (hypertension)    Compression fracture of lumbar spine, non-traumatic    Brain mass    Acute pulmonary embolism without acute cor pulmonale    ACP (advance care planning)    Vasogenic brain edema    Midline shift of brain    Moderate malnutrition       Review of patient's allergies indicates:  No Known Allergies    Current Inpatient Medications:      Current Facility-Administered Medications on File Prior to Encounter   Medication Dose Route Frequency Provider Last Rate Last Admin    acetaminophen tablet 650 mg  650 mg Oral Q6H PRN Montanez, Kylah T, NP        bisacodyL suppository 10 mg  10 mg Rectal Daily PRN Tarik Kylah T, NP        dexAMETHasone injection 4 mg  4 mg Intravenous Q6H Tarik Kylah T, NP   4 mg at 02/28/24 1201    enoxaparin injection 30 mg  30 mg Subcutaneous Q24H (prophylaxis, 1700) Nancy Baker PA-C   30 mg at 02/27/24 1812    famotidine tablet 20 mg  20 mg Oral BID Nancy Baker PA-C   20 mg at 02/28/24 0801    hydrALAZINE injection 10 mg  10 mg Intravenous Q4H PRN Nancy Baker PA-C        labetalol 20 mg/4 mL (5 mg/mL) IV syring  10 mg Intravenous Q4H PRN Kylah Montanez T, NP   10 mg at 02/28/24 1316    levETIRAcetam tablet 500 mg  500 mg Oral BID Garrison Gunn MD   500 mg at 02/28/24 0845    magnesium oxide tablet 800 mg  800 mg Oral PRN Nancy Baker PA-C        magnesium oxide tablet 800 mg  800 mg Oral PRN Nancy Baker PA-C        morphine injection 2 mg  2 mg Intravenous Q4H PRN Jai Back MD   2 mg at 02/28/24 1159    mupirocin 2 % ointment   Nasal BID Alejandro Chavira MD   Given at 02/28/24  0801    ondansetron injection 4 mg  4 mg Intravenous Q8H PRN Kylah Montanez NP        oxyCODONE immediate release tablet 10 mg  10 mg Oral Q6H PRN Jai Back MD   10 mg at 02/28/24 0937    oxyCODONE immediate release tablet 5 mg  5 mg Oral Q6H PRN Olivia Nancy SChaitanya PA-C   5 mg at 02/28/24 1314    potassium bicarbonate disintegrating tablet 35 mEq  35 mEq Oral PRN Nicaud, Nancy S., PA-C        potassium bicarbonate disintegrating tablet 50 mEq  50 mEq Oral PRN Nicaud, Nancy S., PA-C        potassium bicarbonate disintegrating tablet 60 mEq  60 mEq Oral PRN Nicaud, Nancy S., PA-C        potassium, sodium phosphates 280-160-250 mg packet 2 packet  2 packet Oral PRN Niccarole, Nancy S., PA-C        potassium, sodium phosphates 280-160-250 mg packet 2 packet  2 packet Oral PRN Nicaud, Nancy S., PA-C        potassium, sodium phosphates 280-160-250 mg packet 2 packet  2 packet Oral PRN Niccarole, Nancy SChaitanya, PA-C        senna-docusate 8.6-50 mg per tablet 2 tablet  2 tablet Oral BID Rekha Marquez PA-MILO   2 tablet at 02/28/24 0801     Current Outpatient Medications on File Prior to Encounter   Medication Sig Dispense Refill    atenoloL (TENORMIN) 50 MG tablet Take 1 tablet (50 mg total) by mouth once daily. 30 tablet 11    folic acid (FOLVITE) 1 MG tablet Take 1 tablet (1 mg total) by mouth once daily. 90 tablet 3    gabapentin (NEURONTIN) 100 MG capsule Take 1 capsule (100 mg total) by mouth 3 (three) times daily. 90 capsule 11       Past Surgical History:   Procedure Laterality Date    TUBAL LIGATION  2002       Social History     Socioeconomic History    Marital status:    Tobacco Use    Smoking status: Every Day     Current packs/day: 0.50     Types: Cigarettes    Smokeless tobacco: Current    Tobacco comments:     3/4 PPD   Substance and Sexual Activity    Alcohol use: Not Currently     Comment: OCCASIONALLY    Drug use: Never    Sexual activity: Not Currently      Social Determinants of Health     Financial Resource Strain: High Risk (2/27/2024)    Overall Financial Resource Strain (CARDIA)     Difficulty of Paying Living Expenses: Very hard   Food Insecurity: No Food Insecurity (2/27/2024)    Hunger Vital Sign     Worried About Running Out of Food in the Last Year: Never true     Ran Out of Food in the Last Year: Never true   Transportation Needs: No Transportation Needs (2/27/2024)    PRAPARE - Transportation     Lack of Transportation (Medical): No     Lack of Transportation (Non-Medical): No   Physical Activity: Inactive (2/27/2024)    Exercise Vital Sign     Days of Exercise per Week: 0 days     Minutes of Exercise per Session: 0 min   Stress: No Stress Concern Present (2/27/2024)    [employer] - Occupational Stress Questionnaire     Feeling of Stress : Not at all   Social Connections: Socially Isolated (2/27/2024)    Social Connection and Isolation Panel [NHANES]     Frequency of Communication with Friends and Family: Never     Frequency of Social Gatherings with Friends and Family: Never     Attends Zoroastrian Services: Never     Active Member of Clubs or Organizations: No     Attends Club or Organization Meetings: Never     Marital Status:    Housing Stability: Low Risk  (2/27/2024)    Housing Stability Vital Sign     Unable to Pay for Housing in the Last Year: No     Number of Places Lived in the Last Year: 1     Unstable Housing in the Last Year: No       OBJECTIVE:     Vital Signs Range (Last 24H):  Temp:  [36.4 °C (97.6 °F)-36.8 °C (98.2 °F)]   Pulse:  []   Resp:  [11-27]   BP: (127-167)/()   SpO2:  [94 %-97 %]       Significant Labs:  Lab Results   Component Value Date    WBC 14.06 (H) 02/28/2024    HGB 12.1 02/28/2024    HCT 37.2 02/28/2024     (H) 02/28/2024    CHOL 181 02/27/2024    TRIG 146 02/27/2024    HDL 48 02/27/2024    ALT 66 (H) 02/28/2024    AST 76 (H) 02/28/2024      02/28/2024    K 4.8 02/28/2024     02/28/2024    CREATININE 0.7 02/28/2024    BUN 10 02/28/2024    CO2 22 (L) 02/28/2024    TSH 0.775 02/27/2024    INR 1.0 02/27/2024    HGBA1C 5.5 02/27/2024       Diagnostic Studies: No relevant studies.    EKG:   Results for orders placed or performed during the hospital encounter of 02/24/24   EKG 12-lead    Collection Time: 02/24/24  8:07 AM   Result Value Ref Range    QRS Duration 78 ms    [facility] QTC Calculation 430 ms    Narrative    Test Reason : R10.9,    Vent. Rate : 088 BPM     Atrial Rate : 088 BPM     P-R Int : 126 ms          QRS Dur : 078 ms      QT Int : 356 ms       P-R-T Axes : 068 -05 043 degrees     QTc Int : 430 ms    Normal sinus rhythm  Possible Left atrial enlargement  Borderline Abnormal ECG  When compared with ECG of 26-APR-2023 08:28,  Nonspecific T wave abnormality now evident in Anterior leads    Referred By: [email or web address]   SELF           Confirmed By:        2D ECHO:  TTE:  No results found for this or any previous visit.    CHANA:  No results found for this or any previous visit.    ASSESSMENT/PLAN:         Pre-op Assessment    I have reviewed the Patient Summary Reports.     I have reviewed the Nursing Notes. I have reviewed the NPO Status.   I have reviewed the Medications.     Review of Systems  Anesthesia Hx:  No problems with previous Anesthesia             Denies Family Hx of Anesthesia complications.    Denies Personal Hx of Anesthesia complications.                    Social:  Smoker       Hematology/Oncology:  Hematology Normal                                 Oncology Comments: Brain neoplasm     EENT/Dental:  EENT/Dental Normal           Cardiovascular:     Hypertension                                  Hypertension         Pulmonary:  Pulmonary Normal                       Hepatic/GI:  Hepatic/GI Normal                 Musculoskeletal:  Musculoskeletal Normal                Endocrine:  Endocrine Normal            Dermatological:  Skin Normal     Psych:  Psychiatric Normal                  Physical Exam  General: Cooperative, Alert, Oriented and Cachexia  Smelled heavily of tobacco  Airway:  Mallampati: III / II  Mouth Opening: Normal  TM Distance: Normal  Tongue: Normal  Neck ROM: Normal ROM    Dental:  Periodontal disease      Anesthesia Plan  Type of Anesthesia, risks & benefits discussed:    Anesthesia Type: Gen ETT  Intra-op Monitoring Plan: Standard ASA Monitors and Art Line  Post Op Pain Control Plan: multimodal analgesia and IV/PO Opioids PRN  Induction:  IV  Airway Plan: Direct, Post-Induction  Informed Consent: Informed consent signed with the Patient and all parties understand the risks and agree with anesthesia plan.  All questions answered.   ASA Score: 4  Day of Surgery Review of History & Physical: H&P Update referred to the surgeon/provider.  Anesthesia Plan Notes: Scalp blocks    Ready For Surgery From Anesthesia Perspective.     .

## 2024-02-28 NOTE — HOSPITAL COURSE
02/28/2024 Plan for OR tomorrow; multimodal pain control   02/29/2024 OR today   03/01/2024 C/o back pain/HA unrelieved by current PRN regimen. Increased frequency of oxycodone. Likely stepdown tomorrow per NSGY.   3/2/24: ok to step down to HM  3/3/24: awaiting step down bed  3/4/2024 ELAINEO, boarding for . Plan to taper steroids to off. Salt tabs started per NSGY for hyponatremia. Path pending. Heme onc following. Will likely need AC at some point for small PE asymptomatic. F/u with heme onc team
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
## 2024-02-28 NOTE — SUBJECTIVE & OBJECTIVE
Interval History 2/28 naeon, exam stable. Plan for OR tomorrow for craniotomy tumor resection. Preop labs, NPO midnight    Review of patient's allergies indicates:  No Known Allergies    Past Medical History:   Diagnosis Date    Arthritis      Past Surgical History:   Procedure Laterality Date    TUBAL LIGATION  2002     Family History    None       Tobacco Use    Smoking status: Every Day     Current packs/day: 0.50     Types: Cigarettes    Smokeless tobacco: Current    Tobacco comments:     3/4 PPD   Substance and Sexual Activity    Alcohol use: Not Currently     Comment: OCCASIONALLY    Drug use: Never    Sexual activity: Not Currently     Review of Systems  Objective:        There is no height or weight on file to calculate BMI.  Vital Signs (Most Recent):  Temp: 98.2 °F (36.8 °C) (02/28/24 0701)  Pulse: 91 (02/28/24 0701)  Resp: (!) 22 (02/28/24 0713)  BP: (!) 164/89 (02/28/24 0701)  SpO2: 95 % (02/28/24 0701) Vital Signs (24h Range):  Temp:  [97.7 °F (36.5 °C)-98.2 °F (36.8 °C)] 98.2 °F (36.8 °C)  Pulse:  [85-97] 91  Resp:  [10-23] 22  SpO2:  [93 %-97 %] 95 %  BP: (131-164)/(72-98) 164/89     Date 02/28/24 0700 - 02/29/24 0659   Shift 3944-0700 8300-0820 3874-3401 24 Hour Total   INTAKE   P.O. 60   60   Shift Total 60   60   OUTPUT   Urine 200   200   Shift Total 200   200   Weight (kg)                           Female External Urinary Catheter w/ Suction 02/25/24 2150 (Active)   Skin no redness;no breakdown 02/27/24 0701   Tolerance no signs/symptoms of discomfort 02/27/24 0701   Suction Continuous suction at 70 mmHg 02/27/24 0701   Date of last wick change 02/26/24 02/27/24 0701   Time of last wick change 0600 02/26/24 1901   Output (mL) 0 mL 02/27/24 0400          Physical Exam         Neurosurgery Physical Exam  General: well developed, well nourished, no distress.   Head: normocephalic, atraumatic  Neurologic: Alert and oriented. Thought content appropriate. Higher order confusion   GCS: Motor: 6/Verbal:  5/Eyes: 4 GCS Total: 15  Mental Status: Awake, Alert, Oriented x 4  Language: No aphasia  Speech: No dysarthria  Cranial nerves: face symmetric, tongue midline, CN II-XII grossly intact.   Eyes: pupils equal, round, reactive to light with accommodation, EOMI.   Pulmonary: normal respirations, no signs of respiratory distress  Abdomen: soft, non-distended, not tender to palpation  Skin: Skin is warm, dry and intact.  Sensory: intact to light touch throughout    Motor Strength:Moves all extremities spontaneously with good tone.  No abnormal movements seen.     Strength  Deltoids Triceps Biceps Wrist Extension Wrist Flexion Hand    Upper: R 4+/5 4+/5 4+/5 5/5 5/5 5/5    L 5/5 5/5 5/5 5/5 5/5 5/5     Iliopsoas Quadriceps Knee  Flexion Tibialis  anterior Gastro- cnemius EHL   Lower: R 4+/5 5/5 5/5 5/5 5/5 5/5    L 5/5 5/5 5/5 5/5 5/5 5/5   Pain limited weakness in right shoulder and right hip      Cerebellar:   Finger-to-nose: intact bilaterally   Pronator drift: absent bilaterally      Significant Labs:  Recent Labs   Lab 02/27/24  0346 02/28/24  0058   * 110   * 136   K 4.3 4.8   CL 99 101   CO2 25 22*   BUN 12 10   CREATININE 0.6 0.7   CALCIUM 9.5 10.0   MG 2.0 2.3       Recent Labs   Lab 02/27/24  0346 02/28/24  0058   WBC 12.30 14.06*   HGB 11.2* 12.1   HCT 33.7* 37.2   * 616*       Recent Labs   Lab 02/27/24  0707   INR 1.0   APTT 22.5       Microbiology Results (last 7 days)       ** No results found for the last 168 hours. **          All pertinent labs from the last 24 hours have been reviewed.    Significant Diagnostics:

## 2024-02-28 NOTE — HOSPITAL COURSE
2/28 naeon, exam stable. Plan for OR tomorrow for craniotomy tumor resection. Preop labs, NPO midnight  3/4: neurologically stable, pending step down, needs to further work with PT. Dex wean in process.

## 2024-02-28 NOTE — PT/OT/SLP PROGRESS
Occupational Therapy      Patient Name:  Crow Hines   MRN:  [id number]    Patient not seen today secondary to Pain. Pt in 10/10 pain upon OT attempt at 1004 and requesting for therapy to follow up with pt tomorrow. Will follow-up as appropriate.    2/28/2024

## 2024-02-29 ENCOUNTER — ANESTHESIA (OUTPATIENT)
Dept: SURGERY | Facility: HOSPITAL | Age: 64
End: 2024-02-29
Payer: MEDICAID

## 2024-02-29 LAB
ALBUMIN SERPL BCP-MCNC: 2.9 G/DL (ref 3.5–5.2)
ALP SERPL-CCNC: 375 U/L (ref 55–135)
ALT SERPL W/O P-5'-P-CCNC: 202 U/L (ref 10–44)
ANION GAP SERPL CALC-SCNC: 8 MMOL/L (ref 8–16)
ASCENDING AORTA: 2.44 CM
AST SERPL-CCNC: 187 U/L (ref 10–40)
AV INDEX (PROSTH): 0.91
AV MEAN GRADIENT: 3 MMHG
AV PEAK GRADIENT: 5 MMHG
AV VALVE AREA BY VELOCITY RATIO: 2.25 CM²
AV VALVE AREA: 2.85 CM²
AV VELOCITY RATIO: 0.72
BASOPHILS # BLD AUTO: 0.03 K/UL (ref 0–0.2)
BASOPHILS NFR BLD: 0.2 % (ref 0–1.9)
BILIRUB SERPL-MCNC: 0.3 MG/DL (ref 0.1–1)
BSA FOR ECHO PROCEDURE: 1.45 M2
BUN SERPL-MCNC: 15 MG/DL (ref 8–23)
CALCIUM SERPL-MCNC: 9.5 MG/DL (ref 8.7–10.5)
CHLORIDE SERPL-SCNC: 100 MMOL/L (ref 95–110)
CO2 SERPL-SCNC: 27 MMOL/L (ref 23–29)
CREAT SERPL-MCNC: 0.8 MG/DL (ref 0.5–1.4)
CV ECHO LV RWT: 0.72 CM
DIFFERENTIAL METHOD BLD: ABNORMAL
DOP CALC AO PEAK VEL: 1.16 M/S
DOP CALC AO VTI: 17.24 CM
DOP CALC LVOT AREA: 3.1 CM2
DOP CALC LVOT DIAMETER: 2 CM
DOP CALC LVOT PEAK VEL: 0.83 M/S
DOP CALC LVOT STROKE VOLUME: 49.17 CM3
DOP CALCLVOT PEAK VEL VTI: 15.66 CM
ECHO LV POSTERIOR WALL: 1.08 CM (ref 0.6–1.1)
EOSINOPHIL # BLD AUTO: 0 K/UL (ref 0–0.5)
EOSINOPHIL NFR BLD: 0 % (ref 0–8)
ERYTHROCYTE [DISTWIDTH] IN BLOOD BY AUTOMATED COUNT: 14 % (ref 11.5–14.5)
EST. GFR  (NO RACE VARIABLE): >60 ML/MIN/1.73 M^2
FRACTIONAL SHORTENING: 37 % (ref 28–44)
GLUCOSE SERPL-MCNC: 112 MG/DL (ref 70–110)
HCT VFR BLD AUTO: 37.3 % (ref 37–48.5)
HGB BLD-MCNC: 12.1 G/DL (ref 12–16)
IMM GRANULOCYTES # BLD AUTO: 0.09 K/UL (ref 0–0.04)
IMM GRANULOCYTES NFR BLD AUTO: 0.7 % (ref 0–0.5)
INTERVENTRICULAR SEPTUM: 0.76 CM (ref 0.6–1.1)
LA MAJOR: 4.8 CM
LA MINOR: 3.31 CM
LA WIDTH: 2.93 CM
LEFT ATRIUM SIZE: 2.88 CM
LEFT ATRIUM VOLUME INDEX: 19.1 ML/M2
LEFT ATRIUM VOLUME: 28.1 CM3
LEFT INTERNAL DIMENSION IN SYSTOLE: 1.89 CM (ref 2.1–4)
LEFT VENTRICLE DIASTOLIC VOLUME INDEX: 24.09 ML/M2
LEFT VENTRICLE DIASTOLIC VOLUME: 35.41 ML
LEFT VENTRICLE MASS INDEX: 50 G/M2
LEFT VENTRICLE SYSTOLIC VOLUME INDEX: 7.5 ML/M2
LEFT VENTRICLE SYSTOLIC VOLUME: 11.07 ML
LEFT VENTRICULAR INTERNAL DIMENSION IN DIASTOLE: 3.01 CM (ref 3.5–6)
LEFT VENTRICULAR MASS: 72.83 G
LYMPHOCYTES # BLD AUTO: 0.3 K/UL (ref 1–4.8)
LYMPHOCYTES NFR BLD: 2.6 % (ref 18–48)
MAGNESIUM SERPL-MCNC: 2.2 MG/DL (ref 1.6–2.6)
MCH RBC QN AUTO: 31.5 PG (ref 27–31)
MCHC RBC AUTO-ENTMCNC: 32.4 G/DL (ref 32–36)
MCV RBC AUTO: 97 FL (ref 82–98)
MONOCYTES # BLD AUTO: 0.9 K/UL (ref 0.3–1)
MONOCYTES NFR BLD: 6.6 % (ref 4–15)
NEUTROPHILS # BLD AUTO: 11.9 K/UL (ref 1.8–7.7)
NEUTROPHILS NFR BLD: 89.9 % (ref 38–73)
NRBC BLD-RTO: 0 /100 WBC
PHOSPHATE SERPL-MCNC: 3.5 MG/DL (ref 2.7–4.5)
PISA TR MAX VEL: 2.31 M/S
PLATELET # BLD AUTO: 604 K/UL (ref 150–450)
PMV BLD AUTO: 8.3 FL (ref 9.2–12.9)
POCT GLUCOSE: 151 MG/DL (ref 70–110)
POTASSIUM SERPL-SCNC: 4.3 MMOL/L (ref 3.5–5.1)
PROT SERPL-MCNC: 6.6 G/DL (ref 6–8.4)
RA MAJOR: 4.78 CM
RA PRESSURE ESTIMATED: 3 MMHG
RA WIDTH: 2.58 CM
RBC # BLD AUTO: 3.84 M/UL (ref 4–5.4)
RIGHT VENTRICULAR END-DIASTOLIC DIMENSION: 2.35 CM
RV TB RVSP: 5 MMHG
SINUS: 2.04 CM
SODIUM SERPL-SCNC: 135 MMOL/L (ref 136–145)
STJ: 2.26 CM
TDI LATERAL: 0.09 M/S
TDI SEPTAL: 0.11 M/S
TDI: 0.1 M/S
TR MAX PG: 21 MMHG
TRICUSPID ANNULAR PLANE SYSTOLIC EXCURSION: 1.93 CM
TV REST PULMONARY ARTERY PRESSURE: 24 MMHG
WBC # BLD AUTO: 13.25 K/UL (ref 3.9–12.7)
Z-SCORE OF LEFT VENTRICULAR DIMENSION IN END DIASTOLE: -3.8
Z-SCORE OF LEFT VENTRICULAR DIMENSION IN END SYSTOLE: -2.92

## 2024-02-29 PROCEDURE — 25500020 PHARM REV CODE 255: Performed by: PSYCHIATRY & NEUROLOGY

## 2024-02-29 PROCEDURE — 63600175 PHARM REV CODE 636 W HCPCS: Performed by: STUDENT IN AN ORGANIZED HEALTH CARE EDUCATION/TRAINING PROGRAM

## 2024-02-29 PROCEDURE — 36000713 HC OR TIME LEV V EA ADD 15 MIN: Performed by: NEUROLOGICAL SURGERY

## 2024-02-29 PROCEDURE — 64450 NJX AA&/STRD OTHER PN/BRANCH: CPT | Mod: 59,50,, | Performed by: ANESTHESIOLOGY

## 2024-02-29 PROCEDURE — 83735 ASSAY OF MAGNESIUM: CPT | Performed by: REGISTERED NURSE

## 2024-02-29 PROCEDURE — 86920 COMPATIBILITY TEST SPIN: CPT | Performed by: NEUROLOGICAL SURGERY

## 2024-02-29 PROCEDURE — 85025 COMPLETE CBC W/AUTO DIFF WBC: CPT | Performed by: REGISTERED NURSE

## 2024-02-29 PROCEDURE — 8E09XBZ COMPUTER ASSISTED PROCEDURE OF HEAD AND NECK REGION: ICD-10-PCS | Performed by: NEUROLOGICAL SURGERY

## 2024-02-29 PROCEDURE — 25000003 PHARM REV CODE 250: Performed by: PSYCHIATRY & NEUROLOGY

## 2024-02-29 PROCEDURE — 61510 CRNEC TREPH EXC BRN TUM STTL: CPT | Mod: ,,, | Performed by: NEUROLOGICAL SURGERY

## 2024-02-29 PROCEDURE — 27800903 OPTIME MED/SURG SUP & DEVICES OTHER IMPLANTS: Performed by: NEUROLOGICAL SURGERY

## 2024-02-29 PROCEDURE — 36000712 HC OR TIME LEV V 1ST 15 MIN: Performed by: NEUROLOGICAL SURGERY

## 2024-02-29 PROCEDURE — 80053 COMPREHEN METABOLIC PANEL: CPT | Performed by: REGISTERED NURSE

## 2024-02-29 PROCEDURE — 84100 ASSAY OF PHOSPHORUS: CPT | Performed by: REGISTERED NURSE

## 2024-02-29 PROCEDURE — 63600175 PHARM REV CODE 636 W HCPCS: Performed by: PHYSICIAN ASSISTANT

## 2024-02-29 PROCEDURE — 00B70ZZ EXCISION OF CEREBRAL HEMISPHERE, OPEN APPROACH: ICD-10-PCS | Performed by: NEUROLOGICAL SURGERY

## 2024-02-29 PROCEDURE — 63600175 PHARM REV CODE 636 W HCPCS: Performed by: ANESTHESIOLOGY

## 2024-02-29 PROCEDURE — 37000009 HC ANESTHESIA EA ADD 15 MINS: Performed by: NEUROLOGICAL SURGERY

## 2024-02-29 PROCEDURE — 20000000 HC ICU ROOM

## 2024-02-29 PROCEDURE — 99233 SBSQ HOSP IP/OBS HIGH 50: CPT | Mod: FS,,, | Performed by: PSYCHIATRY & NEUROLOGY

## 2024-02-29 PROCEDURE — D9220A PRA ANESTHESIA: Mod: ,,, | Performed by: ANESTHESIOLOGY

## 2024-02-29 PROCEDURE — 99499 UNLISTED E&M SERVICE: CPT | Mod: ,,, | Performed by: PHYSICIAN ASSISTANT

## 2024-02-29 PROCEDURE — 25000003 PHARM REV CODE 250: Performed by: STUDENT IN AN ORGANIZED HEALTH CARE EDUCATION/TRAINING PROGRAM

## 2024-02-29 PROCEDURE — 25000003 PHARM REV CODE 250: Performed by: NEUROLOGICAL SURGERY

## 2024-02-29 PROCEDURE — 00B20ZZ EXCISION OF DURA MATER, OPEN APPROACH: ICD-10-PCS | Performed by: NEUROLOGICAL SURGERY

## 2024-02-29 PROCEDURE — C1713 ANCHOR/SCREW BN/BN,TIS/BN: HCPCS | Performed by: NEUROLOGICAL SURGERY

## 2024-02-29 PROCEDURE — 61781 SCAN PROC CRANIAL INTRA: CPT | Mod: ,,, | Performed by: NEUROLOGICAL SURGERY

## 2024-02-29 PROCEDURE — 36620 INSERTION CATHETER ARTERY: CPT | Mod: 59,,, | Performed by: ANESTHESIOLOGY

## 2024-02-29 PROCEDURE — 63600175 PHARM REV CODE 636 W HCPCS: Performed by: REGISTERED NURSE

## 2024-02-29 PROCEDURE — 94761 N-INVAS EAR/PLS OXIMETRY MLT: CPT

## 2024-02-29 PROCEDURE — 25000003 PHARM REV CODE 250

## 2024-02-29 PROCEDURE — 37000008 HC ANESTHESIA 1ST 15 MINUTES: Performed by: NEUROLOGICAL SURGERY

## 2024-02-29 PROCEDURE — 64400 NJX AA&/STRD TRIGEMINAL NRV: CPT | Mod: 59,50,, | Performed by: ANESTHESIOLOGY

## 2024-02-29 PROCEDURE — 27201423 OPTIME MED/SURG SUP & DEVICES STERILE SUPPLY: Performed by: NEUROLOGICAL SURGERY

## 2024-02-29 PROCEDURE — 25000003 PHARM REV CODE 250: Performed by: REGISTERED NURSE

## 2024-02-29 PROCEDURE — 63600175 PHARM REV CODE 636 W HCPCS: Performed by: NEUROLOGICAL SURGERY

## 2024-02-29 PROCEDURE — 64405 NJX AA&/STRD GR OCPL NRV: CPT | Mod: 59,50,, | Performed by: ANESTHESIOLOGY

## 2024-02-29 DEVICE — PLATE BONE 6 HOLE DBL Y SHAPE: Type: IMPLANTABLE DEVICE | Site: CRANIAL | Status: FUNCTIONAL

## 2024-02-29 DEVICE — SCREW UN3 AXS SD 1.5X4MM: Type: IMPLANTABLE DEVICE | Site: CRANIAL | Status: FUNCTIONAL

## 2024-02-29 DEVICE — PLATE BONE BUR HOLE COVER 20MM: Type: IMPLANTABLE DEVICE | Site: CRANIAL | Status: FUNCTIONAL

## 2024-02-29 DEVICE — PLATE BONE BUR HOLE COVER 10MM: Type: IMPLANTABLE DEVICE | Site: CRANIAL | Status: FUNCTIONAL

## 2024-02-29 DEVICE — COLLAGEN DURA SUBSTITUTE MEMBRANE 2IN X 2IN (5CM X 5CM)
Type: IMPLANTABLE DEVICE | Site: CRANIAL | Status: FUNCTIONAL
Brand: DURAMATRIX

## 2024-02-29 RX ORDER — OXYCODONE HYDROCHLORIDE 5 MG/1
5 TABLET ORAL EVERY 4 HOURS PRN
Status: DISCONTINUED | OUTPATIENT
Start: 2024-02-29 | End: 2024-02-29

## 2024-02-29 RX ORDER — BACITRACIN ZINC 500 UNIT/G
OINTMENT (GRAM) TOPICAL
Status: DISCONTINUED | OUTPATIENT
Start: 2024-02-29 | End: 2024-02-29 | Stop reason: HOSPADM

## 2024-02-29 RX ORDER — HYDROMORPHONE HYDROCHLORIDE 1 MG/ML
1 INJECTION, SOLUTION INTRAMUSCULAR; INTRAVENOUS; SUBCUTANEOUS EVERY 4 HOURS PRN
Status: DISCONTINUED | OUTPATIENT
Start: 2024-02-29 | End: 2024-03-17 | Stop reason: HOSPADM

## 2024-02-29 RX ORDER — LEVETIRACETAM 500 MG/5ML
INJECTION, SOLUTION, CONCENTRATE INTRAVENOUS
Status: DISCONTINUED | OUTPATIENT
Start: 2024-02-29 | End: 2024-02-29

## 2024-02-29 RX ORDER — NICARDIPINE HYDROCHLORIDE 0.2 MG/ML
0-15 INJECTION INTRAVENOUS CONTINUOUS
Status: DISCONTINUED | OUTPATIENT
Start: 2024-02-29 | End: 2024-03-01

## 2024-02-29 RX ORDER — ROCURONIUM BROMIDE 10 MG/ML
INJECTION, SOLUTION INTRAVENOUS
Status: DISCONTINUED | OUTPATIENT
Start: 2024-02-29 | End: 2024-02-29

## 2024-02-29 RX ORDER — METHOCARBAMOL 500 MG/1
500 TABLET, FILM COATED ORAL 3 TIMES DAILY PRN
Status: DISCONTINUED | OUTPATIENT
Start: 2024-02-29 | End: 2024-03-17 | Stop reason: HOSPADM

## 2024-02-29 RX ORDER — MORPHINE SULFATE 2 MG/ML
1 INJECTION, SOLUTION INTRAMUSCULAR; INTRAVENOUS EVERY 4 HOURS PRN
Status: DISCONTINUED | OUTPATIENT
Start: 2024-02-29 | End: 2024-02-29

## 2024-02-29 RX ORDER — DEXAMETHASONE SODIUM PHOSPHATE 4 MG/ML
INJECTION, SOLUTION INTRA-ARTICULAR; INTRALESIONAL; INTRAMUSCULAR; INTRAVENOUS; SOFT TISSUE
Status: DISCONTINUED | OUTPATIENT
Start: 2024-02-29 | End: 2024-02-29

## 2024-02-29 RX ORDER — ACETAMINOPHEN 10 MG/ML
INJECTION, SOLUTION INTRAVENOUS
Status: DISCONTINUED | OUTPATIENT
Start: 2024-02-29 | End: 2024-02-29

## 2024-02-29 RX ORDER — LIDOCAINE HYDROCHLORIDE AND EPINEPHRINE 10; 10 MG/ML; UG/ML
INJECTION, SOLUTION INFILTRATION; PERINEURAL
Status: DISCONTINUED | OUTPATIENT
Start: 2024-02-29 | End: 2024-02-29 | Stop reason: HOSPADM

## 2024-02-29 RX ORDER — PHENYLEPHRINE HCL IN 0.9% NACL 1 MG/10 ML
SYRINGE (ML) INTRAVENOUS
Status: DISCONTINUED | OUTPATIENT
Start: 2024-02-29 | End: 2024-02-29

## 2024-02-29 RX ORDER — ROPIVACAINE HYDROCHLORIDE 5 MG/ML
INJECTION, SOLUTION EPIDURAL; INFILTRATION; PERINEURAL
Status: DISCONTINUED | OUTPATIENT
Start: 2024-02-29 | End: 2024-02-29

## 2024-02-29 RX ORDER — ENOXAPARIN SODIUM 100 MG/ML
30 INJECTION SUBCUTANEOUS EVERY 24 HOURS
Status: DISCONTINUED | OUTPATIENT
Start: 2024-03-01 | End: 2024-03-05

## 2024-02-29 RX ORDER — FENTANYL CITRATE 50 UG/ML
INJECTION, SOLUTION INTRAMUSCULAR; INTRAVENOUS
Status: DISCONTINUED | OUTPATIENT
Start: 2024-02-29 | End: 2024-02-29

## 2024-02-29 RX ORDER — MIDAZOLAM HYDROCHLORIDE 1 MG/ML
INJECTION INTRAMUSCULAR; INTRAVENOUS
Status: DISCONTINUED | OUTPATIENT
Start: 2024-02-29 | End: 2024-02-29

## 2024-02-29 RX ORDER — LIDOCAINE HYDROCHLORIDE 10 MG/ML
INJECTION INFILTRATION; PERINEURAL
Status: DISCONTINUED | OUTPATIENT
Start: 2024-02-29 | End: 2024-02-29

## 2024-02-29 RX ORDER — METOPROLOL TARTRATE 1 MG/ML
INJECTION, SOLUTION INTRAVENOUS
Status: DISCONTINUED | OUTPATIENT
Start: 2024-02-29 | End: 2024-02-29

## 2024-02-29 RX ORDER — PROPOFOL 10 MG/ML
VIAL (ML) INTRAVENOUS
Status: DISCONTINUED | OUTPATIENT
Start: 2024-02-29 | End: 2024-02-29

## 2024-02-29 RX ORDER — GENTAMICIN 40 MG/ML
INJECTION, SOLUTION INTRAMUSCULAR; INTRAVENOUS
Status: DISCONTINUED | OUTPATIENT
Start: 2024-02-29 | End: 2024-02-29 | Stop reason: HOSPADM

## 2024-02-29 RX ORDER — ONDANSETRON HYDROCHLORIDE 2 MG/ML
INJECTION, SOLUTION INTRAVENOUS
Status: DISCONTINUED | OUTPATIENT
Start: 2024-02-29 | End: 2024-02-29

## 2024-02-29 RX ADMIN — FENTANYL CITRATE 50 MCG: 50 INJECTION INTRAMUSCULAR; INTRAVENOUS at 12:02

## 2024-02-29 RX ADMIN — DEXAMETHASONE SODIUM PHOSPHATE 4 MG: 4 INJECTION INTRA-ARTICULAR; INTRALESIONAL; INTRAMUSCULAR; INTRAVENOUS; SOFT TISSUE at 05:02

## 2024-02-29 RX ADMIN — Medication 1 G: at 09:02

## 2024-02-29 RX ADMIN — ACETAMINOPHEN 1000 MG: 10 INJECTION, SOLUTION INTRAVENOUS at 10:02

## 2024-02-29 RX ADMIN — FENTANYL CITRATE 50 MCG: 50 INJECTION INTRAMUSCULAR; INTRAVENOUS at 09:02

## 2024-02-29 RX ADMIN — OXYCODONE 10 MG: 5 TABLET ORAL at 06:02

## 2024-02-29 RX ADMIN — PROPOFOL 100 MG: 10 INJECTION, EMULSION INTRAVENOUS at 09:02

## 2024-02-29 RX ADMIN — ONDANSETRON 4 MG: 2 INJECTION INTRAMUSCULAR; INTRAVENOUS at 03:02

## 2024-02-29 RX ADMIN — DEXAMETHASONE SODIUM PHOSPHATE 8 MG: 4 INJECTION, SOLUTION INTRAMUSCULAR; INTRAVENOUS at 09:02

## 2024-02-29 RX ADMIN — METOPROLOL TARTRATE 2.5 MG: 5 INJECTION, SOLUTION INTRAVENOUS at 10:02

## 2024-02-29 RX ADMIN — Medication 100 MCG: at 10:02

## 2024-02-29 RX ADMIN — DEXAMETHASONE SODIUM PHOSPHATE 4 MG: 4 INJECTION INTRA-ARTICULAR; INTRALESIONAL; INTRAMUSCULAR; INTRAVENOUS; SOFT TISSUE at 06:02

## 2024-02-29 RX ADMIN — LEVETIRACETAM 500 MG: 500 TABLET, FILM COATED ORAL at 09:02

## 2024-02-29 RX ADMIN — SODIUM CHLORIDE 0.2 MCG/KG/MIN: 9 INJECTION, SOLUTION INTRAVENOUS at 09:02

## 2024-02-29 RX ADMIN — PROPOFOL 20 MG: 10 INJECTION, EMULSION INTRAVENOUS at 10:02

## 2024-02-29 RX ADMIN — MIDAZOLAM 1 MG: 1 INJECTION INTRAMUSCULAR; INTRAVENOUS at 09:02

## 2024-02-29 RX ADMIN — Medication 100 MCG: at 11:02

## 2024-02-29 RX ADMIN — SUGAMMADEX 100 MG: 100 INJECTION, SOLUTION INTRAVENOUS at 11:02

## 2024-02-29 RX ADMIN — METHOCARBAMOL 500 MG: 500 TABLET ORAL at 09:02

## 2024-02-29 RX ADMIN — SODIUM CHLORIDE: 0.9 INJECTION, SOLUTION INTRAVENOUS at 09:02

## 2024-02-29 RX ADMIN — FENTANYL CITRATE 50 MCG: 50 INJECTION INTRAMUSCULAR; INTRAVENOUS at 10:02

## 2024-02-29 RX ADMIN — ACETAMINOPHEN 650 MG: 325 TABLET ORAL at 04:02

## 2024-02-29 RX ADMIN — NICARDIPINE HYDROCHLORIDE 7.5 MG/HR: 0.2 INJECTION, SOLUTION INTRAVENOUS at 01:02

## 2024-02-29 RX ADMIN — NICARDIPINE HYDROCHLORIDE 5 MG/HR: 0.2 INJECTION, SOLUTION INTRAVENOUS at 03:02

## 2024-02-29 RX ADMIN — ONDANSETRON 4 MG: 2 INJECTION INTRAMUSCULAR; INTRAVENOUS at 11:02

## 2024-02-29 RX ADMIN — OXYCODONE 10 MG: 5 TABLET ORAL at 01:02

## 2024-02-29 RX ADMIN — ROPIVACAINE HYDROCHLORIDE 40 ML: 5 INJECTION EPIDURAL; INFILTRATION; PERINEURAL at 09:02

## 2024-02-29 RX ADMIN — IOHEXOL 100 ML: 350 INJECTION, SOLUTION INTRAVENOUS at 02:02

## 2024-02-29 RX ADMIN — DEXTROSE MONOHYDRATE 1 G: 2.5 INJECTION INTRAVENOUS at 04:02

## 2024-02-29 RX ADMIN — MUPIROCIN: 20 OINTMENT TOPICAL at 09:02

## 2024-02-29 RX ADMIN — HYDROMORPHONE HYDROCHLORIDE 1 MG: 1 INJECTION, SOLUTION INTRAMUSCULAR; INTRAVENOUS; SUBCUTANEOUS at 07:02

## 2024-02-29 RX ADMIN — LIDOCAINE HYDROCHLORIDE 50 MG: 10 INJECTION, SOLUTION INFILTRATION; PERINEURAL at 09:02

## 2024-02-29 RX ADMIN — NICARDIPINE HYDROCHLORIDE 2.5 MG/HR: 0.2 INJECTION, SOLUTION INTRAVENOUS at 12:02

## 2024-02-29 RX ADMIN — MORPHINE SULFATE 2 MG: 2 INJECTION, SOLUTION INTRAMUSCULAR; INTRAVENOUS at 05:02

## 2024-02-29 RX ADMIN — FAMOTIDINE 20 MG: 20 TABLET ORAL at 09:02

## 2024-02-29 RX ADMIN — OXYCODONE 10 MG: 5 TABLET ORAL at 09:02

## 2024-02-29 RX ADMIN — OXYCODONE 10 MG: 5 TABLET ORAL at 12:02

## 2024-02-29 RX ADMIN — PROPOFOL 125 MCG/KG/MIN: 10 INJECTION, EMULSION INTRAVENOUS at 09:02

## 2024-02-29 RX ADMIN — SODIUM CHLORIDE, SODIUM GLUCONATE, SODIUM ACETATE, POTASSIUM CHLORIDE, MAGNESIUM CHLORIDE, SODIUM PHOSPHATE, DIBASIC, AND POTASSIUM PHOSPHATE: .53; .5; .37; .037; .03; .012; .00082 INJECTION, SOLUTION INTRAVENOUS at 09:02

## 2024-02-29 RX ADMIN — DEXAMETHASONE SODIUM PHOSPHATE 4 MG: 4 INJECTION INTRA-ARTICULAR; INTRALESIONAL; INTRAMUSCULAR; INTRAVENOUS; SOFT TISSUE at 12:02

## 2024-02-29 RX ADMIN — ROCURONIUM BROMIDE 50 MG: 10 SOLUTION INTRAVENOUS at 09:02

## 2024-02-29 RX ADMIN — LEVETIRACETAM 1000 MG: 100 INJECTION, SOLUTION INTRAVENOUS at 10:02

## 2024-02-29 RX ADMIN — SODIUM CHLORIDE 0.5 MCG/KG/MIN: 9 INJECTION, SOLUTION INTRAVENOUS at 11:02

## 2024-02-29 RX ADMIN — MORPHINE SULFATE 2 MG: 2 INJECTION, SOLUTION INTRAMUSCULAR; INTRAVENOUS at 03:02

## 2024-02-29 RX ADMIN — MORPHINE SULFATE 2 MG: 2 INJECTION, SOLUTION INTRAMUSCULAR; INTRAVENOUS at 01:02

## 2024-02-29 RX ADMIN — Medication 50 MCG: at 11:02

## 2024-02-29 NOTE — NURSING
"[facility] Care Plan    POC reviewed with Crow Hines and family at 1500. Pt verbalized understanding. Questions and concerns addressed. No acute events today. Pt progressing toward goals. Will continue to monitor. See below and flowsheets for full assessment and VS info.     - Crani for tumor resection today  - post op CTH completed  - ECHO completed  - cardene gtt infusing  - pain medication given around the clock        Is this a stroke patient? no    Neuro:  Westley Coma Scale  Best Eye Response: 3-->(E3) to speech  Best Motor Response: 6-->(M6) obeys commands  Best Verbal Response: 4-->(V4) confused  Westley Coma Scale Score: 13  Assessment Qualifiers: patient chemically sedated or paralyzed  Pupil PERRLA: yes     24 hr Temp:  [98 °F (36.7 °C)-98.3 °F (36.8 °C)]     CV:   Rhythm: sinus tachycardia  BP goals:   SBP < 140  MAP > 65    Resp:           Plan: N/A    GI/:     Diet/Nutrition Received: regular  Last Bowel Movement: 02/26/24  Voiding Characteristics: urethral catheter (bladder)    Intake/Output Summary (Last 24 hours) at 2/29/2024 1658  Last data filed at 2/29/2024 1605  Gross per 24 hour   Intake 1368.13 ml   Output 450 ml   Net 918.13 ml          Labs/Accuchecks:  Recent Labs   Lab 02/29/24  0255   WBC 13.25*   RBC 3.84*   HGB 12.1   HCT 37.3   *      Recent Labs   Lab 02/29/24  0255   *   K 4.3   CO2 27      BUN 15   CREATININE 0.8   ALKPHOS 375*   *   *   BILITOT 0.3      Recent Labs   Lab 02/27/24  0707   INR 1.0   APTT 22.5    No results for input(s): "CPK", "CPKMB", "TROPONINI", "MB" in the last 168 hours.    Electrolytes: N/A - electrolytes WDL  Accuchecks: none    Gtts:   nicardipine 5 mg/hr (02/29/24 1605)       LDA/Wounds:      Nurses Note -- 4 Eyes      Is there altered skin present? no     Prevention Measures Documented          WCTM    "
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
## 2024-02-29 NOTE — ANESTHESIA POSTPROCEDURE EVALUATION
Anesthesia Post Evaluation    Patient: Crow Hines    Procedure(s) Performed: Procedure(s) (LRB):  CRANIOTOMY, WITH NEOPLASM EXCISION USING COMPUTER-ASSISTED NAVIGATION (Left)    Final Anesthesia Type: general      Patient location during evaluation: ICU  Patient participation: Yes- Able to Participate  Level of consciousness: awake (return to slighly confused baseline)  Post-procedure vital signs: reviewed and stable  Pain management: adequate  Airway patency: patent    PONV status at discharge: No PONV  Anesthetic complications: no      Cardiovascular status: blood pressure returned to baseline  Respiratory status: unassisted  Hydration status: euvolemic  Follow-up not needed.          Vitals Value Taken Time   /75 02/29/24 1311   Temp 36.8 °C (98.2 °F) 02/29/24 1311   Pulse 116 02/29/24 1334   Resp 11 02/29/24 1333   SpO2 91 % 02/29/24 1333   Vitals shown include unvalidated device data.      No case tracking events are documented in the log.      Pain/Anthony Score: Pain Rating Prior to Med Admin: 7 (2/29/2024  1:18 PM)  Pain Rating Post Med Admin: 3 (2/29/2024  1:07 AM)

## 2024-02-29 NOTE — PLAN OF CARE
[facility]  Discharge Reassessment    Primary Care Provider: Chika Powell MD    Expected Discharge Date: 3/6/2024    Per MD: patient to OR today for crani tumor resection    Patient not medically ready for discharge.     Reassessment (most recent)       Discharge Reassessment - 02/29/24 1244          Discharge Reassessment    Assessment Type Discharge Planning Assessment     Did the patient's condition or plan change since previous assessment? No     Communicated JUAN with patient/caregiver Date not available/Unable to determine     Discharge Plan A Home     Discharge Plan B Hospice/home     DME Needed Upon Discharge  other (see comments)   tbd    Transition of Care Barriers Unisured;No family/friends to help     Why the patient remains in the hospital Requires continued medical care                   Discharge Plan A and Plan B have been determined by review of patient's clinical status, future medical and therapeutic needs, and coverage/benefits for post-acute care in coordination with multidisciplinary team members.      Ene Blunt RN, CCRN-K, [facility]  Neuro-Critical Care   [phone]

## 2024-02-29 NOTE — ANESTHESIA PROCEDURE NOTES
Peripheral Block    Patient location during procedure: OR   Block not for primary anesthetic.  Reason for block: at surgeon's request and post-op pain management   Post-op Pain Location: bilateral scalp   Start time: 2/29/2024 9:41 AM  Timeout: 2/29/2024 9:40 AM   End time: 2/29/2024 9:50 AM    Staffing  Authorizing Provider: Haroon Richardson MD  Performing Provider: Haroon Richardson MD    Staffing  Other anesthesia staff: Kvng Briones DO  Performed by: Haroon Richardson MD  Authorized by: Haroon Richardson MD    Preanesthetic Checklist  Completed: patient identified, IV checked, site marked, risks and benefits discussed, surgical consent, monitors and equipment checked, pre-op evaluation and timeout performed  Peripheral Block  Patient position: supine  Prep: ChloraPrep  Patient monitoring: heart rate, cardiac monitor, continuous pulse ox, continuous capnometry and frequent blood pressure checks  Block type: auriculotemporal, greater occipital, lesser occipital, zygomaticotemporal, supratrochlear and supraorbital  Laterality: bilateral  Injection technique: single shot  Needle  Needle type: Quincke   Needle gauge: 21 G  Needle length: 1.5 in  Needle localization: anatomical landmarks  Needle insertion depth: 4 cm     Assessment  Injection assessment: negative aspiration  Heart rate change: yes  Slow fractionated injection: yes    Medications:    Medications: ropivacaine (NAROPIN) injection 0.5% - Perineural, Other   40 mL - 2/29/2024 9:42:00 AM    Additional Notes  With epi 1:300K

## 2024-02-29 NOTE — ASSESSMENT & PLAN NOTE
-CT abd/pelvis w/ numerous metastatic masses in the liver, likely metastatic deposit in the spleen; bilateral adrenal masses, hypoattenuating and mildly enhancing mass of upper pole L kidney, intra-abdominal enlarged lymph nodes consistent w/ metastatic infiltration.   -Also w/ masses to R lung base w/ consolidation of visualized R renal lower lobe; R lower lobe 9mm nodule  - L2 vertebra compression fracture w/ mild sclerosis of the vertebra, likely reflecting a pathologic fracture; faint sclerosis of the superior endplate of S1 likely reflects metastatic infiltration  -Heme/Onc consulted  -Pain control  -OR today for mass resection with NSGY

## 2024-02-29 NOTE — BRIEF OP NOTE
[facility]  Brief Operative Note  Neurosurgery    SUMMARY     Surgery Date: 2/29/2024     Surgeon(s) and Role:     * Harriett Szymanski, DO - Primary     * Saji Luna MD - Resident - Assisting        Pre-op Diagnosis:  Neoplasm of brain causing mass effect on adjacent structures [D49.6]    Post-op Diagnosis: Post-Op Diagnosis Codes:     * Neoplasm of brain causing mass effect on adjacent structures [D49.6]    Procedure Performed:     Procedure(s) (LRB):  CRANIOTOMY, WITH NEOPLASM EXCISION USING COMPUTER-ASSISTED NAVIGATION (Left)    Technical Procedures Used:   Left temporal craniotomy for resection of dural based intra-cranial mass; dural matrix saturable for closure; frozen: carcinoma    Operative Findings: see full op note    Estimated Blood Loss: 100cc         Specimens:   Specimen (24h ago, onward)       Start     Ordered    02/29/24 1145  Specimen to Pathology, Surgery Neurosurgery  Once        Comments: Pre-op Diagnosis: Neoplasm of brain causing mass effect on adjacent structures [D49.6]Procedure(s):CRANIOTOMY, WITH NEOPLASM EXCISION USING COMPUTER-ASSISTED NAVIGATION Number of specimens: 2Name of specimens: 1. LEFT TEMPORAL TUMOR - Frozen (sent to lab)2. LEFT TEMPORAL TUMOR - Permanent     References:    Click here for ordering Quick Tip   Question Answer Comment   Procedure Type: Neurosurgery    Which provider would you like to cc? HARRIETT SZYMANSKI        02/29/24 1145

## 2024-02-29 NOTE — TRANSFER OF CARE
"Anesthesia Transfer of Care Note    Patient: Crow Hines    Procedure(s) Performed: Procedure(s) (LRB):  CRANIOTOMY, WITH NEOPLASM EXCISION USING COMPUTER-ASSISTED NAVIGATION (Left)    Patient location: ICU    Anesthesia Type: general    Transport from OR: Transported from OR on 6-10 L/min O2 by face mask with adequate spontaneous ventilation    Post pain: adequate analgesia    Post assessment: tolerated procedure well and no apparent anesthetic complications    Post vital signs: stable    Level of consciousness: awake    Nausea/Vomiting: no nausea/vomiting    Complications: none    Transfer of care protocol was followed    Last vitals: Visit Vitals  BP (!) 151/79 (BP Location: Right arm, Patient Position: Lying)   Pulse 101   Temp 36.7 °C (98 °F) (Oral)   Resp 13   Ht 5' 3" (1.6 m)   Wt 47.6 kg (105 lb)   SpO2 95%   BMI 18.60 kg/m²     "

## 2024-02-29 NOTE — ANESTHESIA PROCEDURE NOTES
Intubation    Date/Time: 2/29/2024 9:42 AM    Performed by: Kvng Briones DO  Authorized by: Haroon Richardson MD    Intubation:     Induction:  Intravenous    Intubated:  Postinduction    Mask Ventilation:  Easy mask    Attempts:  1    Attempted By:  Resident anesthesiologist    Method of Intubation:  Direct    Blade:  Hooker 2    Laryngeal View Grade: Grade I - full view of cords      Difficult Airway Encountered?: No      Complications:  None    Airway Device:  Oral endotracheal tube    Airway Device Size:  7.0    Style/Cuff Inflation:  Cuffed (inflated to minimal occlusive pressure)    Placement Verified By:  Capnometry    Complicating Factors:  None    Findings Post-Intubation:  BS equal bilateral and atraumatic/condition of teeth unchanged

## 2024-02-29 NOTE — PT/OT/SLP PROGRESS
Physical Therapy      Patient Name:  Crow Hines   MRN:  [id number]    Patient not seen today secondary to  (DAYA for crani, new orders received. Will f/u tomorrow for re-eval as appropriate.).

## 2024-02-29 NOTE — ANESTHESIA PROCEDURE NOTES
Arterial    Diagnosis: Neoplasm of brain    Patient location during procedure: done in OR    Staffing  Authorizing Provider: Haroon Richardson MD  Performing Provider: Kvng Briones DO    Staffing  Performed by: Kvng Briones DO  Authorized by: Haroon Richardson MD    Anesthesiologist was present at the time of the procedure.    Preanesthetic Checklist  Completed: patient identified, IV checked, site marked, risks and benefits discussed, surgical consent, monitors and equipment checked, pre-op evaluation, timeout performed and anesthesia consent givenArterial  Skin Prep: chlorhexidine gluconate  Orientation: right  Location: radial    Catheter placement by Anatomical landmarks and Ultrasound guidance. Heme positive aspiration all ports.   Vessel Caliber: medium, patent  Needle advanced into vessel with real time Ultrasound guidance.Insertion Attempts: 2  Assessment  Dressing: secured with tape and tegaderm  Patient: Tolerated well

## 2024-02-29 NOTE — ASSESSMENT & PLAN NOTE
-Noted on imaging  -Likely pathologic  -NSGY consulted  -Heme/Onc consulted  -discuss need for brace when OOB

## 2024-02-29 NOTE — PLAN OF CARE
"[facility] Care Plan    POC reviewed with Crow Hines and family at 0300. Pt verbalized understanding. Questions and concerns addressed. No acute events overnight. Pt progressing toward goals. Will continue to monitor. See below and flowsheets for full assessment and VS info.     NPO since midnight for surgery in morning  Round the clock pain meds given   CHG bath given   Linen change       Is this a stroke patient? no    Neuro:  [place] Coma Scale  Best Eye Response: 4-->(E4) spontaneous  Best Motor Response: 6-->(M6) obeys commands  Best Verbal Response: 5-->(V5) oriented  [place] Coma Scale Score: 15  Assessment Qualifiers: patient not sedated/intubated, no eye obstruction present  Pupil PERRLA: yes     24hr Temp:  [97.4 °F (36.3 °C)-98.3 °F (36.8 °C)]     CV:   Rhythm: sinus tachycardia  BP goals:   SBP < 160  MAP > 65    Resp:           Plan: N/A    GI/:     Diet/Nutrition Received: regular  Last Bowel Movement: 02/26/24  Voiding Characteristics: external catheter    Intake/Output Summary (Last 24 hours) at 2/29/2024 0424  Last data filed at 2/28/2024 2156  Gross per 24 hour   Intake 250 ml   Output 1400 ml   Net -1150 ml          Labs/Accuchecks:  Recent Labs   Lab 02/29/24  0255   WBC 13.25*   RBC 3.84*   HGB 12.1   HCT 37.3   *      Recent Labs   Lab 02/29/24  0255   *   K 4.3   CO2 27      BUN 15   CREATININE 0.8   ALKPHOS 375*   *   *   BILITOT 0.3      Recent Labs   Lab 02/27/24  0707   INR 1.0   APTT 22.5    No results for input(s): "CPK", "CPKMB", "TROPONINI", "MB" in the last 168 hours.    Electrolytes: N/A - electrolytes WDL  Accuchecks: none    Gtts:      LDA/Wounds:    Nurses Note -- 4 Eyes    Is there altered skin present? no     Prevention Measures Documented    Second RN/Staff Member:  SYD Gonzales   Problem: Adult Inpatient Plan of Care  Goal: Patient-Specific Goal (Individualized)  Outcome: Ongoing, Progressing  Goal: Optimal Comfort and Wellbeing  Outcome: " Ongoing, Progressing     Problem: Infection  Goal: Absence of Infection Signs and Symptoms  Outcome: Ongoing, Progressing

## 2024-02-29 NOTE — PT/OT/SLP DISCHARGE
Occupational Therapy Discharge Summary    Crow Hines  MRN: [id number]   Principal Problem: Brain mass      Patient Discharged from acute Occupational Therapy on 2/29.  Please refer to prior OT note dated 2/27 for functional status.    Assessment:       Pt in OR for craniotomy - will need new orders postop.    Objective:     GOALS:   Multidisciplinary Problems       Occupational Therapy Goals          Problem: Occupational Therapy    Goal Priority Disciplines Outcome Interventions   Occupational Therapy Goal     OT, PT/OT Ongoing, Progressing    Description: Goals to be met by: 3/27/24     Patient will increase functional independence with ADLs by performing:    UE Dressing with Minimal Assistance.  LE Dressing with Maximum Assistance.  Grooming while EOB with Moderate Assistance.  Toileting from bedside commode with Maximum Assistance for hygiene and clothing management.   Rolling to Bilateral with Moderate Assistance.   Supine to sit with Contact Guard Assistance.  Stand pivot transfers with Maximum Assistance.  Step transfer with Maximum Assistance  [place] [facility] [place].                         Reasons for Discontinuation of Therapy Services  Surgery       Plan:     Patient Discharged to:  [facility]    2/29/2024

## 2024-02-29 NOTE — PT/OT/SLP PROGRESS
Speech Language Pathology/Discontinuation of orders      Crow Hines  MRN: [id number]    Patient not seen today secondary to surgery (pt undergoing craniotomy for tumor resection. Will need new orders to resume SLP services post surgery.).

## 2024-03-01 LAB
ALBUMIN SERPL BCP-MCNC: 2.7 G/DL (ref 3.5–5.2)
ALP SERPL-CCNC: 299 U/L (ref 55–135)
ALT SERPL W/O P-5'-P-CCNC: 148 U/L (ref 10–44)
ANION GAP SERPL CALC-SCNC: 11 MMOL/L (ref 8–16)
AST SERPL-CCNC: 115 U/L (ref 10–40)
BASOPHILS # BLD AUTO: 0.01 K/UL (ref 0–0.2)
BASOPHILS NFR BLD: 0.1 % (ref 0–1.9)
BILIRUB SERPL-MCNC: 0.3 MG/DL (ref 0.1–1)
BUN SERPL-MCNC: 14 MG/DL (ref 8–23)
CALCIUM SERPL-MCNC: 9.3 MG/DL (ref 8.7–10.5)
CHLORIDE SERPL-SCNC: 100 MMOL/L (ref 95–110)
CO2 SERPL-SCNC: 23 MMOL/L (ref 23–29)
CREAT SERPL-MCNC: 0.7 MG/DL (ref 0.5–1.4)
DIFFERENTIAL METHOD BLD: ABNORMAL
EOSINOPHIL # BLD AUTO: 0 K/UL (ref 0–0.5)
EOSINOPHIL NFR BLD: 0.1 % (ref 0–8)
ERYTHROCYTE [DISTWIDTH] IN BLOOD BY AUTOMATED COUNT: 14 % (ref 11.5–14.5)
EST. GFR  (NO RACE VARIABLE): >60 ML/MIN/1.73 M^2
GLUCOSE SERPL-MCNC: 118 MG/DL (ref 70–110)
GLUCOSE SERPL-MCNC: 163 MG/DL (ref 70–110)
HCO3 UR-SCNC: 26.7 MMOL/L (ref 24–28)
HCT VFR BLD AUTO: 34.6 % (ref 37–48.5)
HCT VFR BLD CALC: 30 %PCV (ref 36–54)
HGB BLD-MCNC: 11.2 G/DL (ref 12–16)
IMM GRANULOCYTES # BLD AUTO: 0.12 K/UL (ref 0–0.04)
IMM GRANULOCYTES NFR BLD AUTO: 0.8 % (ref 0–0.5)
LYMPHOCYTES # BLD AUTO: 0.4 K/UL (ref 1–4.8)
LYMPHOCYTES NFR BLD: 2.6 % (ref 18–48)
MAGNESIUM SERPL-MCNC: 2.3 MG/DL (ref 1.6–2.6)
MCH RBC QN AUTO: 31.7 PG (ref 27–31)
MCHC RBC AUTO-ENTMCNC: 32.4 G/DL (ref 32–36)
MCV RBC AUTO: 98 FL (ref 82–98)
MONOCYTES # BLD AUTO: 1.2 K/UL (ref 0.3–1)
MONOCYTES NFR BLD: 8 % (ref 4–15)
NEUTROPHILS # BLD AUTO: 12.8 K/UL (ref 1.8–7.7)
NEUTROPHILS NFR BLD: 88.4 % (ref 38–73)
NRBC BLD-RTO: 0 /100 WBC
PCO2 BLDA: 43 MMHG (ref 35–45)
PH SMN: 7.4 [PH] (ref 7.35–7.45)
PHOSPHATE SERPL-MCNC: 3.4 MG/DL (ref 2.7–4.5)
PLATELET # BLD AUTO: 602 K/UL (ref 150–450)
PMV BLD AUTO: 8.8 FL (ref 9.2–12.9)
PO2 BLDA: 240 MMHG (ref 80–100)
POC BE: 2 MMOL/L
POC IONIZED CALCIUM: 1.18 MMOL/L (ref 1.06–1.42)
POC SATURATED O2: 100 % (ref 95–100)
POC TCO2: 28 MMOL/L (ref 23–27)
POTASSIUM BLD-SCNC: 4 MMOL/L (ref 3.5–5.1)
POTASSIUM SERPL-SCNC: 4.5 MMOL/L (ref 3.5–5.1)
PROT SERPL-MCNC: 6.3 G/DL (ref 6–8.4)
RBC # BLD AUTO: 3.53 M/UL (ref 4–5.4)
SAMPLE: ABNORMAL
SODIUM BLD-SCNC: 133 MMOL/L (ref 136–145)
SODIUM SERPL-SCNC: 134 MMOL/L (ref 136–145)
WBC # BLD AUTO: 14.46 K/UL (ref 3.9–12.7)

## 2024-03-01 PROCEDURE — 80053 COMPREHEN METABOLIC PANEL: CPT | Performed by: REGISTERED NURSE

## 2024-03-01 PROCEDURE — 25000003 PHARM REV CODE 250: Performed by: PSYCHIATRY & NEUROLOGY

## 2024-03-01 PROCEDURE — 94761 N-INVAS EAR/PLS OXIMETRY MLT: CPT

## 2024-03-01 PROCEDURE — 63600175 PHARM REV CODE 636 W HCPCS: Performed by: PHYSICIAN ASSISTANT

## 2024-03-01 PROCEDURE — 92610 EVALUATE SWALLOWING FUNCTION: CPT

## 2024-03-01 PROCEDURE — 85025 COMPLETE CBC W/AUTO DIFF WBC: CPT | Performed by: REGISTERED NURSE

## 2024-03-01 PROCEDURE — 25000003 PHARM REV CODE 250: Performed by: STUDENT IN AN ORGANIZED HEALTH CARE EDUCATION/TRAINING PROGRAM

## 2024-03-01 PROCEDURE — 25000003 PHARM REV CODE 250: Performed by: REGISTERED NURSE

## 2024-03-01 PROCEDURE — 25000003 PHARM REV CODE 250

## 2024-03-01 PROCEDURE — 84100 ASSAY OF PHOSPHORUS: CPT | Performed by: REGISTERED NURSE

## 2024-03-01 PROCEDURE — 25000003 PHARM REV CODE 250: Performed by: PHYSICIAN ASSISTANT

## 2024-03-01 PROCEDURE — 99233 SBSQ HOSP IP/OBS HIGH 50: CPT | Mod: ,,, | Performed by: REGISTERED NURSE

## 2024-03-01 PROCEDURE — 20000000 HC ICU ROOM

## 2024-03-01 PROCEDURE — 63600175 PHARM REV CODE 636 W HCPCS: Performed by: STUDENT IN AN ORGANIZED HEALTH CARE EDUCATION/TRAINING PROGRAM

## 2024-03-01 PROCEDURE — 83735 ASSAY OF MAGNESIUM: CPT | Performed by: REGISTERED NURSE

## 2024-03-01 PROCEDURE — 63600175 PHARM REV CODE 636 W HCPCS: Performed by: REGISTERED NURSE

## 2024-03-01 RX ORDER — OXYCODONE HYDROCHLORIDE 5 MG/1
5 TABLET ORAL EVERY 4 HOURS PRN
Status: DISCONTINUED | OUTPATIENT
Start: 2024-03-01 | End: 2024-03-12

## 2024-03-01 RX ORDER — DEXAMETHASONE SODIUM PHOSPHATE 4 MG/ML
4 INJECTION, SOLUTION INTRA-ARTICULAR; INTRALESIONAL; INTRAMUSCULAR; INTRAVENOUS; SOFT TISSUE EVERY 8 HOURS
Status: DISCONTINUED | OUTPATIENT
Start: 2024-03-01 | End: 2024-03-04

## 2024-03-01 RX ORDER — OXYCODONE HYDROCHLORIDE 10 MG/1
10 TABLET ORAL EVERY 4 HOURS PRN
Status: DISCONTINUED | OUTPATIENT
Start: 2024-03-01 | End: 2024-03-12

## 2024-03-01 RX ORDER — LABETALOL HCL 20 MG/4 ML
10 SYRINGE (ML) INTRAVENOUS EVERY 4 HOURS PRN
Status: DISCONTINUED | OUTPATIENT
Start: 2024-03-01 | End: 2024-03-17 | Stop reason: HOSPADM

## 2024-03-01 RX ORDER — HYDRALAZINE HYDROCHLORIDE 20 MG/ML
10 INJECTION INTRAMUSCULAR; INTRAVENOUS EVERY 4 HOURS PRN
Status: DISCONTINUED | OUTPATIENT
Start: 2024-03-01 | End: 2024-03-17 | Stop reason: HOSPADM

## 2024-03-01 RX ADMIN — METHOCARBAMOL 500 MG: 500 TABLET ORAL at 07:03

## 2024-03-01 RX ADMIN — DEXAMETHASONE SODIUM PHOSPHATE 4 MG: 4 INJECTION INTRA-ARTICULAR; INTRALESIONAL; INTRAMUSCULAR; INTRAVENOUS; SOFT TISSUE at 05:03

## 2024-03-01 RX ADMIN — DEXTROSE MONOHYDRATE 1 G: 2.5 INJECTION INTRAVENOUS at 12:03

## 2024-03-01 RX ADMIN — FAMOTIDINE 20 MG: 20 TABLET ORAL at 08:03

## 2024-03-01 RX ADMIN — LEVETIRACETAM 500 MG: 500 TABLET, FILM COATED ORAL at 08:03

## 2024-03-01 RX ADMIN — DOCUSATE SODIUM AND SENNOSIDES 2 TABLET: 8.6; 5 TABLET, FILM COATED ORAL at 08:03

## 2024-03-01 RX ADMIN — DEXTROSE MONOHYDRATE 1 G: 2.5 INJECTION INTRAVENOUS at 08:03

## 2024-03-01 RX ADMIN — OXYCODONE HYDROCHLORIDE 10 MG: 10 TABLET ORAL at 06:03

## 2024-03-01 RX ADMIN — HYDROMORPHONE HYDROCHLORIDE 1 MG: 1 INJECTION, SOLUTION INTRAMUSCULAR; INTRAVENOUS; SUBCUTANEOUS at 05:03

## 2024-03-01 RX ADMIN — OXYCODONE 5 MG: 5 TABLET ORAL at 08:03

## 2024-03-01 RX ADMIN — HYDROMORPHONE HYDROCHLORIDE 1 MG: 1 INJECTION, SOLUTION INTRAMUSCULAR; INTRAVENOUS; SUBCUTANEOUS at 04:03

## 2024-03-01 RX ADMIN — DEXAMETHASONE SODIUM PHOSPHATE 4 MG: 4 INJECTION INTRA-ARTICULAR; INTRALESIONAL; INTRAMUSCULAR; INTRAVENOUS; SOFT TISSUE at 02:03

## 2024-03-01 RX ADMIN — HYDROMORPHONE HYDROCHLORIDE 1 MG: 1 INJECTION, SOLUTION INTRAMUSCULAR; INTRAVENOUS; SUBCUTANEOUS at 08:03

## 2024-03-01 RX ADMIN — ACETAMINOPHEN 650 MG: 325 TABLET ORAL at 07:03

## 2024-03-01 RX ADMIN — ACETAMINOPHEN 650 MG: 325 TABLET ORAL at 03:03

## 2024-03-01 RX ADMIN — LABETALOL HYDROCHLORIDE 10 MG: 5 INJECTION, SOLUTION INTRAVENOUS at 04:03

## 2024-03-01 RX ADMIN — OXYCODONE 5 MG: 5 TABLET ORAL at 12:03

## 2024-03-01 RX ADMIN — HYDROMORPHONE HYDROCHLORIDE 1 MG: 1 INJECTION, SOLUTION INTRAMUSCULAR; INTRAVENOUS; SUBCUTANEOUS at 11:03

## 2024-03-01 RX ADMIN — DEXAMETHASONE SODIUM PHOSPHATE 4 MG: 4 INJECTION INTRA-ARTICULAR; INTRALESIONAL; INTRAMUSCULAR; INTRAVENOUS; SOFT TISSUE at 09:03

## 2024-03-01 RX ADMIN — LEVETIRACETAM 500 MG: 500 TABLET, FILM COATED ORAL at 09:03

## 2024-03-01 RX ADMIN — OXYCODONE HYDROCHLORIDE 10 MG: 10 TABLET ORAL at 09:03

## 2024-03-01 RX ADMIN — OXYCODONE 10 MG: 5 TABLET ORAL at 03:03

## 2024-03-01 RX ADMIN — FAMOTIDINE 20 MG: 20 TABLET ORAL at 09:03

## 2024-03-01 RX ADMIN — HYDROMORPHONE HYDROCHLORIDE 1 MG: 1 INJECTION, SOLUTION INTRAMUSCULAR; INTRAVENOUS; SUBCUTANEOUS at 12:03

## 2024-03-01 RX ADMIN — OXYCODONE HYDROCHLORIDE 10 MG: 10 TABLET ORAL at 02:03

## 2024-03-01 RX ADMIN — METHOCARBAMOL 500 MG: 500 TABLET ORAL at 10:03

## 2024-03-01 RX ADMIN — MUPIROCIN: 20 OINTMENT TOPICAL at 08:03

## 2024-03-01 RX ADMIN — OXYCODONE 5 MG: 5 TABLET ORAL at 04:03

## 2024-03-01 RX ADMIN — DEXAMETHASONE SODIUM PHOSPHATE 4 MG: 4 INJECTION INTRA-ARTICULAR; INTRALESIONAL; INTRAMUSCULAR; INTRAVENOUS; SOFT TISSUE at 12:03

## 2024-03-01 RX ADMIN — ENOXAPARIN SODIUM 30 MG: 30 INJECTION SUBCUTANEOUS at 02:03

## 2024-03-01 NOTE — ASSESSMENT & PLAN NOTE
62 y/o F presenting as transfer from [facility] for neurosurgery eval of 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     -Admitted to NCC  -NSGY following; s/p crani for tumor resection  -VS/Neuro checks q1  -HOB >/= 30  -Keppra 500 BID  -Dex taper  -SBP goal <140  -PRN labetalol/hydralazine  -Consider resuming home atenolol as needed  -CBC, CMP, Mag, Phos daily  -Na goal > 135  -Monitor I/Os  -Start SQH when clinically indicated   -PT/OT/SLP as appropriate  -Plan for likely stepdown to NSGY tomorrow
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
## 2024-03-01 NOTE — PLAN OF CARE
"[facility] Care Plan    POC reviewed with Crow Hines at 0300. Pt verbalized understanding. Questions and concerns addressed. No acute events overnight. Pt progressing toward goals. Will continue to monitor. See below and flowsheets for full assessment and VS info.     Cardene gtt off   Arterial line removed   PRN pain medication administered overnight     Is this a stroke patient? no    Neuro:  [place] Coma Scale  Best Eye Response: 3-->(E3) to speech  Best Motor Response: 6-->(M6) obeys commands  Best Verbal Response: 4-->(V4) confused  [place] Coma Scale Score: 13  Assessment Qualifiers: no eye obstruction present, patient not sedated/intubated  Pupil PERRLA: yes     24hr Temp:  [97.2 °F (36.2 °C)-98.9 °F (37.2 °C)]     CV:   Rhythm: sinus tachycardia  BP goals:   SBP < 140  MAP > 65    Resp:           Plan: N/A    GI/:     Diet/Nutrition Received: regular  Last Bowel Movement: 02/26/24  Voiding Characteristics: urethral catheter (bladder)    Intake/Output Summary (Last 24 hours) at 3/1/2024 0632  Last data filed at 3/1/2024 0601  Gross per 24 hour   Intake 1363.33 ml   Output 1415 ml   Net -51.67 ml          Labs/Accuchecks:  Recent Labs   Lab 03/01/24  0108   WBC 14.46*   RBC 3.53*   HGB 11.2*   HCT 34.6*   *      Recent Labs   Lab 03/01/24  0108   *   K 4.5   CO2 23      BUN 14   CREATININE 0.7   ALKPHOS 299*   *   *   BILITOT 0.3      Recent Labs   Lab 02/27/24  0707   INR 1.0   APTT 22.5    No results for input(s): "CPK", "CPKMB", "TROPONINI", "MB" in the last 168 hours.    Electrolytes: N/A - electrolytes WDL  Accuchecks: none    Gtts:   nicardipine Stopped (02/29/24 2005)       LDA/Wounds:    Nurses Note -- 4 Eyes    Is there altered skin present? no    Second RN/Staff Member:  Tracy VELARDE       Problem: Adult Inpatient Plan of Care  Goal: Plan of Care Review  Outcome: Ongoing, Progressing  Goal: Patient-Specific Goal (Individualized)  Outcome: Ongoing, Progressing  Goal: " Absence of Hospital-Acquired Illness or Injury  Outcome: Ongoing, Progressing  Goal: Optimal Comfort and Wellbeing  Outcome: Ongoing, Progressing  Goal: Readiness for Transition of Care  Outcome: Ongoing, Progressing     Problem: Adjustment to Illness (Stroke, Hemorrhagic)  Goal: Optimal Coping  Outcome: Ongoing, Progressing     Problem: Bowel Elimination Impaired (Stroke, Hemorrhagic)  Goal: Effective Bowel Elimination  Outcome: Ongoing, Progressing     Problem: Cerebral Tissue Perfusion (Stroke, Hemorrhagic)  Goal: Optimal Cerebral Tissue Perfusion  Outcome: Ongoing, Progressing     Problem: Cognitive Impairment (Stroke, Hemorrhagic)  Goal: Optimal Cognitive Function  Outcome: Ongoing, Progressing     Problem: Communication Impairment (Stroke, Hemorrhagic)  Goal: Effective Communication Skills  Outcome: Ongoing, Progressing     Problem: Functional Ability Impaired (Stroke, Hemorrhagic)  Goal: Optimal Functional Ability  Outcome: Ongoing, Progressing     Problem: Pain (Stroke, Hemorrhagic)  Goal: Acceptable Pain Control  Outcome: Ongoing, Progressing     Problem: Respiratory Compromise (Stroke, Hemorrhagic)  Goal: Effective Oxygenation and Ventilation  Outcome: Ongoing, Progressing     Problem: Sensorimotor Impairment (Stroke, Hemorrhagic)  Goal: Improved Sensorimotor Function  Outcome: Ongoing, Progressing     Problem: Swallowing Impairment (Stroke, Hemorrhagic)  Goal: Optimal Eating and Swallowing Without Aspiration  Outcome: Ongoing, Progressing     Problem: Urinary Elimination Impaired (Stroke, Hemorrhagic)  Goal: Effective Urinary Elimination  Outcome: Ongoing, Progressing     Problem: Infection  Goal: Absence of Infection Signs and Symptoms  Outcome: Ongoing, Progressing

## 2024-03-01 NOTE — ASSESSMENT & PLAN NOTE
-CT abd/pelvis w/ numerous metastatic masses in the liver, likely metastatic deposit in the spleen; bilateral adrenal masses, hypoattenuating and mildly enhancing mass of upper pole L kidney, intra-abdominal enlarged lymph nodes consistent w/ metastatic infiltration.   -Also w/ masses to R lung base w/ consolidation of visualized R renal lower lobe; R lower lobe 9mm nodule  -L2 vertebra compression fracture w/ mild sclerosis of the vertebra, likely reflecting a pathologic fracture; faint sclerosis of the superior endplate of S1 likely reflects metastatic infiltration  -Heme/Onc consulted  -Multimodal pain control  -Consider Palliative Care consult; will discuss w/ patient tomorrow

## 2024-03-01 NOTE — PLAN OF CARE
"  POC reviewed with Crow Hines and family at 1400. Pt verbalized understanding. Questions and concerns addressed. No acute events today. Pt progressing toward goals. Will continue to monitor. See below and flowsheets for full assessment and VS info.     - Oxycodone 5 mg given x3 for pain management.   - Oxycodone 10 mg given x2 for pain management.   - Robaxin given x1 for pain management.   - Dilaudid given x2 for pain management.   - Pt refusing to work with PT/OT x2 today. Pre-medicated prior to afternoon plans to work with patient.   - Labetalol given x1 to maintain SPB goal of < 140.     Neuro:  [place] Coma Scale  Best Eye Response: 4-->(E4) spontaneous  Best Motor Response: 6-->(M6) obeys commands  Best Verbal Response: 4-->(V4) confused  [place] Coma Scale Score: 14  Assessment Qualifiers: patient not sedated/intubated, no eye obstruction present  Pupil PERRLA: yes     24 hr Temp:  [97.2 °F (36.2 °C)-98.9 °F (37.2 °C)]     CV:   Rhythm: sinus tachycardia  BP goals:   SBP < 140  MAP > 65    Resp:           Plan: N/A    GI/:     Diet/Nutrition Received: regular  Last Bowel Movement: 02/26/24  Voiding Characteristics: urethral catheter (bladder)    Intake/Output Summary (Last 24 hours) at 3/1/2024 1440  Last data filed at 3/1/2024 1401  Gross per 24 hour   Intake 319.16 ml   Output 1765 ml   Net -1445.84 ml          Labs/Accuchecks:  Recent Labs   Lab 03/01/24  0108   WBC 14.46*   RBC 3.53*   HGB 11.2*   HCT 34.6*   *      Recent Labs   Lab 03/01/24  0108   *   K 4.5   CO2 23      BUN 14   CREATININE 0.7   ALKPHOS 299*   *   *   BILITOT 0.3      Recent Labs   Lab 02/27/24  0707   INR 1.0   APTT 22.5    No results for input(s): "CPK", "CPKMB", "TROPONINI", "MB" in the last 168 hours.    Electrolytes: N/A - electrolytes WDL  Accuchecks: none    Gtts:      LDA/Wounds:  Lines/Drains/Airways       Drain  Duration                  Urethral Catheter 02/29/24 0951 Silicone 16 Fr. " 1 day              Peripheral Intravenous Line  Duration                  Midline Catheter Insertion/Assessment  - Single Lumen 02/28/24 1100 Left cephalic vein (lateral side of arm) 2 days         Peripheral IV - Single Lumen 03/01/24 0644 22 G Anterior;Right Forearm <1 day                  Wounds: Yes  Wound care consulted: No    Is this a stroke patient? No.      Problem: Adult Inpatient Plan of Care  Goal: Plan of Care Review  Outcome: Ongoing, Progressing     Problem: Adult Inpatient Plan of Care  Goal: Optimal Comfort and Wellbeing  Outcome: Ongoing, Progressing     Problem: Adult Inpatient Plan of Care  Goal: Readiness for Transition of Care  Outcome: Ongoing, Progressing     Problem: Pain   Goal: Acceptable Pain Control  Outcome: Ongoing, Progressing

## 2024-03-01 NOTE — SUBJECTIVE & OBJECTIVE
Interval HPI   Agitated and confused overnight.  No other issues.  Postop day 1.  CT scan postop satisfactory            Review of Systems  Objective:     Weight: 47.6 kg (105 lb)  Body mass index is 18.6 kg/m².  Vital Signs (Most Recent):  Temp: 98.4 °F (36.9 °C) (03/01/24 0701)  Pulse: (!) 111 (03/01/24 0901)  Resp: 13 (03/01/24 0901)  BP: 127/84 (03/01/24 0901)  SpO2: (!) 93 % (03/01/24 0901) Vital Signs (24h Range):  Temp:  [97.2 °F (36.2 °C)-98.9 °F (37.2 °C)] 98.4 °F (36.9 °C)  Pulse:  [] 111  Resp:  [9-35] 13  SpO2:  [91 %-97 %] 93 %  BP: (110-140)/(62-91) 127/84  Arterial Line BP: (116-171)/(56-96) 171/96     Date 03/01/24 0700 - 03/02/24 0659   Shift 0895-9705 4575-8770 7263-7873 24 Hour Total   INTAKE   P.O. 60   60   IV Piggyback 44.9   44.9   Shift Total(mL/kg) 104.9(2.2)   104.9(2.2)   OUTPUT   Urine(mL/kg/hr) 45   45   Shift Total(mL/kg) 45(0.9)   45(0.9)   Weight (kg) 47.6 47.6 47.6 47.6                         Female External Urinary Catheter w/ Suction 02/25/24 2150 (Active)   Skin no redness;no breakdown 02/27/24 0701   Tolerance no signs/symptoms of discomfort 02/27/24 0701   Suction Continuous suction at 70 mmHg 02/27/24 0701   Date of last wick change 02/26/24 02/27/24 0701   Time of last wick change 0600 02/26/24 1901   Output (mL) 0 mL 02/27/24 0400          Physical Exam         Awake alert  Confused   Oriented x1   Pupils reactive  Following commands stain for all antigravity   Moving all in good tone  Unable to assess visual field due to lack of cooperation  Dressing in place  Satting well on room air   Heart rate is regular   No distress          Significant Labs:  Recent Labs   Lab 02/29/24  0255 03/01/24  0108   * 118*   * 134*   K 4.3 4.5    100   CO2 27 23   BUN 15 14   CREATININE 0.8 0.7   CALCIUM 9.5 9.3   MG 2.2 2.3       Recent Labs   Lab 02/29/24  0255 02/29/24  1047 03/01/24  0108   WBC 13.25*  --  14.46*   HGB 12.1  --  11.2*   HCT 37.3 30* 34.6*   PLT  "604*  --  602*       No results for input(s): "LABPT", "INR", "APTT" in the last 48 hours.    Microbiology Results (last 7 days)       ** No results found for the last 168 hours. **          All pertinent labs from the last 24 hours have been reviewed.    "

## 2024-03-01 NOTE — NURSING
Patient complaining of new numbness on the left side of her face, pointing to where her incision and jaw meet. Patient denies numbness and tingling in arms and legs. RN does not note change in strength from previous exams. JANINE Ochoa notified

## 2024-03-01 NOTE — PT/OT/SLP PROGRESS
"Occupational Therapy      Patient Name:  Crow Hines   MRN:  [id number]    Patient not seen today secondary to pt refusing therapy x2 attempts this day. Pt repeatedly stating "I can't, I'm too weak". PT/OT provided max encouragement and education on the importance of out of bed mobility to prevent a decline in functional status s/p craniotomy. Will follow-up as scheduled.    3/1/2024  "

## 2024-03-01 NOTE — OP NOTE
DATE: 2/29/24    PREOP DIAGNOSIS:  Left temporal tumor  Brain compression and edema    POSTOP DIAGNOSIS:  Same as above    OPERATION PERFORMED:  Left temporal craniotomy for resection of tumor  Use of intra-op neuronavigation    SURGEON:  Felix Szymanski D.O.    ASSISTANT:  Saji Luna M.D.    LEVEL OF INVOLVEMENT OF ATTENDING:  Full    INDICATION:  63 y.o. female with past medical history significant for HTN and rheumatoid arthritis who presents as a transfer from [facility] for left temporal brain mass. Patient initially presented with several months of diffuse chest, abdominal, and back pain. CT C/A/P showed diffuse metastatic disease with lung, liver, spleen, and kidney lesions. Also revealing L2 compression fracture for which Neurosurgery in [place] was originally consulted for. Patient had an episode of acute confusion and CT head was ordered, left temporal mass with significant edema was identified. Transfer [place] [facility] [place] and NCC was initiated.     Consents were obtained and risks, benefits, and alternatives to surgery were discussed.      PROCEDURE IN DETAIL:  The patient was correctly identified and taken to the operating room where the anesthesia team administered endotracheal anesthesia.  The patient was positioned in a semilateral position with a shoulder bump under the left shoulder.  The head was fixed in a Gonzales head frame in a lateral position exposing the left temporal area.  The head was registered using [employer] neuro navigation and a linear incision was planned just in front of the tragus over the temporal region.  The hair was clipped free and the area was prepped and draped in typical sterile fashion.  A time-out was performed prophylactic IV antibiotics were given as well as Keppra and Decadron.  The incision was infiltrated local anesthetic and incised with a 10 blade scalpel down through the galea.  The temporalis muscle was then divided using Bovie cautery and  elevated with a periosteal elevator.  A self-retaining retractor was placed for maintaining exposure.  Using neuro navigation the bone flap was planned around the perimeter of the tumor.  Two zach holes were placed and the bone flap was turned using a craniotome.  The dura was then opened in C-shaped fashion and elevated.  The tumor was dural-based and densely adherent to the underlying brain parenchyma.  Using bipolar cautery corticotomy was performed at the interface between the tumor and the brain allowing for establishment of a plane between the tumor and parenchyma.  Using bipolar cautery and gentle suction the plane was expanded and developed circumferentially around the tumor.  Several core specimens were taken and sent for frozen which returned as metastatic carcinoma.  This allowed for the tumor to be more easily mobilized.  The tumor was then removed in piecemeal fashion by resecting circumferentially.  Because the tumor was adherent to the dura the dura was then cut and a duraplasty using a suturable dura matrix was performed with 4 0 Nurolon in a watertight fashion.  The bone was then replated and the wound was closed in layered fashion 1st with 2-0 Vicryl in the muscle and fascial layer followed by 2-0 Vicryl in the galea and finally staples on the skin.    COMPLICATIONS:  None    INCISION:  Left temporal    WOUND CLASS:  Clean    FINDINGS:  Firm dural-based tumor with dense adherence to surrounding brain parenchyma.  Dural attachment was resected.    DRAIN:  None    CONDITION:  Stable    PROGNOSIS:  Good

## 2024-03-01 NOTE — PT/OT/SLP EVAL
Speech Language Pathology Evaluation  Bedside Swallow    Patient Name:  Crow Hines   MRN:  [id number]  Admitting Diagnosis: Brain mass    Recommendations:                 General Recommendations:  Cognitive-linguistic evaluation  Diet recommendations:  Regular Diet - IDDSI Level 7, Thin liquids - IDDSI Level 0   Aspiration Precautions: Feed only when awake/alert, HOB to 90 degrees, Monitor for s/s of aspiration, and Standard aspiration precautions   General Precautions: Standard, aspiration, fall  Communication strategies:  provide increased time to answer and go to room if call light pushed    Assessment:     Crow Hines is a 63 y.o. female.  Swallowing abilities appear WFL. Pt safe to continue current diet, but may choose to request meats cut up into smaller pieces or avoid tough foods until tenderness/soreness on left side of face subsides.  Speech/language/cognitive re-assessment to be conducted on next service date.     History:     Past Medical History:   Diagnosis Date    Arthritis        Past Surgical History:   Procedure Laterality Date    TUBAL LIGATION  2002     Chief Complaint: Brain mass     History of Present Illness: 62 y/o F with pMHx of HTN and rheumatoid arthritis presenting as transfer from OSH for neurosurgery eval of L temporal lobe mass. Initially presented to the ED with complaints that everything hurts.   Reportedly began seeing a rheumatologist 9 months ago where she was diagnosed w/ RA. Methotrexate therapy was initiated. Patient reported a continually decline since that time, leading to her being bed-bound for the last 2 months 2/2 to difficulty walking. She stated her rheumatologist believed that she was not tolerating the methotrexate. On presentation to ED, she reported inability to tolerate abdominal pain any longer. Lab work significant for alk phos 203, AST 57, sodium 132, platelets 586. CXR revealed linear and ill-defined hazy opacities of the right mid to lower lung, felt  "to reflect infiltrate. Diffuse abdominal pain with focal increased tenderness in mid-epigastric area prompted CT abd/pelvis revealing numerous metastatic masses in the liver, metastatic deposits in the spleen and bilateral adrenal masses, hypoattenuating and mildly enhancing mass of the upper pole L kidney. Also w/ intra-abdominal enlarged lymph nodes, consistent with metastatic infiltration, masses of the R lung base with consolidation of the visualized R renal lower lobe and RLL 9 mm nodule. L2 compression fracture w/ mild sclerosis of the vertebra noted, likely pathologic. CTH completed showing large 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 She was transferred to [facility] [place] [facility] for higher level of care and will be admitted to [facility] for further management.     Prior Intubation HX:  2/292/24 for craniotomy    Modified Barium Swallow: none on file    Prior diet: passed Alfonso 2/29 - regular diet/thin liquids      Subjective     "Wait until Monday." Pt given option to participate in re-eval of cognition s/p surgery today or on Monday.     Pain/Comfort:  Pain Rating 1: 8/10  Location - Side 1: Left  Location - Orientation 1: generalized  Location 1: face  Pain Addressed 1: Nurse notified    Respiratory Status: Room air    Objective:     Oral Musculature Evaluation  Oral Musculature: WFL (limited lowering of mandible due to osreness on left side s/p craniotomy)  Dentition: scattered dentition  Secretion Management: adequate  Mucosal Quality: adequate  Mandibular Strength and Mobility: WFL  Oral Labial Strength and Mobility: WFL  Lingual Strength and Mobility: WFL  Velar Elevation: WFL  Buccal Strength and Mobility: WFL  Volitional Cough: adequate  Volitional Swallow: elicited  Voice Prior to PO Intake: dry, clear    Bedside Swallow Eval:   Consistencies Assessed:  Thin liquids straw sips water x 2  Solids 1/4 cracker x 2      Oral Phase:   Increased mastication time taken due to soreness on left side of face s/p " craniotomy on 2/29    Pharyngeal Phase:   no overt clinical signs/symptoms of aspiration  no overt clinical signs/symptoms of pharyngeal dysphagia    Treatment: Pt appears safe to continue regular consistency diet and thin liquids, but may choose softer foods or ask for assistance to cut up solids until soreness on left side of face subsides. Speech/language/cognitive re-evaluation s/p craniotomy to be conducted on next service date.     Goals:   Multidisciplinary Problems       SLP Goals          Problem: SLP    Goal Priority Disciplines Outcome   SLP Goal     SLP    Description: Speech Language Pathology Goals  Goals expected to be met by 3/8:  1. Pt will tolerate regular consistency diet and thin liquids without s/s of aspiration.  2. Pt will participate in speech/language/cognitive re-evaluation s/p craniotomy.                          Plan:     Patient to be seen:  4 x/week   Plan of Care expires:  03/31/24  Plan of Care reviewed with:  patient   SLP Follow-Up:  Yes       Discharge recommendations:  High Intensity Therapy     Time Tracking:     SLP Treatment Date:   03/01/24  Speech Start Time:  1025  Speech Stop Time:  1033     Speech Total Time (min):  8 min    Billable Minutes: Eval Swallow and Oral Function 8    03/01/2024

## 2024-03-01 NOTE — SUBJECTIVE & OBJECTIVE
Interval History:  See hospital course.     Review of Systems   Respiratory:  Negative for cough and shortness of breath.    Cardiovascular:  Negative for chest pain.   Gastrointestinal:  Positive for abdominal pain. Negative for abdominal distention, nausea and vomiting.   Musculoskeletal:  Positive for back pain.   Neurological:  Positive for headaches. Negative for light-headedness and numbness.     Objective:     Vitals:  Temp: 98.2 °F (36.8 °C)  Pulse: (!) 111  Rhythm: sinus tachycardia  BP: (!) 146/89  MAP (mmHg): 110  Resp: 11  SpO2: (!) 92 %    Temp  Min: 97.2 °F (36.2 °C)  Max: 98.9 °F (37.2 °C)  Pulse  Min: 99  Max: 112  BP  Min: 110/70  Max: 146/89  MAP (mmHg)  Min: 84  Max: 112  Resp  Min: 9  Max: 35  SpO2  Min: 91 %  Max: 95 %    02/29 0701 - 03/01 0700  In: 1363.3 [I.V.:125.1]  Out: 1415 [Urine:1415]            Physical Exam  Vitals and nursing note reviewed.   Eyes:      Extraocular Movements: Extraocular movements intact.      Pupils: Pupils are equal, round, and reactive to light.   Cardiovascular:      Rate and Rhythm: Regular rhythm. Tachycardia present.      Pulses: Normal pulses.   Pulmonary:      Effort: Pulmonary effort is normal.   Abdominal:      Palpations: Abdomen is soft.   Musculoskeletal:         General: Normal range of motion.      Cervical back: Normal range of motion.   Skin:     General: Skin is warm and dry.      Capillary Refill: Capillary refill takes less than 2 seconds.   Neurological:      Mental Status: She is alert.      GCS: GCS eye subscore is 4. GCS verbal subscore is 4. GCS motor subscore is 6.      Cranial Nerves: Cranial nerves 2-12 are intact.      Sensory: Sensation is intact.      Comments:   -E4 V4 M6  -PERRL  -Oriented to person and place; intermittent orientation to time  -Follows commands in all extremities  -LONG equally/spontaneously/purposefully  -RUE 5/5  -LUE 5/5  -RLE 5/5  -LLE 5/5            Medications:  Continuous ScheduleddexAMETHasone, 4 mg,  Q8H  enoxparin, 30 mg, Q24H (prophylaxis, 1700)  famotidine, 20 mg, BID  levETIRAcetam, 500 mg, BID  mupirocin, , BID  senna-docusate 8.6-50 mg, 2 tablet, BID    PRNacetaminophen, 650 mg, Q6H PRN  bisacodyL, 10 mg, Daily PRN  hydrALAZINE, 10 mg, Q4H PRN  HYDROmorphone, 1 mg, Q4H PRN  labetalol, 10 mg, Q4H PRN  magnesium oxide, 800 mg, PRN  magnesium oxide, 800 mg, PRN  methocarbamoL, 500 mg, TID PRN  ondansetron, 4 mg, Q8H PRN  oxyCODONE, 10 mg, Q4H PRN  oxyCODONE, 5 mg, Q4H PRN  potassium bicarbonate, 35 mEq, PRN  potassium bicarbonate, 50 mEq, PRN  potassium bicarbonate, 60 mEq, PRN  potassium, sodium phosphates, 2 packet, PRN  potassium, sodium phosphates, 2 packet, PRN  potassium, sodium phosphates, 2 packet, PRN      Today I personally reviewed pertinent medications, lines/drains/airways, imaging, cardiology results, laboratory results, notably: CTH; CBC; CMP    Diet  Diet Adult Regular (IDDSI Level 7)  Diet Adult Regular (IDDSI Level 7)

## 2024-03-01 NOTE — ASSESSMENT & PLAN NOTE
-Noted on imaging  -Likely pathologic  -NSGY consulted  -Heme/Onc consulted  -May need brace when OOB

## 2024-03-01 NOTE — ASSESSMENT & PLAN NOTE
-Noted on CT chest w/ contrast - small volume of pulmonary thromboembolus in the right middle lobe without finding of right heart strain/failure   -No anticoagulation for now; POD 1  -Monitor resp status

## 2024-03-01 NOTE — PROGRESS NOTES
[facility]  Neurocritical Care  Progress Note    Admit Date: 2/27/2024  Service Date: 03/01/2024  Length of Stay: 3    Subjective:     Chief Complaint: Brain mass    History of Present Illness: 62 y/o F with pMHx of HTN and rheumatoid arthritis presenting as transfer from OSH for neurosurgery eval of L temporal lobe mass. Initially presented to the ED with complaints that everything hurts.   Reportedly began seeing a rheumatologist 9 months ago where she was diagnosed w/ RA. Methotrexate therapy was initiated. Patient reported a continually decline since that time, leading to her being bed-bound for the last 2 months 2/2 to difficulty walking. She stated her rheumatologist believed that she was not tolerating the methotrexate. On presentation to ED, she reported inability to tolerate abdominal pain any longer. Lab work significant for alk phos 203, AST 57, sodium 132, platelets 586. CXR revealed linear and ill-defined hazy opacities of the right mid to lower lung, felt to reflect infiltrate. Diffuse abdominal pain with focal increased tenderness in mid-epigastric area prompted CT abd/pelvis revealing numerous metastatic masses in the liver, metastatic deposits in the spleen and bilateral adrenal masses, hypoattenuating and mildly enhancing mass of the upper pole L kidney. Also w/ intra-abdominal enlarged lymph nodes, consistent with metastatic infiltration, masses of the R lung base with consolidation of the visualized R renal lower lobe and RLL 9 mm nodule. L2 compression fracture w/ mild sclerosis of the vertebra noted, likely pathologic. CTH completed showing large 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 She was transferred to [facility] [place] for higher level of care and will be admitted to [facility] for further management.     Hospital Course: 02/28/2024 Plan for OR tomorrow; multimodal pain control   02/29/2024 OR today   03/01/2024 C/o back pain/HA unrelieved by current PRN regimen.  Increased frequency of oxycodone. Likely stepdown tomorrow per NSGY.     Interval History:  See hospital course.     Review of Systems   Respiratory:  Negative for cough and shortness of breath.    Cardiovascular:  Negative for chest pain.   Gastrointestinal:  Positive for abdominal pain. Negative for abdominal distention, nausea and vomiting.   Musculoskeletal:  Positive for back pain.   Neurological:  Positive for headaches. Negative for light-headedness and numbness.     Objective:     Vitals:  Temp: 98.2 °F (36.8 °C)  Pulse: (!) 111  Rhythm: sinus tachycardia  BP: (!) 146/89  MAP (mmHg): 110  Resp: 11  SpO2: (!) 92 %    Temp  Min: 97.2 °F (36.2 °C)  Max: 98.9 °F (37.2 °C)  Pulse  Min: 99  Max: 112  BP  Min: 110/70  Max: 146/89  MAP (mmHg)  Min: 84  Max: 112  Resp  Min: 9  Max: 35  SpO2  Min: 91 %  Max: 95 %    02/29 0701 - 03/01 0700  In: 1363.3 [I.V.:125.1]  Out: 1415 [Urine:1415]            Physical Exam  Vitals and nursing note reviewed.   Eyes:      Extraocular Movements: Extraocular movements intact.      Pupils: Pupils are equal, round, and reactive to light.   Cardiovascular:      Rate and Rhythm: Regular rhythm. Tachycardia present.      Pulses: Normal pulses.   Pulmonary:      Effort: Pulmonary effort is normal.   Abdominal:      Palpations: Abdomen is soft.   Musculoskeletal:         General: Normal range of motion.      Cervical back: Normal range of motion.   Skin:     General: Skin is warm and dry.      Capillary Refill: Capillary refill takes less than 2 seconds.   Neurological:      Mental Status: She is alert.      GCS: GCS eye subscore is 4. GCS verbal subscore is 4. GCS motor subscore is 6.      Cranial Nerves: Cranial nerves 2-12 are intact.      Sensory: Sensation is intact.      Comments:   -E4 V4 M6  -PERRL  -Oriented to person and place; intermittent orientation to time  -Follows commands in all extremities  -LONG equally/spontaneously/purposefully  -RUE 5/5  -LUE 5/5  -RLE 5/5  -LLE 5/5             Medications:  Continuous ScheduleddexAMETHasone, 4 mg, Q8H  enoxparin, 30 mg, Q24H (prophylaxis, 1700)  famotidine, 20 mg, BID  levETIRAcetam, 500 mg, BID  mupirocin, , BID  senna-docusate 8.6-50 mg, 2 tablet, BID    PRNacetaminophen, 650 mg, Q6H PRN  bisacodyL, 10 mg, Daily PRN  hydrALAZINE, 10 mg, Q4H PRN  HYDROmorphone, 1 mg, Q4H PRN  labetalol, 10 mg, Q4H PRN  magnesium oxide, 800 mg, PRN  magnesium oxide, 800 mg, PRN  methocarbamoL, 500 mg, TID PRN  ondansetron, 4 mg, Q8H PRN  oxyCODONE, 10 mg, Q4H PRN  oxyCODONE, 5 mg, Q4H PRN  potassium bicarbonate, 35 mEq, PRN  potassium bicarbonate, 50 mEq, PRN  potassium bicarbonate, 60 mEq, PRN  potassium, sodium phosphates, 2 packet, PRN  potassium, sodium phosphates, 2 packet, PRN  potassium, sodium phosphates, 2 packet, PRN      Today I personally reviewed pertinent medications, lines/drains/airways, imaging, cardiology results, laboratory results, notably: CTH; CBC; CMP    Diet  Diet Adult Regular (IDDSI Level 7)  Diet Adult Regular (IDDSI Level 7)    Assessment/Plan:     Neuro  * Brain mass  64 y/o F presenting as transfer from OSH for neurosurgery eval of 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     -Admitted to NCC  -NSGY following; s/p crani for tumor resection  -VS/Neuro checks q1  -HOB >/= 30  -Keppra 500 BID  -Dex taper  -SBP goal <140  -PRN labetalol/hydralazine  -Consider resuming home atenolol as needed  -CBC, CMP, Mag, Phos daily  -Na goal > 135  -Monitor I/Os  -Start SQH when clinically indicated   -PT/OT/SLP as appropriate  -Plan for likely stepdown to NSGY tomorrow    Midline shift of brain  -Noted on imaging  -See Brain mass      Vasogenic brain edema  -Noted on imaging  -See Brain mass     Compression fracture of lumbar spine, non-traumatic  -Noted on imaging  -Likely pathologic  -NSGY consulted  -Heme/Onc consulted  -May need brace when OOB    Cardiac/Vascular  HTN (hypertension)  -SBP goal < 140  -PRN  labetalol/hydralazine    Hematology  Acute pulmonary embolism without acute cor pulmonale  -Noted on CT chest w/ contrast - small volume of pulmonary thromboembolus in the right middle lobe without finding of right heart strain/failure   -No anticoagulation for now; POD 1  -Monitor resp status    Oncology  Metastatic neoplastic disease  -CT abd/pelvis w/ numerous metastatic masses in the liver, likely metastatic deposit in the spleen; bilateral adrenal masses, hypoattenuating and mildly enhancing mass of upper pole L kidney, intra-abdominal enlarged lymph nodes consistent w/ metastatic infiltration.   -Also w/ masses to R lung base w/ consolidation of visualized R renal lower lobe; R lower lobe 9mm nodule  -L2 vertebra compression fracture w/ mild sclerosis of the vertebra, likely reflecting a pathologic fracture; faint sclerosis of the superior endplate of S1 likely reflects metastatic infiltration  -Heme/Onc consulted  -Multimodal pain control  -Consider Palliative Care consult; will discuss w/ patient tomorrow          The patient is being Prophylaxed for:  Venous Thromboembolism with: Mechanical or Chemical  Stress Ulcer with: H2B  Ventilator Pneumonia with: not applicable    Activity Orders            Diet Adult Regular (IDDSI Level 7): Regular starting at 02/29 1249    Progressive Mobility Protocol (mobilize patient to their highest level of functioning at least twice daily) starting at 02/27 0800    Turn patient starting at 02/27 0600    Elevate HOB starting at 02/27 0544          Full Code    Level III    Kylah Montanez NP  Neurocritical Care  [facility] - [facility]

## 2024-03-01 NOTE — PT/OT/SLP PROGRESS
Physical Therapy      Patient Name:  Crow Hines   MRN:  [id number]    Patient not seen today secondary to  (Pt delcined 2/2 pain in AM. PT set up time with patient in PM and pt was premedicated. Pt declined to participate in evaluation stating she is to weak. Max encouragement and education provided regarding therapy role in maintaining and recovering strength). Pt continued to decline and wanted PT to f/u tomorrow.

## 2024-03-02 PROBLEM — G93.6 CEREBRAL EDEMA: Status: ACTIVE | Noted: 2024-03-02

## 2024-03-02 LAB
ABO + RH BLD: NORMAL
ALBUMIN SERPL BCP-MCNC: 2.7 G/DL (ref 3.5–5.2)
ALP SERPL-CCNC: 292 U/L (ref 55–135)
ALT SERPL W/O P-5'-P-CCNC: 103 U/L (ref 10–44)
ANION GAP SERPL CALC-SCNC: 10 MMOL/L (ref 8–16)
AST SERPL-CCNC: 84 U/L (ref 10–40)
BASOPHILS # BLD AUTO: 0.01 K/UL (ref 0–0.2)
BASOPHILS NFR BLD: 0.1 % (ref 0–1.9)
BILIRUB SERPL-MCNC: 0.4 MG/DL (ref 0.1–1)
BLD GP AB SCN CELLS X3 SERPL QL: NORMAL
BLD PROD TYP BPU: NORMAL
BLD PROD TYP BPU: NORMAL
BLOOD UNIT EXPIRATION DATE: NORMAL
BLOOD UNIT EXPIRATION DATE: NORMAL
BLOOD UNIT TYPE CODE: 6200
BLOOD UNIT TYPE CODE: 6200
BLOOD UNIT TYPE: NORMAL
BLOOD UNIT TYPE: NORMAL
BUN SERPL-MCNC: 15 MG/DL (ref 8–23)
CALCIUM SERPL-MCNC: 9.6 MG/DL (ref 8.7–10.5)
CHLORIDE SERPL-SCNC: 99 MMOL/L (ref 95–110)
CO2 SERPL-SCNC: 24 MMOL/L (ref 23–29)
CODING SYSTEM: NORMAL
CODING SYSTEM: NORMAL
CREAT SERPL-MCNC: 0.7 MG/DL (ref 0.5–1.4)
CROSSMATCH INTERPRETATION: NORMAL
CROSSMATCH INTERPRETATION: NORMAL
DIFFERENTIAL METHOD BLD: ABNORMAL
DISPENSE STATUS: NORMAL
DISPENSE STATUS: NORMAL
EOSINOPHIL # BLD AUTO: 0 K/UL (ref 0–0.5)
EOSINOPHIL NFR BLD: 0 % (ref 0–8)
ERYTHROCYTE [DISTWIDTH] IN BLOOD BY AUTOMATED COUNT: 13.9 % (ref 11.5–14.5)
EST. GFR  (NO RACE VARIABLE): >60 ML/MIN/1.73 M^2
GLUCOSE SERPL-MCNC: 109 MG/DL (ref 70–110)
HCT VFR BLD AUTO: 35.2 % (ref 37–48.5)
HGB BLD-MCNC: 11.3 G/DL (ref 12–16)
IMM GRANULOCYTES # BLD AUTO: 0.08 K/UL (ref 0–0.04)
IMM GRANULOCYTES NFR BLD AUTO: 0.6 % (ref 0–0.5)
LYMPHOCYTES # BLD AUTO: 0.4 K/UL (ref 1–4.8)
LYMPHOCYTES NFR BLD: 3 % (ref 18–48)
MAGNESIUM SERPL-MCNC: 2.2 MG/DL (ref 1.6–2.6)
MCH RBC QN AUTO: 31.9 PG (ref 27–31)
MCHC RBC AUTO-ENTMCNC: 32.1 G/DL (ref 32–36)
MCV RBC AUTO: 99 FL (ref 82–98)
MONOCYTES # BLD AUTO: 1.2 K/UL (ref 0.3–1)
MONOCYTES NFR BLD: 8.5 % (ref 4–15)
NEUTROPHILS # BLD AUTO: 12.6 K/UL (ref 1.8–7.7)
NEUTROPHILS NFR BLD: 87.8 % (ref 38–73)
NRBC BLD-RTO: 0 /100 WBC
NUM UNITS TRANS PACKED RBC: NORMAL
NUM UNITS TRANS PACKED RBC: NORMAL
PHOSPHATE SERPL-MCNC: 3.1 MG/DL (ref 2.7–4.5)
PLATELET # BLD AUTO: 583 K/UL (ref 150–450)
PMV BLD AUTO: 8.5 FL (ref 9.2–12.9)
POTASSIUM SERPL-SCNC: 4.3 MMOL/L (ref 3.5–5.1)
PROT SERPL-MCNC: 6.2 G/DL (ref 6–8.4)
RBC # BLD AUTO: 3.54 M/UL (ref 4–5.4)
SODIUM SERPL-SCNC: 133 MMOL/L (ref 136–145)
SPECIMEN OUTDATE: NORMAL
WBC # BLD AUTO: 14.33 K/UL (ref 3.9–12.7)

## 2024-03-02 PROCEDURE — 25000003 PHARM REV CODE 250: Performed by: PHYSICIAN ASSISTANT

## 2024-03-02 PROCEDURE — 63600175 PHARM REV CODE 636 W HCPCS: Performed by: STUDENT IN AN ORGANIZED HEALTH CARE EDUCATION/TRAINING PROGRAM

## 2024-03-02 PROCEDURE — 25000003 PHARM REV CODE 250

## 2024-03-02 PROCEDURE — 84100 ASSAY OF PHOSPHORUS: CPT | Performed by: REGISTERED NURSE

## 2024-03-02 PROCEDURE — 97535 SELF CARE MNGMENT TRAINING: CPT

## 2024-03-02 PROCEDURE — 25000003 PHARM REV CODE 250: Performed by: NURSE PRACTITIONER

## 2024-03-02 PROCEDURE — 85025 COMPLETE CBC W/AUTO DIFF WBC: CPT | Performed by: REGISTERED NURSE

## 2024-03-02 PROCEDURE — 94761 N-INVAS EAR/PLS OXIMETRY MLT: CPT

## 2024-03-02 PROCEDURE — 63600175 PHARM REV CODE 636 W HCPCS: Performed by: PHYSICIAN ASSISTANT

## 2024-03-02 PROCEDURE — 20600001 HC STEP DOWN PRIVATE ROOM

## 2024-03-02 PROCEDURE — 25000003 PHARM REV CODE 250: Performed by: STUDENT IN AN ORGANIZED HEALTH CARE EDUCATION/TRAINING PROGRAM

## 2024-03-02 PROCEDURE — 86850 RBC ANTIBODY SCREEN: CPT | Performed by: PSYCHIATRY & NEUROLOGY

## 2024-03-02 PROCEDURE — 25000003 PHARM REV CODE 250: Performed by: PSYCHIATRY & NEUROLOGY

## 2024-03-02 PROCEDURE — 99233 SBSQ HOSP IP/OBS HIGH 50: CPT | Mod: FS,,, | Performed by: PSYCHIATRY & NEUROLOGY

## 2024-03-02 PROCEDURE — 83735 ASSAY OF MAGNESIUM: CPT | Performed by: REGISTERED NURSE

## 2024-03-02 PROCEDURE — 97168 OT RE-EVAL EST PLAN CARE: CPT

## 2024-03-02 PROCEDURE — 97164 PT RE-EVAL EST PLAN CARE: CPT

## 2024-03-02 PROCEDURE — 51701 INSERT BLADDER CATHETER: CPT

## 2024-03-02 PROCEDURE — 51798 US URINE CAPACITY MEASURE: CPT

## 2024-03-02 PROCEDURE — 99499 UNLISTED E&M SERVICE: CPT | Mod: ,,, | Performed by: NURSE PRACTITIONER

## 2024-03-02 PROCEDURE — 25000003 PHARM REV CODE 250: Performed by: REGISTERED NURSE

## 2024-03-02 PROCEDURE — 80053 COMPREHEN METABOLIC PANEL: CPT | Performed by: REGISTERED NURSE

## 2024-03-02 PROCEDURE — 97530 THERAPEUTIC ACTIVITIES: CPT

## 2024-03-02 RX ORDER — POLYETHYLENE GLYCOL 3350 17 G/17G
17 POWDER, FOR SOLUTION ORAL DAILY
Status: DISCONTINUED | OUTPATIENT
Start: 2024-03-02 | End: 2024-03-03

## 2024-03-02 RX ORDER — GABAPENTIN 100 MG/1
100 CAPSULE ORAL 3 TIMES DAILY
Status: DISCONTINUED | OUTPATIENT
Start: 2024-03-02 | End: 2024-03-11

## 2024-03-02 RX ORDER — ATENOLOL 50 MG/1
50 TABLET ORAL DAILY
Status: DISCONTINUED | OUTPATIENT
Start: 2024-03-02 | End: 2024-03-17 | Stop reason: HOSPADM

## 2024-03-02 RX ADMIN — OXYCODONE HYDROCHLORIDE 10 MG: 10 TABLET ORAL at 12:03

## 2024-03-02 RX ADMIN — OXYCODONE HYDROCHLORIDE 10 MG: 10 TABLET ORAL at 10:03

## 2024-03-02 RX ADMIN — METHOCARBAMOL 500 MG: 500 TABLET ORAL at 06:03

## 2024-03-02 RX ADMIN — HYDROMORPHONE HYDROCHLORIDE 1 MG: 1 INJECTION, SOLUTION INTRAMUSCULAR; INTRAVENOUS; SUBCUTANEOUS at 04:03

## 2024-03-02 RX ADMIN — DEXAMETHASONE SODIUM PHOSPHATE 4 MG: 4 INJECTION INTRA-ARTICULAR; INTRALESIONAL; INTRAMUSCULAR; INTRAVENOUS; SOFT TISSUE at 02:03

## 2024-03-02 RX ADMIN — METHOCARBAMOL 500 MG: 500 TABLET ORAL at 12:03

## 2024-03-02 RX ADMIN — ACETAMINOPHEN 650 MG: 325 TABLET ORAL at 07:03

## 2024-03-02 RX ADMIN — HYDROMORPHONE HYDROCHLORIDE 1 MG: 1 INJECTION, SOLUTION INTRAMUSCULAR; INTRAVENOUS; SUBCUTANEOUS at 07:03

## 2024-03-02 RX ADMIN — HYDROMORPHONE HYDROCHLORIDE 1 MG: 1 INJECTION, SOLUTION INTRAMUSCULAR; INTRAVENOUS; SUBCUTANEOUS at 12:03

## 2024-03-02 RX ADMIN — GABAPENTIN 100 MG: 100 CAPSULE ORAL at 02:03

## 2024-03-02 RX ADMIN — DOCUSATE SODIUM AND SENNOSIDES 2 TABLET: 8.6; 5 TABLET, FILM COATED ORAL at 09:03

## 2024-03-02 RX ADMIN — MUPIROCIN: 20 OINTMENT TOPICAL at 09:03

## 2024-03-02 RX ADMIN — OXYCODONE HYDROCHLORIDE 10 MG: 10 TABLET ORAL at 06:03

## 2024-03-02 RX ADMIN — OXYCODONE HYDROCHLORIDE 10 MG: 10 TABLET ORAL at 05:03

## 2024-03-02 RX ADMIN — GABAPENTIN 100 MG: 100 CAPSULE ORAL at 10:03

## 2024-03-02 RX ADMIN — MUPIROCIN: 20 OINTMENT TOPICAL at 08:03

## 2024-03-02 RX ADMIN — LEVETIRACETAM 500 MG: 500 TABLET, FILM COATED ORAL at 08:03

## 2024-03-02 RX ADMIN — LEVETIRACETAM 500 MG: 500 TABLET, FILM COATED ORAL at 09:03

## 2024-03-02 RX ADMIN — DEXAMETHASONE SODIUM PHOSPHATE 4 MG: 4 INJECTION INTRA-ARTICULAR; INTRALESIONAL; INTRAMUSCULAR; INTRAVENOUS; SOFT TISSUE at 09:03

## 2024-03-02 RX ADMIN — GABAPENTIN 100 MG: 100 CAPSULE ORAL at 09:03

## 2024-03-02 RX ADMIN — HYDROMORPHONE HYDROCHLORIDE 1 MG: 1 INJECTION, SOLUTION INTRAMUSCULAR; INTRAVENOUS; SUBCUTANEOUS at 02:03

## 2024-03-02 RX ADMIN — ENOXAPARIN SODIUM 30 MG: 30 INJECTION SUBCUTANEOUS at 04:03

## 2024-03-02 RX ADMIN — ATENOLOL 50 MG: 25 TABLET ORAL at 10:03

## 2024-03-02 RX ADMIN — DOCUSATE SODIUM AND SENNOSIDES 2 TABLET: 8.6; 5 TABLET, FILM COATED ORAL at 08:03

## 2024-03-02 RX ADMIN — FAMOTIDINE 20 MG: 20 TABLET ORAL at 09:03

## 2024-03-02 RX ADMIN — POLYETHYLENE GLYCOL 3350 17 G: 17 POWDER, FOR SOLUTION ORAL at 10:03

## 2024-03-02 RX ADMIN — OXYCODONE HYDROCHLORIDE 10 MG: 10 TABLET ORAL at 02:03

## 2024-03-02 RX ADMIN — FAMOTIDINE 20 MG: 20 TABLET ORAL at 08:03

## 2024-03-02 RX ADMIN — DEXAMETHASONE SODIUM PHOSPHATE 4 MG: 4 INJECTION INTRA-ARTICULAR; INTRALESIONAL; INTRAMUSCULAR; INTRAVENOUS; SOFT TISSUE at 05:03
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
## 2024-03-02 NOTE — SUBJECTIVE & OBJECTIVE
Interval History:  Ok to step down to   Review of Systems   Constitutional: Negative.    HENT: Negative.     Eyes: Negative.    Respiratory: Negative.     Cardiovascular: Negative.    Gastrointestinal: Negative.    Endocrine: Negative.    Genitourinary: Negative.    Musculoskeletal: Negative.    Skin: Negative.    Neurological:  Positive for headaches.     2 systems  Objective:     Vitals:  Temp: 98.2 °F (36.8 °C)  Pulse: 87  Rhythm: normal sinus rhythm  BP: 120/77  MAP (mmHg): 94  Resp: 11  SpO2: 97 %    Temp  Min: 98 °F (36.7 °C)  Max: 98.4 °F (36.9 °C)  Pulse  Min: 85  Max: 116  BP  Min: 107/72  Max: 151/92  MAP (mmHg)  Min: 84  Max: 116  Resp  Min: 9  Max: 23  SpO2  Min: 92 %  Max: 98 %    03/01 0701 - 03/02 0700  In: 228.3 [P.O.:180]  Out: 1515 [Urine:1515]            Physical Exam  Vitals and nursing note reviewed.   Constitutional:       Appearance: Normal appearance.   HENT:      Head: Normocephalic.      Nose: Nose normal.      Mouth/Throat:      Mouth: Mucous membranes are moist.      Pharynx: Oropharynx is clear.   Eyes:      Pupils: Pupils are equal, round, and reactive to light.   Cardiovascular:      Rate and Rhythm: Normal rate and regular rhythm.      Pulses: Normal pulses.      Heart sounds: Normal heart sounds.   Pulmonary:      Effort: Pulmonary effort is normal.      Breath sounds: Normal breath sounds.   Abdominal:      General: Bowel sounds are normal.      Palpations: Abdomen is soft.   Musculoskeletal:         General: Normal range of motion.   Skin:     General: Skin is warm and dry.      Capillary Refill: Capillary refill takes 2 to 3 seconds.   Neurological:      Mental Status: She is alert.      Comments: E4 V4 M6  -PERRL  -Oriented to person and place; intermittent orientation to time  -Follows commands in all extremities  -LONG equally/spontaneously/purposefully  -RUE 5/5  -LUE 5/5  -RLE 5/5  -LLE 5/5           Unable to test orientation, language, memory, judgment, insight, fund of  knowledge, hearing, shoulder shrug, tongue protrusion, coordination, gait due to level of consciousness.       Medications:  Continuous ScheduledatenoloL, 50 mg, Daily  dexAMETHasone, 4 mg, Q8H  enoxparin, 30 mg, Q24H (prophylaxis, 1700)  famotidine, 20 mg, BID  gabapentin, 100 mg, TID  levETIRAcetam, 500 mg, BID  mupirocin, , BID  polyethylene glycol, 17 g, Daily  senna-docusate 8.6-50 mg, 2 tablet, BID    PRNacetaminophen, 650 mg, Q6H PRN  bisacodyL, 10 mg, Daily PRN  hydrALAZINE, 10 mg, Q4H PRN  HYDROmorphone, 1 mg, Q4H PRN  labetalol, 10 mg, Q4H PRN  magnesium oxide, 800 mg, PRN  magnesium oxide, 800 mg, PRN  methocarbamoL, 500 mg, TID PRN  ondansetron, 4 mg, Q8H PRN  oxyCODONE, 10 mg, Q4H PRN  oxyCODONE, 5 mg, Q4H PRN  potassium bicarbonate, 35 mEq, PRN  potassium bicarbonate, 50 mEq, PRN  potassium bicarbonate, 60 mEq, PRN  potassium, sodium phosphates, 2 packet, PRN  potassium, sodium phosphates, 2 packet, PRN  potassium, sodium phosphates, 2 packet, PRN      Today I personally reviewed pertinent medications, lines/drains/airways, imaging, cardiology results, laboratory results, microbiology results, notably:    Diet  Diet Adult Regular (IDDSI Level 7)  Diet Adult Regular (IDDSI Level 7)

## 2024-03-02 NOTE — ASSESSMENT & PLAN NOTE
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     -Admitted to [facility]  -NSGY following; s/p crani for tumor resection  -VS/Neuro checks q1  -HOB >/= 30  -Keppra 500 BID  -Dex taper  -SBP goal <140  -PRN labetalol/hydralazine  -Consider resuming home atenolol as needed  -CBC, CMP, Mag, Phos daily  -Na goal > 135  -Monitor I/Os  -Start SQH when clinically indicated   -PT/OT/SLP as appropriate  -Plan for likely stepdown to NSGY tomorrow   -ok to step down to HM

## 2024-03-02 NOTE — PLAN OF CARE
D/c law  Decadron for cerebral edema  Keppra for seizure prophylaxis  SBP <160mmhg  Resume home atenolol  Oob to chair  Tolerating po

## 2024-03-02 NOTE — PLAN OF CARE
"[facility] Care Plan    POC reviewed with Crow Hines at 0300. Pt verbalized understanding. Questions and concerns addressed. No acute events overnight. Pt progressing toward goals. Will continue to monitor. See below and flowsheets for full assessment and VS info.     Pain control overnight with PRNs     Is this a stroke patient? no    Neuro:  Westley Coma Scale  Best Eye Response: 3-->(E3) to speech  Best Motor Response: 6-->(M6) obeys commands  Best Verbal Response: 4-->(V4) confused  Westley Coma Scale Score: 13  Assessment Qualifiers: no eye obstruction present, patient not sedated/intubated  Pupil PERRLA: yes     24hr Temp:  [98 °F (36.7 °C)-98.4 °F (36.9 °C)]     CV:   Rhythm: sinus tachycardia  BP goals:   SBP < 140  MAP > 65    Resp:           Plan: N/A    GI/:     Diet/Nutrition Received: regular  Last Bowel Movement: 02/26/24  Voiding Characteristics: urethral catheter (bladder)    Intake/Output Summary (Last 24 hours) at 3/2/2024 0552  Last data filed at 3/2/2024 0501  Gross per 24 hour   Intake 228.27 ml   Output 1530 ml   Net -1301.73 ml          Labs/Accuchecks:  Recent Labs   Lab 03/02/24  0219   WBC 14.33*   RBC 3.54*   HGB 11.3*   HCT 35.2*   *      Recent Labs   Lab 03/02/24  0219   *   K 4.3   CO2 24   CL 99   BUN 15   CREATININE 0.7   ALKPHOS 292*   *   AST 84*   BILITOT 0.4      Recent Labs   Lab 02/27/24  0707   INR 1.0   APTT 22.5    No results for input(s): "CPK", "CPKMB", "TROPONINI", "MB" in the last 168 hours.    Electrolytes: N/A - electrolytes WDL  Accuchecks: none    Gtts:      LDA/Wounds:    Nurses Note -- 4 Eyes    Is there altered skin present? no     Prevention Measures Documented    Second RN/Staff Member:  Otilia EVANS      Problem: Adult Inpatient Plan of Care  Goal: Plan of Care Review  Outcome: Ongoing, Progressing  Goal: Patient-Specific Goal (Individualized)  Outcome: Ongoing, Progressing  Goal: Absence of Hospital-Acquired Illness or Injury  Outcome: " Ongoing, Progressing  Goal: Optimal Comfort and Wellbeing  Outcome: Ongoing, Progressing  Goal: Readiness for Transition of Care  Outcome: Ongoing, Progressing     Problem: Adjustment to Illness (Stroke, Hemorrhagic)  Goal: Optimal Coping  Outcome: Ongoing, Progressing     Problem: Bowel Elimination Impaired (Stroke, Hemorrhagic)  Goal: Effective Bowel Elimination  Outcome: Ongoing, Progressing     Problem: Cerebral Tissue Perfusion (Stroke, Hemorrhagic)  Goal: Optimal Cerebral Tissue Perfusion  Outcome: Ongoing, Progressing     Problem: Cognitive Impairment (Stroke, Hemorrhagic)  Goal: Optimal Cognitive Function  Outcome: Ongoing, Progressing     Problem: Communication Impairment (Stroke, Hemorrhagic)  Goal: Effective Communication Skills  Outcome: Ongoing, Progressing     Problem: Functional Ability Impaired (Stroke, Hemorrhagic)  Goal: Optimal Functional Ability  Outcome: Ongoing, Progressing     Problem: Pain (Stroke, Hemorrhagic)  Goal: Acceptable Pain Control  Outcome: Ongoing, Progressing     Problem: Respiratory Compromise (Stroke, Hemorrhagic)  Goal: Effective Oxygenation and Ventilation  Outcome: Ongoing, Progressing     Problem: Sensorimotor Impairment (Stroke, Hemorrhagic)  Goal: Improved Sensorimotor Function  Outcome: Ongoing, Progressing     Problem: Swallowing Impairment (Stroke, Hemorrhagic)  Goal: Optimal Eating and Swallowing Without Aspiration  Outcome: Ongoing, Progressing     Problem: Urinary Elimination Impaired (Stroke, Hemorrhagic)  Goal: Effective Urinary Elimination  Outcome: Ongoing, Progressing     Problem: Infection  Goal: Absence of Infection Signs and Symptoms  Outcome: Ongoing, Progressing     Problem: Skin Injury Risk Increased  Goal: Skin Health and Integrity  Outcome: Ongoing, Progressing

## 2024-03-02 NOTE — SUBJECTIVE & OBJECTIVE
"Interval History: naeon, exam stable, incision CDI. Stable to TTF to onc/HM from NSGY perspective            Review of Systems  Objective:     Weight: 47.6 kg (105 lb)  Body mass index is 18.6 kg/m².  Vital Signs (Most Recent):  Temp: 98.1 °F (36.7 °C) (03/02/24 0705)  Pulse: 103 (03/02/24 0905)  Resp: 12 (03/02/24 1007)  BP: 131/78 (03/02/24 0905)  SpO2: (!) 93 % (03/02/24 0905) Vital Signs (24h Range):  Temp:  [98 °F (36.7 °C)-98.4 °F (36.9 °C)] 98.1 °F (36.7 °C)  Pulse:  [] 103  Resp:  [9-23] 12  SpO2:  [91 %-94 %] 93 %  BP: (107-151)/(71-98) 131/78     Date 03/02/24 0700 - 03/03/24 0659   Shift 3350-5620 4325-5196 2151-8515 24 Hour Total   INTAKE   Shift Total(mL/kg)       OUTPUT   Urine(mL/kg/hr) 140   140   Shift Total(mL/kg) 140(2.9)   140(2.9)   Weight (kg) 47.6 47.6 47.6 47.6                         Female External Urinary Catheter w/ Suction 02/25/24 2150 (Active)   Skin no redness;no breakdown 02/27/24 0701   Tolerance no signs/symptoms of discomfort 02/27/24 0701   Suction Continuous suction at 70 mmHg 02/27/24 0701   Date of last wick change 02/26/24 02/27/24 0701   Time of last wick change 0600 02/26/24 1901   Output (mL) 0 mL 02/27/24 0400          Physical Exam         Awake alert  Confused   Oriented x1-2  Pupils reactive  Following commands stain for all antigravity   Moving all in good tone  Dressing in place  Satting well on room air   Heart rate is regular   No distress          Significant Labs:  Recent Labs   Lab 03/01/24  0108 03/02/24  0219   * 109   * 133*   K 4.5 4.3    99   CO2 23 24   BUN 14 15   CREATININE 0.7 0.7   CALCIUM 9.3 9.6   MG 2.3 2.2       Recent Labs   Lab 03/01/24  0108 03/02/24  0219   WBC 14.46* 14.33*   HGB 11.2* 11.3*   HCT 34.6* 35.2*   * 583*       No results for input(s): "LABPT", "INR", "APTT" in the last 48 hours.    Microbiology Results (last 7 days)       ** No results found for the last 168 hours. **          All pertinent labs " from the last 24 hours have been reviewed.    Neurosurgery Physical Exam

## 2024-03-02 NOTE — PROGRESS NOTES
[facility]  Neurocritical Care  Progress Note    Admit Date: 2/27/2024  Service Date: 03/02/2024  Length of Stay: 4    Subjective:     Chief Complaint: Brain mass    History of Present Illness: 64 y/o F with pMHx of HTN and rheumatoid arthritis presenting as transfer from OSH for neurosurgery eval of L temporal lobe mass. Initially presented to the ED with complaints that everything hurts.   Reportedly began seeing a rheumatologist 9 months ago where she was diagnosed w/ RA. Methotrexate therapy was initiated. Patient reported a continually decline since that time, leading to her being bed-bound for the last 2 months 2/2 to difficulty walking. She stated her rheumatologist believed that she was not tolerating the methotrexate. On presentation to ED, she reported inability to tolerate abdominal pain any longer. Lab work significant for alk phos 203, AST 57, sodium 132, platelets 586. CXR revealed linear and ill-defined hazy opacities of the right mid to lower lung, felt to reflect infiltrate. Diffuse abdominal pain with focal increased tenderness in mid-epigastric area prompted CT abd/pelvis revealing numerous metastatic masses in the liver, metastatic deposits in the spleen and bilateral adrenal masses, hypoattenuating and mildly enhancing mass of the upper pole L kidney. Also w/ intra-abdominal enlarged lymph nodes, consistent with metastatic infiltration, masses of the R lung base with consolidation of the visualized R renal lower lobe and RLL 9 mm nodule. L2 compression fracture w/ mild sclerosis of the vertebra noted, likely pathologic. CTH completed showing large 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 [place] [facility] [place] and will be admitted to [facility] for further management.     Hospital Course: 02/28/2024 Plan for OR tomorrow; multimodal pain control   02/29/2024 OR today   03/01/2024 C/o back pain/HA unrelieved by current PRN regimen.  Increased frequency of oxycodone. Likely stepdown tomorrow per NSGY.   3/2/24: ok to step down to HM    Interval History:  Ok to step down to HM  Review of Systems   Constitutional: Negative.    HENT: Negative.     Eyes: Negative.    Respiratory: Negative.     Cardiovascular: Negative.    Gastrointestinal: Negative.    Endocrine: Negative.    Genitourinary: Negative.    Musculoskeletal: Negative.    Skin: Negative.    Neurological:  Positive for headaches.     2 systems  Objective:     Vitals:  Temp: 98.2 °F (36.8 °C)  Pulse: 87  Rhythm: normal sinus rhythm  BP: 120/77  MAP (mmHg): 94  Resp: 11  SpO2: 97 %    Temp  Min: 98 °F (36.7 °C)  Max: 98.4 °F (36.9 °C)  Pulse  Min: 85  Max: 116  BP  Min: 107/72  Max: 151/92  MAP (mmHg)  Min: 84  Max: 116  Resp  Min: 9  Max: 23  SpO2  Min: 92 %  Max: 98 %    03/01 0701 - 03/02 0700  In: 228.3 [P.O.:180]  Out: 1515 [Urine:1515]            Physical Exam  Vitals and nursing note reviewed.   Constitutional:       Appearance: Normal appearance.   HENT:      Head: Normocephalic.      Nose: Nose normal.      Mouth/Throat:      Mouth: Mucous membranes are moist.      Pharynx: Oropharynx is clear.   Eyes:      Pupils: Pupils are equal, round, and reactive to light.   Cardiovascular:      Rate and Rhythm: Normal rate and regular rhythm.      Pulses: Normal pulses.      Heart sounds: Normal heart sounds.   Pulmonary:      Effort: Pulmonary effort is normal.      Breath sounds: Normal breath sounds.   Abdominal:      General: Bowel sounds are normal.      Palpations: Abdomen is soft.   Musculoskeletal:         General: Normal range of motion.   Skin:     General: Skin is warm and dry.      Capillary Refill: Capillary refill takes 2 to 3 seconds.   Neurological:      Mental Status: She is alert.      Comments: E4 V4 M6  -PERRL  -Oriented to person and place; intermittent orientation to time  -Follows commands in all extremities  -LONG equally/spontaneously/purposefully  -RUE 5/5  -LUE  5/5  -RLE 5/5  -LLE 5/5           Unable to test orientation, language, memory, judgment, insight, fund of knowledge, hearing, shoulder shrug, tongue protrusion, coordination, gait due to level of consciousness.       Medications:  Continuous ScheduledatenoloL, 50 mg, Daily  dexAMETHasone, 4 mg, Q8H  enoxparin, 30 mg, Q24H (prophylaxis, 1700)  famotidine, 20 mg, BID  gabapentin, 100 mg, TID  levETIRAcetam, 500 mg, BID  mupirocin, , BID  polyethylene glycol, 17 g, Daily  senna-docusate 8.6-50 mg, 2 tablet, BID    PRNacetaminophen, 650 mg, Q6H PRN  bisacodyL, 10 mg, Daily PRN  hydrALAZINE, 10 mg, Q4H PRN  HYDROmorphone, 1 mg, Q4H PRN  labetalol, 10 mg, Q4H PRN  magnesium oxide, 800 mg, PRN  magnesium oxide, 800 mg, PRN  methocarbamoL, 500 mg, TID PRN  ondansetron, 4 mg, Q8H PRN  oxyCODONE, 10 mg, Q4H PRN  oxyCODONE, 5 mg, Q4H PRN  potassium bicarbonate, 35 mEq, PRN  potassium bicarbonate, 50 mEq, PRN  potassium bicarbonate, 60 mEq, PRN  potassium, sodium phosphates, 2 packet, PRN  potassium, sodium phosphates, 2 packet, PRN  potassium, sodium phosphates, 2 packet, PRN      Today I personally reviewed pertinent medications, lines/drains/airways, imaging, cardiology results, laboratory results, microbiology results, notably:    Diet  Diet Adult Regular (IDDSI Level 7)  Diet Adult Regular (IDDSI Level 7)        Assessment/Plan:     Neuro  * Brain mass  64 y/o F presenting as transfer from OSH for neurosurgery eval of 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     -Admitted to [facility]  -NSGY following; s/p crani for tumor resection  -VS/Neuro checks q1  -HOB >/= 30  -Keppra 500 BID  -Dex taper  -SBP goal <140  -PRN labetalol/hydralazine  -Consider resuming home atenolol as needed  -CBC, CMP, Mag, Phos daily  -Na goal > 135  -Monitor I/Os  -Start SQH when clinically indicated   -PT/OT/SLP as appropriate  -Plan for likely stepdown to NSGY tomorrow   -ok to step down to HM    Midline shift of brain  -Noted on  imaging  -See Brain mass      Vasogenic brain edema  -Noted on imaging  -See Brain mass     Compression fracture of lumbar spine, non-traumatic  -Noted on imaging  -Likely pathologic  -NSGY consulted  -Heme/Onc consulted  -May need brace when OOB    Cardiac/Vascular  HTN (hypertension)  -SBP goal < 140  -PRN labetalol/hydralazine    Hematology  Acute pulmonary embolism without acute cor pulmonale  -Noted on CT chest w/ contrast - small volume of pulmonary thromboembolus in the right middle lobe without finding of right heart strain/failure   -No anticoagulation for now; POD 1  -Monitor resp status    Oncology  Metastatic neoplastic disease  -CT abd/pelvis w/ numerous metastatic masses in the liver, likely metastatic deposit in the spleen; bilateral adrenal masses, hypoattenuating and mildly enhancing mass of upper pole L kidney, intra-abdominal enlarged lymph nodes consistent w/ metastatic infiltration.   -Also w/ masses to R lung base w/ consolidation of visualized R renal lower lobe; R lower lobe 9mm nodule  -L2 vertebra compression fracture w/ mild sclerosis of the vertebra, likely reflecting a pathologic fracture; faint sclerosis of the superior endplate of S1 likely reflects metastatic infiltration  -Heme/Onc consulted  -Multimodal pain control  -Consider Palliative Care consult; will discuss w/ patient tomorrow    Endocrine  Moderate malnutrition  Nutrition consulted. Most recent weight and BMI monitored-     Measurements:  Wt Readings from Last 1 Encounters:   02/28/24 47.6 kg (105 lb)   Body mass index is 18.6 kg/m².    Patient has been screened and assessed by RD.    Malnutrition Type:  Context: chronic illness  Level: moderate    Malnutrition Characteristic Summary:  Weight Loss (Malnutrition): greater than 7.5% in 3 months (-15% x 3mo)  Subcutaneous Fat (Malnutrition): mild depletion  Muscle Mass (Malnutrition): mild depletion    Interventions/Recommendations (treatment strategy):  1.) Recommend  continuing with Regular diet as tolerated. 2.) If PO intake is <50%, recommend Boost Plus BID (or Boost alternate) to help meet needs. 3.) RD to monitor wt, PO intake, skin, labs.            The patient is being Prophylaxed for:  Venous Thromboembolism with: Mechanical or Chemical  Stress Ulcer with: H2B  Ventilator Pneumonia with: not applicable    Activity Orders            Diet Adult Regular (IDDSI Level 7): Regular starting at 02/29 1249    Progressive Mobility Protocol (mobilize patient to their highest level of functioning at least twice daily) starting at 02/27 0800    Turn patient starting at 02/27 0600    Elevate HOB starting at 02/27 0544          Full Code  Level 3  Rosey Bonner NP  Neurocritical Care  [facility] - Neuro Critical Care

## 2024-03-02 NOTE — PT/OT/SLP RE-EVAL
Physical Therapy Re-evaluation  Co-evaluation with OT due to acuity of condition, level of skilled assist needed for assessment of safety with mobility.    Patient Name:  Crow Hines   MRN:  [id number]    Recommendations:     Discharge Recommendations: High Intensity Therapy  Discharge Equipment Recommendations: hospital bed, wheelchair, lift device   Barriers to discharge: Inaccessible home, Decreased caregiver support, and patient below functional baseline    Assessment:     Crow Hines is a 63 y.o. female admitted with a medical diagnosis of Brain mass.  She presents with the following impairments/functional limitations: weakness, impaired balance, impaired endurance, impaired self care skills, impaired functional mobility, gait instability, decreased lower extremity function, decreased upper extremity function, pain, decreased safety awareness, impaired coordination. The patient presents for therapy re-evaluation s/p crani with L temporal mass excision. Patient pre-medicated prior to therapy evaluation. Patient with L2 spine fracture, LSO for comfort with out of bed mobility.  Patient with impaired attention, participation in evaluation limited by pain in sitting. Patient stand by assistance for supine to sit, she required increased assist for sit to supine due to pain and fatigue and to maintain spinal precautions. Patient demo'd good sitting balance, sat EOB~10 min with stand by assistance, sitting tolerance limited 2/2 back and R LE pain. Patient presents with good participation and motivation to return to prior level of function with high intensity therapy.  The patient demonstrates appropriate endurance, strength, and fine motor control to participate in up to 3 hours or 15hrs of combined therapy post acute.     Rehab Prognosis:  Good; patient would benefit from acute skilled PT services to address these deficits and reach maximum level of function.      Recent Surgery: Procedure(s) (LRB):  CRANIOTOMY,  "WITH NEOPLASM EXCISION USING COMPUTER-ASSISTED NAVIGATION (Left) 2 Days Post-Op    Plan:     During this hospitalization, patient to be seen 3 x/week to address the above listed problems via gait training, therapeutic activities, therapeutic exercises, neuromuscular re-education  Plan of Care Expires:  04/01/24  Plan of Care Reviewed with: patient    Subjective     Communicated with RN prior to session.  Patient found HOB elevated with blood pressure cuff, pulse ox (continuous), telemetry, peripheral IV, law catheter upon PT entry to room, agreeable to evaluation.      Chief Complaint: "I don't want to stand today, don't make me try it"; "You need to go slow, don't jerk me around"  Patient comments/goals: return to PLOF, increase independence with mobility  Pain/Comfort:  Pain Rating 1:  (significant pain especially with mobility and in sitting, pt did not rate)  Location - Side 1: Right  Location 1:  (back, hip, and knee)  Pain Addressed 1: Distraction, Reposition, Cessation of Activity, Nurse notified, Pre-medicate for activity    Patients cultural, spiritual, Lutheran conflicts given the current situation: no      Objective:     Patient found with: blood pressure cuff, pulse ox (continuous), telemetry, peripheral IV, law catheter     General Precautions: Standard, fall  Orthopedic Precautions: spinal precautions  Braces: LSO for comfort  Respiratory Status: room air    Exams:    Cognitive Exam  Patient is A&O x4 and follows 100% of one -step commands; participation with mobility behaviorally limited and pain limited   Fine Motor Coordination   -       WNL  LE   Postural Exam Patient presented with the following abnormalities:    -       Rounded shoulders  -       Forward head  -       Kyphosis  -       Posterior pelvic tilt  -       Weight shift posterior and L, R LE pain   Sensation    -       Light touch intact TITA LE   Skin Integrity/Edema     -       Skin integrity: crani incision clean and dry  -       " Edema: NA   R LE ROM WNL   R LE Strength 3/5 hip flexion, knee ext/flex, and ankle DF/PF; questionable effort, required increased cuing for patient to follow MMT instructions on R LE vs L   L LE ROM WNL   L LE Strength  3/5 hip flexion, knee ext/flex, and ankle DF/PF; questionable effort       Balance   Static Sitting stand by assistance    Dynamic Sitting contact guard assist    Static Standing NA   Dynamic Standing       NA        Functional Mobility:    Bed Mobility  Supine to Sit on the R side:  stand by assistance   Sit to supine: moderate assistance at LE and trunk, log roll, assist due to pain and spinal precautions  Scoot to HOB in supine: total assistance x2 drawsheet  Scoot to EOB in sitting: minimum assistance    Transfers Sit to Stand:  deferred due to pain and fatigue in spite of encouragement to attempt        AM-PAC 6 CLICK MOBILITY  Total Score:10       Treatment and Education:   Patient educated on:  -role of therapy  -goals of session  -PT POC  -benefits of out of bed mobility and consequences of immobility  -calling for staff assist to mobilize safely  Patient agreeable to mobilize with therapy.      Sitting edge of bed ~10 minutes, stand by assistance to contact guard assist , weight shift L- R LE pain   -Posterior pelvic tilt, kyphosis, cervical flexion  -Visual/verbal/manual cues for midline orientation, thoracic and cervical extension  -Patient performed self care tasks with OT in sitting, PT cuing for midline alignment in sitting    Patient encouraged to attempt to stand next session with therapy, educated on benefits of mobility/consequences of immobility. Patient verbalized understanding.     Patient left HOB elevated with all lines intact, call button in reach, and RN notified.    GOALS:   Multidisciplinary Problems       Physical Therapy Goals          Problem: Physical Therapy    Goal Priority Disciplines Outcome Goal Variances Interventions   Physical Therapy Goal     PT, PT/OT Ongoing,  Progressing     Description: Goals to be met by: 3/12/2024     Patient will increase functional independence with mobility by performin. Supine to sit with [place]  2. Sit to supine with [place]  3. Rolling to Left and Right with [place].  4. Sit to stand transfer with Contact Guard Assistance with RW  5. Bed to chair transfer with Contact Guard Assistance using Rolling Walker  6. Gait  x 50 feet with Contact Guard Assistance using Rolling Walker.   7. Ascend/descend 3 stair with right Handrails Minimal Assistance using No Assistive Device.   8. Lower extremity exercise program x10 reps per handout, with supervision                             History:     Past Medical History:   Diagnosis Date    Arthritis        Past Surgical History:   Procedure Laterality Date    TUBAL LIGATION         Time Tracking:     PT Received On: 24  PT Start Time: 1054     PT Stop Time: 1114  PT Total Time (min): 20 min     Billable Minutes: Re-eval 10 and Therapeutic Activity 10      2024

## 2024-03-02 NOTE — PLAN OF CARE
Chart review hospitalist acceptance note    62 y/o Female admitted as transfer from OSH for L temporal lobe mass presenting w/ difficulty walking. Lab work significant for alk phos 203, AST 57, sodium 132, platelets 586. CXR revealed linear and ill-defined hazy opacities of the right mid to lower lung, felt to reflect infiltrate. Diffuse abdominal pain with focal increased tenderness in mid-epigastric area prompted CT abd/pelvis revealing numerous metastatic masses in the liver, metastatic deposits in the spleen and bilateral adrenal masses, hypoattenuating and mildly enhancing mass of the upper pole L kidney. Also w/ intra-abdominal enlarged lymph nodes, consistent with metastatic infiltration, masses of the R lung base with consolidation of the visualized R renal lower lobe and RLL 9 mm nodule. L2 compression fracture w/ mild sclerosis of the vertebra noted, likely pathologic. CTH completed showing large 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 Pt underwent L temporal craniotomy tumor resection on 2/29.      Left temporal lobe mass w/ vasogenic edema  -status post tumor resection on 02/29, pathology pending   -scheduled dexamethasone   -Keppra for seizure prophylaxis   -PT/OT  -blood pressure control  -pain control  -neurochecks    Acute PE without cor pulmonale  -Noted on CT chest w/ contrast - small volume of pulmonary thromboembolus in the right middle lobe without finding of right heart strain/failure   -No fulldose anticoagulation for now; POD 1  -Monitor resp status    Metastatic neoplastic disease  -CT abd/pelvis w/ numerous metastatic masses in the liver, likely metastatic deposit in the spleen; bilateral adrenal masses, hypoattenuating and mildly enhancing mass of upper pole L kidney, intra-abdominal enlarged lymph nodes consistent w/ metastatic infiltration.   -masses to R lung base w/ consolidation of visualized R renal lower lobe; R lower lobe 9mm nodule  -L2 vertebra compression  fracture w/ mild sclerosis of the vertebra, likely reflecting a pathologic fracture; faint sclerosis of the superior endplate of S1 likely reflects metastatic infiltration  -Heme/Onc consulted - f/u OR path results  -Multimodal pain control  -Consider Palliative Care consult

## 2024-03-02 NOTE — PT/OT/SLP RE-EVAL
"Occupational Therapy   Co- Re-evaluation and Treatment with PT   Co-treatment and co-eval performed due to patient's multiple deficits requiring two skilled therapists to appropriately and safely assess patient's strength and endurance while facilitating functional tasks in addition to accommodating for patient's activity tolerance.        Name: Crow Hines  MRN: [id number]  Admitting Diagnosis:  Brain mass  Recent Surgery: Procedure(s) (LRB):  CRANIOTOMY, WITH NEOPLASM EXCISION USING COMPUTER-ASSISTED NAVIGATION (Left) 2 Days Post-Op    Recommendations:     Discharge Recommendations: High Intensity Therapy  Discharge Equipment Recommendations: hospital bed, wheelchair, lift device  Barriers to discharge:  Other (Comment) (decreased functional abilities to complete self care and mobility tasks)    Assessment:     Crow Hines is a 63 y.o. female with a medical diagnosis of Brain mass.  .  Performance deficits affecting function are weakness, impaired endurance, impaired sensation, impaired self care skills, impaired functional mobility, gait instability, impaired balance, impaired cognition, decreased coordination, decreased upper extremity function, decreased lower extremity function, decreased safety awareness, pain, decreased ROM, impaired coordination, impaired fine motor, impaired cardiopulmonary response to activity.      Rehab Prognosis:  Good; patient would benefit from acute skilled OT services to address these deficits and reach maximum level of function.       Plan:     Patient to be seen 3 x/week to address the above listed problems via self-care/home management, therapeutic activities, therapeutic exercises, neuromuscular re-education  Plan of Care Expires: 03/27/24  Plan of Care Reviewed with: patient    Subjective     Chief Complaint: "Slow down. I'll get up on my own"  Patient/Family stated goals: Get better  Communicated with: RN prior to session.  Pain/Comfort:  Pain Rating 1: 8/10  Location - " Orientation 1: lower  Location 1: back    Objective:     Communicated with: RN prior to session.  Patient found supine with: law catheter, SCD upon OT entry to room.    General Precautions: Standard, fall  Orthopedic Precautions: spinal precautions  Braces: TLSO  Respiratory Status: Room air    Occupational Performance:    Bed Mobility:    Patient completed Rolling/Turning to Left with  stand by assistance  Patient completed Scooting/Bridging with stand by assistance  Patient completed Supine to Sit with stand by assistance  Patient completed Sit to Supine with minimum assistance    Functional Mobility/Transfers:  Functional Mobility: Patient refusing OOB mobility despite numerous attempts from therapists. Patient sat EOB completing MMTs and self care tasks.     Activities of Daily Living:  Grooming: stand by assistance with RING sitting at EOB    Cognitive/Visual Perceptual:  Cognitive/Psychosocial Skills:     -       Oriented to: Person, Place, Time, and Situation   -       Follows Commands/attention:Follows multistep  commands  -       Safety awareness/insight to disability: impaired   -       Mood/Affect/Coping skills/emotional control: Lashes out and Guarded    Physical Exam:  Balance:    -       EOB sitting SBA; denied standing  Dominant hand:    -       RIght  Upper Extremity Range of Motion:     -       Right Upper Extremity: WFL  -       Left Upper Extremity: WFL  Upper Extremity Strength:    -       Right Upper Extremity: Deficits: deficits in strength 3/5 BUE  -       Left Upper Extremity: Deficits: deficits in strength 3/5 BUE   Strength:    -       Right Upper Extremity: Deficits: weak  strength  -       Left Upper Extremity: Deficits: weak  strength    AMPAC 6 Click:  AMPAC Total Score: 15    Treatment & Education:  Re-evaluation and treatment with PT as stated above. Patient will continue to benefit from therapy services while in hospital and will benefit from high intensity therapy after  d/c.   -Education on energy conservation and task modification to maximize safety and (I) during ADLs and mobility  -Education on importance of OOB activity to improve overall activity tolerance and promote recovery  -Pt educated to call for assistance and to transfer with hospital staff only  -Provided education regarding role of OT, POC, & discharge recommendations with pt verbalizing understanding.  Pt had no further questions & when asked whether there were any concerns pt reported none.      Patient left supine with all lines intact, call button in reach, and nursing notified    GOALS:   Multidisciplinary Problems       Occupational Therapy Goals          Problem: Occupational Therapy    Goal Priority Disciplines Outcome Interventions   Occupational Therapy Goal     OT, PT/OT Ongoing, Progressing    Description: Goals to be met by: 3/27/24     Patient will increase functional independence with ADLs by performing:    UE Dressing with Minimal Assistance.  LE Dressing with Maximum Assistance.  Grooming while EOB with Moderate Assistance.  Toileting from bedside commode with Maximum Assistance for hygiene and clothing management.   Rolling to Bilateral with Moderate Assistance.   Supine to sit with Contact Guard Assistance.  Stand pivot transfers with Maximum Assistance.  Step transfer with Maximum Assistance  Toilet transfer to [facility] with Maximum Assistance.                         History:     Past Medical History:   Diagnosis Date    Arthritis          Past Surgical History:   Procedure Laterality Date    TUBAL LIGATION  2002       Time Tracking:     OT Date of Treatment: 03/02/24  OT Start Time: 1054  OT Stop Time: 1114  OT Total Time (min): 20 min    Billable Minutes:Re-eval 10  Self Care/Home Management 10    3/2/2024

## 2024-03-02 NOTE — PLAN OF CARE
Patient re-evaluated. Goals remain appropriate.     Goals to be met by: 3/27/24     Patient will increase functional independence with ADLs by performing:    UE Dressing with Minimal Assistance.  LE Dressing with Maximum Assistance.  Grooming while EOB with Moderate Assistance.  Toileting from bedside commode with Maximum Assistance for hygiene and clothing management.   Rolling to Bilateral with Moderate Assistance.   Supine to sit with Contact Guard Assistance.  Stand pivot transfers with Maximum Assistance.  Step transfer with Maximum Assistance  [place] [facility] [place].
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
## 2024-03-03 LAB
ALBUMIN SERPL BCP-MCNC: 2.4 G/DL (ref 3.5–5.2)
ALP SERPL-CCNC: 316 U/L (ref 55–135)
ALT SERPL W/O P-5'-P-CCNC: 89 U/L (ref 10–44)
ANION GAP SERPL CALC-SCNC: 9 MMOL/L (ref 8–16)
AST SERPL-CCNC: 81 U/L (ref 10–40)
BASOPHILS # BLD AUTO: 0.01 K/UL (ref 0–0.2)
BASOPHILS NFR BLD: 0.1 % (ref 0–1.9)
BILIRUB SERPL-MCNC: 0.4 MG/DL (ref 0.1–1)
BUN SERPL-MCNC: 14 MG/DL (ref 8–23)
CALCIUM SERPL-MCNC: 9.2 MG/DL (ref 8.7–10.5)
CHLORIDE SERPL-SCNC: 98 MMOL/L (ref 95–110)
CO2 SERPL-SCNC: 26 MMOL/L (ref 23–29)
CREAT SERPL-MCNC: 0.7 MG/DL (ref 0.5–1.4)
DIFFERENTIAL METHOD BLD: ABNORMAL
EOSINOPHIL # BLD AUTO: 0 K/UL (ref 0–0.5)
EOSINOPHIL NFR BLD: 0 % (ref 0–8)
ERYTHROCYTE [DISTWIDTH] IN BLOOD BY AUTOMATED COUNT: 13.9 % (ref 11.5–14.5)
EST. GFR  (NO RACE VARIABLE): >60 ML/MIN/1.73 M^2
GLUCOSE SERPL-MCNC: 89 MG/DL (ref 70–110)
HCT VFR BLD AUTO: 32.7 % (ref 37–48.5)
HGB BLD-MCNC: 10.7 G/DL (ref 12–16)
IMM GRANULOCYTES # BLD AUTO: 0.14 K/UL (ref 0–0.04)
IMM GRANULOCYTES NFR BLD AUTO: 1.1 % (ref 0–0.5)
LYMPHOCYTES # BLD AUTO: 0.7 K/UL (ref 1–4.8)
LYMPHOCYTES NFR BLD: 5.7 % (ref 18–48)
MAGNESIUM SERPL-MCNC: 2 MG/DL (ref 1.6–2.6)
MCH RBC QN AUTO: 32.6 PG (ref 27–31)
MCHC RBC AUTO-ENTMCNC: 32.7 G/DL (ref 32–36)
MCV RBC AUTO: 100 FL (ref 82–98)
MONOCYTES # BLD AUTO: 1.2 K/UL (ref 0.3–1)
MONOCYTES NFR BLD: 9.7 % (ref 4–15)
NEUTROPHILS # BLD AUTO: 10.2 K/UL (ref 1.8–7.7)
NEUTROPHILS NFR BLD: 83.4 % (ref 38–73)
NRBC BLD-RTO: 0 /100 WBC
PHOSPHATE SERPL-MCNC: 2.7 MG/DL (ref 2.7–4.5)
PLATELET # BLD AUTO: 616 K/UL (ref 150–450)
PMV BLD AUTO: 9 FL (ref 9.2–12.9)
POTASSIUM SERPL-SCNC: 4.2 MMOL/L (ref 3.5–5.1)
PROT SERPL-MCNC: 5.9 G/DL (ref 6–8.4)
RBC # BLD AUTO: 3.28 M/UL (ref 4–5.4)
SODIUM SERPL-SCNC: 133 MMOL/L (ref 136–145)
WBC # BLD AUTO: 12.25 K/UL (ref 3.9–12.7)

## 2024-03-03 PROCEDURE — 25000003 PHARM REV CODE 250

## 2024-03-03 PROCEDURE — 85025 COMPLETE CBC W/AUTO DIFF WBC: CPT | Performed by: REGISTERED NURSE

## 2024-03-03 PROCEDURE — 20000000 HC ICU ROOM

## 2024-03-03 PROCEDURE — 63600175 PHARM REV CODE 636 W HCPCS

## 2024-03-03 PROCEDURE — 20600001 HC STEP DOWN PRIVATE ROOM

## 2024-03-03 PROCEDURE — 25000003 PHARM REV CODE 250: Performed by: PSYCHIATRY & NEUROLOGY

## 2024-03-03 PROCEDURE — 84100 ASSAY OF PHOSPHORUS: CPT | Performed by: REGISTERED NURSE

## 2024-03-03 PROCEDURE — 94761 N-INVAS EAR/PLS OXIMETRY MLT: CPT

## 2024-03-03 PROCEDURE — 63600175 PHARM REV CODE 636 W HCPCS: Performed by: STUDENT IN AN ORGANIZED HEALTH CARE EDUCATION/TRAINING PROGRAM

## 2024-03-03 PROCEDURE — 83735 ASSAY OF MAGNESIUM: CPT | Performed by: REGISTERED NURSE

## 2024-03-03 PROCEDURE — 80053 COMPREHEN METABOLIC PANEL: CPT | Performed by: REGISTERED NURSE

## 2024-03-03 PROCEDURE — 63600175 PHARM REV CODE 636 W HCPCS: Performed by: PHYSICIAN ASSISTANT

## 2024-03-03 PROCEDURE — 99499 UNLISTED E&M SERVICE: CPT | Mod: ,,, | Performed by: NURSE PRACTITIONER

## 2024-03-03 PROCEDURE — 25000003 PHARM REV CODE 250: Performed by: REGISTERED NURSE

## 2024-03-03 PROCEDURE — 99233 SBSQ HOSP IP/OBS HIGH 50: CPT | Mod: FS,,, | Performed by: PSYCHIATRY & NEUROLOGY

## 2024-03-03 RX ORDER — POLYETHYLENE GLYCOL 3350 17 G/17G
17 POWDER, FOR SOLUTION ORAL 2 TIMES DAILY
Status: DISCONTINUED | OUTPATIENT
Start: 2024-03-03 | End: 2024-03-12

## 2024-03-03 RX ADMIN — OXYCODONE HYDROCHLORIDE 10 MG: 10 TABLET ORAL at 02:03

## 2024-03-03 RX ADMIN — GABAPENTIN 100 MG: 100 CAPSULE ORAL at 09:03

## 2024-03-03 RX ADMIN — DEXAMETHASONE SODIUM PHOSPHATE 4 MG: 4 INJECTION INTRA-ARTICULAR; INTRALESIONAL; INTRAMUSCULAR; INTRAVENOUS; SOFT TISSUE at 06:03

## 2024-03-03 RX ADMIN — DOCUSATE SODIUM AND SENNOSIDES 2 TABLET: 8.6; 5 TABLET, FILM COATED ORAL at 09:03

## 2024-03-03 RX ADMIN — GABAPENTIN 100 MG: 100 CAPSULE ORAL at 03:03

## 2024-03-03 RX ADMIN — DEXAMETHASONE SODIUM PHOSPHATE 4 MG: 4 INJECTION INTRA-ARTICULAR; INTRALESIONAL; INTRAMUSCULAR; INTRAVENOUS; SOFT TISSUE at 09:03

## 2024-03-03 RX ADMIN — FAMOTIDINE 20 MG: 20 TABLET ORAL at 09:03

## 2024-03-03 RX ADMIN — OXYCODONE 5 MG: 5 TABLET ORAL at 01:03

## 2024-03-03 RX ADMIN — POLYETHYLENE GLYCOL 3350 17 G: 17 POWDER, FOR SOLUTION ORAL at 09:03

## 2024-03-03 RX ADMIN — HYDROMORPHONE HYDROCHLORIDE 1 MG: 1 INJECTION, SOLUTION INTRAMUSCULAR; INTRAVENOUS; SUBCUTANEOUS at 12:03

## 2024-03-03 RX ADMIN — HYDROMORPHONE HYDROCHLORIDE 1 MG: 1 INJECTION, SOLUTION INTRAMUSCULAR; INTRAVENOUS; SUBCUTANEOUS at 03:03

## 2024-03-03 RX ADMIN — LEVETIRACETAM 500 MG: 500 TABLET, FILM COATED ORAL at 09:03

## 2024-03-03 RX ADMIN — ACETAMINOPHEN 650 MG: 325 TABLET ORAL at 01:03

## 2024-03-03 RX ADMIN — BISACODYL 10 MG: 10 SUPPOSITORY RECTAL at 09:03

## 2024-03-03 RX ADMIN — OXYCODONE HYDROCHLORIDE 10 MG: 10 TABLET ORAL at 09:03

## 2024-03-03 RX ADMIN — METHOCARBAMOL 500 MG: 500 TABLET ORAL at 09:03

## 2024-03-03 RX ADMIN — OXYCODONE HYDROCHLORIDE 10 MG: 10 TABLET ORAL at 05:03

## 2024-03-03 RX ADMIN — METHOCARBAMOL 500 MG: 500 TABLET ORAL at 01:03

## 2024-03-03 RX ADMIN — OXYCODONE 5 MG: 5 TABLET ORAL at 06:03

## 2024-03-03 RX ADMIN — ENOXAPARIN SODIUM 30 MG: 30 INJECTION SUBCUTANEOUS at 05:03

## 2024-03-03 RX ADMIN — ATENOLOL 50 MG: 25 TABLET ORAL at 09:03

## 2024-03-03 RX ADMIN — HYDROMORPHONE HYDROCHLORIDE 1 MG: 1 INJECTION, SOLUTION INTRAMUSCULAR; INTRAVENOUS; SUBCUTANEOUS at 07:03

## 2024-03-03 RX ADMIN — DEXAMETHASONE SODIUM PHOSPHATE 4 MG: 4 INJECTION INTRA-ARTICULAR; INTRALESIONAL; INTRAMUSCULAR; INTRAVENOUS; SOFT TISSUE at 01:03

## 2024-03-03 NOTE — PROGRESS NOTES
[facility] - Neuro Critical Care  Neurocritical Care  Progress Note    Admit Date: 2/27/2024  Service Date: 03/03/2024  Length of Stay: 5    Subjective:     Chief Complaint: Brain mass    History of Present Illness: 64 y/o F with pMHx of HTN and rheumatoid arthritis presenting as transfer from OSH for neurosurgery eval of L temporal lobe mass. Initially presented to the ED with complaints that everything hurts.   Reportedly began seeing a rheumatologist 9 months ago where she was diagnosed w/ RA. Methotrexate therapy was initiated. Patient reported a continually decline since that time, leading to her being bed-bound for the last 2 months 2/2 to difficulty walking. She stated her rheumatologist believed that she was not tolerating the methotrexate. On presentation to ED, she reported inability to tolerate abdominal pain any longer. Lab work significant for alk phos 203, AST 57, sodium 132, platelets 586. CXR revealed linear and ill-defined hazy opacities of the right mid to lower lung, felt to reflect infiltrate. Diffuse abdominal pain with focal increased tenderness in mid-epigastric area prompted CT abd/pelvis revealing numerous metastatic masses in the liver, metastatic deposits in the spleen and bilateral adrenal masses, hypoattenuating and mildly enhancing mass of the upper pole L kidney. Also w/ intra-abdominal enlarged lymph nodes, consistent with metastatic infiltration, masses of the R lung base with consolidation of the visualized R renal lower lobe and RLL 9 mm nodule. L2 compression fracture w/ mild sclerosis of the vertebra noted, likely pathologic. CTH completed showing large 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 She was [place] [facility] [place] level of care and will be admitted to [facility] for further management.     Hospital Course: 02/28/2024 Plan for OR tomorrow; multimodal pain control   02/29/2024 OR today   03/01/2024 C/o back pain/HA unrelieved by current PRN regimen.  Increased frequency of oxycodone. Likely stepdown tomorrow per NSGY.   3/2/24: ok to step down to   3/3/24: awaiting step down bed    Interval History:  Ok to step down to   Review of Systems   Constitutional: Negative.    HENT: Negative.     Eyes: Negative.    Respiratory: Negative.     Cardiovascular: Negative.    Gastrointestinal: Negative.    Endocrine: Negative.    Genitourinary: Negative.    Musculoskeletal: Negative.    Skin: Negative.    Neurological:  Positive for headaches.     2 systems  Objective:     Vitals:  Temp: 98.4 °F (36.9 °C)  Pulse: 97  Rhythm: normal sinus rhythm  BP: 113/65  MAP (mmHg): 80  Resp: (!) 21  SpO2: 95 %    Temp  Min: 98.2 °F (36.8 °C)  Max: 99.1 °F (37.3 °C)  Pulse  Min: 78  Max: 101  BP  Min: 97/63  Max: 156/86  MAP (mmHg)  Min: 75  Max: 114  Resp  Min: 9  Max: 30  SpO2  Min: 93 %  Max: 98 %    03/02 0701 - 03/03 0700  In: 1000 [P.O.:1000]  Out: 865 [Urine:865]   Unmeasured Output  Stool Occurrence: 1        Physical Exam  Vitals and nursing note reviewed.   Constitutional:       Appearance: Normal appearance.   HENT:      Head: Normocephalic.      Nose: Nose normal.      Mouth/Throat:      Mouth: Mucous membranes are moist.      Pharynx: Oropharynx is clear.   Eyes:      Pupils: Pupils are equal, round, and reactive to light.   Cardiovascular:      Rate and Rhythm: Normal rate and regular rhythm.      Pulses: Normal pulses.      Heart sounds: Normal heart sounds.   Pulmonary:      Effort: Pulmonary effort is normal.      Breath sounds: Normal breath sounds.   Abdominal:      General: Bowel sounds are normal.      Palpations: Abdomen is soft.   Musculoskeletal:         General: Normal range of motion.   Skin:     General: Skin is warm and dry.      Capillary Refill: Capillary refill takes 2 to 3 seconds.   Neurological:      Mental Status: She is alert.      Comments: E4 V4 M6  -PERRL  -Oriented to person and place; intermittent orientation to time  -Follows commands in all  extremities  -LONG equally/spontaneously/purposefully  -RUE 5/5  -LUE 5/5  -RLE 5/5  -LLE 5/5             Unable to test orientation, language, memory, judgment, insight, fund of knowledge, hearing, shoulder shrug, tongue protrusion, coordination, gait due to level of consciousness.       Medications:  Continuous ScheduledatenoloL, 50 mg, Daily  dexAMETHasone, 4 mg, Q8H  enoxparin, 30 mg, Q24H (prophylaxis, 1700)  famotidine, 20 mg, BID  gabapentin, 100 mg, TID  levETIRAcetam, 500 mg, BID  polyethylene glycol, 17 g, Daily  senna-docusate 8.6-50 mg, 2 tablet, BID    PRNacetaminophen, 650 mg, Q6H PRN  bisacodyL, 10 mg, Daily PRN  hydrALAZINE, 10 mg, Q4H PRN  HYDROmorphone, 1 mg, Q4H PRN  labetalol, 10 mg, Q4H PRN  methocarbamoL, 500 mg, TID PRN  ondansetron, 4 mg, Q8H PRN  oxyCODONE, 10 mg, Q4H PRN  oxyCODONE, 5 mg, Q4H PRN      Today I personally reviewed pertinent medications, lines/drains/airways, imaging, cardiology results, laboratory results, microbiology results, notably:    Diet  Diet Adult Regular (IDDSI Level 7)  Diet Adult Regular (IDDSI Level 7)        Assessment/Plan:     Neuro  * Brain mass  62 y/o F presenting as transfer from OSH for neurosurgery eval of 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     -Admitted to [facility]  -NSGY following; s/p crani for tumor resection  -VS/Neuro checks q1  -HOB >/= 30  -Keppra 500 BID  -Dex taper  -SBP goal <140  -PRN labetalol/hydralazine  -Consider resuming home atenolol as needed  -CBC, CMP, Mag, Phos daily  -Na goal > 135  -Monitor I/Os  -Start SQH when clinically indicated   -PT/OT/SLP as appropriate  -Plan for likely stepdown to NSGY tomorrow   -ok to step down to     Midline shift of brain  -Noted on imaging  -See Brain mass      Vasogenic brain edema  -Noted on imaging  -See Brain mass     Compression fracture of lumbar spine, non-traumatic  -Noted on imaging  -Likely pathologic  -NSGY consulted  -Heme/Onc consulted  -May need brace when  OOB    Cardiac/Vascular  HTN (hypertension)  -SBP goal < 140  -PRN labetalol/hydralazine    Hematology  Acute pulmonary embolism without acute cor pulmonale  -Noted on CT chest w/ contrast - small volume of pulmonary thromboembolus in the right middle lobe without finding of right heart strain/failure   -No anticoagulation for now; POD 1  -Monitor resp status    Oncology  Metastatic neoplastic disease  -CT abd/pelvis w/ numerous metastatic masses in the liver, likely metastatic deposit in the spleen; bilateral adrenal masses, hypoattenuating and mildly enhancing mass of upper pole L kidney, intra-abdominal enlarged lymph nodes consistent w/ metastatic infiltration.   -Also w/ masses to R lung base w/ consolidation of visualized R renal lower lobe; R lower lobe 9mm nodule  -L2 vertebra compression fracture w/ mild sclerosis of the vertebra, likely reflecting a pathologic fracture; faint sclerosis of the superior endplate of S1 likely reflects metastatic infiltration  -Heme/Onc consulted  -Multimodal pain control  -Consider Palliative Care consult; will discuss w/ patient tomorrow    Endocrine  Moderate malnutrition  Nutrition consulted. Most recent weight and BMI monitored-     Measurements:  Wt Readings from Last 1 Encounters:   02/28/24 47.6 kg (105 lb)   Body mass index is 18.6 kg/m².    Patient has been screened and assessed by RD.    Malnutrition Type:  Context: chronic illness  Level: moderate    Malnutrition Characteristic Summary:  Weight Loss (Malnutrition): greater than 7.5% in 3 months (-15% x 3mo)  Subcutaneous Fat (Malnutrition): mild depletion  Muscle Mass (Malnutrition): mild depletion    Interventions/Recommendations (treatment strategy):  1.) Recommend continuing with Regular diet as tolerated. 2.) If PO intake is <50%, recommend Boost Plus BID (or Boost alternate) to help meet needs. 3.) RD to monitor wt, PO intake, skin, labs.            The patient is being Prophylaxed for:  Venous Thromboembolism  with: Mechanical or Chemical  Stress Ulcer with: H2B  Ventilator Pneumonia with: not applicable    Activity Orders            Diet Adult Regular (IDDSI Level 7): Regular starting at 02/29 1249    Progressive Mobility Protocol (mobilize patient to their highest level of functioning at least twice daily) starting at 02/27 0800    Turn patient starting at 02/27 0600    Elevate HOB starting at 02/27 0544          Full Code  Level 3  Rosey Bonner NP  Neurocritical Care  [facility] - Neuro Critical Care

## 2024-03-03 NOTE — ASSESSMENT & PLAN NOTE
62 y/o F presenting as transfer from OSH for neurosurgery eval of 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     -Admitted to NCC  -NSGY following; s/p crani for tumor resection  -VS/Neuro checks q1  -HOB >/= 30  -Keppra 500 BID  -Dex taper  -SBP goal <140  -PRN labetalol/hydralazine  -Consider resuming home atenolol as needed  -CBC, CMP, Mag, Phos daily  -Na goal > 135  -Monitor I/Os  -Start SQH when clinically indicated   -PT/OT/SLP as appropriate  -Plan for likely stepdown to NSGY tomorrow   -ok to step down to HM

## 2024-03-03 NOTE — SUBJECTIVE & OBJECTIVE
Interval History:  Ok to step down to   Review of Systems   Constitutional: Negative.    HENT: Negative.     Eyes: Negative.    Respiratory: Negative.     Cardiovascular: Negative.    Gastrointestinal: Negative.    Endocrine: Negative.    Genitourinary: Negative.    Musculoskeletal: Negative.    Skin: Negative.    Neurological:  Positive for headaches.     2 systems  Objective:     Vitals:  Temp: 98.4 °F (36.9 °C)  Pulse: 97  Rhythm: normal sinus rhythm  BP: 113/65  MAP (mmHg): 80  Resp: (!) 21  SpO2: 95 %    Temp  Min: 98.2 °F (36.8 °C)  Max: 99.1 °F (37.3 °C)  Pulse  Min: 78  Max: 101  BP  Min: 97/63  Max: 156/86  MAP (mmHg)  Min: 75  Max: 114  Resp  Min: 9  Max: 30  SpO2  Min: 93 %  Max: 98 %    03/02 0701 - 03/03 0700  In: 1000 [P.O.:1000]  Out: 865 [Urine:865]   Unmeasured Output  Stool Occurrence: 1        Physical Exam  Vitals and nursing note reviewed.   Constitutional:       Appearance: Normal appearance.   HENT:      Head: Normocephalic.      Nose: Nose normal.      Mouth/Throat:      Mouth: Mucous membranes are moist.      Pharynx: Oropharynx is clear.   Eyes:      Pupils: Pupils are equal, round, and reactive to light.   Cardiovascular:      Rate and Rhythm: Normal rate and regular rhythm.      Pulses: Normal pulses.      Heart sounds: Normal heart sounds.   Pulmonary:      Effort: Pulmonary effort is normal.      Breath sounds: Normal breath sounds.   Abdominal:      General: Bowel sounds are normal.      Palpations: Abdomen is soft.   Musculoskeletal:         General: Normal range of motion.   Skin:     General: Skin is warm and dry.      Capillary Refill: Capillary refill takes 2 to 3 seconds.   Neurological:      Mental Status: She is alert.      Comments: E4 V4 M6  -PERRL  -Oriented to person and place; intermittent orientation to time  -Follows commands in all extremities  -LONG equally/spontaneously/purposefully  -RUE 5/5  -LUE 5/5  -RLE 5/5  -LLE 5/5             Unable to test orientation,  language, memory, judgment, insight, fund of knowledge, hearing, shoulder shrug, tongue protrusion, coordination, gait due to level of consciousness.       Medications:  Continuous ScheduledatenoloL, 50 mg, Daily  dexAMETHasone, 4 mg, Q8H  enoxparin, 30 mg, Q24H (prophylaxis, 1700)  famotidine, 20 mg, BID  gabapentin, 100 mg, TID  levETIRAcetam, 500 mg, BID  polyethylene glycol, 17 g, Daily  senna-docusate 8.6-50 mg, 2 tablet, BID    PRNacetaminophen, 650 mg, Q6H PRN  bisacodyL, 10 mg, Daily PRN  hydrALAZINE, 10 mg, Q4H PRN  HYDROmorphone, 1 mg, Q4H PRN  labetalol, 10 mg, Q4H PRN  methocarbamoL, 500 mg, TID PRN  ondansetron, 4 mg, Q8H PRN  oxyCODONE, 10 mg, Q4H PRN  oxyCODONE, 5 mg, Q4H PRN      Today I personally reviewed pertinent medications, lines/drains/airways, imaging, cardiology results, laboratory results, microbiology results, notably:    Diet  Diet Adult Regular (IDDSI Level 7)  Diet Adult Regular (IDDSI Level 7)

## 2024-03-03 NOTE — PLAN OF CARE
Mobilize as tolerated  Keppra for seizure prophylaxis  Decadron for cerebral edema  Constipation - bowel regimen   Tolerating po

## 2024-03-03 NOTE — PLAN OF CARE
"[facility] Care Plan    POC reviewed with Crow Hines and family at 0300. Pt verbalized understanding. Questions and concerns addressed. No acute events overnight. Pt progressing toward goals. Will continue to monitor. See below and flowsheets for full assessment and VS info.   - PRN oxy and dilaudid utilized for pain control   - straight cath x1  - transfer orders in place        Is this a stroke patient? no    Neuro:  [place] Coma Scale  Best Eye Response: 4-->(E4) spontaneous  Best Motor Response: 6-->(M6) obeys commands  Best Verbal Response: 4-->(V4) confused  [place] Coma Scale Score: 14  Assessment Qualifiers: patient not sedated/intubated  Pupil PERRLA: yes     24hr Temp:  [98.1 °F (36.7 °C)-99.1 °F (37.3 °C)]     CV:   Rhythm: normal sinus rhythm  BP goals:   SBP < 140  MAP > 65    Resp:           Plan: N/A    GI/:     Diet/Nutrition Received: regular  Last Bowel Movement: 02/26/24  Voiding Characteristics: external catheter    Intake/Output Summary (Last 24 hours) at 3/3/2024 0643  Last data filed at 3/2/2024 2200  Gross per 24 hour   Intake 1000 ml   Output 865 ml   Net 135 ml          Labs/Accuchecks:  Recent Labs   Lab 03/02/24  0219   WBC 14.33*   RBC 3.54*   HGB 11.3*   HCT 35.2*   *      Recent Labs   Lab 03/02/24  0219   *   K 4.3   CO2 24   CL 99   BUN 15   CREATININE 0.7   ALKPHOS 292*   *   AST 84*   BILITOT 0.4      Recent Labs   Lab 02/27/24  0707   INR 1.0   APTT 22.5    No results for input(s): "CPK", "CPKMB", "TROPONINI", "MB" in the last 168 hours.    Electrolytes: N/A - electrolytes WDL  Accuchecks: none    Gtts:      LDA/Wounds:    Nurses Note -- 4 Eyes    Is there altered skin present? no     Prevention Measures Documented    Second RN/Staff Member:  NORMA CORNELIUS   "

## 2024-03-03 NOTE — PLAN OF CARE
"[facility] Care Plan    POC reviewed with Crow Hines at 1400. Pt is confused.  Reinforcement is needed.  Questions and concerns addressed. No acute events today. Pt progressing toward goals.  See below and flowsheets for full assessment and VS info.     Awaiting step down bed  Pain managed with PRN oxycodone, dilaudid and robaxin  Suppository admin with positive results    Is this a stroke patient? no    Neuro:  [place] Coma Scale  Best Eye Response: 4-->(E4) spontaneous  Best Motor Response: 6-->(M6) obeys commands  Best Verbal Response: 5-->(V5) oriented  [place] Coma Scale Score: 15  Assessment Qualifiers: patient not sedated/intubated  Pupil PERRLA: yes     24 hr Temp:  [98.3 °F (36.8 °C)-99.1 °F (37.3 °C)]     CV:   Rhythm: normal sinus rhythm  BP goals:   SBP < 140  MAP > 65    Resp:           Plan: N/A    GI/:     Diet/Nutrition Received: regular  Last Bowel Movement: 03/03/24  Voiding Characteristics: external catheter    Intake/Output Summary (Last 24 hours) at 3/3/2024 1735  Last data filed at 3/3/2024 1600  Gross per 24 hour   Intake 1960 ml   Output 725 ml   Net 1235 ml     Unmeasured Output  Stool Occurrence: 1    Labs/Accuchecks:  Recent Labs   Lab 03/03/24  0614   WBC 12.25   RBC 3.28*   HGB 10.7*   HCT 32.7*   *      Recent Labs   Lab 03/03/24  0614   *   K 4.2   CO2 26   CL 98   BUN 14   CREATININE 0.7   ALKPHOS 316*   ALT 89*   AST 81*   BILITOT 0.4      Recent Labs   Lab 02/27/24  0707   INR 1.0   APTT 22.5    No results for input(s): "CPK", "CPKMB", "TROPONINI", "MB" in the last 168 hours.    Electrolytes: N/A - electrolytes WDL  Accuchecks: none    Gtts:      LDA/Wounds:    Is there altered skin present? no     Prevention Measures Documented    Second RN/Staff Member:  SYD Ferrell        "

## 2024-03-04 LAB
ALBUMIN SERPL BCP-MCNC: 2.5 G/DL (ref 3.5–5.2)
ALP SERPL-CCNC: 403 U/L (ref 55–135)
ALT SERPL W/O P-5'-P-CCNC: 109 U/L (ref 10–44)
ANION GAP SERPL CALC-SCNC: 9 MMOL/L (ref 8–16)
AST SERPL-CCNC: 95 U/L (ref 10–40)
BASOPHILS # BLD AUTO: 0.01 K/UL (ref 0–0.2)
BASOPHILS NFR BLD: 0.1 % (ref 0–1.9)
BILIRUB SERPL-MCNC: 0.4 MG/DL (ref 0.1–1)
BUN SERPL-MCNC: 14 MG/DL (ref 8–23)
CALCIUM SERPL-MCNC: 9.4 MG/DL (ref 8.7–10.5)
CHLORIDE SERPL-SCNC: 99 MMOL/L (ref 95–110)
CO2 SERPL-SCNC: 25 MMOL/L (ref 23–29)
COMMENT: NORMAL
CREAT SERPL-MCNC: 0.7 MG/DL (ref 0.5–1.4)
DIFFERENTIAL METHOD BLD: ABNORMAL
EOSINOPHIL # BLD AUTO: 0 K/UL (ref 0–0.5)
EOSINOPHIL NFR BLD: 0 % (ref 0–8)
ERYTHROCYTE [DISTWIDTH] IN BLOOD BY AUTOMATED COUNT: 13.8 % (ref 11.5–14.5)
EST. GFR  (NO RACE VARIABLE): >60 ML/MIN/1.73 M^2
FINAL PATHOLOGIC DIAGNOSIS: NORMAL
FROZEN SECTION DIAGNOSIS: NORMAL
FROZEN SECTION FOOTNOTE: NORMAL
GLUCOSE SERPL-MCNC: 116 MG/DL (ref 70–110)
GROSS: NORMAL
HCT VFR BLD AUTO: 34.4 % (ref 37–48.5)
HGB BLD-MCNC: 11 G/DL (ref 12–16)
IMM GRANULOCYTES # BLD AUTO: 0.13 K/UL (ref 0–0.04)
IMM GRANULOCYTES NFR BLD AUTO: 1.2 % (ref 0–0.5)
LYMPHOCYTES # BLD AUTO: 0.3 K/UL (ref 1–4.8)
LYMPHOCYTES NFR BLD: 3.2 % (ref 18–48)
Lab: NORMAL
MAGNESIUM SERPL-MCNC: 2 MG/DL (ref 1.6–2.6)
MCH RBC QN AUTO: 31.6 PG (ref 27–31)
MCHC RBC AUTO-ENTMCNC: 32 G/DL (ref 32–36)
MCV RBC AUTO: 99 FL (ref 82–98)
MICROSCOPIC EXAM: NORMAL
MONOCYTES # BLD AUTO: 0.8 K/UL (ref 0.3–1)
MONOCYTES NFR BLD: 7.5 % (ref 4–15)
NEUTROPHILS # BLD AUTO: 9.2 K/UL (ref 1.8–7.7)
NEUTROPHILS NFR BLD: 88 % (ref 38–73)
NRBC BLD-RTO: 0 /100 WBC
PHOSPHATE SERPL-MCNC: 2.8 MG/DL (ref 2.7–4.5)
PLATELET # BLD AUTO: 606 K/UL (ref 150–450)
PMV BLD AUTO: 9 FL (ref 9.2–12.9)
POTASSIUM SERPL-SCNC: 4.3 MMOL/L (ref 3.5–5.1)
PROT SERPL-MCNC: 6 G/DL (ref 6–8.4)
RBC # BLD AUTO: 3.48 M/UL (ref 4–5.4)
SODIUM SERPL-SCNC: 133 MMOL/L (ref 136–145)
WBC # BLD AUTO: 10.5 K/UL (ref 3.9–12.7)

## 2024-03-04 PROCEDURE — 63600175 PHARM REV CODE 636 W HCPCS

## 2024-03-04 PROCEDURE — 94761 N-INVAS EAR/PLS OXIMETRY MLT: CPT

## 2024-03-04 PROCEDURE — 97535 SELF CARE MNGMENT TRAINING: CPT

## 2024-03-04 PROCEDURE — 99233 SBSQ HOSP IP/OBS HIGH 50: CPT | Mod: ,,, | Performed by: PHYSICIAN ASSISTANT

## 2024-03-04 PROCEDURE — 85025 COMPLETE CBC W/AUTO DIFF WBC: CPT

## 2024-03-04 PROCEDURE — 25000003 PHARM REV CODE 250

## 2024-03-04 PROCEDURE — 11000001 HC ACUTE MED/SURG PRIVATE ROOM

## 2024-03-04 PROCEDURE — 84100 ASSAY OF PHOSPHORUS: CPT

## 2024-03-04 PROCEDURE — 80053 COMPREHEN METABOLIC PANEL: CPT

## 2024-03-04 PROCEDURE — 25000003 PHARM REV CODE 250: Performed by: HOSPITALIST

## 2024-03-04 PROCEDURE — 25000003 PHARM REV CODE 250: Performed by: PSYCHIATRY & NEUROLOGY

## 2024-03-04 PROCEDURE — 83735 ASSAY OF MAGNESIUM: CPT

## 2024-03-04 PROCEDURE — 63600175 PHARM REV CODE 636 W HCPCS: Performed by: STUDENT IN AN ORGANIZED HEALTH CARE EDUCATION/TRAINING PROGRAM

## 2024-03-04 PROCEDURE — 92523 SPEECH SOUND LANG COMPREHEN: CPT

## 2024-03-04 PROCEDURE — 25000003 PHARM REV CODE 250: Performed by: PHYSICIAN ASSISTANT

## 2024-03-04 RX ORDER — ALUMINUM HYDROXIDE, MAGNESIUM HYDROXIDE, AND SIMETHICONE 1200; 120; 1200 MG/30ML; MG/30ML; MG/30ML
30 SUSPENSION ORAL
Status: DISCONTINUED | OUTPATIENT
Start: 2024-03-04 | End: 2024-03-16

## 2024-03-04 RX ORDER — SODIUM CHLORIDE 1 G/1
2000 TABLET ORAL 3 TIMES DAILY
Status: DISCONTINUED | OUTPATIENT
Start: 2024-03-04 | End: 2024-03-17 | Stop reason: HOSPADM

## 2024-03-04 RX ORDER — DEXAMETHASONE SODIUM PHOSPHATE 4 MG/ML
4 INJECTION, SOLUTION INTRA-ARTICULAR; INTRALESIONAL; INTRAMUSCULAR; INTRAVENOUS; SOFT TISSUE EVERY 12 HOURS
Status: DISCONTINUED | OUTPATIENT
Start: 2024-03-04 | End: 2024-03-12

## 2024-03-04 RX ADMIN — FAMOTIDINE 20 MG: 20 TABLET ORAL at 09:03

## 2024-03-04 RX ADMIN — OXYCODONE HYDROCHLORIDE 10 MG: 10 TABLET ORAL at 08:03

## 2024-03-04 RX ADMIN — SODIUM CHLORIDE 2000 MG: 1 TABLET ORAL at 08:03

## 2024-03-04 RX ADMIN — POLYETHYLENE GLYCOL 3350 17 G: 17 POWDER, FOR SOLUTION ORAL at 09:03

## 2024-03-04 RX ADMIN — LEVETIRACETAM 500 MG: 500 TABLET, FILM COATED ORAL at 09:03

## 2024-03-04 RX ADMIN — GABAPENTIN 100 MG: 100 CAPSULE ORAL at 04:03

## 2024-03-04 RX ADMIN — FAMOTIDINE 20 MG: 20 TABLET ORAL at 08:03

## 2024-03-04 RX ADMIN — OXYCODONE HYDROCHLORIDE 10 MG: 10 TABLET ORAL at 02:03

## 2024-03-04 RX ADMIN — OXYCODONE HYDROCHLORIDE 10 MG: 10 TABLET ORAL at 01:03

## 2024-03-04 RX ADMIN — DEXAMETHASONE SODIUM PHOSPHATE 4 MG: 4 INJECTION INTRA-ARTICULAR; INTRALESIONAL; INTRAMUSCULAR; INTRAVENOUS; SOFT TISSUE at 05:03

## 2024-03-04 RX ADMIN — DOCUSATE SODIUM AND SENNOSIDES 2 TABLET: 8.6; 5 TABLET, FILM COATED ORAL at 09:03

## 2024-03-04 RX ADMIN — POLYETHYLENE GLYCOL 3350 17 G: 17 POWDER, FOR SOLUTION ORAL at 08:03

## 2024-03-04 RX ADMIN — OXYCODONE HYDROCHLORIDE 10 MG: 10 TABLET ORAL at 05:03

## 2024-03-04 RX ADMIN — OXYCODONE HYDROCHLORIDE 10 MG: 10 TABLET ORAL at 09:03

## 2024-03-04 RX ADMIN — METHOCARBAMOL 500 MG: 500 TABLET ORAL at 04:03

## 2024-03-04 RX ADMIN — DOCUSATE SODIUM AND SENNOSIDES 2 TABLET: 8.6; 5 TABLET, FILM COATED ORAL at 08:03

## 2024-03-04 RX ADMIN — ALUMINUM HYDROXIDE, MAGNESIUM HYDROXIDE, AND SIMETHICONE 30 ML: 1200; 120; 1200 SUSPENSION ORAL at 06:03

## 2024-03-04 RX ADMIN — ACETAMINOPHEN 650 MG: 325 TABLET ORAL at 07:03

## 2024-03-04 RX ADMIN — GABAPENTIN 100 MG: 100 CAPSULE ORAL at 09:03

## 2024-03-04 RX ADMIN — HYDROMORPHONE HYDROCHLORIDE 1 MG: 1 INJECTION, SOLUTION INTRAMUSCULAR; INTRAVENOUS; SUBCUTANEOUS at 11:03

## 2024-03-04 RX ADMIN — METHOCARBAMOL 500 MG: 500 TABLET ORAL at 02:03

## 2024-03-04 RX ADMIN — HYDROMORPHONE HYDROCHLORIDE 1 MG: 1 INJECTION, SOLUTION INTRAMUSCULAR; INTRAVENOUS; SUBCUTANEOUS at 07:03

## 2024-03-04 RX ADMIN — HYDROMORPHONE HYDROCHLORIDE 1 MG: 1 INJECTION, SOLUTION INTRAMUSCULAR; INTRAVENOUS; SUBCUTANEOUS at 04:03

## 2024-03-04 RX ADMIN — HYDROMORPHONE HYDROCHLORIDE 1 MG: 1 INJECTION, SOLUTION INTRAMUSCULAR; INTRAVENOUS; SUBCUTANEOUS at 12:03

## 2024-03-04 RX ADMIN — HYDROMORPHONE HYDROCHLORIDE 1 MG: 1 INJECTION, SOLUTION INTRAMUSCULAR; INTRAVENOUS; SUBCUTANEOUS at 03:03

## 2024-03-04 RX ADMIN — LEVETIRACETAM 500 MG: 500 TABLET, FILM COATED ORAL at 08:03

## 2024-03-04 RX ADMIN — ATENOLOL 50 MG: 25 TABLET ORAL at 09:03

## 2024-03-04 RX ADMIN — GABAPENTIN 100 MG: 100 CAPSULE ORAL at 08:03

## 2024-03-04 RX ADMIN — SODIUM CHLORIDE 2000 MG: 1 TABLET ORAL at 02:03

## 2024-03-04 RX ADMIN — DEXAMETHASONE SODIUM PHOSPHATE 4 MG: 4 INJECTION INTRA-ARTICULAR; INTRALESIONAL; INTRAMUSCULAR; INTRAVENOUS; SOFT TISSUE at 08:03

## 2024-03-04 NOTE — NURSING
Nursing Transfer Note       Transfer To 954    Transfer via bed    Transfer with cardiac monitoring    Transported by RN     Medicines sent:  n/a    Chart sent with patient: Yes    Belongings sent with patient: walker, phone, purse, keys, backpack, LSO brace, shoes, clothes, silver water jug    Notified: unable to leave message for daughter. Voicemail full    Bedside Neuro assessment performed: Yes    Bedside Handoff given to: SYD Serrano    Upon arrival to floor: cardiac monitor applied, patient oriented to room, call bell in reach, and bed in lowest position    Nurses Note -- 4 Eyes      3/4/2024   11:19 AM      Skin assessed during: Transfer      [x] No Altered Skin Integrity Present    [x]Prevention Measures Documented      [] Yes- Altered Skin Integrity Present or Discovered   [] LDA Added if Not in Epic (Describe Wound)   [] New Altered Skin Integrity was Present on Admit and Documented in LDA   [] Wound Image Taken    Wound Care Consulted? No    Attending Nurse:  Tracy Costa RN/Staff Member:  Valeria VELARDE

## 2024-03-04 NOTE — NURSING
Nurses Note -- 4 Eyes      3/4/2024   12:00 PM      Skin assessed during: Transfer      [x] No Altered Skin Integrity Present    []Prevention Measures Documented      [] Yes- Altered Skin Integrity Present or Discovered   [] LDA Added if Not in Epic (Describe Wound)   [] New Altered Skin Integrity was Present on Admit and Documented in LDA   [] Wound Image Taken    Wound Care Consulted? Yes    Attending Nurse:  Zach Costa RN/Staff Member:  SYD Ferrell
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
## 2024-03-04 NOTE — ASSESSMENT & PLAN NOTE
-CT abd/pelvis w/ numerous metastatic masses in the liver, likely metastatic deposit in the spleen; bilateral adrenal masses, hypoattenuating and mildly enhancing mass of upper pole L kidney, intra-abdominal enlarged lymph nodes consistent w/ metastatic infiltration.   -Also w/ masses to R lung base w/ consolidation of visualized R renal lower lobe; R lower lobe 9mm nodule  -L2 vertebra compression fracture w/ mild sclerosis of the vertebra, likely reflecting a pathologic fracture; faint sclerosis of the superior endplate of S1 likely reflects metastatic infiltration  -Heme/Onc consulted  -Multimodal pain control  -Consider Palliative Care consult; patient refused for now.

## 2024-03-04 NOTE — ASSESSMENT & PLAN NOTE
62 y/o F presenting as transfer from OSH for neurosurgery eval of 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     -Admitted to NCC  -NSGY following; s/p crani for tumor resection  -HOB >/= 30  -Keppra 500 BID  -Dex taper  -SBP goal <160  -Na goal > 135, salt tabs  -Monitor I/Os  -PT/OT/SLP as appropriate   -ok to step down to HM
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
## 2024-03-04 NOTE — PLAN OF CARE
"[facility] Care Plan    POC reviewed with Crow Hines and family at 0300. Pt verbalized understanding. Questions and concerns addressed. No acute events overnight. Pt progressing toward goals. Will continue to monitor. See below and flowsheets for full assessment and VS info.   - prn oxy, morphine, and robaxin given for pain control           Is this a stroke patient? no    Neuro:  [place] Coma Scale  Best Eye Response: 4-->(E4) spontaneous  Best Motor Response: 6-->(M6) obeys commands  Best Verbal Response: 5-->(V5) oriented  [place] Coma Scale Score: 15  Assessment Qualifiers: patient not sedated/intubated  Pupil PERRLA: yes     24hr Temp:  [97.8 °F (36.6 °C)-98.6 °F (37 °C)]     CV:   Rhythm: normal sinus rhythm  BP goals:   SBP < 160  MAP > 65    Resp:           Plan: N/A    GI/:     Diet/Nutrition Received: regular  Last Bowel Movement: 03/03/24  Voiding Characteristics: external catheter    Intake/Output Summary (Last 24 hours) at 3/4/2024 0704  Last data filed at 3/4/2024 0411  Gross per 24 hour   Intake 960 ml   Output 1100 ml   Net -140 ml     Unmeasured Output  Urine Occurrence: 1  Stool Occurrence: 1  Pad Count: 1    Labs/Accuchecks:  Recent Labs   Lab 03/04/24  0124   WBC 10.50   RBC 3.48*   HGB 11.0*   HCT 34.4*   *      Recent Labs   Lab 03/04/24  0124   *   K 4.3   CO2 25   CL 99   BUN 14   CREATININE 0.7   ALKPHOS 403*   *   AST 95*   BILITOT 0.4      Recent Labs   Lab 02/27/24  0707   INR 1.0   APTT 22.5    No results for input(s): "CPK", "CPKMB", "TROPONINI", "MB" in the last 168 hours.    Electrolytes: N/A - electrolytes WDL  Accuchecks: none    Gtts:      LDA/Wounds:    Nurses Note -- 4 Eyes    Is there altered skin present? no     Prevention Measures Documented    Second RN/Staff Member:  Yes    WCTM   "
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
## 2024-03-04 NOTE — SUBJECTIVE & OBJECTIVE
Interval History: 3/4: neurologically stable, pending step down, needs to further work with PT. Dex wean in process.     Medications:  Continuous Infusions:  Scheduled Meds:   atenoloL  50 mg Oral Daily    dexAMETHasone  4 mg Intravenous Q12H    enoxparin  30 mg Subcutaneous Q24H (prophylaxis, 1700)    famotidine  20 mg Oral BID    gabapentin  100 mg Oral TID    levETIRAcetam  500 mg Oral BID    polyethylene glycol  17 g Oral BID    senna-docusate 8.6-50 mg  2 tablet Oral BID     PRN Meds:acetaminophen, bisacodyL, hydrALAZINE, HYDROmorphone, labetalol, methocarbamoL, ondansetron, oxyCODONE, oxyCODONE     Review of Systems  Objective:     Weight: 47.6 kg (105 lb)  Body mass index is 18.6 kg/m².  Vital Signs (Most Recent):  Temp: 97.8 °F (36.6 °C) (03/04/24 0301)  Pulse: 88 (03/04/24 0737)  Resp: (!) 27 (03/04/24 0737)  BP: 113/60 (03/04/24 0601)  SpO2: (!) 94 % (03/04/24 0737) Vital Signs (24h Range):  Temp:  [97.8 °F (36.6 °C)-98.6 °F (37 °C)] 97.8 °F (36.6 °C)  Pulse:  [] 88  Resp:  [11-53] 27  SpO2:  [92 %-96 %] 94 %  BP: (100-127)/(59-70) 113/60                         Female External Urinary Catheter w/ Suction 03/02/24 1422 (Active)   Skin no redness;no breakdown;perineum cleansed w/ soap and water;female external urine collection device repositioned 03/04/24 0501   Tolerance no signs/symptoms of discomfort 03/04/24 0501   Suction Continuous suction at 60 mmHg 03/03/24 1901   Date of last wick change 03/03/24 03/03/24 1901   Time of last wick change 1900 03/03/24 1901   Output (mL) 250 mL 03/04/24 0411          Physical Exam         Neurosurgery Physical Exam  Awake alert  Confused   Oriented x1-2  Pupils reactive  Following commands stain for all antigravity   Moving all in good tone  Dressing in place  Satting well on room air   Heart rate is regular   No distress      Significant Labs:  Recent Labs   Lab 03/03/24  0614 03/04/24  0124   GLU 89 116*   * 133*   K 4.2 4.3   CL 98 99   CO2 26 25   BUN  "14 14   CREATININE 0.7 0.7   CALCIUM 9.2 9.4   MG 2.0 2.0     Recent Labs   Lab 03/03/24  0614 03/04/24  0124   WBC 12.25 10.50   HGB 10.7* 11.0*   HCT 32.7* 34.4*   * 606*     No results for input(s): "LABPT", "INR", "APTT" in the last 48 hours.  Microbiology Results (last 7 days)       ** No results found for the last 168 hours. **          All pertinent labs from the last 24 hours have been reviewed.    Significant Diagnostics:  I have reviewed all pertinent imaging results/findings within the past 24 hours.  "

## 2024-03-04 NOTE — PLAN OF CARE
Problem: Adult Inpatient Plan of Care  Goal: Plan of Care Review  Outcome: Ongoing, Progressing  Goal: Patient-Specific Goal (Individualized)  Outcome: Ongoing, Progressing  Goal: Absence of Hospital-Acquired Illness or Injury  Outcome: Ongoing, Progressing  Goal: Optimal Comfort and Wellbeing  Outcome: Ongoing, Progressing  Goal: Readiness for Transition of Care  Outcome: Ongoing, Progressing     Problem: Pain Acute (Oncology Care)  Goal: Optimal Pain Control  Outcome: Ongoing, Progressing  Intervention: Develop Pain Management Plan  Flowsheets (Taken 3/4/2024 1754)  Pain Management Interventions:   around-the-clock dosing utilized   care clustered   pain management plan reviewed with patient/caregiver  Intervention: Prevent or Manage Pain  Flowsheets (Taken 3/4/2024 1754)  Sleep/Rest Enhancement:   awakenings minimized   room darkened   regular sleep/rest pattern promoted  Sensory Stimulation Regulation:   auditory stimulation minimized   lighting decreased   care clustered  Bowel Elimination Promotion: privacy promoted  Medication Review/Management: medications reviewed  Intervention: Optimize Psychosocial Wellbeing  Flowsheets (Taken 3/4/2024 1754)  Spiritual Activities Assistance: affirmation provided  Supportive Measures:   active listening utilized   decision-making supported   problem-solving facilitated

## 2024-03-04 NOTE — ASSESSMENT & PLAN NOTE
-Noted on CT chest w/ contrast - small volume of pulmonary thromboembolus in the right middle lobe without finding of right heart strain/failure   -No anticoagulation for now; POD 4  -Monitor resp status

## 2024-03-04 NOTE — PROGRESS NOTES
[facility] - Neurosurgery ([facility])  Neurocritical Care  Progress Note    Admit Date: 2/27/2024  Service Date: 03/04/2024  Length of Stay: 6    Subjective:     Chief Complaint: Brain mass    History of Present Illness: 64 y/o F with pMHx of HTN and rheumatoid arthritis presenting as transfer from OSH for neurosurgery eval of L temporal lobe mass. Initially presented to the ED with complaints that everything hurts.   Reportedly began seeing a rheumatologist 9 months ago where she was diagnosed w/ RA. Methotrexate therapy was initiated. Patient reported a continually decline since that time, leading to her being bed-bound for the last 2 months 2/2 to difficulty walking. She stated her rheumatologist believed that she was not tolerating the methotrexate. On presentation to ED, she reported inability to tolerate abdominal pain any longer. Lab work significant for alk phos 203, AST 57, sodium 132, platelets 586. CXR revealed linear and ill-defined hazy opacities of the right mid to lower lung, felt to reflect infiltrate. Diffuse abdominal pain with focal increased tenderness in mid-epigastric area prompted CT abd/pelvis revealing numerous metastatic masses in the liver, metastatic deposits in the spleen and bilateral adrenal masses, hypoattenuating and mildly enhancing mass of the upper pole L kidney. Also w/ intra-abdominal enlarged lymph nodes, consistent with metastatic infiltration, masses of the R lung base with consolidation of the visualized R renal lower lobe and RLL 9 mm nodule. L2 compression fracture w/ mild sclerosis of the vertebra noted, likely pathologic. CTH completed showing large 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 She was transferred to [facility] for higher level of care and will be admitted to [facility] for further management.     Hospital Course: 02/28/2024 Plan for OR tomorrow; multimodal pain control   02/29/2024 OR today   03/01/2024 C/o back pain/HA unrelieved by current PRN regimen.  Increased frequency of oxycodone. Likely stepdown tomorrow per NSGY.   3/2/24: ok to step down to HM  3/3/24: awaiting step down bed  3/4/2024 KETTY, boarding for . Plan to taper steroids to off. Salt tabs started per NSGY for hyponatremia. Path pending. Heme onc following. Will likely need AC at some point for small PE asymptomatic. F/u with heme onc team    Interval History:  Ok to step down to   Review of Systems   Constitutional: Negative.    HENT: Negative.     Eyes: Negative.    Respiratory: Negative.     Cardiovascular: Negative.    Gastrointestinal:  Positive for abdominal pain.   Endocrine: Negative.    Genitourinary: Negative.    Musculoskeletal:  Positive for arthralgias.   Skin: Negative.    Neurological:  Positive for headaches.     2 systems  Objective:     Vitals:  Temp: 97.9 °F (36.6 °C)  Pulse: 88  Rhythm: normal sinus rhythm  BP: 113/76  MAP (mmHg): 79  Resp: 18  SpO2: 96 %    Temp  Min: 97.8 °F (36.6 °C)  Max: 98.6 °F (37 °C)  Pulse  Min: 76  Max: 94  BP  Min: 100/62  Max: 127/69  MAP (mmHg)  Min: 75  Max: 92  Resp  Min: 11  Max: 53  SpO2  Min: 92 %  Max: 96 %    03/03 0701 - 03/04 0700  In: 960 [P.O.:960]  Out: 1100 [Urine:1100]   Unmeasured Output  Urine Occurrence: 1  Stool Occurrence: 1  Pad Count: 1        Physical Exam  Vitals and nursing note reviewed.   Constitutional:       Appearance: Normal appearance.   HENT:      Head: Normocephalic.      Nose: Nose normal.      Mouth/Throat:      Mouth: Mucous membranes are moist.      Pharynx: Oropharynx is clear.   Eyes:      Pupils: Pupils are equal, round, and reactive to light.   Cardiovascular:      Rate and Rhythm: Normal rate and regular rhythm.      Pulses: Normal pulses.      Heart sounds: Normal heart sounds.   Pulmonary:      Effort: Pulmonary effort is normal.      Breath sounds: Normal breath sounds.   Abdominal:      General: Bowel sounds are normal.      Palpations: Abdomen is soft.   Musculoskeletal:         General: Normal range  of motion.   Skin:     General: Skin is warm and dry.   Neurological:      Mental Status: She is alert.      Comments: E4 V4 M6  -PERRL  -Oriented to person and place; intermittent orientation to time  -Follows commands in all extremities  -LONG equally/spontaneously/purposefully  -RUE 5/5  -LUE 5/5  -RLE 5/5  -LLE 5/5                 Medications:  Continuous ScheduledatenoloL, 50 mg, Daily  dexAMETHasone, 4 mg, Q12H  enoxparin, 30 mg, Q24H (prophylaxis, 1700)  famotidine, 20 mg, BID  gabapentin, 100 mg, TID  levETIRAcetam, 500 mg, BID  polyethylene glycol, 17 g, BID  senna-docusate 8.6-50 mg, 2 tablet, BID  sodium chloride, 2,000 mg, TID    PRNacetaminophen, 650 mg, Q6H PRN  bisacodyL, 10 mg, Daily PRN  hydrALAZINE, 10 mg, Q4H PRN  HYDROmorphone, 1 mg, Q4H PRN  labetalol, 10 mg, Q4H PRN  methocarbamoL, 500 mg, TID PRN  ondansetron, 4 mg, Q8H PRN  oxyCODONE, 5 mg, Q4H PRN  oxyCODONE, 10 mg, Q4H PRN      Today I personally reviewed pertinent medications, lines/drains/airways, imaging, cardiology results, laboratory results, microbiology results,   Na 133    Diet  Diet Adult Regular (IDDSI Level 7)  Diet Adult Regular (IDDSI Level 7)        Assessment/Plan:     Neuro  * Brain mass  62 y/o F presenting as transfer from OSH for neurosurgery eval of 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     -Admitted to NCC  -NSGY following; s/p crani for tumor resection  -HOB >/= 30  -Keppra 500 BID  -Dex taper  -SBP goal <160  -Na goal > 135, salt tabs  -Monitor I/Os  -PT/OT/SLP as appropriate   -ok to step down to     Midline shift of brain  -Noted on imaging  -See Brain mass      Vasogenic brain edema  -Noted on imaging  -See Brain mass     Compression fracture of lumbar spine, non-traumatic  -Noted on imaging  -Likely pathologic  -NSGY consulted  -Heme/Onc consulted  -May need brace when OOB    Cardiac/Vascular  HTN (hypertension)  -SBP goal < 160  -PRN labetalol/hydralazine    Hematology  Acute pulmonary  embolism without acute cor pulmonale  -Noted on CT chest w/ contrast - small volume of pulmonary thromboembolus in the right middle lobe without finding of right heart strain/failure   -No anticoagulation for now; POD 4  -Monitor resp status    Oncology  Metastatic neoplastic disease  -CT abd/pelvis w/ numerous metastatic masses in the liver, likely metastatic deposit in the spleen; bilateral adrenal masses, hypoattenuating and mildly enhancing mass of upper pole L kidney, intra-abdominal enlarged lymph nodes consistent w/ metastatic infiltration.   -Also w/ masses to R lung base w/ consolidation of visualized R renal lower lobe; R lower lobe 9mm nodule  -L2 vertebra compression fracture w/ mild sclerosis of the vertebra, likely reflecting a pathologic fracture; faint sclerosis of the superior endplate of S1 likely reflects metastatic infiltration  -Heme/Onc consulted  -Multimodal pain control  -Consider Palliative Care consult; patient refused for now.     Endocrine  Moderate malnutrition  Nutrition consulted. Most recent weight and BMI monitored-     Measurements:  Wt Readings from Last 1 Encounters:   02/28/24 47.6 kg (105 lb)   Body mass index is 18.6 kg/m².    Patient has been screened and assessed by RD.    Malnutrition Type:  Context: chronic illness  Level: moderate    Malnutrition Characteristic Summary:  Weight Loss (Malnutrition): greater than 7.5% in 3 months (-15% x 3mo)  Subcutaneous Fat (Malnutrition): mild depletion  Muscle Mass (Malnutrition): mild depletion    Interventions/Recommendations (treatment strategy):  1.) Recommend continuing with Regular diet as tolerated. 2.) If PO intake is <50%, recommend Boost Plus BID (or Boost alternate) to help meet needs. 3.) RD to monitor wt, PO intake, skin, labs.            The patient is being Prophylaxed for:  Venous Thromboembolism with: Chemical  Stress Ulcer with: H2B  Ventilator Pneumonia with: not applicable    Activity Orders            Diet Adult  Regular (IDDSI Level 7): Regular starting at 02/29 1249    Progressive Mobility Protocol (mobilize patient to their highest level of functioning at least twice daily) starting at 02/27 0800    Turn patient starting at 02/27 0600    Elevate HOB starting at 02/27 0544          Full Code    Paula Barone PA-C  Neurocritical Care  [facility] - Neurosurgery ([facility])

## 2024-03-04 NOTE — PT/OT/SLP EVAL
"Speech Language Pathology Evaluation  Cognitive Communication    Patient Name:  Crow Hines   MRN:  [id number]  Admitting Diagnosis: Brain mass    Recommendations:     Recommendations:                General Recommendations:  Cognitive-linguistic therapy  Diet recommendations:  Regular Diet - IDDSI Level 7, Thin liquids - IDDSI Level 0   Aspiration Precautions: Standard aspiration precautions   General Precautions: Standard, fall  Communication strategies:  provide increased time to answer and go to room if call light pushed    Assessment:     Crow Hines is a 63 y.o. female with an SLP diagnosis of Cognitive-Linguistic Impairment.      History:     Past Medical History:   Diagnosis Date    Arthritis        Past Surgical History:   Procedure Laterality Date    CRANIOTOMY, WITH NEOPLASM EXCISION USING COMPUTER-ASSISTED NAVIGATION Left 2/29/2024    Procedure: CRANIOTOMY, WITH NEOPLASM EXCISION USING COMPUTER-ASSISTED NAVIGATION;  Surgeon: Felix Szymanski DO;  Location: [facility] OR [address] [place] [address];  Service: Neurosurgery;  Laterality: Left;  (Left temporal crani for rescetion of tumor)  [place]  BRAIN LAB  Navigation - CT with contrast    TUBAL LIGATION  2002       Subjective     "It's variable by day." Pt stated in regards to cognition.     Pain/Comfort:  Pain Rating 1: 0/10  Location - Side 1: Left  Location - Orientation 1: generalized  Location 1: face  Pain Addressed 1: Nurse notified    Respiratory Status: Room air    Objective:     Cognitive Status:    Pt O x 4. General recall was 67%. Delayed recall was 67% accurate IND/100% given cues.  Pt immediately recalled series of 3 and 4, but not 5.  Pt answered problem solving q's with 83% accuracy IND/100% given cues. Pt was unable to generate multiple causes for a hypothetical problem despite cues.        Receptive Language:   Comprehension:      Pt answered complex yes/no q's with 100% accuracy. Pt followed 1- and 2- step commands, but was unable to follow 3-step " commands despite cues and model.     Expressive Language:  Verbal:    Pt demonstrating difficulty with thought organization and word finding during responsive and spontaneous speech. During a word fluency task, pt listed 10 items in a category within one minute given cue x 1 (15-20 is wnl).       Motor Speech:  WFL    Voice:   WFL    Visual-Spatial:  tba    Reading:   tba      Written Expression:   tba    Treatment: Education ws provided to pt regarding role of SLP, purpose of speech/language/cognitive evaluation, results of assessment, cognition, cognitive impairments, benefits of cognitive stimulation, and SLP treatment plan and POC.  Pt demonstrated understanding of education provided, but will benefit from continued reinforcement.       Goals:   Multidisciplinary Problems       SLP Goals          Problem: SLP    Goal Priority Disciplines Outcome   SLP Goal     SLP    Description: Speech Language Pathology Goals  Goals expected to be met by 3/8:  1. Pt will tolerate regular consistency diet and thin liquids without s/s of aspiration.  2. Pt will participate in speech/language/cognitive re-evaluation s/p craniotomy. Goal met 3/4  ADDITIONAL GOALS:   3. Pt will complete moderately complex immediate memory tasks with 70% accuracy given mod cues.   4. Following a 2 minute delay, pt will recall 3/3 novel items with modified independence.   5. Pt will follow 3-step/complex commands with 60% accuracy given mod-max cues.   6. Pt will complete problem solving/reasoning tasks with 70% accuracy given mod cues.   7. Pt will complete simple mental manipulation tasks with 70% accuracy given mod cues.                          Plan:   Patient to be seen:  4 x/week   Plan of Care expires:  03/31/24  Plan of Care reviewed with:  patient   SLP Follow-Up:  Yes       Discharge recommendations:  Therapy Intensity Recommendations at Discharge: High Intensity Therapy     Time Tracking:     SLP Treatment Date:   03/04/24  Speech Start  Time:  1156  Speech Stop Time:  1212     Speech Total Time (min):  16 min    Billable Minutes: Eval 8  and Self Care/Home Management Training 8    03/04/2024

## 2024-03-04 NOTE — SUBJECTIVE & OBJECTIVE
Interval History: 3/3: NAEON, exam stable, Na 133    Medications:  Continuous Infusions:  Scheduled Meds:   atenoloL  50 mg Oral Daily    dexAMETHasone  4 mg Intravenous Q8H    enoxparin  30 mg Subcutaneous Q24H (prophylaxis, 1700)    famotidine  20 mg Oral BID    gabapentin  100 mg Oral TID    levETIRAcetam  500 mg Oral BID    polyethylene glycol  17 g Oral BID    senna-docusate 8.6-50 mg  2 tablet Oral BID     PRN Meds:acetaminophen, bisacodyL, hydrALAZINE, HYDROmorphone, labetalol, methocarbamoL, ondansetron, oxyCODONE, oxyCODONE     Review of Systems  Objective:     Weight: 47.6 kg (105 lb)  Body mass index is 18.6 kg/m².  Vital Signs (Most Recent):  Temp: 98.1 °F (36.7 °C) (03/03/24 1901)  Pulse: 86 (03/03/24 2101)  Resp: 20 (03/03/24 2101)  BP: 124/66 (03/03/24 2101)  SpO2: (!) 92 % (03/03/24 2101) Vital Signs (24h Range):  Temp:  [98.1 °F (36.7 °C)-99.1 °F (37.3 °C)] 98.1 °F (36.7 °C)  Pulse:  [] 86  Resp:  [11-40] 20  SpO2:  [92 %-96 %] 92 %  BP: ()/(59-83) 124/66     Date 03/03/24 0700 - 03/04/24 0659   Shift 1923-7786 3763-1079 2455-0994 24 Hour Total   INTAKE   P.O. 480 480  960   Shift Total(mL/kg) 480(10.1) 480(10.1)  960(20.2)   OUTPUT   Urine(mL/kg/hr) 150(0.4) 400(1)  550   Shift Total(mL/kg) 150(3.1) 400(8.4)  550(11.5)   Weight (kg) 47.6 47.6 47.6 47.6                       Female External Urinary Catheter w/ Suction 03/02/24 1422 (Active)   Skin no redness;no breakdown;perineum cleansed w/ soap and water;female external urine collection device repositioned 03/03/24 1901   Tolerance no signs/symptoms of discomfort 03/03/24 1901   Suction Continuous suction at 60 mmHg 03/03/24 1901   Date of last wick change 03/03/24 03/03/24 1901   Time of last wick change 1900 03/03/24 1901   Output (mL) 200 mL 03/03/24 2101          Physical Exam         Neurosurgery Physical Exam    Awake alert  Confused   Oriented x1-2  Pupils reactive  Following commands stain for all antigravity   Moving all in  "good tone  Dressing in place  Satting well on room air   Heart rate is regular   No distress    Significant Labs:  Recent Labs   Lab 03/02/24  0219 03/03/24  0614    89   * 133*   K 4.3 4.2   CL 99 98   CO2 24 26   BUN 15 14   CREATININE 0.7 0.7   CALCIUM 9.6 9.2   MG 2.2 2.0     Recent Labs   Lab 03/02/24  0219 03/03/24  0614   WBC 14.33* 12.25   HGB 11.3* 10.7*   HCT 35.2* 32.7*   * 616*     No results for input(s): "LABPT", "INR", "APTT" in the last 48 hours.  Microbiology Results (last 7 days)       ** No results found for the last 168 hours. **          All pertinent labs from the last 24 hours have been reviewed.    Significant Diagnostics:  CT: No results found in the last 24 hours.  MRI: No results found in the last 24 hours.  "

## 2024-03-04 NOTE — PLAN OF CARE
Patient is not medically ready for discharge.  [facility] (Hospital)  Discharge Reassessment    Primary Care Provider: Chika Powell MD    Expected Discharge Date: 3/6/2024    Reassessment (most recent)       Discharge Reassessment - 03/04/24 1239          Discharge Reassessment    Assessment Type Discharge Planning Reassessment     Did the patient's condition or plan change since previous assessment? No     Discharge Plan discussed with: Patient     Discharge Plan A Home     Discharge Plan B Rehab     DME Needed Upon Discharge  other (see comments)   TBD    Transition of Care Barriers Unisured;No family/friends to help     Why the patient remains in the hospital Requires continued medical care                   Patient is not medically ready for discharge.  Per Md:  3/4: neurologically stable, pending step down, needs to further work with PT. Dex wean in process.      Discharge Plan A and Plan B have been determined by review of patient's clinical status, future medical and therapeutic needs, and coverage/benefits for post-acute care in coordination with multidisciplinary team members. Pt continues to be self- pay and no insurance for placement    Bee Stout LMSW  PRN - [facility]  [phone]          Yes

## 2024-03-04 NOTE — SUBJECTIVE & OBJECTIVE
Interval History:  Ok to step down to   Review of Systems   Constitutional: Negative.    HENT: Negative.     Eyes: Negative.    Respiratory: Negative.     Cardiovascular: Negative.    Gastrointestinal:  Positive for abdominal pain.   Endocrine: Negative.    Genitourinary: Negative.    Musculoskeletal:  Positive for arthralgias.   Skin: Negative.    Neurological:  Positive for headaches.     2 systems  Objective:     Vitals:  Temp: 97.9 °F (36.6 °C)  Pulse: 88  Rhythm: normal sinus rhythm  BP: 113/76  MAP (mmHg): 79  Resp: 18  SpO2: 96 %    Temp  Min: 97.8 °F (36.6 °C)  Max: 98.6 °F (37 °C)  Pulse  Min: 76  Max: 94  BP  Min: 100/62  Max: 127/69  MAP (mmHg)  Min: 75  Max: 92  Resp  Min: 11  Max: 53  SpO2  Min: 92 %  Max: 96 %    03/03 0701 - 03/04 0700  In: 960 [P.O.:960]  Out: 1100 [Urine:1100]   Unmeasured Output  Urine Occurrence: 1  Stool Occurrence: 1  Pad Count: 1        Physical Exam  Vitals and nursing note reviewed.   Constitutional:       Appearance: Normal appearance.   HENT:      Head: Normocephalic.      Nose: Nose normal.      Mouth/Throat:      Mouth: Mucous membranes are moist.      Pharynx: Oropharynx is clear.   Eyes:      Pupils: Pupils are equal, round, and reactive to light.   Cardiovascular:      Rate and Rhythm: Normal rate and regular rhythm.      Pulses: Normal pulses.      Heart sounds: Normal heart sounds.   Pulmonary:      Effort: Pulmonary effort is normal.      Breath sounds: Normal breath sounds.   Abdominal:      General: Bowel sounds are normal.      Palpations: Abdomen is soft.   Musculoskeletal:         General: Normal range of motion.   Skin:     General: Skin is warm and dry.   Neurological:      Mental Status: She is alert.      Comments: E4 V4 M6  -PERRL  -Oriented to person and place; intermittent orientation to time  -Follows commands in all extremities  -LONG equally/spontaneously/purposefully  -RUE 5/5  -LUE 5/5  -RLE 5/5  -LLE 5/5                 Medications:  Continuous  ScheduledatenoloL, 50 mg, Daily  dexAMETHasone, 4 mg, Q12H  enoxparin, 30 mg, Q24H (prophylaxis, 1700)  famotidine, 20 mg, BID  gabapentin, 100 mg, TID  levETIRAcetam, 500 mg, BID  polyethylene glycol, 17 g, BID  senna-docusate 8.6-50 mg, 2 tablet, BID  sodium chloride, 2,000 mg, TID    PRNacetaminophen, 650 mg, Q6H PRN  bisacodyL, 10 mg, Daily PRN  hydrALAZINE, 10 mg, Q4H PRN  HYDROmorphone, 1 mg, Q4H PRN  labetalol, 10 mg, Q4H PRN  methocarbamoL, 500 mg, TID PRN  ondansetron, 4 mg, Q8H PRN  oxyCODONE, 5 mg, Q4H PRN  oxyCODONE, 10 mg, Q4H PRN      Today I personally reviewed pertinent medications, lines/drains/airways, imaging, cardiology results, laboratory results, microbiology results,   Na 133    Diet  Diet Adult Regular (IDDSI Level 7)  Diet Adult Regular (IDDSI Level 7)

## 2024-03-05 DIAGNOSIS — C34.90 NON-SMALL CELL LUNG CANCER METASTATIC TO BRAIN: Primary | ICD-10-CM

## 2024-03-05 DIAGNOSIS — C79.31 NON-SMALL CELL LUNG CANCER METASTATIC TO BRAIN: Primary | ICD-10-CM

## 2024-03-05 PROBLEM — Z71.89 ADVANCE CARE PLANNING: Status: ACTIVE | Noted: 2024-03-05

## 2024-03-05 PROBLEM — Z51.5 PALLIATIVE CARE ENCOUNTER: Status: ACTIVE | Noted: 2024-03-05

## 2024-03-05 PROBLEM — R53.81 DEBILITY: Status: ACTIVE | Noted: 2024-03-05

## 2024-03-05 PROBLEM — R52 PAIN: Status: ACTIVE | Noted: 2024-03-05

## 2024-03-05 LAB
ALBUMIN SERPL BCP-MCNC: 2.4 G/DL (ref 3.5–5.2)
ALP SERPL-CCNC: 508 U/L (ref 55–135)
ALT SERPL W/O P-5'-P-CCNC: 133 U/L (ref 10–44)
ANION GAP SERPL CALC-SCNC: 7 MMOL/L (ref 8–16)
APTT PPP: 21.1 SEC (ref 21–32)
APTT PPP: 24.6 SEC (ref 21–32)
AST SERPL-CCNC: 109 U/L (ref 10–40)
BASOPHILS # BLD AUTO: 0.03 K/UL (ref 0–0.2)
BASOPHILS NFR BLD: 0.3 % (ref 0–1.9)
BILIRUB SERPL-MCNC: 0.4 MG/DL (ref 0.1–1)
BUN SERPL-MCNC: 15 MG/DL (ref 8–23)
CALCIUM SERPL-MCNC: 9.3 MG/DL (ref 8.7–10.5)
CHLORIDE SERPL-SCNC: 102 MMOL/L (ref 95–110)
CK SERPL-CCNC: 84 U/L (ref 20–180)
CO2 SERPL-SCNC: 26 MMOL/L (ref 23–29)
CREAT SERPL-MCNC: 0.6 MG/DL (ref 0.5–1.4)
DIFFERENTIAL METHOD BLD: ABNORMAL
EOSINOPHIL # BLD AUTO: 0 K/UL (ref 0–0.5)
EOSINOPHIL NFR BLD: 0.1 % (ref 0–8)
ERYTHROCYTE [DISTWIDTH] IN BLOOD BY AUTOMATED COUNT: 13.7 % (ref 11.5–14.5)
EST. GFR  (NO RACE VARIABLE): >60 ML/MIN/1.73 M^2
GGT SERPL-CCNC: 1385 U/L (ref 8–55)
GLUCOSE SERPL-MCNC: 84 MG/DL (ref 70–110)
HCT VFR BLD AUTO: 33.3 % (ref 37–48.5)
HGB BLD-MCNC: 10.8 G/DL (ref 12–16)
IMM GRANULOCYTES # BLD AUTO: 0.2 K/UL (ref 0–0.04)
IMM GRANULOCYTES NFR BLD AUTO: 1.9 % (ref 0–0.5)
INR PPP: 1 (ref 0.8–1.2)
LYMPHOCYTES # BLD AUTO: 0.6 K/UL (ref 1–4.8)
LYMPHOCYTES NFR BLD: 5.6 % (ref 18–48)
MAGNESIUM SERPL-MCNC: 1.9 MG/DL (ref 1.6–2.6)
MCH RBC QN AUTO: 31.8 PG (ref 27–31)
MCHC RBC AUTO-ENTMCNC: 32.4 G/DL (ref 32–36)
MCV RBC AUTO: 98 FL (ref 82–98)
MONOCYTES # BLD AUTO: 1.3 K/UL (ref 0.3–1)
MONOCYTES NFR BLD: 12.7 % (ref 4–15)
NEUTROPHILS # BLD AUTO: 8.4 K/UL (ref 1.8–7.7)
NEUTROPHILS NFR BLD: 79.4 % (ref 38–73)
NRBC BLD-RTO: 0 /100 WBC
PHOSPHATE SERPL-MCNC: 3 MG/DL (ref 2.7–4.5)
PLATELET # BLD AUTO: 549 K/UL (ref 150–450)
PMV BLD AUTO: 8.9 FL (ref 9.2–12.9)
POTASSIUM SERPL-SCNC: 4.3 MMOL/L (ref 3.5–5.1)
PROT SERPL-MCNC: 5.9 G/DL (ref 6–8.4)
PROTHROMBIN TIME: 10.8 SEC (ref 9–12.5)
RBC # BLD AUTO: 3.4 M/UL (ref 4–5.4)
SODIUM SERPL-SCNC: 135 MMOL/L (ref 136–145)
WBC # BLD AUTO: 10.59 K/UL (ref 3.9–12.7)

## 2024-03-05 PROCEDURE — 25000003 PHARM REV CODE 250

## 2024-03-05 PROCEDURE — 85730 THROMBOPLASTIN TIME PARTIAL: CPT | Mod: 91 | Performed by: HOSPITALIST

## 2024-03-05 PROCEDURE — 99233 SBSQ HOSP IP/OBS HIGH 50: CPT | Mod: ,,, | Performed by: HOSPITALIST

## 2024-03-05 PROCEDURE — 99497 ADVNCD CARE PLAN 30 MIN: CPT | Mod: 25,,, | Performed by: CLINICAL NURSE SPECIALIST

## 2024-03-05 PROCEDURE — 84100 ASSAY OF PHOSPHORUS: CPT

## 2024-03-05 PROCEDURE — 82977 ASSAY OF GGT: CPT | Performed by: HOSPITALIST

## 2024-03-05 PROCEDURE — 25000003 PHARM REV CODE 250: Performed by: PHYSICIAN ASSISTANT

## 2024-03-05 PROCEDURE — 63600175 PHARM REV CODE 636 W HCPCS: Performed by: STUDENT IN AN ORGANIZED HEALTH CARE EDUCATION/TRAINING PROGRAM

## 2024-03-05 PROCEDURE — 63600175 PHARM REV CODE 636 W HCPCS

## 2024-03-05 PROCEDURE — 63600175 PHARM REV CODE 636 W HCPCS: Performed by: HOSPITALIST

## 2024-03-05 PROCEDURE — 82550 ASSAY OF CK (CPK): CPT | Performed by: HOSPITALIST

## 2024-03-05 PROCEDURE — 99024 POSTOP FOLLOW-UP VISIT: CPT | Mod: ,,,

## 2024-03-05 PROCEDURE — 85025 COMPLETE CBC W/AUTO DIFF WBC: CPT

## 2024-03-05 PROCEDURE — 25000003 PHARM REV CODE 250: Performed by: PSYCHIATRY & NEUROLOGY

## 2024-03-05 PROCEDURE — 97535 SELF CARE MNGMENT TRAINING: CPT

## 2024-03-05 PROCEDURE — 11000001 HC ACUTE MED/SURG PRIVATE ROOM

## 2024-03-05 PROCEDURE — 83735 ASSAY OF MAGNESIUM: CPT

## 2024-03-05 PROCEDURE — 97530 THERAPEUTIC ACTIVITIES: CPT

## 2024-03-05 PROCEDURE — 99223 1ST HOSP IP/OBS HIGH 75: CPT | Mod: ,,, | Performed by: CLINICAL NURSE SPECIALIST

## 2024-03-05 PROCEDURE — 80053 COMPREHEN METABOLIC PANEL: CPT

## 2024-03-05 PROCEDURE — 85610 PROTHROMBIN TIME: CPT | Performed by: HOSPITALIST

## 2024-03-05 PROCEDURE — 36415 COLL VENOUS BLD VENIPUNCTURE: CPT | Mod: XB | Performed by: HOSPITALIST

## 2024-03-05 PROCEDURE — 25000003 PHARM REV CODE 250: Performed by: HOSPITALIST

## 2024-03-05 RX ORDER — HEPARIN SODIUM,PORCINE/D5W 25000/250
0-40 INTRAVENOUS SOLUTION INTRAVENOUS CONTINUOUS
Status: DISCONTINUED | OUTPATIENT
Start: 2024-03-05 | End: 2024-03-16

## 2024-03-05 RX ADMIN — DEXAMETHASONE SODIUM PHOSPHATE 4 MG: 4 INJECTION INTRA-ARTICULAR; INTRALESIONAL; INTRAMUSCULAR; INTRAVENOUS; SOFT TISSUE at 08:03

## 2024-03-05 RX ADMIN — OXYCODONE HYDROCHLORIDE 10 MG: 10 TABLET ORAL at 01:03

## 2024-03-05 RX ADMIN — HYDROMORPHONE HYDROCHLORIDE 1 MG: 1 INJECTION, SOLUTION INTRAMUSCULAR; INTRAVENOUS; SUBCUTANEOUS at 11:03

## 2024-03-05 RX ADMIN — SODIUM CHLORIDE 2000 MG: 1 TABLET ORAL at 08:03

## 2024-03-05 RX ADMIN — FAMOTIDINE 20 MG: 20 TABLET ORAL at 08:03

## 2024-03-05 RX ADMIN — SODIUM CHLORIDE 2000 MG: 1 TABLET ORAL at 03:03

## 2024-03-05 RX ADMIN — LEVETIRACETAM 500 MG: 500 TABLET, FILM COATED ORAL at 08:03

## 2024-03-05 RX ADMIN — HEPARIN SODIUM AND DEXTROSE 18 UNITS/KG/HR: 10000; 5 INJECTION INTRAVENOUS at 01:03

## 2024-03-05 RX ADMIN — DOCUSATE SODIUM AND SENNOSIDES 2 TABLET: 8.6; 5 TABLET, FILM COATED ORAL at 08:03

## 2024-03-05 RX ADMIN — ALUMINUM HYDROXIDE, MAGNESIUM HYDROXIDE, AND SIMETHICONE 30 ML: 1200; 120; 1200 SUSPENSION ORAL at 05:03

## 2024-03-05 RX ADMIN — ALUMINUM HYDROXIDE, MAGNESIUM HYDROXIDE, AND SIMETHICONE 30 ML: 1200; 120; 1200 SUSPENSION ORAL at 08:03

## 2024-03-05 RX ADMIN — GABAPENTIN 100 MG: 100 CAPSULE ORAL at 08:03

## 2024-03-05 RX ADMIN — HYDROMORPHONE HYDROCHLORIDE 1 MG: 1 INJECTION, SOLUTION INTRAMUSCULAR; INTRAVENOUS; SUBCUTANEOUS at 03:03

## 2024-03-05 RX ADMIN — OXYCODONE HYDROCHLORIDE 10 MG: 10 TABLET ORAL at 05:03

## 2024-03-05 RX ADMIN — HYDROMORPHONE HYDROCHLORIDE 1 MG: 1 INJECTION, SOLUTION INTRAMUSCULAR; INTRAVENOUS; SUBCUTANEOUS at 07:03

## 2024-03-05 RX ADMIN — GABAPENTIN 100 MG: 100 CAPSULE ORAL at 03:03

## 2024-03-05 RX ADMIN — POLYETHYLENE GLYCOL 3350 17 G: 17 POWDER, FOR SOLUTION ORAL at 08:03

## 2024-03-05 RX ADMIN — OXYCODONE HYDROCHLORIDE 10 MG: 10 TABLET ORAL at 09:03

## 2024-03-05 RX ADMIN — ATENOLOL 50 MG: 25 TABLET ORAL at 08:03

## 2024-03-05 RX ADMIN — HYDROMORPHONE HYDROCHLORIDE 1 MG: 1 INJECTION, SOLUTION INTRAMUSCULAR; INTRAVENOUS; SUBCUTANEOUS at 08:03

## 2024-03-05 RX ADMIN — ALUMINUM HYDROXIDE, MAGNESIUM HYDROXIDE, AND SIMETHICONE 30 ML: 1200; 120; 1200 SUSPENSION ORAL at 11:03

## 2024-03-05 NOTE — PLAN OF CARE
Problem: Occupational Therapy  Goal: Occupational Therapy Goal  Description: Goals to be met by: 3/27/24     Patient will increase functional independence with ADLs by performing:    UE Dressing with Minimal Assistance.  LE Dressing with Maximum Assistance.  Grooming while EOB with Moderate Assistance.  Toileting from bedside commode with Maximum Assistance for hygiene and clothing management.   Rolling to Bilateral with Moderate Assistance.   Supine to sit with Contact Guard Assistance.  Stand pivot transfers with Maximum Assistance.  Step transfer with Maximum Assistance  Toilet transfer to BSC with Maximum Assistance.    Patient DC recommendation updated to moderate intensity due to functional ability at this time    Outcome: Ongoing, Progressing

## 2024-03-05 NOTE — HOSPITAL COURSE
3/14- /71  SpO2 90%  other VSSComplicated social situation and discharge. Pt lives in trailer in [place] and has no transport. No insurance. Trying transferring to [facility] in [place] per onc here since pt is [place] resident w/ no insurance; if that is not possible then back up option may be home hospice until her insurance is approved and then can re-evaluate if she wants to pursue palliative chemo. Has new Metastatic small cell ca, poor prognosis.  See GOC note below.     3/15- pt getting confused. Will need to reach out to family. Likely too sick to receive chemo. Constipation. Start miralax and leah supp. Has abd pain. Wbc 12. On heparin to for PE, CMP stable. Appreciate PC and  follow up. See GOC below. Plts 26, 000 not bleeding.  If bleeds, dc heparin and transfuse platelets.     3/16- episode of choking with breakfast today. UA sent.  SLP swallow eval. not eating well.  need to monitor plts and Hb. Abd pain, and + large BMs this am. Plates still dropping. Check HIT. Dc heparin. Will start eliquis for PE. No bleeding. MS is stable.    - Started oxygen for sat 89%, nebs ordered. RL 250cc bolus for hypotension    3/17- [facility]. Ate 25%, on 2 liter per NC. BP 94/60. Pulse 89  SpO2 97%., abd pain improved after BM. Alk phos rising. DNR. Hospice is appropriate. Plts 36,000 stable, no bleeding on eliquis 5 bid.  Can wean off salt tablets, monitoring Na level. Or dc if MS declines and receiving palliative care

## 2024-03-05 NOTE — PLAN OF CARE
Recommendations     1.) Consider changing diet to IDDSI Level 6 (soft and bite sized) d/t chewing pain.     2.) Add Boost Plus BID (or Boost alternate) to help meet needs.      3.) RD to monitor wt, PO intake, skin, labs.     Goals: to meet % of EEN/EPN by next RD f/u  Nutrition Goal Status: goal not met  Communication of RD Recs: other (comment)
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
## 2024-03-05 NOTE — PLAN OF CARE
Problem: Adult Inpatient Plan of Care  Goal: Plan of Care Review  Outcome: Ongoing, Progressing  Flowsheets (Taken 3/5/2024 0435)  Plan of Care Reviewed With: patient  Goal: Absence of Hospital-Acquired Illness or Injury  Outcome: Ongoing, Progressing  Intervention: Identify and Manage Fall Risk  Flowsheets (Taken 3/5/2024 0435)  Safety Promotion/Fall Prevention:   bed alarm set   Fall Risk signage in place   Fall Risk reviewed with patient/family   nonskid shoes/socks when out of bed   lighting adjusted   side rails raised x 3   toileting scheduled   instructed to call staff for mobility  Goal: Optimal Comfort and Wellbeing  Outcome: Ongoing, Progressing  Intervention: Monitor Pain and Promote Comfort  Flowsheets (Taken 3/5/2024 0435)  Pain Management Interventions:   pain management plan reviewed with patient/caregiver   pillow support provided   position adjusted   relaxation techniques promoted   quiet environment facilitated     Bed in lowest position, bed alarm on, call light within reach and pt instructed to call if they need to get up or need any assistance. Educated pt on pain management goal. Requiring PO/IV PRN medications. See flowsheets for VS. POC ongoing.

## 2024-03-05 NOTE — PT/OT/SLP PROGRESS
Physical Therapy Co-Treatment  Co-treatment performed to appropriately and safely address patient's strength and endurance deficits while facilitating functional tasks in addition to accommodating for patient's activity/pain tolerance.    Patient Name:  Crow Hines   MRN:  [id number]    Recommendations:     Discharge Recommendations: Moderate Intensity Therapy  Discharge Equipment Recommendations: hospital bed, wheelchair  Barriers to discharge: Inaccessible home and Decreased caregiver support    Assessment:     Crow Hines is a 63 y.o. female admitted with a medical diagnosis of Brain mass.  She presents with the following impairments/functional limitations: weakness, impaired endurance, impaired self care skills, impaired functional mobility, gait instability, impaired balance, decreased lower extremity function, decreased upper extremity function, pain, decreased safety awareness . Pt progressed to standing trials this date, but was only able to tolerate standing next to the bed before having to sit down 2/2 pain. Pt would benefit from a moderate intensity/frequency therapy for: Dynamic/static standing/sitting balance through skilled balance training, strengthening with the use of skilled therapeutic exercises interventions, and mobility through adaptive equipment training. Pt continues to benefit from a collaborative PT/OT program to improve quality of life and focus on recovery of impairments.      Rehab Prognosis: Good; patient would benefit from acute skilled PT services to address these deficits and reach maximum level of function.    Recent Surgery: Procedure(s) (LRB):  CRANIOTOMY, WITH NEOPLASM EXCISION USING COMPUTER-ASSISTED NAVIGATION (Left) 5 Days Post-Op    Plan:     During this hospitalization, patient to be seen 3 x/week to address the identified rehab impairments via gait training, therapeutic activities, therapeutic exercises, neuromuscular re-education and progress toward the following  "goals:    Plan of Care Expires:  04/01/24    Subjective     Chief Complaint: R hip pain  Patient/Family Comments/goals: to walk to the bathroom  "Go slow" and "Don't pull on my leg"  Pain/Comfort:  Pain Rating 1:  (so bad she is unable to rate it per pt)  Location - Side 1: Right  Location 1: hip  Pain Addressed 1: Reposition, Distraction, Cessation of Activity  Pain Rating Post-Intervention 1: other (see comments) (not rated, but appears decreased laying in bed)      Objective:     Communicated with RN prior to session.  Patient found HOB elevated with telemetry, peripheral IV, PureWick upon PT entry to room.     General Precautions: Standard, fall  Orthopedic Precautions: spinal precautions  Braces: LSO  Respiratory Status: Room air     Functional Mobility:  Bed Mobility:     Rolling Left:  stand by assistance with bed rail, increased time  Rolling Right: stand by assistance with bed rail, increased time  Scooting: contact guard assistance, increased time  Supine to Sit: contact guard assistance, HOB elevated and increased time  Sit to Supine: minimum assistance, assistance with BLEs  Transfers:     Sit to Stand:  minimum assistance with 4 wheeled walker  Gait: unable to complete 2/2 pain in R hip in standing  Balance:   Static Sitting: SBA-CGA  Dynamic Sitting: CGA  Static Standing: Fidel-CGA with rollator  Dynamic Standing: FOX this date 2/2 pain      AM-PAC 6 CLICK MOBILITY  Turning over in bed (including adjusting bedclothes, sheets and blankets)?: 3  Sitting down on and standing up from a chair with arms (e.g., wheelchair, bedside commode, etc.): 3  Moving from lying on back to sitting on the side of the bed?: 3  Moving to and from a bed to a chair (including a wheelchair)?: 3  Need to walk in hospital room?: 1  Climbing 3-5 steps with a railing?: 1  Basic Mobility Total Score: 14       Treatment & Education:  Total A for donning and doffing brace.  Patient educated on role of therapy, goals of session, and " benefits of mobilizing.   Discussed PT plan of care during hospitalization.   Patient educated on calling for assistance.   Patient educated on how their diagnosis impacts their mobility within PT scope of practice.   All questions answered within PT scope of practice.    Patient left HOB elevated with all lines intact, call button in reach, bed alarm on, and RN notified.    GOALS:   Multidisciplinary Problems       Physical Therapy Goals          Problem: Physical Therapy    Goal Priority Disciplines Outcome Goal Variances Interventions   Physical Therapy Goal     PT, PT/OT Ongoing, Progressing     Description: Goals to be met by: 3/12/2024     Patient will increase functional independence with mobility by performin. Supine to sit with Foley  2. Sit to supine with Foley  3. Rolling to Left and Right with Foley.  4. Sit to stand transfer with Contact Guard Assistance with RW  5. Bed to chair transfer with Contact Guard Assistance using Rolling Walker  6. Gait  x 50 feet with Contact Guard Assistance using Rolling Walker.   7. Ascend/descend 3 stair with right Handrails Minimal Assistance using No Assistive Device.   8. Lower extremity exercise program x10 reps per handout, with supervision                             Time Tracking:     PT Received On: 24  PT Start Time: 1115     PT Stop Time: 1138  PT Total Time (min): 23 min     Billable Minutes: Therapeutic Activity 23    Treatment Type: Treatment  PT/PTA: PT     Number of PTA visits since last PT visit: 0     2024

## 2024-03-05 NOTE — PT/OT/SLP PROGRESS
Speech Language Pathology      Crow Hines  MRN: [id number]    Patient not seen today secondary to  (attempted x 2 - pt recently returned from CT and requesting to rest; pt wokring with nurse to plan pain manaement  for the evening). Will follow-up 3/6.

## 2024-03-05 NOTE — SUBJECTIVE & OBJECTIVE
Interval History: Pt in process of work-up with brain mass, mets.     Past Medical History:   Diagnosis Date    Arthritis        Past Surgical History:   Procedure Laterality Date    CRANIOTOMY, WITH NEOPLASM EXCISION USING COMPUTER-ASSISTED NAVIGATION Left 2/29/2024    Procedure: CRANIOTOMY, WITH NEOPLASM EXCISION USING COMPUTER-ASSISTED NAVIGATION;  Surgeon: Felix Szymanski DO;  Location: [facility] OR [address] [place] [address];  Service: Neurosurgery;  Laterality: Left;  (Left temporal crani for rescetion of tumor)  [place]  BRAIN LAB  Navigation - CT with contrast    TUBAL LIGATION  2002       Review of patient's allergies indicates:  No Known Allergies    Medications:  Continuous Infusions:   heparin (porcine) in D5W 18 Units/kg/hr (03/05/24 1321)     Scheduled Meds:   aluminum-magnesium hydroxide-simethicone  30 mL Oral QID (AC & HS)    atenoloL  50 mg Oral Daily    dexAMETHasone  4 mg Intravenous Q12H    famotidine  20 mg Oral BID    gabapentin  100 mg Oral TID    levETIRAcetam  500 mg Oral BID    polyethylene glycol  17 g Oral BID    senna-docusate 8.6-50 mg  2 tablet Oral BID    sodium chloride  2,000 mg Oral TID     PRN Meds:acetaminophen, bisacodyL, hydrALAZINE, HYDROmorphone, labetalol, methocarbamoL, ondansetron, oxyCODONE, oxyCODONE    Family History    None       Tobacco Use    Smoking status: Every Day     Current packs/day: 0.50     Types: Cigarettes    Smokeless tobacco: Current    Tobacco comments:     3/4 PPD   Substance and Sexual Activity    Alcohol use: Not Currently     Comment: OCCASIONALLY    Drug use: Never    Sexual activity: Not Currently       Review of Systems   Constitutional:  Positive for activity change and fatigue.   HENT:  Negative for trouble swallowing.    Respiratory:  Negative for shortness of breath.    Gastrointestinal:  Positive for nausea.   Musculoskeletal:  Positive for back pain.   Neurological:  Positive for weakness.   Psychiatric/Behavioral:  Positive for decreased concentration.       Objective:     Vital Signs (Most Recent):  Temp: 98.3 °F (36.8 °C) (03/05/24 1222)  Pulse: 81 (03/05/24 1222)  Resp: 17 (03/05/24 1339)  BP: 128/73 (03/05/24 1222)  SpO2: (!) 94 % (03/05/24 1222) Vital Signs (24h Range):  Temp:  [97.9 °F (36.6 °C)-98.6 °F (37 °C)] 98.3 °F (36.8 °C)  Pulse:  [81-99] 81  Resp:  [16-20] 17  SpO2:  [94 %-95 %] 94 %  BP: (105-132)/(62-84) 128/73     Weight: 47.6 kg (105 lb)  Body mass index is 18.6 kg/m².       Physical Exam  Constitutional:       General: She is not in acute distress.  HENT:      Head: Normocephalic and atraumatic.   Pulmonary:      Effort: Respiratory distress present.   Musculoskeletal:      Cervical back: Normal range of motion.      Comments: Weakness noted. Pt able to move extremities.    Skin:     General: Skin is warm and dry.      Coloration: Skin is pale.   Neurological:      Mental Status: She is alert and oriented to person, place, and time.   Psychiatric:         Speech: Speech normal.         Behavior: Behavior is cooperative.            Review of Symptoms      Symptom Assessment (ESAS 0-10 Scale)  Pain:  6  Dyspnea:  0  Anxiety:  0  Nausea:  0  Depression:  0  Anorexia:  0  Fatigue:  0  Insomnia:  0  Restlessness:  0  Agitation:  0         Pain Assessment:  OME in 24 hours:  195  Location(s): back    Back       Location: right        Quality: Throbbing and shooting        Quantity: 6/10 in intensity        Chronicity: Onset 0 second(s) ago, unchanged        Aggravating Factors: Walking and movement        Alleviating Factors: Opiates       Associated Symptoms: Nausea    Living Arrangements:  Lives with family    Psychosocial/Cultural:   See Palliative Psychosocial Note: No  Pt reports she lives with her 20 y/o son in [facility] in [place]. Per pt, she cares for son due to his mental issues. Per pt, son able to help her physically. Pt reports she has a daughter, Thais who loves locally. Per pt she is dealing with liver cancer and received chemo. Pt reports not  " and not a big support system. Per pt, her son's godmother is caring for him.   **Primary  to Follow**  Palliative Care  Consult: Yes        Advance Care Planning   Advance Directives:   Living Will: No    Do Not Resuscitate Status: No    Medical Power of : No    Agent's Name:  DaughterThais is NOK    Decision Making:  Patient answered questions  Goals of Care: What is most important right now is to focus on curative/life-prolongation (regardless of treatment burdens). Accordingly, we have decided that the best plan to meet the patient's goals includes continuing with treatment.         Significant Labs: All pertinent labs within the past 24 hours have been reviewed.  CBC:   Recent Labs   Lab 03/05/24 0525   WBC 10.59   HGB 10.8*   HCT 33.3*   MCV 98   *     BMP:  Recent Labs   Lab 03/05/24 0525   GLU 84   *   K 4.3      CO2 26   BUN 15   CREATININE 0.6   CALCIUM 9.3   MG 1.9     LFT:  Lab Results   Component Value Date     (H) 03/05/2024    GGT 1,385 (H) 03/05/2024    ALKPHOS 508 (H) 03/05/2024    BILITOT 0.4 03/05/2024     Albumin:   Albumin   Date Value Ref Range Status   03/05/2024 2.4 (L) 3.5 - 5.2 g/dL Final     Protein:   Total Protein   Date Value Ref Range Status   03/05/2024 5.9 (L) 6.0 - 8.4 g/dL Final     Lactic acid:   No results found for: "LACTATE"    Significant Imaging: I have reviewed all pertinent imaging results/findings within the past 24 hours.    "

## 2024-03-05 NOTE — SUBJECTIVE & OBJECTIVE
Interval History: NAEON. Neuro stable. Denies headaches, vision changes, new focal weakness. Patient with R middle lobe PE- okay for heparin gtt no bolus starting POD 7. Please obtain head CT once therapeutic.    Medications:  Continuous Infusions:  Scheduled Meds:   aluminum-magnesium hydroxide-simethicone  30 mL Oral QID (AC & HS)    atenoloL  50 mg Oral Daily    dexAMETHasone  4 mg Intravenous Q12H    enoxparin  30 mg Subcutaneous Q24H (prophylaxis, 1700)    famotidine  20 mg Oral BID    gabapentin  100 mg Oral TID    levETIRAcetam  500 mg Oral BID    polyethylene glycol  17 g Oral BID    senna-docusate 8.6-50 mg  2 tablet Oral BID    sodium chloride  2,000 mg Oral TID     PRN Meds:acetaminophen, bisacodyL, hydrALAZINE, HYDROmorphone, labetalol, methocarbamoL, ondansetron, oxyCODONE, oxyCODONE     Review of Systems  Objective:     Weight: 47.6 kg (105 lb)  Body mass index is 18.6 kg/m².  Vital Signs (Most Recent):  Temp: 98.6 °F (37 °C) (03/05/24 0756)  Pulse: 99 (03/05/24 0756)  Resp: 16 (03/05/24 0935)  BP: 132/72 (03/05/24 0756)  SpO2: 95 % (03/05/24 0756) Vital Signs (24h Range):  Temp:  [97.9 °F (36.6 °C)-98.6 °F (37 °C)] 98.6 °F (37 °C)  Pulse:  [84-99] 99  Resp:  [11-18] 16  SpO2:  [94 %-96 %] 95 %  BP: (105-132)/(61-84) 132/72                         Female External Urinary Catheter w/ Suction 03/02/24 1422 (Active)   Skin no redness;no breakdown 03/04/24 1930   Tolerance no signs/symptoms of discomfort 03/04/24 1930   Suction Continuous suction at 70 mmHg 03/04/24 1930   Date of last wick change 03/03/24 03/04/24 0705   Time of last wick change 1900 03/03/24 1901   Output (mL) 150 mL 03/04/24 1100       Neurosurgery Physical Exam  General: Well developed, well nourished, not in acute distress.   Head: Normocephalic.  Neurologic: Alert and oriented x 4. Thought content appropriate.  GCS: Motor:6  Verbal:5  Eyes:4   GCS Total: 15  Language: No aphasia.  Speech: No dysarthria.  Cranial nerves: Face  "symmetric, tongue midline, CN II-XII grossly intact.   Eyes: Pupils equal, round, reactive to light with accomodation, EOMI.   Ears: No drainage. No erythema.  Pulmonary: Normal respirations, no signs of respiratory distress.  Abdomen: Soft, non-distended, non-tender to palpation.  Sensory: Intact to light touch throughout.  Motor Strength: Moves all extremities spontaneously with good tone. No abnormal movements seen. 4/5 R proximal UE and LE.   Pronator Drift: No drift noted.  Finger-to-nose: Intact bilaterally.  Skin: Skin is warm, dry and intact.    Incision c/d/I with skin edges well approximated. No surrounding erythema or edema. No drainage from incision. No palpable hematoma or underlying fluid collection.    Significant Labs:  Recent Labs   Lab 03/04/24  0124 03/05/24  0525   * 84   * 135*   K 4.3 4.3   CL 99 102   CO2 25 26   BUN 14 15   CREATININE 0.7 0.6   CALCIUM 9.4 9.3   MG 2.0 1.9     Recent Labs   Lab 03/04/24  0124 03/05/24  0525   WBC 10.50 10.59   HGB 11.0* 10.8*   HCT 34.4* 33.3*   * 549*     No results for input(s): "LABPT", "INR", "APTT" in the last 48 hours.  Microbiology Results (last 7 days)       ** No results found for the last 168 hours. **          All pertinent labs from the last 24 hours have been reviewed.    Significant Diagnostics:  I have reviewed and interpreted all pertinent imaging results/findings within the past 24 hours.  "

## 2024-03-05 NOTE — CONSULTS
"Mario Hsieh - Neurosurgery ([facility])  Palliative Medicine  Consult Note    Patient Name: Crow Hines  MRN: [id number]  Admission Date: 2/27/2024  Hospital Length of Stay: 7 days  Code Status: Full Code   Attending Provider: Fareed Glass MD  Consulting Provider: YOSELIN Greer  Primary Care Physician: Chika Powell MD  Principal Problem:Brain mass    Patient information was obtained from patient and primary team.      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 Pt has hx of RA. Pt has poor performance status. Pt is alert, oriented to person, place, and situation. Pt reports back and hip pain. Pt is a full code.     Reason for consult: ACP/GOC    Today: Met with pt who is aware of her newly dx metastatic cancer. Pt reports "I know I am terminal." Pt reports she wants to know what her treatment options are that may extend her life longer. Pt reports she carefor her 20 y/o son who has mental issues but able to help her at home. They live in a trailer in [place]. Pt reports her daughter Thais lives locally. Per pt she has liver cancer and is receiving active chemo. T reports she is not .     Pt does report she has debility which has prevented ambulation.     MPOA- NOK is Thais landrum. Per pt son unable to make medical decisions.     Code status discussed. Pt reports she wants to be a full code for now while she gets her affairs in order. Per pt, is she is put on life support and not getting better, she would want it stopped and be made comfortable.     Symptom management:     Pain to right hip and back are r/t  metastatic cancer.   OME in 24 hour is about 195.   Pt reports throbbing and shooting pain relieved with pain meds to a tolerable level.     Current " meds:   Dilaudid 1 mg IVP q 4 hrs pain not relieved by pain meds.   Oxycodone 10 mg q 4 hrs prn pain.   Pt on Neurontin 100 mg tid.   Pt on dexamethasone 4 mg IVP     Recs:   Consider starting oxycodone long acting 10 mg bid.   Will continue to reassess.     Nausea:   Pt on zofran 4 mg IVP q 8 hrs prn nausea.     Debility:   PT/OT working with pt.     Constipation:   Pt on bowel regiment.     Plan:   Communicated with Dr. Glass.   See above recs.   Will continue to follow.         Thank you for your consult. I will follow-up with patient. Please contact us if you have any additional questions.    Subjective:     HPI:   Pt is a 64 y/o F with pMHx of HTN and rheumatoid arthritis presenting as transfer from [facility] for neurosurgery eval of L temporal lobe mass. Per chart review, pt initially presented to the ED with complaints that everything hurts.   Reportedly began seeing a rheumatologist 9 months ago where she was diagnosed w/ RA. Methotrexate therapy was initiated. Patient reported a continually decline since that time, leading to her being bed-bound for the last 2 months 2/2 to difficulty walking. She stated her rheumatologist believed that she was not tolerating the methotrexate. On presentation to ED, she reported inability to tolerate abdominal pain any longer. Lab work significant for alk phos 203, AST 57, sodium 132, platelets 586. CXR revealed linear and ill-defined hazy opacities of the right mid to lower lung, felt to reflect infiltrate. Diffuse abdominal pain with focal increased tenderness in mid-epigastric area prompted CT abd/pelvis revealing numerous metastatic masses in the liver, metastatic deposits in the spleen and bilateral adrenal masses, hypoattenuating and mildly enhancing mass of the upper pole L kidney. Also w/ intra-abdominal enlarged lymph nodes, consistent with metastatic infiltration, masses of the R lung base with consolidation of the visualized R renal lower lobe and RLL 9 mm nodule.  L2 compression fracture w/ mild sclerosis of the vertebra noted, likely pathologic. CTH completed showing large 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 She was transferred to [facility] for higher level of care and will be admitted to [facility] for further management.        Hospital Course:  No notes on file    Interval History: Pt in process of work-up with brain mass, mets.     Past Medical History:   Diagnosis Date    Arthritis        Past Surgical History:   Procedure Laterality Date    CRANIOTOMY, WITH NEOPLASM EXCISION USING COMPUTER-ASSISTED NAVIGATION Left 2/29/2024    Procedure: CRANIOTOMY, WITH NEOPLASM EXCISION USING COMPUTER-ASSISTED NAVIGATION;  Surgeon: Felix Szymanski DO;  Location: [facility] OR [address] [place] [address];  Service: Neurosurgery;  Laterality: Left;  (Left temporal crani for rescetion of tumor)  [place]  BRAIN LAB  Navigation - CT with contrast    TUBAL LIGATION  2002       Review of patient's allergies indicates:  No Known Allergies    Medications:  Continuous Infusions:   heparin (porcine) in D5W 18 Units/kg/hr (03/05/24 1321)     Scheduled Meds:   aluminum-magnesium hydroxide-simethicone  30 mL Oral QID (AC & HS)    atenoloL  50 mg Oral Daily    dexAMETHasone  4 mg Intravenous Q12H    famotidine  20 mg Oral BID    gabapentin  100 mg Oral TID    levETIRAcetam  500 mg Oral BID    polyethylene glycol  17 g Oral BID    senna-docusate 8.6-50 mg  2 tablet Oral BID    sodium chloride  2,000 mg Oral TID     PRN Meds:acetaminophen, bisacodyL, hydrALAZINE, HYDROmorphone, labetalol, methocarbamoL, ondansetron, oxyCODONE, oxyCODONE    Family History    None       Tobacco Use    Smoking status: Every Day     Current packs/day: 0.50     Types: Cigarettes    Smokeless tobacco: Current    Tobacco comments:     3/4 PPD   Substance and Sexual Activity    Alcohol use: Not Currently     Comment: OCCASIONALLY    Drug use: Never    Sexual activity: Not Currently       Review of Systems   Constitutional:  Positive for  activity change and fatigue.   HENT:  Negative for trouble swallowing.    Respiratory:  Negative for shortness of breath.    Gastrointestinal:  Positive for nausea.   Musculoskeletal:  Positive for back pain.   Neurological:  Positive for weakness.   Psychiatric/Behavioral:  Positive for decreased concentration.      Objective:     Vital Signs (Most Recent):  Temp: 98.3 °F (36.8 °C) (03/05/24 1222)  Pulse: 81 (03/05/24 1222)  Resp: 17 (03/05/24 1339)  BP: 128/73 (03/05/24 1222)  SpO2: (!) 94 % (03/05/24 1222) Vital Signs (24h Range):  Temp:  [97.9 °F (36.6 °C)-98.6 °F (37 °C)] 98.3 °F (36.8 °C)  Pulse:  [81-99] 81  Resp:  [16-20] 17  SpO2:  [94 %-95 %] 94 %  BP: (105-132)/(62-84) 128/73     Weight: 47.6 kg (105 lb)  Body mass index is 18.6 kg/m².       Physical Exam  Constitutional:       General: She is not in acute distress.  HENT:      Head: Normocephalic and atraumatic.   Pulmonary:      Effort: Respiratory distress present.   Musculoskeletal:      Cervical back: Normal range of motion.      Comments: Weakness noted. Pt able to move extremities.    Skin:     General: Skin is warm and dry.      Coloration: Skin is pale.   Neurological:      Mental Status: She is alert and oriented to person, place, and time.   Psychiatric:         Speech: Speech normal.         Behavior: Behavior is cooperative.            Review of Symptoms      Symptom Assessment (ESAS 0-10 Scale)  Pain:  6  Dyspnea:  0  Anxiety:  0  Nausea:  0  Depression:  0  Anorexia:  0  Fatigue:  0  Insomnia:  0  Restlessness:  0  Agitation:  0         Pain Assessment:  OME in 24 hours:  195  Location(s): back    Back       Location: right        Quality: Throbbing and shooting        Quantity: 6/10 in intensity        Chronicity: Onset 0 second(s) ago, unchanged        Aggravating Factors: Walking and movement        Alleviating Factors: Opiates       Associated Symptoms: Nausea    Living Arrangements:  Lives with family    Psychosocial/Cultural:   See  "Palliative Psychosocial Note: No  Pt reports she lives with her 22 y/o son in [facility] in [place]. Per pt, she cares for son due to his mental issues. Per pt, son able to help her physically. Pt reports she has a daughter, Thais who loves locally. Per pt she is dealing with liver cancer and received chemo. Pt reports not  and not a big support system. Per pt, her son's godmother is caring for him.   **Primary  to Follow**  Palliative Care  Consult: Yes        Advance Care Planning  Advance Directives:   Living Will: No    Do Not Resuscitate Status: No    Medical Power of : No    Agent's Name:  Daughter, Thais Hines is NOK    Decision Making:  Patient answered questions  Goals of Care: What is most important right now is to focus on curative/life-prolongation (regardless of treatment burdens). Accordingly, we have decided that the best plan to meet the patient's goals includes continuing with treatment.         Significant Labs: All pertinent labs within the past 24 hours have been reviewed.  CBC:   Recent Labs   Lab 03/05/24  0525   WBC 10.59   HGB 10.8*   HCT 33.3*   MCV 98   *     BMP:  Recent Labs   Lab 03/05/24  0525   GLU 84   *   K 4.3      CO2 26   BUN 15   CREATININE 0.6   CALCIUM 9.3   MG 1.9     LFT:  Lab Results   Component Value Date     (H) 03/05/2024    GGT 1,385 (H) 03/05/2024    ALKPHOS 508 (H) 03/05/2024    BILITOT 0.4 03/05/2024     Albumin:   Albumin   Date Value Ref Range Status   03/05/2024 2.4 (L) 3.5 - 5.2 g/dL Final     Protein:   Total Protein   Date Value Ref Range Status   03/05/2024 5.9 (L) 6.0 - 8.4 g/dL Final     Lactic acid:   No results found for: "LACTATE"    Significant Imaging: I have reviewed all pertinent imaging results/findings within the past 24 hours.      30 minutes of ACP completed.       Hannah Saunders, CNS  Palliative Medicine  [facility] - Neurosurgery ([facility])              "

## 2024-03-05 NOTE — PROGRESS NOTES
Mario Hsieh - Neurosurgery ([facility])  [facility] Medicine  Progress Note    Patient Name: Crow Hines  MRN: [id number]  Patient Class: IP- Inpatient   Admission Date: 2/27/2024  Length of Stay: 7 days  Attending Physician: Fareed Glass MD  Primary Care Provider: Chika Powell MD        Subjective:     Principal Problem:Brain mass        HPI:  No notes on file    Overview/Hospital Course:  62 y/o Female admitted as transfer from OSH for L temporal lobe mass presenting w/ difficulty walking. Lab work significant for alk phos 203, AST 57, sodium 132, platelets 586. CXR revealed linear and ill-defined hazy opacities of the right mid to lower lung, felt to reflect infiltrate. Diffuse abdominal pain with focal increased tenderness in mid-epigastric area prompted CT abd/pelvis revealing numerous metastatic masses in the liver, metastatic deposits in the spleen and bilateral adrenal masses, hypoattenuating and mildly enhancing mass of the upper pole L kidney. Also w/ intra-abdominal enlarged lymph nodes, consistent with metastatic infiltration, masses of the R lung base with consolidation of the visualized R renal lower lobe and RLL 9 mm nodule. L2 compression fracture w/ mild sclerosis of the vertebra noted, likely pathologic. CTH completed showing large 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 Pt underwent L temporal craniotomy tumor resection on 2/29. Path returned metastatic small cell lung CA.     Interval History:  Patient does verbalize some discomfort the head.  No vijay chest pain or shortness a breath.  Awake and alert.  Patient does report having some memory gaps.  Discussed with Oncology, they are ordering complete spine MRI further staging.  Brain pathology has returned positive for metastatic small-cell lung cancer.    Review of Systems  Objective:     Vital Signs (Most Recent):  Temp: 98.3 °F (36.8 °C) (03/05/24 1222)  Pulse: 81 (03/05/24 1222)  Resp: 17 (03/05/24 1339)  BP: 128/73  (03/05/24 1222)  SpO2: (!) 94 % (03/05/24 1222) Vital Signs (24h Range):  Temp:  [97.9 °F (36.6 °C)-98.6 °F (37 °C)] 98.3 °F (36.8 °C)  Pulse:  [81-99] 81  Resp:  [16-20] 17  SpO2:  [94 %-95 %] 94 %  BP: (105-132)/(62-84) 128/73     Weight: 47.6 kg (105 lb)  Body mass index is 18.6 kg/m².    Intake/Output Summary (Last 24 hours) at 3/5/2024 1523  Last data filed at 3/5/2024 0115  Gross per 24 hour   Intake --   Output 300 ml   Net -300 ml         Physical Exam      Clear lungs bilaterally, unlabored breathing, on room air, no cyanosis  Heart sounds indicate a regular rate and rhythm  Awake alert, no acute distress   Left-sided scalp incision appears clean dry and intact   5/5 proximal upper and lower extremity strength bilaterally including fist  and hip flexor strength   Pupils equal bilaterally   No obvious lower extremity edema     Assessment/Plan:      * Brain mass  -L temporal mass; status post tumor resection on 02/29, pathology showing met small cell lung CA   -scheduled dexamethasone   -Keppra for seizure prophylaxis   -PT/OT  -blood pressure control  -pain control  -neurochecks      Vasogenic brain edema  See above      Metastatic neoplastic disease  -CT abd/pelvis w/ numerous metastatic masses in the liver, likely metastatic deposit in the spleen; bilateral adrenal masses, hypoattenuating and mildly enhancing mass of upper pole L kidney, intra-abdominal enlarged lymph nodes consistent w/ metastatic infiltration.   -masses to R lung base w/ consolidation of visualized R renal lower lobe; R lower lobe 9mm nodule  -L2 vertebra compression fracture w/ mild sclerosis of the vertebra, likely reflecting a pathologic fracture; faint sclerosis of the superior endplate of S1 likely reflects metastatic infiltration  -deferring staging workup including MRI spine and labs to medical oncology  -palliative care consulted       Acute pulmonary embolism without acute cor pulmonale  -Stable respiratory status  -Per  Neurosurgery recommendation, starting heparin infusion drip to be followed by CT head to evaluate for any bleeds and then switch over to Eliquis tomorrow      Compression fracture of lumbar spine, non-traumatic  Back brace        VTE Risk Mitigation (From admission, onward)           Ordered     heparin 25,000 units in dextrose 5% 250 mL (100 units/mL) infusion HIGH INTENSITY nomogram - [facility]  Continuous        Question:  Begin at (units/kg/hr)  Answer:  18    03/05/24 1004     Reason for No Pharmacological VTE Prophylaxis  Once        Question:  Reasons:  Answer:  Risk of Bleeding    02/27/24 0556     IP VTE HIGH RISK PATIENT  Once         02/27/24 0556     Place sequential compression device  Until discontinued         02/27/24 0556                    Discharge Planning   JUAN: 3/6/2024     Code Status: Full Code   Is the patient medically ready for discharge?: No    Reason for patient still in hospital (select all that apply): Patient trending condition, Laboratory test, Treatment, Imaging, and Consult recommendations  Discharge Plan A: Home                  Fareed Glass MD  Department of Hospital Medicine   [facility] - Neurosurgery ([facility])

## 2024-03-05 NOTE — HPI
Pt is a 62 y/o F with pMHx of HTN and rheumatoid arthritis presenting as transfer from OSH for neurosurgery eval of L temporal lobe mass. Per chart review, pt initially presented to the ED with complaints that everything hurts.   Reportedly began seeing a rheumatologist 9 months ago where she was diagnosed w/ RA. Methotrexate therapy was initiated. Patient reported a continually decline since that time, leading to her being bed-bound for the last 2 months 2/2 to difficulty walking. She stated her rheumatologist believed that she was not tolerating the methotrexate. On presentation to ED, she reported inability to tolerate abdominal pain any longer. Lab work significant for alk phos 203, AST 57, sodium 132, platelets 586. CXR revealed linear and ill-defined hazy opacities of the right mid to lower lung, felt to reflect infiltrate. Diffuse abdominal pain with focal increased tenderness in mid-epigastric area prompted CT abd/pelvis revealing numerous metastatic masses in the liver, metastatic deposits in the spleen and bilateral adrenal masses, hypoattenuating and mildly enhancing mass of the upper pole L kidney. Also w/ intra-abdominal enlarged lymph nodes, consistent with metastatic infiltration, masses of the R lung base with consolidation of the visualized R renal lower lobe and RLL 9 mm nodule. L2 compression fracture w/ mild sclerosis of the vertebra noted, likely pathologic. CTH completed showing large 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 She was transferred to [facility] for higher level of care and will be admitted to [facility] for further management.

## 2024-03-05 NOTE — PROGRESS NOTES
Mario Hsieh - Neurosurgery ([facility])  Adult Nutrition  Progress Note    SUMMARY       Recommendations    1.) Consider changing diet to IDDSI Level 6 (soft and bite sized) d/t chewing pain.    2.) Add Boost Plus BID (or Boost alternate) to help meet needs.     3.) RD to monitor wt, PO intake, skin, labs.    Goals: to meet % of EEN/EPN by next RD f/u  Nutrition Goal Status: goal not met  Communication of RD Recs: other (comment)    Assessment and Plan    Moderate malnutrition  Malnutrition Type:  Context: chronic illness  Level: moderate    Related to (etiology):   Brain Mass    Signs and Symptoms (as evidenced by):   Mild wasting muscle/fat and -15% wt loss x 3mo     Malnutrition Characteristic Summary:  Weight Loss (Malnutrition): greater than 7.5% in 3 months (-15% x 3mo)  Subcutaneous Fat (Malnutrition): mild depletion  Muscle Mass (Malnutrition): mild depletion      Interventions/Recommendations (treatment strategy):  1.) Recommend continuing with Regular diet as tolerated. 2.) If PO intake is <50%, recommend Boost Plus BID (or Boost alternate) to help meet needs. 3.) RD to monitor wt, PO intake, skin, labs.    Nutrition Diagnosis Status:   Ongoing         Malnutrition Assessment  Malnutrition Context: chronic illness  Malnutrition Level: moderate      Micronutrient Evaluation Summary: no deficiencies   Weight Loss (Malnutrition): greater than 7.5% in 3 months (-15% x 3mo)  Subcutaneous Fat (Malnutrition): mild depletion  Muscle Mass (Malnutrition): mild depletion   Orbital Region (Subcutaneous Fat Loss): mild depletion   [place] Region (Muscle Loss): mild depletion  Clavicle Bone Region (Muscle Loss): mild depletion  Clavicle and Acromion Bone Region (Muscle Loss): mild depletion  Dorsal Hand (Muscle Loss): mild depletion               Reason for Assessment    Reason For Assessment: RD follow-up  Diagnosis: cancer diagnosis/related complications (left temporal brain mass)  Relevant Medical History: HTN,  "RA  Interdisciplinary Rounds: did not attend  General Information Comments: Patient receiving regular diet. Reports pain chewing. Endorses constipation. Denies N/V. Reports normal PO intake, with some fluctuations in appetite. Patient requesting softer food  items such as soup. NFPE performed 2/27. Meets criteria for moderate malnutrition. PO intake avg 35% meals x 5 noted meals. ONS not ordered at this time. RD to place order. 2/29 craniotomy.  Nutrition Discharge Planning: adequate PO intake    Nutrition Risk Screen    Nutrition Risk Screen: no indicators present    Nutrition/Diet History    Patient Reported Diet/Restrictions/Preferences: general  Typical Food/Fluid Intake: 2-3 small meals  Spiritual, Cultural Beliefs, Hinduism Practices, Values that Affect Care: no  Food Allergies: NKFA    Anthropometrics    Temp: 98.3 °F (36.8 °C)  Height: 5' 3" (160 cm)  Height (inches): 63 in  Weight Method: Bed Scale  Weight: 47.6 kg (105 lb)  Weight (lb): 105 lb  Ideal Body Weight (IBW), Female: 115 lb  % Ideal Body Weight, Female (lb): 91.3 %  BMI (Calculated): 18.6  BMI Grade: 18.5-24.9 - normal     Lab/Procedures/Meds    Pertinent Labs Reviewed: reviewed  Pertinent Labs Comments: (3/5) Na: 135, ALP: 508, Tpro: 5.9, Alb: 2.4, AST: 109, ALT: 133, GGT: 1385, RBC: 3.4, H/H: 10.8/33.3, Plt Cnt: 549  Pertinent Medications Reviewed: reviewed  Pertinent Medications Comments: Dexamethasone, famotidine, senna-docusate, polyethylene glycol      Estimated/Assessed Needs    Weight Used For Calorie Calculations: 47.6 kg (104 lb 15 oz)  Energy Calorie Requirements (kcal): 1428- 1666 kcal  Energy Need Method: Kcal/kg (30-35 kcal/kg)  Protein Requirements: 57- 72g (1.2-1.5g/kg)  Weight Used For Protein Calculations: 47.6 kg (104 lb 15 oz)  Fluid Requirements (mL): 1ml/1kcal or per MD  Estimated Fluid Requirement Method: RDA Method  RDA Method (mL): 1428     Nutrition Prescription Ordered    Current Diet Order: Regular diet    Evaluation " of Received Nutrient/Fluid Intake    I/O: -3.9 L since admit  Energy Calories Required: not meeting needs (diet just advanced)  Protein Required: not meeting needs (diet just advanced)  Fluid Required:  (as per MD)  Comments: LBM: 3/3  Tolerance: not tolerating  % Intake of Estimated Energy Needs: 25 - 50 %  % Meal Intake: 25 - 50 %    Nutrition Risk    Level of Risk/Frequency of Follow-up:  (RD to f/u x 1/week)     Monitor and Evaluation    Food and Nutrient Intake: energy intake, food and beverage intake  Food and Nutrient Adminstration: diet order  Physical Activity and Function: nutrition-related ADLs and IADLs  Anthropometric Measurements: weight change, weight, body mass index  Biochemical Data, Medical Tests and Procedures: electrolyte and renal panel, gastrointestinal profile, glucose/endocrine profile, inflammatory profile  Nutrition-Focused Physical Findings: overall appearance, skin     Nutrition Follow-Up    RD Follow-up?: Yes

## 2024-03-05 NOTE — ASSESSMENT & PLAN NOTE
-CT abd/pelvis w/ numerous metastatic masses in the liver, likely metastatic deposit in the spleen; bilateral adrenal masses, hypoattenuating and mildly enhancing mass of upper pole L kidney, intra-abdominal enlarged lymph nodes consistent w/ metastatic infiltration.   -masses to R lung base w/ consolidation of visualized R renal lower lobe; R lower lobe 9mm nodule  -L2 vertebra compression fracture w/ mild sclerosis of the vertebra, likely reflecting a pathologic fracture; faint sclerosis of the superior endplate of S1 likely reflects metastatic infiltration  -deferring staging workup including MRI spine and labs to medical oncology  -palliative care consulted

## 2024-03-05 NOTE — PROGRESS NOTES
[facility] - Neurosurgery ([facility])  Neurosurgery  Progress Note        Subjective:     HPI:  Ms. Crow Hines is a 63 y.o. female who was transferred from [place] for management of a newly for left temporal brain mass.  Patient initially presented with several months of diffuse chest, abdominal, and back pain. CT C/A/P showed diffuse metastatic disease with lung, liver, spleen, and kidney lesions. Also revealing L2 compression fracture for which Neurosurgery in [place] was originally consulted for. Patient had an episode of acute confusion and CT head was ordered, left temporal mass with significant edema was identified. [place] [facility] [place]     Recent left temporal biopsy revealed metastatic lung small cell carcinoma.        Physical Exam   Constitutional: She is oriented to person, place, and time.  Non-toxic appearance. No distress.   HENT:   Head: Normocephalic and atraumatic.   Cardiovascular: Normal rate and normal pulses. Pulmonary:      Effort: Pulmonary effort is normal. No respiratory distress.     Abdominal: Soft. She exhibits no distension.   Musculoskeletal:      Right lower leg: No edema.      Left lower leg: No edema.   Neurological: She is oriented to person, place, and time.   Psychiatric: Mood normal.   Nursing note and vitals reviewed.         Assessment/Plan:     Metastatic small cell lung cancer  - New diagnosis  - Extensive stage SCLC  - Following discussion with patient she does live with her handicap son which is the primary caregiver. She has been bedbound for up to 9 months. She does not drive however can find someone to bring her appointments if needed.  -ECOG PS: 4  - According to patient, she has progressive rheumatoid arthritis. Unsure how much debility may have been caused by the brewing metastatic disease..  -Given neurocranial disease, it will be prudent to obtain neurocranial axis imaging -- recommend obtaining MRI cranial-thoracic-lumbar spine  imaging.  - Maybe a candidate for systemic therapy with combination chemo-immunotherapy/radiation. However given poor PS, best supportive care should be considered.  - Recommend Radiation oncology consultation.  - Check TLS labs- CMP, Uric acid, Mag, Phos daily  -Recommend palliative care consult.        Jim Francis MD  Hematology/Oncology  [facility] - Neuro Critical Care

## 2024-03-05 NOTE — ASSESSMENT & PLAN NOTE
-L temporal mass; status post tumor resection on 02/29, pathology showing met small cell lung CA   -scheduled dexamethasone   -Keppra for seizure prophylaxis   -PT/OT  -blood pressure control  -pain control  -neurochecks

## 2024-03-05 NOTE — PT/OT/SLP PROGRESS
Occupational Therapy   Co-Treatment    Name: Crow Hines  MRN: [id number]  Admitting Diagnosis:  Brain mass  5 Days Post-Op    Recommendations:     Discharge Recommendations: Moderate Intensity Therapy  Discharge Equipment Recommendations:  hospital bed, wheelchair, lift device  Barriers to discharge:  Other (Comment)    Assessment:     Crow Hines is a 63 y.o. female with a medical diagnosis of Brain mass.  She presents with decrease functional status secondary to medical diagnosis. Performance deficits affecting function are weakness, impaired self care skills, impaired balance, decreased safety awareness, impaired functional mobility, gait instability, decreased lower extremity function, pain. Patient continues to need significant assistance with all functional tasks and mobilization 2/2 to increased pain. Patient remains appropriate candidate for acute OT services.     Rehab Prognosis:  Good; patient would benefit from acute skilled OT services to address these deficits and reach maximum level of function.       Plan:     Patient to be seen 3 x/week to address the above listed problems via self-care/home management, therapeutic activities, therapeutic exercises, neuromuscular re-education  Plan of Care Expires: 03/27/24  Plan of Care Reviewed with: patient    Subjective     Chief Complaint: None at this time   Patient/Family Comments/goals: DC  Pain/Comfort:       Objective:     Communicated with: RN prior to session.  Patient found supine with blood pressure cuff, pulse ox (continuous), telemetry, law catheter upon OT entry to room.    General Precautions: Standard, fall    Orthopedic Precautions:spinal precautions  Braces: LSO  Respiratory Status: Room air     Occupational Performance:     Bed Mobility:  Patient hypersensitive to touch and requiring increased time with all mobilization  Rolling Left:  stand by assistance with bed rail  Rolling Right: stand by assistance with bed rail  Scooting: contact  guard assistance,  Supine to Sit: contact guard assistance, HOB elevated   Sit to Supine: minimum assistance, assistance with BLEs    Functional Mobility/Transfers:  Patient completed Sit <> Stand Transfer with minimum assistance  with  4 wheeled walker   Functional Mobility: Unable 2/2 to hip pain in standing     Activities of Daily Living:  Bathing: maximal assistance for bed bath supine   Toileting: total assistance patient with pure wick malfunction; OT assisted patient in hygiene       [facility] 6 Click ADL:      Treatment & Education:  Co-Treatment conducted due to patient medical instability and to promote patient safety. Provided education regarding role of OT, POC, & discharge recommendations.  Pt with no further questions/concerns at this time.     Patient left supine with all lines intact and call button in reach    GOALS:   Multidisciplinary Problems       Occupational Therapy Goals          Problem: Occupational Therapy    Goal Priority Disciplines Outcome Interventions   Occupational Therapy Goal     OT, PT/OT Ongoing, Progressing    Description: Goals to be met by: 3/27/24     Patient will increase functional independence with ADLs by performing:    UE Dressing with Minimal Assistance.  LE Dressing with Maximum Assistance.  Grooming while EOB with Moderate Assistance.  Toileting from bedside commode with Maximum Assistance for hygiene and clothing management.   Rolling to Bilateral with Moderate Assistance.   Supine to sit with Contact Guard Assistance.  Stand pivot transfers with Maximum Assistance.  Step transfer with Maximum Assistance  Toilet transfer to [facility] with Maximum Assistance.                         Time Tracking:     OT Date of Treatment: 03/05/24  OT Start Time: 1115  OT Stop Time: 1138  OT Total Time (min): 23 min    Billable Minutes:Self Care/Home Management 23 minutes     OT/LYNDA: OT          3/5/2024

## 2024-03-05 NOTE — ASSESSMENT & PLAN NOTE
-Stable respiratory status  -Per Neurosurgery recommendation, starting heparin infusion drip to be followed by CT head to evaluate for any bleeds and then switch over to Eliquis tomorrow

## 2024-03-05 NOTE — SUBJECTIVE & OBJECTIVE
Interval History:  Patient does verbalize some discomfort the head.  No vijay chest pain or shortness a breath.  Awake and alert.  Patient does report having some memory gaps.  Discussed with Oncology, they are ordering complete spine MRI further staging.  Brain pathology has returned positive for metastatic small-cell lung cancer.    Review of Systems  Objective:     Vital Signs (Most Recent):  Temp: 98.3 °F (36.8 °C) (03/05/24 1222)  Pulse: 81 (03/05/24 1222)  Resp: 17 (03/05/24 1339)  BP: 128/73 (03/05/24 1222)  SpO2: (!) 94 % (03/05/24 1222) Vital Signs (24h Range):  Temp:  [97.9 °F (36.6 °C)-98.6 °F (37 °C)] 98.3 °F (36.8 °C)  Pulse:  [81-99] 81  Resp:  [16-20] 17  SpO2:  [94 %-95 %] 94 %  BP: (105-132)/(62-84) 128/73     Weight: 47.6 kg (105 lb)  Body mass index is 18.6 kg/m².    Intake/Output Summary (Last 24 hours) at 3/5/2024 1523  Last data filed at 3/5/2024 0115  Gross per 24 hour   Intake --   Output 300 ml   Net -300 ml         Physical Exam      Clear lungs bilaterally, unlabored breathing, on room air, no cyanosis  Heart sounds indicate a regular rate and rhythm  Awake alert, no acute distress   Left-sided scalp incision appears clean dry and intact   5/5 proximal upper and lower extremity strength bilaterally including fist  and hip flexor strength   Pupils equal bilaterally   No obvious lower extremity edema

## 2024-03-05 NOTE — ASSESSMENT & PLAN NOTE
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 Pt has hx of RA. Pt has poor performance status. Pt is alert, oriented to person, place, and situation. Pt reports back and hip pain. Pt is a full code.     Reason for consult: ACP/GOC    Today: Met with pt who is aware of her newly dx metastatic cancer. Pt reports "I know I am terminal." Pt reports she wants to know what her treatment options are that may extend her life longer. Pt reports she carefor her 22 y/o son who has mental issues but able to help her at home. They live in a trailer in [place]. Pt reports her daughter Thais lives locally. Per pt she has liver cancer and is receiving active chemo. T reports she is not .     Pt does report she has debility which has prevented ambulation.     MPOA- NOK is nathalienakiaThais. Per pt son unable to make medical decisions.     Code status discussed. Pt reports she wants to be a full code for now while she gets her affairs in order. Per pt, is she is put on life support and not getting better, she would want it stopped and be made comfortable.     Symptom management:     Pain to right hip and back are r/t  metastatic cancer.   OME in 24 hour is about 195.   Pt reports throbbing and shooting pain relieved with pain meds to a tolerable level.     Current meds:   Dilaudid 1 mg IVP q 4 hrs pain not relieved by pain meds.   Oxycodone 10 mg q 4 hrs prn pain.   Pt on Neurontin 100 mg tid.   Pt on dexamethasone 4 mg IVP     Recs:   Consider starting oxycodone long acting 10 mg bid.   Will continue to reassess.     Nausea:   Pt on zofran 4 mg IVP q 8 hrs prn nausea.     Debility:   PT/OT working with pt.     Plan:   Communicated with Dr. Glass.   See above recs.   Will continue to follow.   "

## 2024-03-05 NOTE — ASSESSMENT & PLAN NOTE
- Recommend LSO brace for comfort.  - XR upright/supine for stability on 2/27. No dynamic instability.

## 2024-03-06 PROBLEM — R74.01 TRANSAMINITIS: Status: ACTIVE | Noted: 2024-03-06

## 2024-03-06 PROBLEM — G93.5 BRAIN COMPRESSION: Status: ACTIVE | Noted: 2024-02-27

## 2024-03-06 PROBLEM — G89.3 CANCER RELATED PAIN: Status: ACTIVE | Noted: 2024-03-05

## 2024-03-06 PROBLEM — E87.1 HYPONATREMIA: Status: ACTIVE | Noted: 2024-03-06

## 2024-03-06 PROBLEM — D75.839 THROMBOCYTOSIS: Status: ACTIVE | Noted: 2024-03-06

## 2024-03-06 LAB
ALBUMIN SERPL BCP-MCNC: 2.3 G/DL (ref 3.5–5.2)
ALP SERPL-CCNC: 545 U/L (ref 55–135)
ALT SERPL W/O P-5'-P-CCNC: 129 U/L (ref 10–44)
ANION GAP SERPL CALC-SCNC: 11 MMOL/L (ref 8–16)
APTT PPP: 32.2 SEC (ref 21–32)
APTT PPP: 34.7 SEC (ref 21–32)
APTT PPP: 40.3 SEC (ref 21–32)
AST SERPL-CCNC: 99 U/L (ref 10–40)
BASOPHILS # BLD AUTO: 0.01 K/UL (ref 0–0.2)
BASOPHILS NFR BLD: 0.1 % (ref 0–1.9)
BILIRUB SERPL-MCNC: 0.4 MG/DL (ref 0.1–1)
BUN SERPL-MCNC: 11 MG/DL (ref 8–23)
CALCIUM SERPL-MCNC: 9.3 MG/DL (ref 8.7–10.5)
CHLORIDE SERPL-SCNC: 101 MMOL/L (ref 95–110)
CO2 SERPL-SCNC: 25 MMOL/L (ref 23–29)
CREAT SERPL-MCNC: 0.6 MG/DL (ref 0.5–1.4)
DIFFERENTIAL METHOD BLD: ABNORMAL
EOSINOPHIL # BLD AUTO: 0 K/UL (ref 0–0.5)
EOSINOPHIL NFR BLD: 0 % (ref 0–8)
ERYTHROCYTE [DISTWIDTH] IN BLOOD BY AUTOMATED COUNT: 13.8 % (ref 11.5–14.5)
EST. GFR  (NO RACE VARIABLE): >60 ML/MIN/1.73 M^2
GLUCOSE SERPL-MCNC: 98 MG/DL (ref 70–110)
HCT VFR BLD AUTO: 32.7 % (ref 37–48.5)
HGB BLD-MCNC: 10.6 G/DL (ref 12–16)
IMM GRANULOCYTES # BLD AUTO: 0.15 K/UL (ref 0–0.04)
IMM GRANULOCYTES NFR BLD AUTO: 1.6 % (ref 0–0.5)
LYMPHOCYTES # BLD AUTO: 0.5 K/UL (ref 1–4.8)
LYMPHOCYTES NFR BLD: 5.2 % (ref 18–48)
MAGNESIUM SERPL-MCNC: 2 MG/DL (ref 1.6–2.6)
MCH RBC QN AUTO: 32.2 PG (ref 27–31)
MCHC RBC AUTO-ENTMCNC: 32.4 G/DL (ref 32–36)
MCV RBC AUTO: 99 FL (ref 82–98)
MONOCYTES # BLD AUTO: 1.1 K/UL (ref 0.3–1)
MONOCYTES NFR BLD: 11.6 % (ref 4–15)
NEUTROPHILS # BLD AUTO: 7.8 K/UL (ref 1.8–7.7)
NEUTROPHILS NFR BLD: 81.5 % (ref 38–73)
NRBC BLD-RTO: 0 /100 WBC
PHOSPHATE SERPL-MCNC: 3.1 MG/DL (ref 2.7–4.5)
PLATELET # BLD AUTO: 511 K/UL (ref 150–450)
PMV BLD AUTO: 8.7 FL (ref 9.2–12.9)
POTASSIUM SERPL-SCNC: 4.4 MMOL/L (ref 3.5–5.1)
PROT SERPL-MCNC: 5.7 G/DL (ref 6–8.4)
RBC # BLD AUTO: 3.29 M/UL (ref 4–5.4)
SODIUM SERPL-SCNC: 137 MMOL/L (ref 136–145)
WBC # BLD AUTO: 9.55 K/UL (ref 3.9–12.7)

## 2024-03-06 PROCEDURE — 83735 ASSAY OF MAGNESIUM: CPT

## 2024-03-06 PROCEDURE — 63600175 PHARM REV CODE 636 W HCPCS

## 2024-03-06 PROCEDURE — 36415 COLL VENOUS BLD VENIPUNCTURE: CPT | Mod: XB | Performed by: HOSPITALIST

## 2024-03-06 PROCEDURE — 63600175 PHARM REV CODE 636 W HCPCS: Performed by: HOSPITALIST

## 2024-03-06 PROCEDURE — 25000003 PHARM REV CODE 250

## 2024-03-06 PROCEDURE — 99233 SBSQ HOSP IP/OBS HIGH 50: CPT | Mod: ,,, | Performed by: CLINICAL NURSE SPECIALIST

## 2024-03-06 PROCEDURE — 25000003 PHARM REV CODE 250: Performed by: PHYSICIAN ASSISTANT

## 2024-03-06 PROCEDURE — 94761 N-INVAS EAR/PLS OXIMETRY MLT: CPT

## 2024-03-06 PROCEDURE — 25000003 PHARM REV CODE 250: Performed by: HOSPITALIST

## 2024-03-06 PROCEDURE — 99024 POSTOP FOLLOW-UP VISIT: CPT | Mod: ,,, | Performed by: PHYSICIAN ASSISTANT

## 2024-03-06 PROCEDURE — 99497 ADVNCD CARE PLAN 30 MIN: CPT | Mod: ,,, | Performed by: CLINICAL NURSE SPECIALIST

## 2024-03-06 PROCEDURE — 25000003 PHARM REV CODE 250: Performed by: PSYCHIATRY & NEUROLOGY

## 2024-03-06 PROCEDURE — 80053 COMPREHEN METABOLIC PANEL: CPT

## 2024-03-06 PROCEDURE — 85730 THROMBOPLASTIN TIME PARTIAL: CPT | Performed by: HOSPITALIST

## 2024-03-06 PROCEDURE — 63600175 PHARM REV CODE 636 W HCPCS: Performed by: STUDENT IN AN ORGANIZED HEALTH CARE EDUCATION/TRAINING PROGRAM

## 2024-03-06 PROCEDURE — 97129 THER IVNTJ 1ST 15 MIN: CPT

## 2024-03-06 PROCEDURE — 84100 ASSAY OF PHOSPHORUS: CPT

## 2024-03-06 PROCEDURE — 11000001 HC ACUTE MED/SURG PRIVATE ROOM

## 2024-03-06 PROCEDURE — 85025 COMPLETE CBC W/AUTO DIFF WBC: CPT

## 2024-03-06 PROCEDURE — 97535 SELF CARE MNGMENT TRAINING: CPT

## 2024-03-06 RX ORDER — SODIUM BICARBONATE 650 MG/1
650 TABLET ORAL DAILY
Status: DISCONTINUED | OUTPATIENT
Start: 2024-03-06 | End: 2024-03-17 | Stop reason: HOSPADM

## 2024-03-06 RX ADMIN — ACETAMINOPHEN 650 MG: 325 TABLET ORAL at 03:03

## 2024-03-06 RX ADMIN — ALUMINUM HYDROXIDE, MAGNESIUM HYDROXIDE, AND SIMETHICONE 30 ML: 1200; 120; 1200 SUSPENSION ORAL at 04:03

## 2024-03-06 RX ADMIN — GABAPENTIN 100 MG: 100 CAPSULE ORAL at 10:03

## 2024-03-06 RX ADMIN — HYDROMORPHONE HYDROCHLORIDE 1 MG: 1 INJECTION, SOLUTION INTRAMUSCULAR; INTRAVENOUS; SUBCUTANEOUS at 12:03

## 2024-03-06 RX ADMIN — HYDROMORPHONE HYDROCHLORIDE 1 MG: 1 INJECTION, SOLUTION INTRAMUSCULAR; INTRAVENOUS; SUBCUTANEOUS at 08:03

## 2024-03-06 RX ADMIN — HYDROMORPHONE HYDROCHLORIDE 1 MG: 1 INJECTION, SOLUTION INTRAMUSCULAR; INTRAVENOUS; SUBCUTANEOUS at 04:03

## 2024-03-06 RX ADMIN — DEXAMETHASONE SODIUM PHOSPHATE 4 MG: 4 INJECTION INTRA-ARTICULAR; INTRALESIONAL; INTRAMUSCULAR; INTRAVENOUS; SOFT TISSUE at 08:03

## 2024-03-06 RX ADMIN — SODIUM CHLORIDE 2000 MG: 1 TABLET ORAL at 10:03

## 2024-03-06 RX ADMIN — SODIUM CHLORIDE 2000 MG: 1 TABLET ORAL at 08:03

## 2024-03-06 RX ADMIN — HEPARIN SODIUM AND DEXTROSE 25 UNITS/KG/HR: 10000; 5 INJECTION INTRAVENOUS at 12:03

## 2024-03-06 RX ADMIN — ALUMINUM HYDROXIDE, MAGNESIUM HYDROXIDE, AND SIMETHICONE 30 ML: 1200; 120; 1200 SUSPENSION ORAL at 10:03

## 2024-03-06 RX ADMIN — METHOCARBAMOL 500 MG: 500 TABLET ORAL at 02:03

## 2024-03-06 RX ADMIN — OXYCODONE HYDROCHLORIDE 10 MG: 10 TABLET ORAL at 02:03

## 2024-03-06 RX ADMIN — POLYETHYLENE GLYCOL 3350 17 G: 17 POWDER, FOR SOLUTION ORAL at 10:03

## 2024-03-06 RX ADMIN — ATENOLOL 50 MG: 25 TABLET ORAL at 08:03

## 2024-03-06 RX ADMIN — OXYCODONE HYDROCHLORIDE 10 MG: 10 TABLET ORAL at 10:03

## 2024-03-06 RX ADMIN — ALUMINUM HYDROXIDE, MAGNESIUM HYDROXIDE, AND SIMETHICONE 30 ML: 1200; 120; 1200 SUSPENSION ORAL at 06:03

## 2024-03-06 RX ADMIN — LEVETIRACETAM 500 MG: 500 TABLET, FILM COATED ORAL at 10:03

## 2024-03-06 RX ADMIN — OXYCODONE HYDROCHLORIDE 10 MG: 10 TABLET ORAL at 06:03

## 2024-03-06 RX ADMIN — DOCUSATE SODIUM AND SENNOSIDES 2 TABLET: 8.6; 5 TABLET, FILM COATED ORAL at 08:03

## 2024-03-06 RX ADMIN — DEXAMETHASONE SODIUM PHOSPHATE 4 MG: 4 INJECTION INTRA-ARTICULAR; INTRALESIONAL; INTRAMUSCULAR; INTRAVENOUS; SOFT TISSUE at 10:03

## 2024-03-06 RX ADMIN — BISACODYL 10 MG: 10 SUPPOSITORY RECTAL at 02:03

## 2024-03-06 RX ADMIN — DOCUSATE SODIUM AND SENNOSIDES 2 TABLET: 8.6; 5 TABLET, FILM COATED ORAL at 10:03

## 2024-03-06 RX ADMIN — GABAPENTIN 100 MG: 100 CAPSULE ORAL at 08:03

## 2024-03-06 RX ADMIN — SODIUM CHLORIDE 2000 MG: 1 TABLET ORAL at 02:03

## 2024-03-06 RX ADMIN — FAMOTIDINE 20 MG: 20 TABLET ORAL at 08:03

## 2024-03-06 RX ADMIN — LEVETIRACETAM 500 MG: 500 TABLET, FILM COATED ORAL at 08:03

## 2024-03-06 RX ADMIN — SODIUM BICARBONATE 650 MG: 650 TABLET ORAL at 03:03

## 2024-03-06 RX ADMIN — FAMOTIDINE 20 MG: 20 TABLET ORAL at 10:03

## 2024-03-06 RX ADMIN — GABAPENTIN 100 MG: 100 CAPSULE ORAL at 02:03

## 2024-03-06 NOTE — ASSESSMENT & PLAN NOTE
-CT abd/pelvis w/ numerous metastatic masses in the liver, likely metastatic deposit in the spleen; bilateral adrenal masses, hypoattenuating and mildly enhancing mass of upper pole L kidney, intra-abdominal enlarged lymph nodes consistent w/ metastatic infiltration.   -masses to R lung base w/ consolidation of visualized R renal lower lobe; R lower lobe 9mm nodule  -L2 vertebra compression fracture w/ mild sclerosis of the vertebra, likely reflecting a pathologic fracture; faint sclerosis of the superior endplate of S1 likely reflects metastatic infiltration  -deferring staging workup including MRI spine and labs to medical oncology  -medical oncology discussion, they affirmed that the patient is a fairly decent candidate for systemic chemotherapy but best for patient to be in [place] to initiate the cycle as she has no follow up options here in the state due to Pay[employer] source limitations and residency status  -per rad onc, pt not a good candidate at this time until possibly later in future if she gets chemotherapy.

## 2024-03-06 NOTE — ASSESSMENT & PLAN NOTE
-Stable respiratory status  -Per Neurosurgery recommendation, awaiting for heparin infusion drip to be therapeutic and then to repeat head CT to rule out bleed and then transitioned over to Eliquis

## 2024-03-06 NOTE — NURSING
"Pt with PTT of 24.6 and per the nomogram she should be getting a bolus (60u/kg) and an increase in rate of 3u/kg/hr. Per NSGY note, no boluses should be given while on heparin gtt. Contacted MD (Kings Sheridan) for clarification of how to proceed.     Per MD- "increase by 3u/kg/hr but do not give the bolus"   "

## 2024-03-06 NOTE — PT/OT/SLP PROGRESS
"Speech Language Pathology Treatment/Discharge    Patient Name:  Crow Hines   MRN:  [id number]  Admitting Diagnosis: Brain mass    Recommendations:                 General Recommendations:  Follow-up not indicated  Diet recommendations:  Regular Diet - IDDSI Level 7, Liquid Diet Level: Thin liquids - IDDSI Level 0   Aspiration Precautions: HOB to 90 degrees, Monitor for s/s of aspiration, and Standard aspiration precautions   General Precautions: Standard, fall, vision impaired  Communication strategies:  go to room if call light pushed    Assessment:     Crow Hines is a 63 y.o. female with an SLP diagnosis of Cognitive-Linguistic Impairment. Given pt's prognosis and in order to optimize her mental well-being, further skilled SLP services not recommended at this time.      Subjective     "The diagnosis is terminal. There's nothing they can do." Pt stated when orienting to situation.     Pain/Comfort:  Pain Rating 1:  (did not rate)  Location - Side 1: Right  Location - Orientation 1: generalized  Pain Addressed 1:  (pt reports nurse already notified)    Respiratory Status: Room air    Objective:     Has the patient been evaluated by SLP for swallowing?   Yes  Keep patient NPO? No   Current Respiratory Status:        Pt seen for ongoing cognitive-linguistic assessment and therapy. Pt wore reading glasses to read phrases and sentences in large print with one error due to decreased attention at longer sentence level.  When reading at the paragraph level in smaller font, pt exhibited multiple errors due to difficulty with left-right visual scanning and sustained attention.  Pt was O x 4.  She reported receiving news that her diagnosis was terminal with no treatment options.  Pt stated at this time that she did wish to continue to work with SLP at the time stating "at least it's someone to talk to."  Pt became frustrated when she had difficulty completing a delayed memory task and a problem solving task. Pt " "recalled 1 out of 3 unrelated words after 2 minutes given max cues. She was able to identify problems from solutions on 1 out of 2 trials.  Pt demonstrating difficulty sustaining and shifting attention due to decreased cognitive flexibility.  Pt expressed frustration and stated the tasks were "pediatric." SLP provided education regarding cognition, rationale for cognitive tx tasks, and option to cease skilled SLP services if causing too much frustration.  Pt's full understanding may be impacted by cognition and current emotional state having recently received news of terminal prognosis. SLP communicated with provider who demonstrated agreement with SLP recommendations to discontinue SLP services at this time.         Plan:     Plan of Care reviewed with:  patient   SLP Follow-Up:  No       Discharge recommendations:   (no further skilled SLP services warranted at this time)     Time Tracking:     SLP Treatment Date:   03/06/24  Speech Start Time:  0929  Speech Stop Time:  0943     Speech Total Time (min):  14 min    Billable Minutes: Speech Therapy Individual 6 and Self Care/Home Management Training 8    03/06/2024    "
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
## 2024-03-06 NOTE — DISCHARGE INSTRUCTIONS
Neurosurgery Patient Information  -Return to work will be determined on an individual basis.  -No driving until released by PCP.   -Do not take any OTC products containing acetaminophen at the same time as you take your narcotic pain medication. Medications that may contain acetaminophen include but are not limited to: Excedrin and other headache medications, arthritis medications, cold and sinus medications, etc. Please review the list of active ingredients in any OTC medication prior to taking it.  -Do not take any Aspirin or Aspirin-containing products for 2 weeks after surgery.  -Do not take any Aleve, Naprosyn, Naproxen, Ibuprofen, Advil or any other nonsteroidal anti-inflammatory drug (NSAID) for 2 weeks after surgery.  -Do not take any herbal supplements for 2 weeks after surgery.   -Do not consume any alcoholic beverages until released by your neurosurgeon  -Do not perform any excessive bending over or leaning forward as this is a fall hazard.  -Do not perform any heavy lifting or lifting more than 5-10 lbs from the ground level as this is a fall hazard.  -Slowly increase your ambulation [walking] over the next 2 weeks as tolerated. The goal is to be walking 1-2 miles by the time of your 2 week post op appointment.   -Walk on paved surfaces only. It is okay to walk up and down stairs while holding onto a side rail.      Contact the Neurosurgery Office immediately if:  If you begin to notice any neurologic changes such as:           -Sudden onset of lethargy or sleepiness           -Sudden confusion, trouble speaking, or understanding            -Sudden trouble seeing in one or both eyes            -Sudden trouble walking, dizziness, loss of coordination            -Sudden severe headache with no known cause            -Sudden onset of numbness or weakness     Wound Care:  Keep your incision open to air. Keep incision clean and dry at all times. You may shower on the 2nd day after your surgery. You may wash  your hair with baby shampoo. Do not rub or scrub the incision. Do not allow the force of water to hit the incision. If the incision gets damp, gently pat it dry. You cannot take a bath/swim/submerge the incision until 8 weeks after surgery.    The incision does not need to be cleaned with any water, soap, alcohol, peroxide, or other substance.    Call your doctor or go to the Emergency Room for any signs of infection including: increased redness, drainage, pain or fever (temperature greater than or equal to 101.4).     Miscellaneous:  -You were started on a steroid (Decadron) taper while in the hospital. Please continue to take this medication as instructed until the course is completed.   -Please continue to take Protonix to protect your stomach while on a steroid. Once you have completed the steroid course, you can stop taking the Protonix.   -You were started on a medication to prevent seizures (Keppra) while in the hospital. Please continue to take this medication as instructed.   -Follow up with Dr. Szymanski in 2 weeks for a wound check and pathology results. Appointment will be mailed to you.      [facility] Neurosurgery Office: [phone]

## 2024-03-06 NOTE — PLAN OF CARE
Patient is listed as moderate level intensity for therapy. Patient is uninsured.  Patient is not medically ready for discharge at this time.

## 2024-03-06 NOTE — PLAN OF CARE
Problem: Adult Inpatient Plan of Care  Goal: Plan of Care Review  Outcome: Ongoing, Progressing  Flowsheets (Taken 3/6/2024 0507)  Plan of Care Reviewed With: patient  Goal: Absence of Hospital-Acquired Illness or Injury  Outcome: Ongoing, Progressing  Intervention: Identify and Manage Fall Risk  Flowsheets (Taken 3/6/2024 0507)  Safety Promotion/Fall Prevention:   bed alarm set   Fall Risk reviewed with patient/family   Fall Risk signage in place   lighting adjusted   medications reviewed   nonskid shoes/socks when out of bed   side rails raised x 3   toileting scheduled   instructed to call staff for mobility  Goal: Optimal Comfort and Wellbeing  Outcome: Ongoing, Progressing  Intervention: Monitor Pain and Promote Comfort  Flowsheets (Taken 3/6/2024 0507)  Pain Management Interventions:   pain management plan reviewed with patient/caregiver   pillow support provided   medication offered   position adjusted     Bed in lowest position, bed alarm on, call light within reach and pt instructed to call if they need to get up or need any assistance. Pain control with PO/IV PRN medications. Pt requested suppository. POC/education discussed with pt. See flowsheets for VS. POC ongoing

## 2024-03-06 NOTE — NURSING
02:35 called phlebotomy for ETA for timed study    03:00 called phlebotomy again for ETA    03:25 labs collected    04:40 called asking about results for PTT. Per lab will result in the next 20 minutes    Still awaiting results of PTT.

## 2024-03-06 NOTE — PLAN OF CARE
Upon making rounds on patient, CM informed patient that we have spiritual care available in the event the patient would like to speak with someone as patient has a new cancer diagnosis. Patient inquired whether this was non-Religious and CM advised it was. Patient in agreement to speaking with Spiritual Care. Cm spoke with Jaswinder with Spiritual Care who stated he would be up to meet with patient.

## 2024-03-06 NOTE — CARE UPDATE
I have reviewed the chart of Crow Hines who is hospitalized for the following:    Active Hospital Problems    Diagnosis    *Brain mass     Patient oriented to self, month, year and location and cranial nerves 2-12 intact.    Pending transfer to [facility] main.      Hyponatremia     Due to brain compression and edema  On dexamethasone       Transaminitis    Thrombocytosis     Found to have mets  Nsgy is following       Palliative care encounter    Debility    Cancer related pain    Advance care planning    Cerebral edema    Vasogenic brain edema    Brain compression    Moderate malnutrition    Acute pulmonary embolism without acute cor pulmonale    Metastatic neoplastic disease    HTN (hypertension)    Compression fracture of lumbar spine, non-traumatic          Delphine Maria E, NP  Unit Based [facility]

## 2024-03-06 NOTE — SUBJECTIVE & OBJECTIVE
Interval History:  No new or worsening chest pain today per the patient.  No vijay shortness a breath, some tolerable headache.  APTT still subtherapeutic.     Review of Systems  Objective:     Vital Signs (Most Recent):  Temp: 97.7 °F (36.5 °C) (03/06/24 1506)  Pulse: 81 (03/06/24 1506)  Resp: 18 (03/06/24 1506)  BP: 102/68 (03/06/24 1506)  SpO2: 95 % (03/06/24 1506) Vital Signs (24h Range):  Temp:  [97.5 °F (36.4 °C)-98.5 °F (36.9 °C)] 97.7 °F (36.5 °C)  Pulse:  [78-92] 81  Resp:  [16-20] 18  SpO2:  [93 %-96 %] 95 %  BP: (102-130)/(65-81) 102/68     Weight: 47.6 kg (105 lb)  Body mass index is 18.6 kg/m².    Intake/Output Summary (Last 24 hours) at 3/6/2024 1523  Last data filed at 3/6/2024 0615  Gross per 24 hour   Intake --   Output 625 ml   Net -625 ml         Physical Exam      Clear lungs bilaterally, unlabored breathing, on room air, no cyanosis  Heart sounds indicate a regular rate and rhythm  Awake alert, no acute distress   No facial droop, no slurred speech   No obvious lower extremity edema   Pupils equal bilaterally   5/5 fist  and hip flexor strength bilaterally

## 2024-03-06 NOTE — ASSESSMENT & PLAN NOTE
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 Pt has hx of RA. Pt has poor performance status. Pt is alert, oriented to person, place, and situation. Pt reports back and hip pain. Pt is a full code.     Reason for consult: ACP/GOC    Today: Met with pt along with SUYAPA Nagel with pal care. Per pt, her son is now staying in [facility] with his godmother. Pt report he has learning disabilities and ADHD. Per pt, there is not one to care for her at home. Per pt, daughter has stage IV cancer and receiving treatment. Pt has Medicaid paperwork in her chart. She reports she needs assistance filling out. SUYAPA Nagel to reach out to Medicaid representative, Marilynn.     Pt is aware she may be too debilitated to receive chemo.   Attempted to reach out to pt's daughter. Voicemail full.    3/5/24 Met with pt who is aware of her newly dx metastatic cancer. Pt reports "I know I am terminal." Pt reports she wants to know what her treatment options are that may extend her life longer. Pt reports she carefor her 20 y/o son who has mental issues but able to help her at home. They live in a trailer in [place]. Pt reports her daughter Thais lives locally. Per pt she has liver cancer and is receiving active chemo. T reports she is not .     Pt does report she has debility which has prevented ambulation.     MPOA- NOK is Thais landrum. Per pt son unable to make medical decisions.     Code status discussed. Pt reports she wants to be a full code for now while she gets her affairs in order. Per pt, is she is put on life support and not getting better, she would want it stopped and be made comfortable.     Symptom management:     Pain to right hip and back are r/t  metastatic cancer.   OME in 24 hour is about 195.   Pt reports throbbing and shooting pain relieved with pain meds to a tolerable level.     Current meds:   Dilaudid 1 mg IVP q 4 hrs pain not relieved by pain meds.   Oxycodone 10 mg q 4 hrs " prn pain.   Pt on Neurontin 100 mg tid.   Pt on dexamethasone 4 mg IVP     Recs:   Consider starting oxycodone long acting 10 mg bid.  Will continue to reassess.     Nausea:   Pt on zofran 4 mg IVP q 8 hrs prn nausea.     Debility:   PT/OT working with pt.     Plan:   Communicated with Dr. Glass.   See above recs.   Will continue to follow.

## 2024-03-06 NOTE — PROGRESS NOTES
"[facility] - Neurosurgery ([facility])  Palliative Medicine  Progress Note    Patient Name: Crow Hines  MRN: [id number]  Admission Date: 2/27/2024  Hospital Length of Stay: 8 days  Code Status: Full Code   Attending Provider: Fareed Glass MD  Consulting Provider: Hannah Saunders CNS  Primary Care Physician: Chika Powell MD  Principal Problem:Brain mass    Patient information was obtained from patient and primary team.      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 Pt has hx of RA. Pt has poor performance status. Pt is alert, oriented to person, place, and situation. Pt reports back and hip pain. Pt is a full code.     Reason for consult: ACP/GOC    Today: Met with pt along with SUYAPA Nagel with pal care. Per pt, her son is now staying in [facility] with his godmother. Pt report he has learning disabilities and ADHD. Per pt, there is not one to care for her at home. Per pt, daughter has stage IV cancer and receiving treatment. Pt has Medicaid paperwork in her chart. She reports she needs assistance filling out. SUYAPA Nagel to reach out to Medicaid representative, Marilynn.     Pt is aware she may be too debilitated to receive chemo.   Attempted to reach out to pt's daughter. Voicemail full.    3/5/24 Met with pt who is aware of her newly dx metastatic cancer. Pt reports "I know I am terminal." Pt reports she wants to know what her treatment options are that may extend her life longer. Pt reports she carefor her 20 y/o son who has mental issues but able to help her at home. They live in a trailer in [place]. Pt reports her daughter Thais lives locally. Per pt she has liver cancer and is receiving active chemo. T reports she is not .     Pt does report she has debility which has prevented ambulation.     MPOA- NOK is Thais landrum. Per pt son unable to make medical decisions.     Code status " discussed. Pt reports she wants to be a full code for now while she gets her affairs in order. Per pt, is she is put on life support and not getting better, she would want it stopped and be made comfortable.     Symptom management:     Pain to right hip and back are r/t  metastatic cancer.   OME in 24 hour is about 195.   Pt reports throbbing and shooting pain relieved with pain meds to a tolerable level.     Current meds:   Dilaudid 1 mg IVP q 4 hrs pain not relieved by pain meds.   Oxycodone 10 mg q 4 hrs prn pain.   Pt on Neurontin 100 mg tid.   Pt on dexamethasone 4 mg IVP     Recs:   Consider starting oxycodone long acting 10 mg bid.  Will continue to reassess.     Nausea:   Pt on zofran 4 mg IVP q 8 hrs prn nausea.     Debility:   PT/OT working with pt.     Plan:   Communicated with Dr. Glass.   See above recs.   Will continue to follow.         I will follow-up with patient. Please contact us if you have any additional questions.    Subjective:     Chief Complaint: No chief complaint on file.      HPI:   Pt is a 64 y/o F with pMHx of HTN and rheumatoid arthritis presenting as transfer from OSH for neurosurgery eval of L temporal lobe mass. Per chart review, pt initially presented to the ED with complaints that everything hurts.   Reportedly began seeing a rheumatologist 9 months ago where she was diagnosed w/ RA. Methotrexate therapy was initiated. Patient reported a continually decline since that time, leading to her being bed-bound for the last 2 months 2/2 to difficulty walking. She stated her rheumatologist believed that she was not tolerating the methotrexate. On presentation to ED, she reported inability to tolerate abdominal pain any longer. Lab work significant for alk phos 203, AST 57, sodium 132, platelets 586. CXR revealed linear and ill-defined hazy opacities of the right mid to lower lung, felt to reflect infiltrate. Diffuse abdominal pain with focal increased tenderness in mid-epigastric  area prompted CT abd/pelvis revealing numerous metastatic masses in the liver, metastatic deposits in the spleen and bilateral adrenal masses, hypoattenuating and mildly enhancing mass of the upper pole L kidney. Also w/ intra-abdominal enlarged lymph nodes, consistent with metastatic infiltration, masses of the R lung base with consolidation of the visualized R renal lower lobe and RLL 9 mm nodule. L2 compression fracture w/ mild sclerosis of the vertebra noted, likely pathologic. CTH completed showing large 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 She was transferred to [facility] for higher level of care and will be admitted to [facility] for further management.        Hospital Course:  No notes on file    Interval History: Pt will need placement in NH.       Past Medical History:   Diagnosis Date    Arthritis        Past Surgical History:   Procedure Laterality Date    CRANIOTOMY, WITH NEOPLASM EXCISION USING COMPUTER-ASSISTED NAVIGATION Left 2/29/2024    Procedure: CRANIOTOMY, WITH NEOPLASM EXCISION USING COMPUTER-ASSISTED NAVIGATION;  Surgeon: Felix Szymanski DO;  Location: [facility] OR [address] [place] [address];  Service: Neurosurgery;  Laterality: Left;  (Left temporal crani for rescetion of tumor)  [place]  BRAIN LAB  Navigation - CT with contrast    TUBAL LIGATION  2002       Review of patient's allergies indicates:  No Known Allergies    Medications:  Continuous Infusions:   heparin (porcine) in D5W 23 Units/kg/hr (03/06/24 3192)     Scheduled Meds:   aluminum-magnesium hydroxide-simethicone  30 mL Oral QID (AC & HS)    atenoloL  50 mg Oral Daily    dexAMETHasone  4 mg Intravenous Q12H    famotidine  20 mg Oral BID    gabapentin  100 mg Oral TID    levETIRAcetam  500 mg Oral BID    polyethylene glycol  17 g Oral BID    senna-docusate 8.6-50 mg  2 tablet Oral BID    sodium chloride  2,000 mg Oral TID     PRN Meds:acetaminophen, bisacodyL, hydrALAZINE, HYDROmorphone, labetalol, methocarbamoL, ondansetron, oxyCODONE,  oxyCODONE    Family History    None       Tobacco Use    Smoking status: Every Day     Current packs/day: 0.50     Types: Cigarettes    Smokeless tobacco: Current    Tobacco comments:     3/4 PPD   Substance and Sexual Activity    Alcohol use: Not Currently     Comment: OCCASIONALLY    Drug use: Never    Sexual activity: Not Currently       Review of Systems   Constitutional:  Positive for activity change and fatigue.   HENT:  Negative for trouble swallowing.    Respiratory:  Negative for shortness of breath.    Gastrointestinal:  Positive for nausea.   Musculoskeletal:  Positive for back pain.   Neurological:  Positive for weakness.   Psychiatric/Behavioral:  Positive for decreased concentration.      Objective:     Vital Signs (Most Recent):  Temp: 97.7 °F (36.5 °C) (03/06/24 0728)  Pulse: 92 (03/06/24 0728)  Resp: 16 (03/06/24 1015)  BP: 123/71 (03/06/24 0843)  SpO2: 96 % (03/06/24 0728) Vital Signs (24h Range):  Temp:  [97.5 °F (36.4 °C)-98.5 °F (36.9 °C)] 97.7 °F (36.5 °C)  Pulse:  [81-92] 92  Resp:  [16-20] 16  SpO2:  [93 %-96 %] 96 %  BP: (112-130)/(65-81) 123/71     Weight: 47.6 kg (105 lb)  Body mass index is 18.6 kg/m².       Physical Exam  Constitutional:       General: She is not in acute distress.  HENT:      Head: Normocephalic and atraumatic.   Pulmonary:      Effort: Respiratory distress present.   Musculoskeletal:      Cervical back: Normal range of motion.      Comments: Weakness noted. Pt able to move extremities.    Skin:     General: Skin is warm and dry.      Coloration: Skin is pale.   Neurological:      Mental Status: She is alert and oriented to person, place, and time.   Psychiatric:         Speech: Speech normal.         Behavior: Behavior is cooperative.            Review of Symptoms      Symptom Assessment (ESAS 0-10 Scale)  Pain:  0  Dyspnea:  0  Anxiety:  0  Nausea:  0  Depression:  0  Anorexia:  0  Fatigue:  0  Insomnia:  0  Restlessness:  0  Agitation:  0         Pain  Assessment:  OME in 24 hours:  195  Location(s): back    Back       Location: right        Quality: Throbbing and shooting        Quantity: 6/10 in intensity        Chronicity: Onset 0 second(s) ago, unchanged        Aggravating Factors: Walking and movement        Alleviating Factors: Opiates       Associated Symptoms: Nausea    Living Arrangements:  Lives with family    Psychosocial/Cultural:   See Palliative Psychosocial Note: No  Pt reports she lives with her 20 y/o son in [facility] in [place]. Per pt, she cares for son due to his mental issues. Per pt, son able to help her physically. Pt reports she has a daughter, Thais who loves locally. Per pt she is dealing with liver cancer and received chemo. Pt reports not  and not a big support system. Per pt, her son's godmother is caring for him.   **Primary  to Follow**  Palliative Care  Consult: Yes        Advance Care Planning  Advance Directives:   Living Will: No    Do Not Resuscitate Status: No    Medical Power of : No    Agent's Name:  DaughterThais is NOK    Decision Making:  Patient answered questions  Goals of Care: What is most important right now is to focus on curative/life-prolongation (regardless of treatment burdens). Accordingly, we have decided that the best plan to meet the patient's goals includes continuing with treatment.         Significant Labs: All pertinent labs within the past 24 hours have been reviewed.  CBC:   Recent Labs   Lab 03/06/24  0325   WBC 9.55   HGB 10.6*   HCT 32.7*   MCV 99*   *       BMP:  Recent Labs   Lab 03/06/24  0325   GLU 98      K 4.4      CO2 25   BUN 11   CREATININE 0.6   CALCIUM 9.3   MG 2.0       LFT:  Lab Results   Component Value Date    AST 99 (H) 03/06/2024    GGT 1,385 (H) 03/05/2024    ALKPHOS 545 (H) 03/06/2024    BILITOT 0.4 03/06/2024     Albumin:   Albumin   Date Value Ref Range Status   03/06/2024 2.3 (L) 3.5 - 5.2 g/dL Final     Protein:  "  Total Protein   Date Value Ref Range Status   03/06/2024 5.7 (L) 6.0 - 8.4 g/dL Final     Lactic acid:   No results found for: "LACTATE"    Significant Imaging: I have reviewed all pertinent imaging results/findings within the past 24 hours.    20 minutes of ACP completed.       Hannah Saunders, CNS  Palliative Medicine  [facility] - Neurosurgery ([facility])                "

## 2024-03-06 NOTE — PLAN OF CARE
"Advance Care Planning   [facility])  Palliative Care       Patient Name: Crow Hines  MRN: [id number]  Admission Date: 2/27/2024  Hospital Length of Stay: 8 days  Code Status: Full Code   Attending Provider: Fareed Glass MD  Palliative Care Provider:   Primary Care Physician: Chika Powell MD  Principal Problem:Brain mass     LCSW, along with CHEY TIWARI, met with pt at bedside. Pt alert but confused today. Pt with awareness as to why she is having confusion and memory issues, but would benefit from ongoing education pertaining to the extent of her illness in layman's terms. Pt verbalizes that she knows she is dying. Pt concerned about her 21 year old son, whom she describes as "somewhat autistic, and slow." He is currently staying with his godmother Prachi Pacheco ([phone]) in [facility]. Pt cannot identify exactly where Prachi resides. Per pt, he cannot live on his own, and her 34 year old dtr (in [place]), has terminal stage IV liver cancer and cannot care for him either. [facility] information and guidance.     LCSW called Prachi, who was able to confirm that she will be caring for pt's son long term. Additionally, Prachi reports that pt and her dtr are estranged. 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 Reportedly, pt is the only one who has access to her own finances, thereby complicating the completion of a Medicaid application. If pt is able to be placed in a nursing home, their preference is that she is placed in [place], [place], to be close enough for them to visit. Pt reportedly does not have any living blood relatives, other than her two children. When asked if Prachi and pt had ever discussed EOL wishes, Prachi states that pt "had always said that she would not want to live on life support."    LCSW spoke to Lai in the [facility] office here to discuss insurance " options for pt. Pt is not able to receive [place] medicaid unless she first applies for SSDI, and then completes the medicaid application. Pt is not cognitively able to provide the required information, nor is she able to make it to necessary appointments at the  office, further complicating the medicaid application process. [facility] has made attempts ton reach pt's dtr for assistance, but she has not been responsive, nor does it sound as though she will be able to provide any financial information.     [facility] FAYETF.     Shona Fonseca, \[facility] [place] [facility]\""EDIS  Palliative Medicine

## 2024-03-06 NOTE — PROGRESS NOTES
[facility] - Neurosurgery ([facility])  [facility] Medicine  Progress Note    Patient Name: Crow Hines  MRN: [id number]  Patient Class: IP- Inpatient   Admission Date: 2/27/2024  Length of Stay: 8 days  Attending Physician: Fareed Glass MD  Primary Care Provider: Chika Powell MD        Subjective:     Principal Problem:Brain mass        HPI:  No notes on file    Overview/Hospital Course:  64 y/o Female admitted as transfer from OSH for L temporal lobe mass presenting w/ difficulty walking. Lab work significant for alk phos 203, AST 57, sodium 132, platelets 586. CXR revealed linear and ill-defined hazy opacities of the right mid to lower lung, felt to reflect infiltrate. Diffuse abdominal pain with focal increased tenderness in mid-epigastric area prompted CT abd/pelvis revealing numerous metastatic masses in the liver, metastatic deposits in the spleen and bilateral adrenal masses, hypoattenuating and mildly enhancing mass of the upper pole L kidney. Also w/ intra-abdominal enlarged lymph nodes, consistent with metastatic infiltration, masses of the R lung base with consolidation of the visualized R renal lower lobe and RLL 9 mm nodule. L2 compression fracture w/ mild sclerosis of the vertebra noted, likely pathologic. CTH completed showing large 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 Pt underwent L temporal craniotomy tumor resection on 2/29. Path returned metastatic small cell lung CA.     Interval History:  No new or worsening chest pain today per the patient.  No vijay shortness a breath, some tolerable headache.  APTT still subtherapeutic.     Review of Systems  Objective:     Vital Signs (Most Recent):  Temp: 97.7 °F (36.5 °C) (03/06/24 1506)  Pulse: 81 (03/06/24 1506)  Resp: 18 (03/06/24 1506)  BP: 102/68 (03/06/24 1506)  SpO2: 95 % (03/06/24 1506) Vital Signs (24h Range):  Temp:  [97.5 °F (36.4 °C)-98.5 °F (36.9 °C)] 97.7 °F (36.5 °C)  Pulse:  [78-92] 81  Resp:  [16-20]  18  SpO2:  [93 %-96 %] 95 %  BP: (102-130)/(65-81) 102/68     Weight: 47.6 kg (105 lb)  Body mass index is 18.6 kg/m².    Intake/Output Summary (Last 24 hours) at 3/6/2024 1523  Last data filed at 3/6/2024 0615  Gross per 24 hour   Intake --   Output 625 ml   Net -625 ml         Physical Exam      Clear lungs bilaterally, unlabored breathing, on room air, no cyanosis  Heart sounds indicate a regular rate and rhythm  Awake alert, no acute distress   No facial droop, no slurred speech   No obvious lower extremity edema   Pupils equal bilaterally   5/5 fist  and hip flexor strength bilaterally    Assessment/Plan:      * Brain mass  -L temporal mass; status post tumor resection on 02/29, pathology showing met small cell lung CA   -scheduled dexamethasone   -Keppra for seizure prophylaxis   -PT/OT  -blood pressure control  -pain control  -neurochecks      Vasogenic brain edema  See above      Metastatic neoplastic disease  -CT abd/pelvis w/ numerous metastatic masses in the liver, likely metastatic deposit in the spleen; bilateral adrenal masses, hypoattenuating and mildly enhancing mass of upper pole L kidney, intra-abdominal enlarged lymph nodes consistent w/ metastatic infiltration.   -masses to R lung base w/ consolidation of visualized R renal lower lobe; R lower lobe 9mm nodule  -L2 vertebra compression fracture w/ mild sclerosis of the vertebra, likely reflecting a pathologic fracture; faint sclerosis of the superior endplate of S1 likely reflects metastatic infiltration  -deferring staging workup including MRI spine and labs to medical oncology  -medical oncology discussion, they affirmed that the patient is a fairly decent candidate for systemic chemotherapy but best for patient to be in [place] to initiate the cycle as she has no follow up options here in the state due to Payor source limitations and residency status  -per rad onc, pt not a good candidate at this time until possibly later in future if  she gets chemotherapy.      Acute pulmonary embolism without acute cor pulmonale  -Stable respiratory status  -Per Neurosurgery recommendation, awaiting for heparin infusion drip to be therapeutic and then to repeat head CT to rule out bleed and then transitioned over to Eliquis    Compression fracture of lumbar spine, non-traumatic  Back brace        Discussion with palliative Care and case management, depending upon patient's goals of either directly going to nursing home with hospice versus starting inpt and outpt palliative chemotherapy, maybe loose beneficial to have her transferred to [facility] due to payor source limitations based off of [place] residency status.       VTE Risk Mitigation (From admission, onward)           Ordered     heparin 25,000 units in dextrose 5% 250 mL (100 units/mL) infusion HIGH INTENSITY nomogram - [facility]  Continuous        Question:  Begin at (units/kg/hr)  Answer:  18    03/05/24 1004     Reason for No Pharmacological VTE Prophylaxis  Once        Question:  Reasons:  Answer:  Risk of Bleeding    02/27/24 0556     IP VTE HIGH RISK PATIENT  Once         02/27/24 0556     Place sequential compression device  Until discontinued         02/27/24 0556                    Discharge Planning   JUAN: 3/6/2024     Code Status: Full Code   Is the patient medically ready for discharge?: No    Reason for patient still in hospital (select all that apply): Patient trending condition, Laboratory test, Treatment, Imaging, and Consult recommendations  Discharge Plan A: Home                  Fareed Glass MD  Department of Hospital Medicine   [facility] - Neurosurgery ([facility])
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
## 2024-03-06 NOTE — SUBJECTIVE & OBJECTIVE
Interval History: Pt will need placement in NH.       Past Medical History:   Diagnosis Date    Arthritis        Past Surgical History:   Procedure Laterality Date    CRANIOTOMY, WITH NEOPLASM EXCISION USING COMPUTER-ASSISTED NAVIGATION Left 2/29/2024    Procedure: CRANIOTOMY, WITH NEOPLASM EXCISION USING COMPUTER-ASSISTED NAVIGATION;  Surgeon: Felix Szymanski DO;  Location: [facility] OR [address] [place] [address];  Service: Neurosurgery;  Laterality: Left;  (Left temporal crani for rescetion of tumor)  [place]  BRAIN LAB  Navigation - CT with contrast    TUBAL LIGATION  2002       Review of patient's allergies indicates:  No Known Allergies    Medications:  Continuous Infusions:   heparin (porcine) in D5W 23 Units/kg/hr (03/06/24 0469)     Scheduled Meds:   aluminum-magnesium hydroxide-simethicone  30 mL Oral QID (AC & HS)    atenoloL  50 mg Oral Daily    dexAMETHasone  4 mg Intravenous Q12H    famotidine  20 mg Oral BID    gabapentin  100 mg Oral TID    levETIRAcetam  500 mg Oral BID    polyethylene glycol  17 g Oral BID    senna-docusate 8.6-50 mg  2 tablet Oral BID    sodium chloride  2,000 mg Oral TID     PRN Meds:acetaminophen, bisacodyL, hydrALAZINE, HYDROmorphone, labetalol, methocarbamoL, ondansetron, oxyCODONE, oxyCODONE    Family History    None       Tobacco Use    Smoking status: Every Day     Current packs/day: 0.50     Types: Cigarettes    Smokeless tobacco: Current    Tobacco comments:     3/4 PPD   Substance and Sexual Activity    Alcohol use: Not Currently     Comment: OCCASIONALLY    Drug use: Never    Sexual activity: Not Currently       Review of Systems   Constitutional:  Positive for activity change and fatigue.   HENT:  Negative for trouble swallowing.    Respiratory:  Negative for shortness of breath.    Gastrointestinal:  Positive for nausea.   Musculoskeletal:  Positive for back pain.   Neurological:  Positive for weakness.   Psychiatric/Behavioral:  Positive for decreased concentration.      Objective:      Vital Signs (Most Recent):  Temp: 97.7 °F (36.5 °C) (03/06/24 0728)  Pulse: 92 (03/06/24 0728)  Resp: 16 (03/06/24 1015)  BP: 123/71 (03/06/24 0843)  SpO2: 96 % (03/06/24 0728) Vital Signs (24h Range):  Temp:  [97.5 °F (36.4 °C)-98.5 °F (36.9 °C)] 97.7 °F (36.5 °C)  Pulse:  [81-92] 92  Resp:  [16-20] 16  SpO2:  [93 %-96 %] 96 %  BP: (112-130)/(65-81) 123/71     Weight: 47.6 kg (105 lb)  Body mass index is 18.6 kg/m².       Physical Exam  Constitutional:       General: She is not in acute distress.  HENT:      Head: Normocephalic and atraumatic.   Pulmonary:      Effort: Respiratory distress present.   Musculoskeletal:      Cervical back: Normal range of motion.      Comments: Weakness noted. Pt able to move extremities.    Skin:     General: Skin is warm and dry.      Coloration: Skin is pale.   Neurological:      Mental Status: She is alert and oriented to person, place, and time.   Psychiatric:         Speech: Speech normal.         Behavior: Behavior is cooperative.            Review of Symptoms      Symptom Assessment (ESAS 0-10 Scale)  Pain:  0  Dyspnea:  0  Anxiety:  0  Nausea:  0  Depression:  0  Anorexia:  0  Fatigue:  0  Insomnia:  0  Restlessness:  0  Agitation:  0         Pain Assessment:  OME in 24 hours:  195  Location(s): back    Back       Location: right        Quality: Throbbing and shooting        Quantity: 6/10 in intensity        Chronicity: Onset 0 second(s) ago, unchanged        Aggravating Factors: Walking and movement        Alleviating Factors: Opiates       Associated Symptoms: Nausea    Living Arrangements:  Lives with family    Psychosocial/Cultural:   See Palliative Psychosocial Note: No  Pt reports she lives with her 20 y/o son in [facility] in [place]. Per pt, she cares for son due to his mental issues. Per pt, son able to help her physically. Pt reports she has a daughter, Thais who loves locally. Per pt she is dealing with liver cancer and received chemo. Pt reports not  and not  "a big support system. Per pt, her son's godmother is caring for him.   **Primary  to Follow**  Palliative Care  Consult: Yes        Advance Care Planning   Advance Directives:   Living Will: No    Do Not Resuscitate Status: No    Medical Power of : No    Agent's Name:  DaughterThais is NOK    Decision Making:  Patient answered questions  Goals of Care: What is most important right now is to focus on curative/life-prolongation (regardless of treatment burdens). Accordingly, we have decided that the best plan to meet the patient's goals includes continuing with treatment.         Significant Labs: All pertinent labs within the past 24 hours have been reviewed.  CBC:   Recent Labs   Lab 03/06/24  0325   WBC 9.55   HGB 10.6*   HCT 32.7*   MCV 99*   *       BMP:  Recent Labs   Lab 03/06/24  0325   GLU 98      K 4.4      CO2 25   BUN 11   CREATININE 0.6   CALCIUM 9.3   MG 2.0       LFT:  Lab Results   Component Value Date    AST 99 (H) 03/06/2024    GGT 1,385 (H) 03/05/2024    ALKPHOS 545 (H) 03/06/2024    BILITOT 0.4 03/06/2024     Albumin:   Albumin   Date Value Ref Range Status   03/06/2024 2.3 (L) 3.5 - 5.2 g/dL Final     Protein:   Total Protein   Date Value Ref Range Status   03/06/2024 5.7 (L) 6.0 - 8.4 g/dL Final     Lactic acid:   No results found for: "LACTATE"    Significant Imaging: I have reviewed all pertinent imaging results/findings within the past 24 hours.    "

## 2024-03-06 NOTE — SUBJECTIVE & OBJECTIVE
Interval History: NAEON. Neuro stable. Denies headaches, vision changes, new focal weakness. Large BM yesterday -reports improved abdominal discomfort     Medications:  Continuous Infusions:   heparin (porcine) in D5W 23 Units/kg/hr (03/06/24 0453)     Scheduled Meds:   aluminum-magnesium hydroxide-simethicone  30 mL Oral QID (AC & HS)    atenoloL  50 mg Oral Daily    dexAMETHasone  4 mg Intravenous Q12H    famotidine  20 mg Oral BID    gabapentin  100 mg Oral TID    levETIRAcetam  500 mg Oral BID    polyethylene glycol  17 g Oral BID    senna-docusate 8.6-50 mg  2 tablet Oral BID    sodium chloride  2,000 mg Oral TID     PRN Meds:acetaminophen, bisacodyL, hydrALAZINE, HYDROmorphone, labetalol, methocarbamoL, ondansetron, oxyCODONE, oxyCODONE     Review of Systems  Objective:     Weight: 47.6 kg (105 lb)  Body mass index is 18.6 kg/m².  Vital Signs (Most Recent):  Temp: 97.7 °F (36.5 °C) (03/06/24 0728)  Pulse: 92 (03/06/24 0728)  Resp: 16 (03/06/24 0843)  BP: 123/71 (03/06/24 0843)  SpO2: 96 % (03/06/24 0728) Vital Signs (24h Range):  Temp:  [97.5 °F (36.4 °C)-98.5 °F (36.9 °C)] 97.7 °F (36.5 °C)  Pulse:  [81-92] 92  Resp:  [16-20] 16  SpO2:  [93 %-96 %] 96 %  BP: (112-130)/(65-81) 123/71                         Female External Urinary Catheter w/ Suction 03/02/24 1422 (Active)   Skin no redness;no breakdown 03/04/24 1930   Tolerance no signs/symptoms of discomfort 03/04/24 1930   Suction Continuous suction at 70 mmHg 03/04/24 1930   Date of last wick change 03/03/24 03/04/24 0705   Time of last wick change 1900 03/03/24 1901   Output (mL) 150 mL 03/04/24 1100       Neurosurgery Physical Exam  General: Well developed, well nourished, not in acute distress.   Head: Normocephalic.  Neurologic: Alert and oriented x 4. Thought content appropriate.  GCS: Motor:6  Verbal:5  Eyes:4   GCS Total: 15  Language: No aphasia.  Speech: No dysarthria.  Cranial nerves: Face symmetric, tongue midline, CN II-XII grossly intact.    Eyes: Pupils equal, round, reactive to light with accomodation, EOMI.   Pulmonary: Normal respirations, no signs of respiratory distress.  Abdomen: Soft, non-distended, non-tender to palpation.  Sensory: Intact to light touch throughout.  Motor Strength: Moves all extremities spontaneously with good tone. No abnormal movements seen. 4/5 R proximal UE and LE.     Incision c/d/I with skin edges well approximated. No surrounding erythema or edema. No drainage from incision. No palpable hematoma or underlying fluid collection.    Significant Labs:  Recent Labs   Lab 03/05/24  0525 03/06/24  0325   GLU 84 98   * 137   K 4.3 4.4    101   CO2 26 25   BUN 15 11   CREATININE 0.6 0.6   CALCIUM 9.3 9.3   MG 1.9 2.0       Recent Labs   Lab 03/05/24  0525 03/06/24  0325   WBC 10.59 9.55   HGB 10.8* 10.6*   HCT 33.3* 32.7*   * 511*       Recent Labs   Lab 03/05/24  1045 03/05/24  1913 03/06/24  0325   INR 1.0  --   --    APTT 21.1 24.6 32.2*     Microbiology Results (last 7 days)       ** No results found for the last 168 hours. **          All pertinent labs from the last 24 hours have been reviewed.    Significant Diagnostics:  I have reviewed and interpreted all pertinent imaging results/findings within the past 24 hours.  Physical Exam

## 2024-03-06 NOTE — PLAN OF CARE
Problem: Adult Inpatient Plan of Care  Goal: Plan of Care Review  Outcome: Ongoing, Progressing  Goal: Patient-Specific Goal (Individualized)  Outcome: Ongoing, Progressing  Goal: Absence of Hospital-Acquired Illness or Injury  Outcome: Ongoing, Progressing  Goal: Optimal Comfort and Wellbeing  Outcome: Ongoing, Progressing  Goal: Readiness for Transition of Care  Outcome: Ongoing, Progressing     Problem: Infection  Goal: Absence of Infection Signs and Symptoms  Outcome: Ongoing, Progressing     Problem: Skin Injury Risk Increased  Goal: Skin Health and Integrity  Outcome: Ongoing, Progressing     Problem: Fall Injury Risk  Goal: Absence of Fall and Fall-Related Injury  Outcome: Ongoing, Progressing     Problem: Constipation  Goal: Effective Bowel Elimination  Outcome: Ongoing, Progressing     Problem: Coping Ineffective (Oncology Care)  Goal: Effective Coping  Outcome: Ongoing, Progressing  Intervention: Support and Enhance Coping Strategies  Flowsheets (Taken 3/6/2024 1655)  Complementary Therapy: music therapy provided  Supportive Measures: active listening utilized  Family/Support System Care:   presence promoted   self-care encouraged  Environmental Support:   environmental consistency promoted   rest periods encouraged   calm environment promoted   caregiver consistency promoted     POC reviewed with the patient and they verbalized understanding. All comments and concerns addressed. Bed locked in lowest position with bed alarm set, call light within reach. Safety precautions maintained. VSS, see flowsheets. No events this shift. Will continue to monitor for changes to POC and clinical condition.

## 2024-03-07 LAB
ALBUMIN SERPL BCP-MCNC: 2.4 G/DL (ref 3.5–5.2)
ALP SERPL-CCNC: 544 U/L (ref 55–135)
ALT SERPL W/O P-5'-P-CCNC: 124 U/L (ref 10–44)
ANION GAP SERPL CALC-SCNC: 9 MMOL/L (ref 8–16)
APTT PPP: 43.3 SEC (ref 21–32)
APTT PPP: 43.3 SEC (ref 21–32)
APTT PPP: 43.4 SEC (ref 21–32)
AST SERPL-CCNC: 86 U/L (ref 10–40)
BASOPHILS # BLD AUTO: 0.01 K/UL (ref 0–0.2)
BASOPHILS # BLD AUTO: 0.01 K/UL (ref 0–0.2)
BASOPHILS NFR BLD: 0.1 % (ref 0–1.9)
BASOPHILS NFR BLD: 0.1 % (ref 0–1.9)
BILIRUB SERPL-MCNC: 0.4 MG/DL (ref 0.1–1)
BUN SERPL-MCNC: 12 MG/DL (ref 8–23)
CALCIUM SERPL-MCNC: 9.2 MG/DL (ref 8.7–10.5)
CHLORIDE SERPL-SCNC: 99 MMOL/L (ref 95–110)
CO2 SERPL-SCNC: 26 MMOL/L (ref 23–29)
CREAT SERPL-MCNC: 0.6 MG/DL (ref 0.5–1.4)
DIFFERENTIAL METHOD BLD: ABNORMAL
DIFFERENTIAL METHOD BLD: ABNORMAL
EOSINOPHIL # BLD AUTO: 0 K/UL (ref 0–0.5)
EOSINOPHIL # BLD AUTO: 0 K/UL (ref 0–0.5)
EOSINOPHIL NFR BLD: 0 % (ref 0–8)
EOSINOPHIL NFR BLD: 0 % (ref 0–8)
ERYTHROCYTE [DISTWIDTH] IN BLOOD BY AUTOMATED COUNT: 13.9 % (ref 11.5–14.5)
ERYTHROCYTE [DISTWIDTH] IN BLOOD BY AUTOMATED COUNT: 13.9 % (ref 11.5–14.5)
EST. GFR  (NO RACE VARIABLE): >60 ML/MIN/1.73 M^2
GLUCOSE SERPL-MCNC: 111 MG/DL (ref 70–110)
HCT VFR BLD AUTO: 31.9 % (ref 37–48.5)
HCT VFR BLD AUTO: 32.2 % (ref 37–48.5)
HGB BLD-MCNC: 10.1 G/DL (ref 12–16)
HGB BLD-MCNC: 10.2 G/DL (ref 12–16)
IMM GRANULOCYTES # BLD AUTO: 0.12 K/UL (ref 0–0.04)
IMM GRANULOCYTES # BLD AUTO: 0.13 K/UL (ref 0–0.04)
IMM GRANULOCYTES NFR BLD AUTO: 1.1 % (ref 0–0.5)
IMM GRANULOCYTES NFR BLD AUTO: 1.2 % (ref 0–0.5)
LYMPHOCYTES # BLD AUTO: 0.5 K/UL (ref 1–4.8)
LYMPHOCYTES # BLD AUTO: 0.5 K/UL (ref 1–4.8)
LYMPHOCYTES NFR BLD: 4.2 % (ref 18–48)
LYMPHOCYTES NFR BLD: 4.8 % (ref 18–48)
MAGNESIUM SERPL-MCNC: 2.1 MG/DL (ref 1.6–2.6)
MCH RBC QN AUTO: 31.2 PG (ref 27–31)
MCH RBC QN AUTO: 31.5 PG (ref 27–31)
MCHC RBC AUTO-ENTMCNC: 31.7 G/DL (ref 32–36)
MCHC RBC AUTO-ENTMCNC: 31.7 G/DL (ref 32–36)
MCV RBC AUTO: 99 FL (ref 82–98)
MCV RBC AUTO: 99 FL (ref 82–98)
MONOCYTES # BLD AUTO: 0.8 K/UL (ref 0.3–1)
MONOCYTES # BLD AUTO: 0.9 K/UL (ref 0.3–1)
MONOCYTES NFR BLD: 7.8 % (ref 4–15)
MONOCYTES NFR BLD: 8.1 % (ref 4–15)
NEUTROPHILS # BLD AUTO: 9.4 K/UL (ref 1.8–7.7)
NEUTROPHILS # BLD AUTO: 9.5 K/UL (ref 1.8–7.7)
NEUTROPHILS NFR BLD: 85.9 % (ref 38–73)
NEUTROPHILS NFR BLD: 86.7 % (ref 38–73)
NRBC BLD-RTO: 0 /100 WBC
NRBC BLD-RTO: 0 /100 WBC
OHS QRS DURATION: 78 MS
OHS QTC CALCULATION: 430 MS
PHOSPHATE SERPL-MCNC: 3.2 MG/DL (ref 2.7–4.5)
PLATELET # BLD AUTO: 549 K/UL (ref 150–450)
PLATELET # BLD AUTO: 566 K/UL (ref 150–450)
PMV BLD AUTO: 9.4 FL (ref 9.2–12.9)
PMV BLD AUTO: 9.6 FL (ref 9.2–12.9)
POTASSIUM SERPL-SCNC: 4.1 MMOL/L (ref 3.5–5.1)
PROT SERPL-MCNC: 5.8 G/DL (ref 6–8.4)
RBC # BLD AUTO: 3.24 M/UL (ref 4–5.4)
RBC # BLD AUTO: 3.24 M/UL (ref 4–5.4)
SODIUM SERPL-SCNC: 134 MMOL/L (ref 136–145)
WBC # BLD AUTO: 10.8 K/UL (ref 3.9–12.7)
WBC # BLD AUTO: 11 K/UL (ref 3.9–12.7)

## 2024-03-07 PROCEDURE — 80053 COMPREHEN METABOLIC PANEL: CPT

## 2024-03-07 PROCEDURE — 25000003 PHARM REV CODE 250: Performed by: PSYCHIATRY & NEUROLOGY

## 2024-03-07 PROCEDURE — 63600175 PHARM REV CODE 636 W HCPCS: Performed by: STUDENT IN AN ORGANIZED HEALTH CARE EDUCATION/TRAINING PROGRAM

## 2024-03-07 PROCEDURE — 25000003 PHARM REV CODE 250

## 2024-03-07 PROCEDURE — 11000001 HC ACUTE MED/SURG PRIVATE ROOM

## 2024-03-07 PROCEDURE — 36415 COLL VENOUS BLD VENIPUNCTURE: CPT | Mod: XB | Performed by: HOSPITALIST

## 2024-03-07 PROCEDURE — 99233 SBSQ HOSP IP/OBS HIGH 50: CPT | Mod: GT,,, | Performed by: CLINICAL NURSE SPECIALIST

## 2024-03-07 PROCEDURE — 25000003 PHARM REV CODE 250: Performed by: STUDENT IN AN ORGANIZED HEALTH CARE EDUCATION/TRAINING PROGRAM

## 2024-03-07 PROCEDURE — 85025 COMPLETE CBC W/AUTO DIFF WBC: CPT | Mod: 91

## 2024-03-07 PROCEDURE — 25000003 PHARM REV CODE 250: Performed by: HOSPITALIST

## 2024-03-07 PROCEDURE — 85730 THROMBOPLASTIN TIME PARTIAL: CPT | Mod: 91 | Performed by: HOSPITALIST

## 2024-03-07 PROCEDURE — 25000003 PHARM REV CODE 250: Performed by: PHYSICIAN ASSISTANT

## 2024-03-07 PROCEDURE — 85730 THROMBOPLASTIN TIME PARTIAL: CPT | Performed by: HOSPITALIST

## 2024-03-07 PROCEDURE — 97530 THERAPEUTIC ACTIVITIES: CPT

## 2024-03-07 PROCEDURE — 85025 COMPLETE CBC W/AUTO DIFF WBC: CPT | Performed by: HOSPITALIST

## 2024-03-07 PROCEDURE — 63600175 PHARM REV CODE 636 W HCPCS: Performed by: HOSPITALIST

## 2024-03-07 PROCEDURE — 84100 ASSAY OF PHOSPHORUS: CPT

## 2024-03-07 PROCEDURE — 83735 ASSAY OF MAGNESIUM: CPT

## 2024-03-07 PROCEDURE — 63600175 PHARM REV CODE 636 W HCPCS

## 2024-03-07 RX ORDER — OXYCODONE HCL 10 MG/1
10 TABLET, FILM COATED, EXTENDED RELEASE ORAL EVERY 12 HOURS
Status: DISCONTINUED | OUTPATIENT
Start: 2024-03-07 | End: 2024-03-08

## 2024-03-07 RX ADMIN — ACETAMINOPHEN 650 MG: 325 TABLET ORAL at 04:03

## 2024-03-07 RX ADMIN — OXYCODONE HYDROCHLORIDE 10 MG: 10 TABLET ORAL at 02:03

## 2024-03-07 RX ADMIN — ALUMINUM HYDROXIDE, MAGNESIUM HYDROXIDE, AND SIMETHICONE 30 ML: 1200; 120; 1200 SUSPENSION ORAL at 05:03

## 2024-03-07 RX ADMIN — DOCUSATE SODIUM AND SENNOSIDES 2 TABLET: 8.6; 5 TABLET, FILM COATED ORAL at 08:03

## 2024-03-07 RX ADMIN — OXYCODONE HYDROCHLORIDE 10 MG: 10 TABLET ORAL at 07:03

## 2024-03-07 RX ADMIN — OXYCODONE HYDROCHLORIDE 10 MG: 10 TABLET ORAL at 10:03

## 2024-03-07 RX ADMIN — LEVETIRACETAM 500 MG: 500 TABLET, FILM COATED ORAL at 08:03

## 2024-03-07 RX ADMIN — METHOCARBAMOL 500 MG: 500 TABLET ORAL at 04:03

## 2024-03-07 RX ADMIN — DEXAMETHASONE SODIUM PHOSPHATE 4 MG: 4 INJECTION INTRA-ARTICULAR; INTRALESIONAL; INTRAMUSCULAR; INTRAVENOUS; SOFT TISSUE at 08:03

## 2024-03-07 RX ADMIN — OXYCODONE HYDROCHLORIDE 10 MG: 10 TABLET ORAL at 03:03

## 2024-03-07 RX ADMIN — SODIUM CHLORIDE 2000 MG: 1 TABLET ORAL at 08:03

## 2024-03-07 RX ADMIN — ALUMINUM HYDROXIDE, MAGNESIUM HYDROXIDE, AND SIMETHICONE 30 ML: 1200; 120; 1200 SUSPENSION ORAL at 08:03

## 2024-03-07 RX ADMIN — OXYCODONE HYDROCHLORIDE 10 MG: 10 TABLET, FILM COATED, EXTENDED RELEASE ORAL at 08:03

## 2024-03-07 RX ADMIN — HYDROMORPHONE HYDROCHLORIDE 1 MG: 1 INJECTION, SOLUTION INTRAMUSCULAR; INTRAVENOUS; SUBCUTANEOUS at 10:03

## 2024-03-07 RX ADMIN — GABAPENTIN 100 MG: 100 CAPSULE ORAL at 03:03

## 2024-03-07 RX ADMIN — FAMOTIDINE 20 MG: 20 TABLET ORAL at 08:03

## 2024-03-07 RX ADMIN — GABAPENTIN 100 MG: 100 CAPSULE ORAL at 08:03

## 2024-03-07 RX ADMIN — POLYETHYLENE GLYCOL 3350 17 G: 17 POWDER, FOR SOLUTION ORAL at 08:03

## 2024-03-07 RX ADMIN — HYDROMORPHONE HYDROCHLORIDE 1 MG: 1 INJECTION, SOLUTION INTRAMUSCULAR; INTRAVENOUS; SUBCUTANEOUS at 07:03

## 2024-03-07 RX ADMIN — SODIUM CHLORIDE 2000 MG: 1 TABLET ORAL at 03:03

## 2024-03-07 RX ADMIN — METHOCARBAMOL 500 MG: 500 TABLET ORAL at 07:03

## 2024-03-07 RX ADMIN — ALUMINUM HYDROXIDE, MAGNESIUM HYDROXIDE, AND SIMETHICONE 30 ML: 1200; 120; 1200 SUSPENSION ORAL at 10:03

## 2024-03-07 RX ADMIN — ALUMINUM HYDROXIDE, MAGNESIUM HYDROXIDE, AND SIMETHICONE 30 ML: 1200; 120; 1200 SUSPENSION ORAL at 06:03

## 2024-03-07 RX ADMIN — SODIUM BICARBONATE 650 MG: 650 TABLET ORAL at 08:03

## 2024-03-07 RX ADMIN — HEPARIN SODIUM AND DEXTROSE 25 UNITS/KG/HR: 10000; 5 INJECTION INTRAVENOUS at 09:03

## 2024-03-07 RX ADMIN — ATENOLOL 50 MG: 25 TABLET ORAL at 08:03

## 2024-03-07 RX ADMIN — HYDROMORPHONE HYDROCHLORIDE 1 MG: 1 INJECTION, SOLUTION INTRAMUSCULAR; INTRAVENOUS; SUBCUTANEOUS at 05:03

## 2024-03-07 NOTE — PROGRESS NOTES
Advance Care Planning   [facility] - Neurosurgery ([facility])  Palliative Care       Patient Name: Crow Hines  MRN: [id number]  Admission Date: 2/27/2024  Hospital Length of Stay: 9 days  Code Status: Full Code   Attending Provider: Theodore Montenegro*  Palliative Care Provider:   Primary Care Physician: Chika Powell MD  Principal Problem:Brain mass     Pt seen at bedside, along with APRN HL. Pt c/o pain; again reporting that she did not sleep well due to not having enough pain meds. Informed pt that her son will be cared for long term by close friend Prachi, which eased her mind. Discussed pt's potential transfer to [facility] in [place], and pt in agreement. Pt would like to be closer to her son and Prachi, so they can visit. Pt still wanting information regarding potential treatment for her cancer. Informed Yoana in  that pt would like to go forward with the transfer. No immediate needs at this time.    Shona Fonseca, ROBERTW  Palliative Medicine

## 2024-03-07 NOTE — PT/OT/SLP PROGRESS
Occupational Therapy      Patient Name:  Crow Hines   MRN:  [id number]    Patient not seen today secondary to increased pain in AM; patient requesting return visit in PM. OT unable to return.  Will follow-up as appropriate.    3/7/2024

## 2024-03-07 NOTE — ASSESSMENT & PLAN NOTE
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 Pt has hx of RA. Pt has poor performance status. Pt is alert, oriented to person, place, and situation. Pt reports back and hip pain. Pt is a full code.     Reason for consult: ACP/GOC    Today: Met with pt along with Shona KAUR LCSW. Pt would like to get closer to [facility] to be by her son and close friend Prachi. At this time, pt interested in palliative treatment for her cancer. Communicated with BRANDT Lees.     3/6/24: Met with pt along with SUYAPA Nagel with pal care. Per pt, her son is now staying in [facility] with his godmother. Pt report he has learning disabilities and ADHD. Per pt, there is not one to care for her at home. Per pt, daughter has stage IV cancer and receiving treatment. Pt has Medicaid paperwork in her chart. She reports she needs assistance filling out. SUYAPA Nagel to reach out to Medicaid representative, Marilynn.     Pt is aware she may be too debilitated to receive chemo.   Attempted to reach out to pt's daughter. Voicemail full.    3/5/24 Met with pt who is aware of her newly dx metastatic cancer. Pt reports "I know I am terminal." Pt reports she wants to know what her treatment options are that may extend her life longer. Pt reports she carefor her 20 y/o son who has mental issues but able to help her at home. They live in a trailer in [place]. Pt reports her daughter Thais lives locally. Per pt she has liver cancer and is receiving active chemo. T reports she is not .     Pt does report she has debility which has prevented ambulation.     MPOA- NOK is Thais landrum. Per pt son unable to make medical decisions.     Code status discussed. Pt reports she wants to be a full code for now while she gets her affairs in order. Per pt, is she is put on life support and not getting better, she would want it stopped and be made comfortable.     Symptom management:     Pain to right hip and back are r/t  " metastatic cancer.   OME in 24 hour is about 195.   Pt reports throbbing and shooting pain relieved with pain meds to a tolerable level.     Current meds:   Dilaudid 1 mg IVP q 4 hrs pain not relieved by pain meds.   Oxycodone 10 mg q 4 hrs prn pain.   Pt on Neurontin 100 mg tid.   Pt on dexamethasone 4 mg IVP     Recs:   Consider starting oxycodone long acting 10 mg bid.  Will continue to reassess.     Nausea:   Pt on zofran 4 mg IVP q 8 hrs prn nausea.     Debility:   PT/OT working with pt.     Plan:   Communicated with Dr. Glass.   See above recs.   Will continue to follow.

## 2024-03-07 NOTE — SUBJECTIVE & OBJECTIVE
Interval History: Pt reports pain was uncontrolled this am but now some improvement after being given pain medication  On hep gtt aptt 43    Review of Systems  Objective:     Vital Signs (Most Recent):  Temp: 97.7 °F (36.5 °C) (03/07/24 1130)  Pulse: 85 (03/07/24 1130)  Resp: 18 (03/07/24 1502)  BP: 121/72 (03/07/24 1130)  SpO2: (!) 94 % (03/07/24 1130) Vital Signs (24h Range):  Temp:  [97.7 °F (36.5 °C)-98.3 °F (36.8 °C)] 97.7 °F (36.5 °C)  Pulse:  [81-90] 85  Resp:  [16-20] 18  SpO2:  [93 %-95 %] 94 %  BP: ()/(56-80) 121/72     Weight: 47.6 kg (105 lb)  Body mass index is 18.6 kg/m².    Intake/Output Summary (Last 24 hours) at 3/7/2024 1507  Last data filed at 3/6/2024 1948  Gross per 24 hour   Intake --   Output 325 ml   Net -325 ml         Physical Exam  Constitutional:       General: She is not in acute distress.     Appearance: Normal appearance. She is not toxic-appearing or diaphoretic.   Cardiovascular:      Rate and Rhythm: Normal rate and regular rhythm.      Heart sounds: No murmur heard.     No friction rub. No gallop.   Pulmonary:      Effort: Pulmonary effort is normal. No respiratory distress.      Breath sounds: Normal breath sounds. No wheezing or rales.   Abdominal:      General: Abdomen is flat. There is no distension.      Palpations: Abdomen is soft.      Tenderness: There is no abdominal tenderness. There is no guarding or rebound.   Musculoskeletal:      Right lower leg: No edema.      Left lower leg: No edema.   Neurological:      Mental Status: She is alert.             Significant Labs: All pertinent labs within the past 24 hours have been reviewed.    Significant Imaging: I have reviewed all pertinent imaging results/findings within the past 24 hours.

## 2024-03-07 NOTE — PROGRESS NOTES
Mario Hsieh - Neurosurgery ([facility])  Hospital Medicine  Progress Note    Patient Name: Crow Hines  MRN: [id number]  Patient Class: IP- Inpatient   Admission Date: 2/27/2024  Length of Stay: 9 days  Attending Physician: Theodore Montenegro*  Primary Care Provider: Chika Powell MD        Subjective:     Principal Problem:Brain mass        HPI:  No notes on file    Overview/Hospital Course:  62 y/o Female admitted as transfer from OSH for L temporal lobe mass presenting w/ difficulty walking. Lab work significant for alk phos 203, AST 57, sodium 132, platelets 586. CXR revealed linear and ill-defined hazy opacities of the right mid to lower lung, felt to reflect infiltrate. Diffuse abdominal pain with focal increased tenderness in mid-epigastric area prompted CT abd/pelvis revealing numerous metastatic masses in the liver, metastatic deposits in the spleen and bilateral adrenal masses, hypoattenuating and mildly enhancing mass of the upper pole L kidney. Also w/ intra-abdominal enlarged lymph nodes, consistent with metastatic infiltration, masses of the R lung base with consolidation of the visualized R renal lower lobe and RLL 9 mm nodule. L2 compression fracture w/ mild sclerosis of the vertebra noted, likely pathologic. CTH completed showing large 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 Pt underwent L temporal craniotomy tumor resection on 2/29. Path returned metastatic small cell lung CA.     Interval History: Pt reports pain was uncontrolled this am but now some improvement after being given pain medication  On hep gtt aptt 43    Review of Systems  Objective:     Vital Signs (Most Recent):  Temp: 97.7 °F (36.5 °C) (03/07/24 1130)  Pulse: 85 (03/07/24 1130)  Resp: 18 (03/07/24 1502)  BP: 121/72 (03/07/24 1130)  SpO2: (!) 94 % (03/07/24 1130) Vital Signs (24h Range):  Temp:  [97.7 °F (36.5 °C)-98.3 °F (36.8 °C)] 97.7 °F (36.5 °C)  Pulse:  [81-90] 85  Resp:  [16-20] 18  SpO2:  [93 %-95  %] 94 %  BP: ()/(56-80) 121/72     Weight: 47.6 kg (105 lb)  Body mass index is 18.6 kg/m².    Intake/Output Summary (Last 24 hours) at 3/7/2024 1507  Last data filed at 3/6/2024 1948  Gross per 24 hour   Intake --   Output 325 ml   Net -325 ml         Physical Exam  Constitutional:       General: She is not in acute distress.     Appearance: Normal appearance. She is not toxic-appearing or diaphoretic.   Cardiovascular:      Rate and Rhythm: Normal rate and regular rhythm.      Heart sounds: No murmur heard.     No friction rub. No gallop.   Pulmonary:      Effort: Pulmonary effort is normal. No respiratory distress.      Breath sounds: Normal breath sounds. No wheezing or rales.   Abdominal:      General: Abdomen is flat. There is no distension.      Palpations: Abdomen is soft.      Tenderness: There is no abdominal tenderness. There is no guarding or rebound.   Musculoskeletal:      Right lower leg: No edema.      Left lower leg: No edema.   Neurological:      Mental Status: She is alert.             Significant Labs: All pertinent labs within the past 24 hours have been reviewed.    Significant Imaging: I have reviewed all pertinent imaging results/findings within the past 24 hours.    Assessment/Plan:      * Brain mass  -L temporal mass; status post tumor resection on 02/29, pathology showing met small cell lung CA   -scheduled dexamethasone. Continue dex 4q12 for vasogenic edema. Wean to off over 2 weeks  - Keppra 500 mg bid for seizure prophylaxis  - On hep gtt for PE. obtain head CT once therapeutic. If CTH is stable, okay to transition to oral AC upon discharge for treatment of PE.  -PT/OT  -blood pressure control  -pain control  -neurochecks      Vasogenic brain edema  See above      Acute pulmonary embolism without acute cor pulmonale  -Stable respiratory status  -Per Neurosurgery recommendation, awaiting for heparin infusion drip to be therapeutic and then to repeat head CT to rule out bleed and  then transitioned over to Eliquis    Compression fracture of lumbar spine, non-traumatic  Back brace      Metastatic neoplastic disease  -CT abd/pelvis w/ numerous metastatic masses in the liver, likely metastatic deposit in the spleen; bilateral adrenal masses, hypoattenuating and mildly enhancing mass of upper pole L kidney, intra-abdominal enlarged lymph nodes consistent w/ metastatic infiltration.   -masses to R lung base w/ consolidation of visualized R renal lower lobe; R lower lobe 9mm nodule  -L2 vertebra compression fracture w/ mild sclerosis of the vertebra, likely reflecting a pathologic fracture; faint sclerosis of the superior endplate of S1 likely reflects metastatic infiltration  -deferring staging workup including MRI spine and labs to medical oncology  -medical oncology discussion, they affirmed that the patient is a fairly decent candidate for systemic chemotherapy but best for patient to be in [place] to initiate the cycle as she has no follow up options here in the state due to Payor source limitations and residency status  -per rad onc, pt not a good candidate at this time until possibly later in future if she gets chemotherapy.    Per onc, No evidence of visceral crisis currently requiring emergent initiation of inpatient chemotherapy, but pt needs urgent f/u for consideration of palliative systemic therapy if it can be arranged. Pt is a resident of [place] so arranging f/u at [facility] or one closer to home would be best.       VTE Risk Mitigation (From admission, onward)           Ordered     heparin 25,000 units in dextrose 5% 250 mL (100 units/mL) infusion HIGH INTENSITY nomogram - [facility]  Continuous        Question:  Begin at (units/kg/hr)  Answer:  18    03/05/24 1004     Reason for No Pharmacological VTE Prophylaxis  Once        Question:  Reasons:  Answer:  Risk of Bleeding    02/27/24 0556     IP VTE HIGH RISK PATIENT  Once         02/27/24 0595      Place sequential compression device  Until discontinued         02/27/24 0556                    Discharge Planning   JUAN: 3/8/2024     Code Status: Full Code   Is the patient medically ready for discharge?: No    Reason for patient still in hospital (select all that apply): Patient trending condition, Treatment, and Consult recommendations  Discharge Plan A: Hospital Transfer   Discharge Delays: (!) Ambulance Transport/Facility Transport    Theodore Montenegro MD  Department of Hospital Medicine   [facility] - Neurosurgery ([facility])

## 2024-03-07 NOTE — PROGRESS NOTES
"Mario Hsieh - Neurosurgery ([facility])  Palliative Medicine  Progress Note    Patient Name: Crow Hines  MRN: [id number]  Admission Date: 2/27/2024  Hospital Length of Stay: 9 days  Code Status: Full Code   Attending Provider: Theodore Montenegro*  Consulting Provider: Hannah Saunders CNS  Primary Care Physician: Chika Powell MD  Principal Problem:Brain mass    Patient information was obtained from patient and primary team.      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 Pt has hx of RA. Pt has poor performance status. Pt is alert, oriented to person, place, and situation. Pt reports back and hip pain. Pt is a full code.     Reason for consult: ACP/GOC    Today: Met with pt along with Shona KAUR LCSW. Pt would like to get closer to [facility] to be by her son and close friend Prachi. At this time, pt interested in palliative treatment for her cancer. Communicated with BRANDT Lees.     3/6/24: Met with pt along with SUYAPA Nagel with pal care. Per pt, her son is now staying in [facility] with his godmother. Pt report he has learning disabilities and ADHD. Per pt, there is not one to care for her at home. Per pt, daughter has stage IV cancer and receiving treatment. Pt has Medicaid paperwork in her chart. She reports she needs assistance filling out. USYAPA Nagel to reach out to Medicaid representative, Marilynn.     Pt is aware she may be too debilitated to receive chemo.   Attempted to reach out to pt's daughter. Voicemail full.    3/5/24 Met with pt who is aware of her newly dx metastatic cancer. Pt reports "I know I am terminal." Pt reports she wants to know what her treatment options are that may extend her life longer. Pt reports she carefor her 22 y/o son who has mental issues but able to help her at home. They live in a trailer in [place]. Pt reports her daughter Thais lives locally. Per pt she has liver " cancer and is receiving active chemo. T reports she is not .     Pt does report she has debility which has prevented ambulation.     MPOA- NOK is Thais landrum. Per pt son unable to make medical decisions.     Code status discussed. Pt reports she wants to be a full code for now while she gets her affairs in order. Per pt, is she is put on life support and not getting better, she would want it stopped and be made comfortable.     Symptom management:     Pain to right hip and back are r/t  metastatic cancer.   OME in 24 hour is about 195.   Pt reports throbbing and shooting pain relieved with pain meds to a tolerable level.     Current meds:   Dilaudid 1 mg IVP q 4 hrs pain not relieved by pain meds.   Oxycodone 10 mg q 4 hrs prn pain.   Pt on Neurontin 100 mg tid.   Pt on dexamethasone 4 mg IVP     Recs:   Consider starting oxycodone long acting 10 mg bid.  Will continue to reassess.     Nausea:   Pt on zofran 4 mg IVP q 8 hrs prn nausea.     Debility:   PT/OT working with pt.     Plan:   Communicated with Dr. Glass.   See above recs.   Will continue to follow.         I will follow-up with patient. Please contact us if you have any additional questions.    Subjective:     Chief Complaint: No chief complaint on file.      HPI:   Pt is a 64 y/o F with pMHx of HTN and rheumatoid arthritis presenting as transfer from OS for neurosurgery eval of L temporal lobe mass. Per chart review, pt initially presented to the ED with complaints that everything hurts.   Reportedly began seeing a rheumatologist 9 months ago where she was diagnosed w/ RA. Methotrexate therapy was initiated. Patient reported a continually decline since that time, leading to her being bed-bound for the last 2 months 2/2 to difficulty walking. She stated her rheumatologist believed that she was not tolerating the methotrexate. On presentation to ED, she reported inability to tolerate abdominal pain any longer. Lab work significant for alk  phos 203, AST 57, sodium 132, platelets 586. CXR revealed linear and ill-defined hazy opacities of the right mid to lower lung, felt to reflect infiltrate. Diffuse abdominal pain with focal increased tenderness in mid-epigastric area prompted CT abd/pelvis revealing numerous metastatic masses in the liver, metastatic deposits in the spleen and bilateral adrenal masses, hypoattenuating and mildly enhancing mass of the upper pole L kidney. Also w/ intra-abdominal enlarged lymph nodes, consistent with metastatic infiltration, masses of the R lung base with consolidation of the visualized R renal lower lobe and RLL 9 mm nodule. L2 compression fracture w/ mild sclerosis of the vertebra noted, likely pathologic. CTH completed showing large 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 She was transferred to [facility] for higher level of care and will be admitted to [facility] for further management.        Hospital Course:  No notes on file    Interval History: Pt will need placement.       Past Medical History:   Diagnosis Date    Arthritis        Past Surgical History:   Procedure Laterality Date    CRANIOTOMY, WITH NEOPLASM EXCISION USING COMPUTER-ASSISTED NAVIGATION Left 2/29/2024    Procedure: CRANIOTOMY, WITH NEOPLASM EXCISION USING COMPUTER-ASSISTED NAVIGATION;  Surgeon: Felix Szymanski DO;  Location: [facility] OR [address] [place] [address];  Service: Neurosurgery;  Laterality: Left;  (Left temporal crani for rescetion of tumor)  [place]  BRAIN LAB  Navigation - CT with contrast    TUBAL LIGATION  2002       Review of patient's allergies indicates:  No Known Allergies    Medications:  Continuous Infusions:   heparin (porcine) in D5W 25 Units/kg/hr (03/07/24 0955)     Scheduled Meds:   aluminum-magnesium hydroxide-simethicone  30 mL Oral QID (AC & HS)    atenoloL  50 mg Oral Daily    dexAMETHasone  4 mg Intravenous Q12H    famotidine  20 mg Oral BID    gabapentin  100 mg Oral TID    levETIRAcetam  500 mg Oral BID    polyethylene glycol   17 g Oral BID    senna-docusate 8.6-50 mg  2 tablet Oral BID    sodium bicarbonate  650 mg Oral Daily    sodium chloride  2,000 mg Oral TID     PRN Meds:acetaminophen, bisacodyL, hydrALAZINE, HYDROmorphone, labetalol, methocarbamoL, ondansetron, oxyCODONE, oxyCODONE    Family History    None       Tobacco Use    Smoking status: Every Day     Current packs/day: 0.50     Types: Cigarettes    Smokeless tobacco: Current    Tobacco comments:     3/4 PPD   Substance and Sexual Activity    Alcohol use: Not Currently     Comment: OCCASIONALLY    Drug use: Never    Sexual activity: Not Currently       Review of Systems   Constitutional:  Positive for activity change and fatigue.   HENT:  Negative for trouble swallowing.    Respiratory:  Negative for shortness of breath.    Gastrointestinal:  Positive for nausea.   Musculoskeletal:  Positive for back pain.   Neurological:  Positive for weakness.   Psychiatric/Behavioral:  Positive for decreased concentration.      Objective:     Vital Signs (Most Recent):  Temp: 98.2 °F (36.8 °C) (03/07/24 0725)  Pulse: 89 (03/07/24 0725)  Resp: 18 (03/07/24 1011)  BP: 125/80 (03/07/24 0725)  SpO2: 95 % (03/07/24 0725) Vital Signs (24h Range):  Temp:  [97.7 °F (36.5 °C)-98.3 °F (36.8 °C)] 98.2 °F (36.8 °C)  Pulse:  [78-90] 89  Resp:  [17-20] 18  SpO2:  [93 %-95 %] 95 %  BP: ()/(56-80) 125/80     Weight: 47.6 kg (105 lb)  Body mass index is 18.6 kg/m².       Physical Exam  Constitutional:       General: She is not in acute distress.  HENT:      Head: Normocephalic and atraumatic.   Pulmonary:      Effort: Respiratory distress present.   Musculoskeletal:      Cervical back: Normal range of motion.      Comments: Weakness noted. Pt able to move extremities.    Skin:     General: Skin is warm and dry.      Coloration: Skin is pale.   Neurological:      Mental Status: She is alert and oriented to person, place, and time.   Psychiatric:         Speech: Speech normal.         Behavior:  Behavior is cooperative.            Review of Symptoms      Symptom Assessment (ESAS 0-10 Scale)  Pain:  0  Dyspnea:  0  Anxiety:  0  Nausea:  0  Depression:  0  Anorexia:  0  Fatigue:  0  Insomnia:  0  Restlessness:  0  Agitation:  0         Pain Assessment:  OME in 24 hours:  195  Location(s): back    Back       Location: right        Quality: Throbbing and shooting        Quantity: 6/10 in intensity        Chronicity: Onset 0 second(s) ago, unchanged        Aggravating Factors: Walking and movement        Alleviating Factors: Opiates       Associated Symptoms: Nausea    Living Arrangements:  Lives with family    Psychosocial/Cultural:   See Palliative Psychosocial Note: No  Pt reports she lives with her 22 y/o son in [facility] in [place]. Per pt, she cares for son due to his mental issues. Per pt, son able to help her physically. Pt reports she has a daughter, Thais who loves locally. Per pt she is dealing with liver cancer and received chemo. Pt reports not  and not a big support system. Per pt, her son's godmother is caring for him.   **Primary  to Follow**  Palliative Care  Consult: Yes        Advance Care Planning  Advance Directives:   Living Will: No    Do Not Resuscitate Status: No    Medical Power of : No    Agent's Name:  DaughterThais is NOK    Decision Making:  Patient answered questions  Goals of Care: What is most important right now is to focus on curative/life-prolongation (regardless of treatment burdens). Accordingly, we have decided that the best plan to meet the patient's goals includes continuing with treatment.         Significant Labs: All pertinent labs within the past 24 hours have been reviewed.  CBC:   Recent Labs   Lab 03/07/24  0509   WBC 10.80  11.00   HGB 10.2*  10.1*   HCT 32.2*  31.9*   MCV 99*  99*   *  549*       BMP:  Recent Labs   Lab 03/07/24  0509   *   *   K 4.1   CL 99   CO2 26   BUN 12   CREATININE 0.6  "  CALCIUM 9.2   MG 2.1       LFT:  Lab Results   Component Value Date    AST 86 (H) 03/07/2024    GGT 1,385 (H) 03/05/2024    ALKPHOS 544 (H) 03/07/2024    BILITOT 0.4 03/07/2024     Albumin:   Albumin   Date Value Ref Range Status   03/07/2024 2.4 (L) 3.5 - 5.2 g/dL Final     Protein:   Total Protein   Date Value Ref Range Status   03/07/2024 5.8 (L) 6.0 - 8.4 g/dL Final     Lactic acid:   No results found for: "LACTATE"    Significant Imaging: I have reviewed all pertinent imaging results/findings within the past 24 hours.    > 50% of  55 min visit spent in chart review, face to face discussion of goals of care, charting,  symptom assessment, coordination of care and emotional support     Hannah Saunders, CNS  Palliative Medicine  [facility] - Neurosurgery ([facility])                "

## 2024-03-07 NOTE — PLAN OF CARE
Mario Hsieh - Neurosurgery (Hospital)  Discharge Reassessment    Primary Care Provider: Chika Powell MD    Expected Discharge Date: 3/8/2024    Patient is  medically ready.  Patient could benefit from oncology palliative care.  Transfer requested to [facility], [place].    Discharge Plan A and Plan B have been determined by review of patient's clinical status, future medical and therapeutic needs, and coverage/benefits for post-acute care in coordination with multidisciplinary team members.   Reassessment (most recent)       Discharge Reassessment - 03/07/24 1326          Discharge Reassessment    Assessment Type Discharge Planning Reassessment     Did the patient's condition or plan change since previous assessment? No     Discharge Plan discussed with: Patient     Communicated JUAN with patient/caregiver Date not available/Unable to determine     Discharge Plan A Hospital Transfer     Discharge Plan B Home     DME Needed Upon Discharge  none     Transition of Care Barriers Unisured     Why the patient remains in the hospital Requires continued medical care        Post-Acute Status    Discharge Delays Ambulance Transport/Facility Transport

## 2024-03-07 NOTE — PLAN OF CARE
Problem: Adult Inpatient Plan of Care  Goal: Plan of Care Review  Outcome: Ongoing, Progressing  Goal: Patient-Specific Goal (Individualized)  Outcome: Ongoing, Progressing  Goal: Absence of Hospital-Acquired Illness or Injury  Outcome: Ongoing, Progressing  Goal: Optimal Comfort and Wellbeing  Outcome: Ongoing, Progressing  Goal: Readiness for Transition of Care  Outcome: Ongoing, Progressing     Problem: Infection  Goal: Absence of Infection Signs and Symptoms  Outcome: Ongoing, Progressing     Problem: Skin Injury Risk Increased  Goal: Skin Health and Integrity  Outcome: Ongoing, Progressing     Problem: Fall Injury Risk  Goal: Absence of Fall and Fall-Related Injury  Outcome: Ongoing, Progressing     Problem: Constipation  Goal: Effective Bowel Elimination  Outcome: Ongoing, Progressing     Problem: Coping Ineffective (Oncology Care)  Goal: Effective Coping  Outcome: Ongoing, Progressing     Problem: Fatigue (Oncology Care)  Goal: Improved Activity Tolerance  Outcome: Ongoing, Progressing     Problem: Oral Intake Altered (Oncology Care)  Goal: Optimal Oral Intake  Outcome: Ongoing, Progressing     Problem: Oral Mucositis (Oncology Care)  Goal: Improved Oral Mucous Membrane Integrity  Outcome: Ongoing, Progressing     Problem: Pain Acute (Oncology Care)  Goal: Optimal Pain Control  Outcome: Ongoing, Progressing     Problem: Impaired Wound Healing  Goal: Optimal Wound Healing  Outcome: Ongoing, Progressing     Problem: Coping Ineffective  Goal: Effective Coping  Outcome: Ongoing, Progressing

## 2024-03-07 NOTE — ASSESSMENT & PLAN NOTE
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 Continue dex 4q12 for vasogenic edema. Wean to off over 2 weeks  - Keppra 500 mg bid for seizure prophylaxis  - On hep gtt for PE. obtain head CT once therapeutic. If CTH is stable, okay to transition to oral AC upon discharge for treatment of PE.  -PT/OT  -blood pressure control  -pain control  -neurochecks

## 2024-03-07 NOTE — PT/OT/SLP PROGRESS
"Physical Therapy Treatment    Patient Name:  Crow Hines   MRN:  [id number]    Recommendations:     Discharge Recommendations: Moderate Intensity Therapy  Discharge Equipment Recommendations: hospital bed, wheelchair  Barriers to discharge: Inaccessible home and Decreased caregiver support    Assessment:     Crow Hines is a 63 y.o. female admitted with a medical diagnosis of Brain mass.  She presents with the following impairments/functional limitations: weakness, impaired endurance, impaired self care skills, impaired functional mobility, impaired balance, gait instability, decreased lower extremity function, pain, orthopedic precautions . Pt limited by pain and declined sitting EOB after rolling for cleaning 2/2 pain. Pt would benefit from a moderate intensity/frequency therapy for: Dynamic/static standing/sitting balance through skilled balance training, strengthening with the use of skilled therapeutic exercises interventions, and mobility through adaptive equipment training. Pt continues to benefit from a collaborative PT/OT program to improve quality of life and focus on recovery of impairments.      Rehab Prognosis: Good; patient would benefit from acute skilled PT services to address these deficits and reach maximum level of function.    Recent Surgery: Procedure(s) (LRB):  CRANIOTOMY, WITH NEOPLASM EXCISION USING COMPUTER-ASSISTED NAVIGATION (Left) 7 Days Post-Op    Plan:     During this hospitalization, patient to be seen 3 x/week to address the identified rehab impairments via therapeutic activities, therapeutic exercises, gait training, neuromuscular re-education and progress toward the following goals:    Plan of Care Expires:  04/01/23    Subjective     Chief Complaint: pain  Patient/Family Comments/goals: pain is excruciating   Pain/Comfort:  Pain Rating 1:  (not rated)  Location 1:  ("excrutiating" pt did not localize)  Pain Addressed 1: Reposition, Distraction, Cessation of Activity  Pain " Rating Post-Intervention 1:  (not rated)      Objective:     Communicated with RN prior to session.  Patient found HOB elevated with telemetry, peripheral IV, PureWick upon PT entry to room.     General Precautions: Standard, fall  Orthopedic Precautions: spinal precautions  Braces: LSO  Respiratory Status: Room air     Functional Mobility:  Bed Mobility:     Rolling Left:  CGA with bed rail, increased time  Rolling Right: CGA with bed rail, increased time  Scooting: contact guard assistance, increased time  Supine to Sit: \declined 2/2 pain      AM-PAC 6 CLICK MOBILITY  Turning over in bed (including adjusting bedclothes, sheets and blankets)?: 3  Sitting down on and standing up from a chair with arms (e.g., wheelchair, bedside commode, etc.): 3  Moving from lying on back to sitting on the side of the bed?: 3  Moving to and from a bed to a chair (including a wheelchair)?: 1  Need to walk in hospital room?: 1  Climbing 3-5 steps with a railing?: 1  Basic Mobility Total Score: 12       Treatment & Education:  Patient educated on role of therapy, goals of session, and benefits of mobilizing.   Discussed PT plan of care during hospitalization.   Patient educated on calling for assistance.   Patient educated on how their diagnosis impacts their mobility within PT scope of practice.   All questions answered within PT scope of practice.    Patient left HOB elevated with all lines intact, call button in reach, bed alarm on, and RN notified.    GOALS:   Multidisciplinary Problems       Physical Therapy Goals          Problem: Physical Therapy    Goal Priority Disciplines Outcome Goal Variances Interventions   Physical Therapy Goal     PT, PT/OT Ongoing, Progressing     Description: Goals to be met by: 3/12/2024     Patient will increase functional independence with mobility by performin. Supine to sit with [place]  2. Sit to supine with [place]  3. Rolling to Left and Right with [place].  4. Sit to  stand transfer with Contact Guard Assistance with RW  5. Bed to chair transfer with Contact Guard Assistance using Rolling Walker  6. Gait  x 50 feet with Contact Guard Assistance using Rolling Walker.   7. Ascend/descend 3 stair with right Handrails Minimal Assistance using No Assistive Device.   8. Lower extremity exercise program x10 reps per handout, with supervision                             Time Tracking:     PT Received On: 03/07/24  PT Start Time: 1600     PT Stop Time: 1617  PT Total Time (min): 17 min     Billable Minutes: Therapeutic Activity 17    Treatment Type: Treatment  PT/PTA: PT     Number of PTA visits since last PT visit: 0     03/07/2024

## 2024-03-07 NOTE — ASSESSMENT & PLAN NOTE
-CT abd/pelvis w/ numerous metastatic masses in the liver, likely metastatic deposit in the spleen; bilateral adrenal masses, hypoattenuating and mildly enhancing mass of upper pole L kidney, intra-abdominal enlarged lymph nodes consistent w/ metastatic infiltration.   -masses to R lung base w/ consolidation of visualized R renal lower lobe; R lower lobe 9mm nodule  -L2 vertebra compression fracture w/ mild sclerosis of the vertebra, likely reflecting a pathologic fracture; faint sclerosis of the superior endplate of S1 likely reflects metastatic infiltration  -deferring staging workup including MRI spine and labs to medical oncology  -medical oncology discussion, they affirmed that the patient is a fairly decent candidate for systemic chemotherapy but best for patient to be in [place] to initiate the cycle as she has no follow up options here in the state due to Pay[employer] source limitations and residency status  -per rad onc, pt not a good candidate at this time until possibly later in future if she gets chemotherapy.    Per onc, No evidence of visceral crisis currently requiring emergent initiation of inpatient chemotherapy, but pt needs urgent f/u for consideration of palliative systemic therapy if it can be arranged. Pt is a resident of [place] so arranging f/u at [facility] or one closer to home would be best.

## 2024-03-07 NOTE — SUBJECTIVE & OBJECTIVE
Interval History: Pt will need placement.       Past Medical History:   Diagnosis Date    Arthritis        Past Surgical History:   Procedure Laterality Date    CRANIOTOMY, WITH NEOPLASM EXCISION USING COMPUTER-ASSISTED NAVIGATION Left 2/29/2024    Procedure: CRANIOTOMY, WITH NEOPLASM EXCISION USING COMPUTER-ASSISTED NAVIGATION;  Surgeon: Felix Szymanski DO;  Location: [facility] OR [address] [place] [address];  Service: Neurosurgery;  Laterality: Left;  (Left temporal crani for rescetion of tumor)  [place]  BRAIN LAB  Navigation - CT with contrast    TUBAL LIGATION  2002       Review of patient's allergies indicates:  No Known Allergies    Medications:  Continuous Infusions:   heparin (porcine) in D5W 25 Units/kg/hr (03/07/24 0910)     Scheduled Meds:   aluminum-magnesium hydroxide-simethicone  30 mL Oral QID (AC & HS)    atenoloL  50 mg Oral Daily    dexAMETHasone  4 mg Intravenous Q12H    famotidine  20 mg Oral BID    gabapentin  100 mg Oral TID    levETIRAcetam  500 mg Oral BID    polyethylene glycol  17 g Oral BID    senna-docusate 8.6-50 mg  2 tablet Oral BID    sodium bicarbonate  650 mg Oral Daily    sodium chloride  2,000 mg Oral TID     PRN Meds:acetaminophen, bisacodyL, hydrALAZINE, HYDROmorphone, labetalol, methocarbamoL, ondansetron, oxyCODONE, oxyCODONE    Family History    None       Tobacco Use    Smoking status: Every Day     Current packs/day: 0.50     Types: Cigarettes    Smokeless tobacco: Current    Tobacco comments:     3/4 PPD   Substance and Sexual Activity    Alcohol use: Not Currently     Comment: OCCASIONALLY    Drug use: Never    Sexual activity: Not Currently       Review of Systems   Constitutional:  Positive for activity change and fatigue.   HENT:  Negative for trouble swallowing.    Respiratory:  Negative for shortness of breath.    Gastrointestinal:  Positive for nausea.   Musculoskeletal:  Positive for back pain.   Neurological:  Positive for weakness.   Psychiatric/Behavioral:  Positive for decreased  concentration.      Objective:     Vital Signs (Most Recent):  Temp: 98.2 °F (36.8 °C) (03/07/24 0725)  Pulse: 89 (03/07/24 0725)  Resp: 18 (03/07/24 1011)  BP: 125/80 (03/07/24 0725)  SpO2: 95 % (03/07/24 0725) Vital Signs (24h Range):  Temp:  [97.7 °F (36.5 °C)-98.3 °F (36.8 °C)] 98.2 °F (36.8 °C)  Pulse:  [78-90] 89  Resp:  [17-20] 18  SpO2:  [93 %-95 %] 95 %  BP: ()/(56-80) 125/80     Weight: 47.6 kg (105 lb)  Body mass index is 18.6 kg/m².       Physical Exam  Constitutional:       General: She is not in acute distress.  HENT:      Head: Normocephalic and atraumatic.   Pulmonary:      Effort: Respiratory distress present.   Musculoskeletal:      Cervical back: Normal range of motion.      Comments: Weakness noted. Pt able to move extremities.    Skin:     General: Skin is warm and dry.      Coloration: Skin is pale.   Neurological:      Mental Status: She is alert and oriented to person, place, and time.   Psychiatric:         Speech: Speech normal.         Behavior: Behavior is cooperative.            Review of Symptoms      Symptom Assessment (ESAS 0-10 Scale)  Pain:  0  Dyspnea:  0  Anxiety:  0  Nausea:  0  Depression:  0  Anorexia:  0  Fatigue:  0  Insomnia:  0  Restlessness:  0  Agitation:  0         Pain Assessment:  OME in 24 hours:  195  Location(s): back    Back       Location: right        Quality: Throbbing and shooting        Quantity: 6/10 in intensity        Chronicity: Onset 0 second(s) ago, unchanged        Aggravating Factors: Walking and movement        Alleviating Factors: Opiates       Associated Symptoms: Nausea    Living Arrangements:  Lives with family    Psychosocial/Cultural:   See Palliative Psychosocial Note: No  Pt reports she lives with her 22 y/o son in [facility] in [place]. Per pt, she cares for son due to his mental issues. Per pt, son able to help her physically. Pt reports she has a daughter, Thais who loves locally. Per pt she is dealing with liver cancer and received chemo.  "Pt reports not  and not a big support system. Per pt, her son's godmother is caring for him.   **Primary  to Follow**  Palliative Care  Consult: Yes        Advance Care Planning   Advance Directives:   Living Will: No    Do Not Resuscitate Status: No    Medical Power of : No    Agent's Name:  DaughterThais is NOK    Decision Making:  Patient answered questions  Goals of Care: What is most important right now is to focus on curative/life-prolongation (regardless of treatment burdens). Accordingly, we have decided that the best plan to meet the patient's goals includes continuing with treatment.         Significant Labs: All pertinent labs within the past 24 hours have been reviewed.  CBC:   Recent Labs   Lab 03/07/24  0509   WBC 10.80  11.00   HGB 10.2*  10.1*   HCT 32.2*  31.9*   MCV 99*  99*   *  549*       BMP:  Recent Labs   Lab 03/07/24  0509   *   *   K 4.1   CL 99   CO2 26   BUN 12   CREATININE 0.6   CALCIUM 9.2   MG 2.1       LFT:  Lab Results   Component Value Date    AST 86 (H) 03/07/2024    GGT 1,385 (H) 03/05/2024    ALKPHOS 544 (H) 03/07/2024    BILITOT 0.4 03/07/2024     Albumin:   Albumin   Date Value Ref Range Status   03/07/2024 2.4 (L) 3.5 - 5.2 g/dL Final     Protein:   Total Protein   Date Value Ref Range Status   03/07/2024 5.8 (L) 6.0 - 8.4 g/dL Final     Lactic acid:   No results found for: "LACTATE"    Significant Imaging: I have reviewed all pertinent imaging results/findings within the past 24 hours.    "

## 2024-03-08 PROBLEM — R52 PAIN: Status: ACTIVE | Noted: 2024-03-08

## 2024-03-08 LAB
ALBUMIN SERPL BCP-MCNC: 2.4 G/DL (ref 3.5–5.2)
ALP SERPL-CCNC: 562 U/L (ref 55–135)
ALT SERPL W/O P-5'-P-CCNC: 111 U/L (ref 10–44)
ANION GAP SERPL CALC-SCNC: 14 MMOL/L (ref 8–16)
APTT PPP: 47 SEC (ref 21–32)
AST SERPL-CCNC: 85 U/L (ref 10–40)
BASOPHILS # BLD AUTO: 0.03 K/UL (ref 0–0.2)
BASOPHILS # BLD AUTO: 0.03 K/UL (ref 0–0.2)
BASOPHILS NFR BLD: 0.2 % (ref 0–1.9)
BASOPHILS NFR BLD: 0.2 % (ref 0–1.9)
BILIRUB SERPL-MCNC: 0.5 MG/DL (ref 0.1–1)
BUN SERPL-MCNC: 11 MG/DL (ref 8–23)
CALCIUM SERPL-MCNC: 9.4 MG/DL (ref 8.7–10.5)
CHLORIDE SERPL-SCNC: 101 MMOL/L (ref 95–110)
CO2 SERPL-SCNC: 22 MMOL/L (ref 23–29)
CREAT SERPL-MCNC: 0.7 MG/DL (ref 0.5–1.4)
DIFFERENTIAL METHOD BLD: ABNORMAL
DIFFERENTIAL METHOD BLD: ABNORMAL
EOSINOPHIL # BLD AUTO: 0 K/UL (ref 0–0.5)
EOSINOPHIL # BLD AUTO: 0 K/UL (ref 0–0.5)
EOSINOPHIL NFR BLD: 0 % (ref 0–8)
EOSINOPHIL NFR BLD: 0 % (ref 0–8)
ERYTHROCYTE [DISTWIDTH] IN BLOOD BY AUTOMATED COUNT: 14 % (ref 11.5–14.5)
ERYTHROCYTE [DISTWIDTH] IN BLOOD BY AUTOMATED COUNT: 14.1 % (ref 11.5–14.5)
EST. GFR  (NO RACE VARIABLE): >60 ML/MIN/1.73 M^2
GLUCOSE SERPL-MCNC: 82 MG/DL (ref 70–110)
HCT VFR BLD AUTO: 34.2 % (ref 37–48.5)
HCT VFR BLD AUTO: 34.6 % (ref 37–48.5)
HGB BLD-MCNC: 11.1 G/DL (ref 12–16)
HGB BLD-MCNC: 11.2 G/DL (ref 12–16)
IMM GRANULOCYTES # BLD AUTO: 0.14 K/UL (ref 0–0.04)
IMM GRANULOCYTES # BLD AUTO: 0.16 K/UL (ref 0–0.04)
IMM GRANULOCYTES NFR BLD AUTO: 1 % (ref 0–0.5)
IMM GRANULOCYTES NFR BLD AUTO: 1.2 % (ref 0–0.5)
LYMPHOCYTES # BLD AUTO: 0.7 K/UL (ref 1–4.8)
LYMPHOCYTES # BLD AUTO: 0.8 K/UL (ref 1–4.8)
LYMPHOCYTES NFR BLD: 4.7 % (ref 18–48)
LYMPHOCYTES NFR BLD: 5.5 % (ref 18–48)
MAGNESIUM SERPL-MCNC: 2.1 MG/DL (ref 1.6–2.6)
MCH RBC QN AUTO: 32 PG (ref 27–31)
MCH RBC QN AUTO: 32 PG (ref 27–31)
MCHC RBC AUTO-ENTMCNC: 32.4 G/DL (ref 32–36)
MCHC RBC AUTO-ENTMCNC: 32.5 G/DL (ref 32–36)
MCV RBC AUTO: 99 FL (ref 82–98)
MCV RBC AUTO: 99 FL (ref 82–98)
MONOCYTES # BLD AUTO: 1.1 K/UL (ref 0.3–1)
MONOCYTES # BLD AUTO: 1.1 K/UL (ref 0.3–1)
MONOCYTES NFR BLD: 7.7 % (ref 4–15)
MONOCYTES NFR BLD: 8.2 % (ref 4–15)
NEUTROPHILS # BLD AUTO: 11.8 K/UL (ref 1.8–7.7)
NEUTROPHILS # BLD AUTO: 12.1 K/UL (ref 1.8–7.7)
NEUTROPHILS NFR BLD: 85.6 % (ref 38–73)
NEUTROPHILS NFR BLD: 85.7 % (ref 38–73)
NRBC BLD-RTO: 0 /100 WBC
NRBC BLD-RTO: 0 /100 WBC
PHOSPHATE SERPL-MCNC: 3.2 MG/DL (ref 2.7–4.5)
PLATELET # BLD AUTO: 605 K/UL (ref 150–450)
PLATELET # BLD AUTO: 608 K/UL (ref 150–450)
PMV BLD AUTO: 9.5 FL (ref 9.2–12.9)
PMV BLD AUTO: 9.6 FL (ref 9.2–12.9)
POTASSIUM SERPL-SCNC: 4.3 MMOL/L (ref 3.5–5.1)
PROT SERPL-MCNC: 6 G/DL (ref 6–8.4)
RBC # BLD AUTO: 3.47 M/UL (ref 4–5.4)
RBC # BLD AUTO: 3.5 M/UL (ref 4–5.4)
SODIUM SERPL-SCNC: 137 MMOL/L (ref 136–145)
WBC # BLD AUTO: 13.71 K/UL (ref 3.9–12.7)
WBC # BLD AUTO: 14.13 K/UL (ref 3.9–12.7)

## 2024-03-08 PROCEDURE — 63600175 PHARM REV CODE 636 W HCPCS: Performed by: HOSPITALIST

## 2024-03-08 PROCEDURE — 36415 COLL VENOUS BLD VENIPUNCTURE: CPT

## 2024-03-08 PROCEDURE — 63600175 PHARM REV CODE 636 W HCPCS

## 2024-03-08 PROCEDURE — 11000001 HC ACUTE MED/SURG PRIVATE ROOM

## 2024-03-08 PROCEDURE — 63600175 PHARM REV CODE 636 W HCPCS: Performed by: STUDENT IN AN ORGANIZED HEALTH CARE EDUCATION/TRAINING PROGRAM

## 2024-03-08 PROCEDURE — 85025 COMPLETE CBC W/AUTO DIFF WBC: CPT | Mod: 91 | Performed by: HOSPITALIST

## 2024-03-08 PROCEDURE — 84100 ASSAY OF PHOSPHORUS: CPT

## 2024-03-08 PROCEDURE — 25000003 PHARM REV CODE 250: Performed by: PHYSICIAN ASSISTANT

## 2024-03-08 PROCEDURE — 25000003 PHARM REV CODE 250

## 2024-03-08 PROCEDURE — 25000003 PHARM REV CODE 250: Performed by: HOSPITALIST

## 2024-03-08 PROCEDURE — 25000003 PHARM REV CODE 250: Performed by: PSYCHIATRY & NEUROLOGY

## 2024-03-08 PROCEDURE — 80053 COMPREHEN METABOLIC PANEL: CPT

## 2024-03-08 PROCEDURE — 25000003 PHARM REV CODE 250: Performed by: STUDENT IN AN ORGANIZED HEALTH CARE EDUCATION/TRAINING PROGRAM

## 2024-03-08 PROCEDURE — 85025 COMPLETE CBC W/AUTO DIFF WBC: CPT

## 2024-03-08 PROCEDURE — 83735 ASSAY OF MAGNESIUM: CPT

## 2024-03-08 PROCEDURE — 85730 THROMBOPLASTIN TIME PARTIAL: CPT | Performed by: HOSPITALIST

## 2024-03-08 PROCEDURE — 99233 SBSQ HOSP IP/OBS HIGH 50: CPT | Mod: ,,, | Performed by: CLINICAL NURSE SPECIALIST

## 2024-03-08 RX ORDER — MORPHINE SULFATE 15 MG/1
15 TABLET, FILM COATED, EXTENDED RELEASE ORAL EVERY 12 HOURS
Status: DISCONTINUED | OUTPATIENT
Start: 2024-03-08 | End: 2024-03-12

## 2024-03-08 RX ADMIN — SODIUM BICARBONATE 650 MG: 650 TABLET ORAL at 08:03

## 2024-03-08 RX ADMIN — LEVETIRACETAM 500 MG: 500 TABLET, FILM COATED ORAL at 08:03

## 2024-03-08 RX ADMIN — POLYETHYLENE GLYCOL 3350 17 G: 17 POWDER, FOR SOLUTION ORAL at 08:03

## 2024-03-08 RX ADMIN — MORPHINE SULFATE 15 MG: 15 TABLET, EXTENDED RELEASE ORAL at 08:03

## 2024-03-08 RX ADMIN — OXYCODONE HYDROCHLORIDE 10 MG: 10 TABLET ORAL at 09:03

## 2024-03-08 RX ADMIN — SODIUM CHLORIDE 2000 MG: 1 TABLET ORAL at 08:03

## 2024-03-08 RX ADMIN — GABAPENTIN 100 MG: 100 CAPSULE ORAL at 08:03

## 2024-03-08 RX ADMIN — HYDROMORPHONE HYDROCHLORIDE 1 MG: 1 INJECTION, SOLUTION INTRAMUSCULAR; INTRAVENOUS; SUBCUTANEOUS at 04:03

## 2024-03-08 RX ADMIN — HYDROMORPHONE HYDROCHLORIDE 1 MG: 1 INJECTION, SOLUTION INTRAMUSCULAR; INTRAVENOUS; SUBCUTANEOUS at 02:03

## 2024-03-08 RX ADMIN — METHOCARBAMOL 500 MG: 500 TABLET ORAL at 11:03

## 2024-03-08 RX ADMIN — METHOCARBAMOL 500 MG: 500 TABLET ORAL at 05:03

## 2024-03-08 RX ADMIN — DEXAMETHASONE SODIUM PHOSPHATE 4 MG: 4 INJECTION INTRA-ARTICULAR; INTRALESIONAL; INTRAMUSCULAR; INTRAVENOUS; SOFT TISSUE at 08:03

## 2024-03-08 RX ADMIN — OXYCODONE HYDROCHLORIDE 10 MG: 10 TABLET ORAL at 12:03

## 2024-03-08 RX ADMIN — OXYCODONE 5 MG: 5 TABLET ORAL at 11:03

## 2024-03-08 RX ADMIN — ALUMINUM HYDROXIDE, MAGNESIUM HYDROXIDE, AND SIMETHICONE 30 ML: 1200; 120; 1200 SUSPENSION ORAL at 06:03

## 2024-03-08 RX ADMIN — DOCUSATE SODIUM AND SENNOSIDES 2 TABLET: 8.6; 5 TABLET, FILM COATED ORAL at 08:03

## 2024-03-08 RX ADMIN — HYDROMORPHONE HYDROCHLORIDE 1 MG: 1 INJECTION, SOLUTION INTRAMUSCULAR; INTRAVENOUS; SUBCUTANEOUS at 05:03

## 2024-03-08 RX ADMIN — POLYETHYLENE GLYCOL 3350 17 G: 17 POWDER, FOR SOLUTION ORAL at 09:03

## 2024-03-08 RX ADMIN — ALUMINUM HYDROXIDE, MAGNESIUM HYDROXIDE, AND SIMETHICONE 30 ML: 1200; 120; 1200 SUSPENSION ORAL at 05:03

## 2024-03-08 RX ADMIN — ALUMINUM HYDROXIDE, MAGNESIUM HYDROXIDE, AND SIMETHICONE 30 ML: 1200; 120; 1200 SUSPENSION ORAL at 12:03

## 2024-03-08 RX ADMIN — HYDROMORPHONE HYDROCHLORIDE 1 MG: 1 INJECTION, SOLUTION INTRAMUSCULAR; INTRAVENOUS; SUBCUTANEOUS at 10:03

## 2024-03-08 RX ADMIN — ALUMINUM HYDROXIDE, MAGNESIUM HYDROXIDE, AND SIMETHICONE 30 ML: 1200; 120; 1200 SUSPENSION ORAL at 08:03

## 2024-03-08 RX ADMIN — OXYCODONE HYDROCHLORIDE 10 MG: 10 TABLET, FILM COATED, EXTENDED RELEASE ORAL at 08:03

## 2024-03-08 RX ADMIN — GABAPENTIN 100 MG: 100 CAPSULE ORAL at 02:03

## 2024-03-08 RX ADMIN — HEPARIN SODIUM AND DEXTROSE 25 UNITS/KG/HR: 10000; 5 INJECTION INTRAVENOUS at 07:03

## 2024-03-08 RX ADMIN — ATENOLOL 50 MG: 25 TABLET ORAL at 08:03

## 2024-03-08 RX ADMIN — FAMOTIDINE 20 MG: 20 TABLET ORAL at 08:03

## 2024-03-08 RX ADMIN — SODIUM CHLORIDE 2000 MG: 1 TABLET ORAL at 02:03

## 2024-03-08 NOTE — SUBJECTIVE & OBJECTIVE
Interval History: Will give ms contin 15 bid  Pt denies headache, shortness of breath  Discussed pain management with patient    Review of Systems  Objective:     Vital Signs (Most Recent):  Temp: 98.1 °F (36.7 °C) (03/08/24 1151)  Pulse: 92 (03/08/24 1151)  Resp: 18 (03/08/24 1404)  BP: 134/74 (03/08/24 1151)  SpO2: (!) 94 % (03/08/24 1151) Vital Signs (24h Range):  Temp:  [98.1 °F (36.7 °C)-98.5 °F (36.9 °C)] 98.1 °F (36.7 °C)  Pulse:  [89-94] 92  Resp:  [18-20] 18  SpO2:  [92 %-95 %] 94 %  BP: (132-137)/(73-77) 134/74     Weight: 47.6 kg (105 lb)  Body mass index is 18.6 kg/m².  No intake or output data in the 24 hours ending 03/08/24 1525      Physical Exam  Constitutional:       General: She is not in acute distress.     Appearance: Normal appearance. She is not toxic-appearing or diaphoretic.   Cardiovascular:      Rate and Rhythm: Normal rate and regular rhythm.      Heart sounds: No murmur heard.     No friction rub. No gallop.   Pulmonary:      Effort: Pulmonary effort is normal. No respiratory distress.      Breath sounds: Normal breath sounds. No wheezing or rales.   Abdominal:      General: Abdomen is flat. There is no distension.      Palpations: Abdomen is soft.      Tenderness: There is no abdominal tenderness. There is no guarding or rebound.   Musculoskeletal:      Right lower leg: No edema.      Left lower leg: No edema.   Neurological:      Mental Status: She is alert.             Significant Labs: All pertinent labs within the past 24 hours have been reviewed.    Significant Imaging: I have reviewed all pertinent imaging results/findings within the past 24 hours.

## 2024-03-08 NOTE — SUBJECTIVE & OBJECTIVE
Interval History: Pt will need placement.       Past Medical History:   Diagnosis Date    Arthritis        Past Surgical History:   Procedure Laterality Date    CRANIOTOMY, WITH NEOPLASM EXCISION USING COMPUTER-ASSISTED NAVIGATION Left 2/29/2024    Procedure: CRANIOTOMY, WITH NEOPLASM EXCISION USING COMPUTER-ASSISTED NAVIGATION;  Surgeon: Felix Szymanski DO;  Location: [facility] OR [address] [place] [address];  Service: Neurosurgery;  Laterality: Left;  (Left temporal crani for rescetion of tumor)  [place]  BRAIN LAB  Navigation - CT with contrast    TUBAL LIGATION  2002       Review of patient's allergies indicates:  No Known Allergies    Medications:  Continuous Infusions:   heparin (porcine) in D5W 25 Units/kg/hr (03/08/24 0738)     Scheduled Meds:   aluminum-magnesium hydroxide-simethicone  30 mL Oral QID (AC & HS)    atenoloL  50 mg Oral Daily    dexAMETHasone  4 mg Intravenous Q12H    famotidine  20 mg Oral BID    gabapentin  100 mg Oral TID    levETIRAcetam  500 mg Oral BID    oxyCODONE  10 mg Oral Q12H    polyethylene glycol  17 g Oral BID    senna-docusate 8.6-50 mg  2 tablet Oral BID    sodium bicarbonate  650 mg Oral Daily    sodium chloride  2,000 mg Oral TID     PRN Meds:acetaminophen, bisacodyL, hydrALAZINE, HYDROmorphone, labetalol, methocarbamoL, ondansetron, oxyCODONE, oxyCODONE    Family History    None       Tobacco Use    Smoking status: Every Day     Current packs/day: 0.50     Types: Cigarettes    Smokeless tobacco: Current    Tobacco comments:     3/4 PPD   Substance and Sexual Activity    Alcohol use: Not Currently     Comment: OCCASIONALLY    Drug use: Never    Sexual activity: Not Currently       Review of Systems   Constitutional:  Positive for activity change and fatigue.   HENT:  Negative for trouble swallowing.    Respiratory:  Negative for shortness of breath.    Gastrointestinal:  Positive for nausea.   Musculoskeletal:  Positive for back pain.   Neurological:  Positive for weakness.    Psychiatric/Behavioral:  Positive for decreased concentration.      Objective:     Vital Signs (Most Recent):  Temp: 98.2 °F (36.8 °C) (03/08/24 0741)  Pulse: 94 (03/08/24 0741)  Resp: 18 (03/08/24 0843)  BP: 134/74 (03/08/24 0741)  SpO2: 95 % (03/08/24 0741) Vital Signs (24h Range):  Temp:  [97.7 °F (36.5 °C)-98.5 °F (36.9 °C)] 98.2 °F (36.8 °C)  Pulse:  [84-94] 94  Resp:  [16-20] 18  SpO2:  [92 %-95 %] 95 %  BP: (121-153)/(72-85) 134/74     Weight: 47.6 kg (105 lb)  Body mass index is 18.6 kg/m².       Physical Exam  Constitutional:       General: She is not in acute distress.  HENT:      Head: Normocephalic and atraumatic.   Pulmonary:      Effort: Respiratory distress present.   Musculoskeletal:      Cervical back: Normal range of motion.      Comments: Weakness noted. Pt able to move extremities.    Skin:     General: Skin is warm and dry.      Coloration: Skin is pale.   Neurological:      Mental Status: She is alert and oriented to person, place, and time.   Psychiatric:         Speech: Speech normal.         Behavior: Behavior is cooperative.            Review of Symptoms      Symptom Assessment (ESAS 0-10 Scale)  Pain:  0  Dyspnea:  0  Anxiety:  0  Nausea:  0  Depression:  0  Anorexia:  0  Fatigue:  0  Insomnia:  0  Restlessness:  0  Agitation:  0         Pain Assessment:  OME in 24 hours:  195  Location(s): back    Back       Location: right        Quality: Throbbing and shooting        Quantity: 6/10 in intensity        Chronicity: Onset 0 second(s) ago, unchanged        Aggravating Factors: Walking and movement        Alleviating Factors: Opiates       Associated Symptoms: Nausea    Living Arrangements:  Lives with family    Psychosocial/Cultural:   See Palliative Psychosocial Note: No  Pt reports she lives with her 22 y/o son in [facility] in [place]. Per pt, she cares for son due to his mental issues. Per pt, son able to help her physically. Pt reports she has a daughter, Thais who loves locally. Per pt she  "is dealing with liver cancer and received chemo. Pt reports not  and not a big support system. Per pt, her son's godmother is caring for him.   **Primary  to Follow**  Palliative Care  Consult: Yes        Advance Care Planning   Advance Directives:   Living Will: No    Do Not Resuscitate Status: No    Medical Power of : No    Agent's Name:  DaughterThais is NOK    Decision Making:  Patient answered questions  Goals of Care: What is most important right now is to focus on curative/life-prolongation (regardless of treatment burdens). Accordingly, we have decided that the best plan to meet the patient's goals includes continuing with treatment.         Significant Labs: All pertinent labs within the past 24 hours have been reviewed.  CBC:   Recent Labs   Lab 03/08/24  0451   WBC 13.71*  14.13*   HGB 11.2*  11.1*   HCT 34.6*  34.2*   MCV 99*  99*   *  605*       BMP:  Recent Labs   Lab 03/08/24  0451   GLU 82      K 4.3      CO2 22*   BUN 11   CREATININE 0.7   CALCIUM 9.4   MG 2.1       LFT:  Lab Results   Component Value Date    AST 85 (H) 03/08/2024    GGT 1,385 (H) 03/05/2024    ALKPHOS 562 (H) 03/08/2024    BILITOT 0.5 03/08/2024     Albumin:   Albumin   Date Value Ref Range Status   03/08/2024 2.4 (L) 3.5 - 5.2 g/dL Final     Protein:   Total Protein   Date Value Ref Range Status   03/08/2024 6.0 6.0 - 8.4 g/dL Final     Lactic acid:   No results found for: "LACTATE"    Significant Imaging: I have reviewed all pertinent imaging results/findings within the past 24 hours.    "

## 2024-03-08 NOTE — PLAN OF CARE
CM sent email to [facility] and [facility] for disability alex and Medicaid alex.    [facility].    12:15 CM rec'd call from Rosey with [employer] disability who states they have been attempting to obtain apps for medicaid and disability but patient states that daughter has to complete. Cm will give patient Rosey's phone number to call.

## 2024-03-08 NOTE — PROGRESS NOTES
Mario Hsieh - Neurosurgery ([facility])  Hospital Medicine  Progress Note    Patient Name: Crow Hines  MRN: [id number]  Patient Class: IP- Inpatient   Admission Date: 2/27/2024  Length of Stay: 10 days  Attending Physician: Theodore Montenegro*  Primary Care Provider: Chika Powell MD        Subjective:     Principal Problem:Brain mass        HPI:  No notes on file    Overview/Hospital Course:  64 y/o Female admitted as transfer from OSH for L temporal lobe mass presenting w/ difficulty walking. Lab work significant for alk phos 203, AST 57, sodium 132, platelets 586. CXR revealed linear and ill-defined hazy opacities of the right mid to lower lung, felt to reflect infiltrate. Diffuse abdominal pain with focal increased tenderness in mid-epigastric area prompted CT abd/pelvis revealing numerous metastatic masses in the liver, metastatic deposits in the spleen and bilateral adrenal masses, hypoattenuating and mildly enhancing mass of the upper pole L kidney. Also w/ intra-abdominal enlarged lymph nodes, consistent with metastatic infiltration, masses of the R lung base with consolidation of the visualized R renal lower lobe and RLL 9 mm nodule. L2 compression fracture w/ mild sclerosis of the vertebra noted, likely pathologic. CTH completed showing large 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 Pt underwent L temporal craniotomy tumor resection on 2/29. Path returned metastatic small cell lung CA.     Interval History: Will give ms contin 15 bid  Pt denies headache, shortness of breath  Discussed pain management with patient    Review of Systems  Objective:     Vital Signs (Most Recent):  Temp: 98.1 °F (36.7 °C) (03/08/24 1151)  Pulse: 92 (03/08/24 1151)  Resp: 18 (03/08/24 1404)  BP: 134/74 (03/08/24 1151)  SpO2: (!) 94 % (03/08/24 1151) Vital Signs (24h Range):  Temp:  [98.1 °F (36.7 °C)-98.5 °F (36.9 °C)] 98.1 °F (36.7 °C)  Pulse:  [89-94] 92  Resp:  [18-20] 18  SpO2:  [92 %-95 %] 94  %  BP: (132-137)/(73-77) 134/74     Weight: 47.6 kg (105 lb)  Body mass index is 18.6 kg/m².  No intake or output data in the 24 hours ending 03/08/24 1525      Physical Exam  Constitutional:       General: She is not in acute distress.     Appearance: Normal appearance. She is not toxic-appearing or diaphoretic.   Cardiovascular:      Rate and Rhythm: Normal rate and regular rhythm.      Heart sounds: No murmur heard.     No friction rub. No gallop.   Pulmonary:      Effort: Pulmonary effort is normal. No respiratory distress.      Breath sounds: Normal breath sounds. No wheezing or rales.   Abdominal:      General: Abdomen is flat. There is no distension.      Palpations: Abdomen is soft.      Tenderness: There is no abdominal tenderness. There is no guarding or rebound.   Musculoskeletal:      Right lower leg: No edema.      Left lower leg: No edema.   Neurological:      Mental Status: She is alert.             Significant Labs: All pertinent labs within the past 24 hours have been reviewed.    Significant Imaging: I have reviewed all pertinent imaging results/findings within the past 24 hours.    Assessment/Plan:      * Brain mass  -L temporal mass; status post tumor resection on 02/29, pathology showing met small cell lung CA   -scheduled dexamethasone. Continue dex 4q12 for vasogenic edema. Wean to off over 2 weeks  - Keppra 500 mg bid for seizure prophylaxis  - On hep gtt for PE. obtain head CT once therapeutic. If CTH is stable, okay to transition to oral AC upon discharge for treatment of PE.  -PT/OT  -blood pressure control  -pain control  -neurochecks      Vasogenic brain edema  See above      Acute pulmonary embolism without acute cor pulmonale  -Stable respiratory status  -Per Neurosurgery recommendation, awaiting for heparin infusion drip to be therapeutic and then to repeat head CT to rule out bleed and then transitioned over to Eliquis    Compression fracture of lumbar spine, non-traumatic  Back  brace      Metastatic neoplastic disease  -CT abd/pelvis w/ numerous metastatic masses in the liver, likely metastatic deposit in the spleen; bilateral adrenal masses, hypoattenuating and mildly enhancing mass of upper pole L kidney, intra-abdominal enlarged lymph nodes consistent w/ metastatic infiltration.   -masses to R lung base w/ consolidation of visualized R renal lower lobe; R lower lobe 9mm nodule  -L2 vertebra compression fracture w/ mild sclerosis of the vertebra, likely reflecting a pathologic fracture; faint sclerosis of the superior endplate of S1 likely reflects metastatic infiltration  -deferring staging workup including MRI spine and labs to medical oncology  -medical oncology discussion, they affirmed that the patient is a fairly decent candidate for systemic chemotherapy but best for patient to be in [place] to initiate the cycle as she has no follow up options here in the state due to Payor source limitations and residency status  -per rad onc, pt not a good candidate at this time until possibly later in future if she gets chemotherapy.    Per onc, No evidence of visceral crisis currently requiring emergent initiation of inpatient chemotherapy, but pt needs urgent f/u for consideration of palliative systemic therapy if it can be arranged. Pt is a resident of [place] so arranging f/u at [facility] or one closer to home would be best.       VTE Risk Mitigation (From admission, onward)           Ordered     heparin 25,000 units in dextrose 5% 250 mL (100 units/mL) infusion HIGH INTENSITY nomogram - [facility]  Continuous        Question:  Begin at (units/kg/hr)  Answer:  18    03/05/24 1004     Reason for No Pharmacological VTE Prophylaxis  Once        Question:  Reasons:  Answer:  Risk of Bleeding    02/27/24 0556     IP VTE HIGH RISK PATIENT  Once         02/27/24 0556     Place sequential compression device  Until discontinued         02/27/24 0558                     Discharge Planning   JUAN: 3/8/2024     Code Status: Full Code   Is the patient medically ready for discharge?: No    Reason for patient still in hospital (select all that apply): Patient trending condition and Treatment  Discharge Plan A: Hospital Transfer   Discharge Delays: (!) Ambulance Transport/Facility Transport      Theodore Motnenegro MD  Department of Hospital Medicine   [facility] - Neurosurgery ([facility])

## 2024-03-08 NOTE — PROGRESS NOTES
"Mario Hsieh - Neurosurgery ([facility])  Palliative Medicine  Progress Note    Patient Name: Crow Hines  MRN: [id number]  Admission Date: 2/27/2024  Hospital Length of Stay: 10 days  Code Status: Full Code   Attending Provider: Theodore Montenegro*  Consulting Provider: Hannah Saunders CNS  Primary Care Physician: Chika Powell MD  Principal Problem:Brain mass    Patient information was obtained from patient and primary team.      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 Pt has hx of RA. Pt has poor performance status. Pt is alert, oriented to person, place, and situation. Pt reports back and hip pain. Pt is a full code.     Reason for consult: ACP/GOC    Today: Met with pt along with Shona KAUR LCSW. Pt would like to get closer to [facility] to be by her son and close friend Prachi. At this time, pt interested in palliative treatment for her cancer. Communicated with BRANDT Lees.     3/6/24: Met with pt along with SUYAPA Nagel with pal care. Per pt, her son is now staying in [facility] with his godmother. Pt report he has learning disabilities and ADHD. Per pt, there is not one to care for her at home. Per pt, daughter has stage IV cancer and receiving treatment. Pt has Medicaid paperwork in her chart. She reports she needs assistance filling out. SUYAPA Nagel to reach out to Medicaid representative, Marilynn.     Pt is aware she may be too debilitated to receive chemo.   Attempted to reach out to pt's daughter. Voicemail full.    3/5/24 Met with pt who is aware of her newly dx metastatic cancer. Pt reports "I know I am terminal." Pt reports she wants to know what her treatment options are that may extend her life longer. Pt reports she carefor her 20 y/o son who has mental issues but able to help her at home. They live in a trailer in [place]. Pt reports her daughter Thais lives locally. Per pt she has liver " cancer and is receiving active chemo. T reports she is not .     Pt does report she has debility which has prevented ambulation.     MPOA- NOK is Thais landrum. Per pt son unable to make medical decisions.     Code status discussed. Pt reports she wants to be a full code for now while she gets her affairs in order. Per pt, is she is put on life support and not getting better, she would want it stopped and be made comfortable.     Symptom management:     Pain to right hip and back are r/t  metastatic cancer.   OME in 24 hour is about 170.   Pt reports throbbing and shooting pain relieved with pain meds to a tolerable level.     Current meds:   Dilaudid 1 mg IVP q 4 hrs pain not relieved by pain meds.   Oxycodone 10 mg q 4 hrs prn pain.   Pt on Neurontin 100 mg tid.   Pt on dexamethasone 4 mg IVP     Recs:   Consider starting [place] Contin 15 mg bid.   Will continue to reassess.     Nausea:   Pt on zofran 4 mg IVP q 8 hrs prn nausea.     Debility:   PT/OT working with pt.     Plan:   Communicated with Dr. Glass.   See above recs.   Will continue to follow.         I will follow-up with patient. Please contact us if you have any additional questions.    Subjective:     Chief Complaint: No chief complaint on file.      HPI:   Pt is a 64 y/o F with pMHx of HTN and rheumatoid arthritis presenting as transfer from OSH for neurosurgery eval of L temporal lobe mass. Per chart review, pt initially presented to the ED with complaints that everything hurts.   Reportedly began seeing a rheumatologist 9 months ago where she was diagnosed w/ RA. Methotrexate therapy was initiated. Patient reported a continually decline since that time, leading to her being bed-bound for the last 2 months 2/2 to difficulty walking. She stated her rheumatologist believed that she was not tolerating the methotrexate. On presentation to ED, she reported inability to tolerate abdominal pain any longer. Lab work significant for alk phos 203,  AST 57, sodium 132, platelets 586. CXR revealed linear and ill-defined hazy opacities of the right mid to lower lung, felt to reflect infiltrate. Diffuse abdominal pain with focal increased tenderness in mid-epigastric area prompted CT abd/pelvis revealing numerous metastatic masses in the liver, metastatic deposits in the spleen and bilateral adrenal masses, hypoattenuating and mildly enhancing mass of the upper pole L kidney. Also w/ intra-abdominal enlarged lymph nodes, consistent with metastatic infiltration, masses of the R lung base with consolidation of the visualized R renal lower lobe and RLL 9 mm nodule. L2 compression fracture w/ mild sclerosis of the vertebra noted, likely pathologic. CTH completed showing large 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 She was transferred to [facility] for higher level of care and will be admitted to [facility] for further management.        Hospital Course:  No notes on file    Interval History: Pt will need placement.       Past Medical History:   Diagnosis Date    Arthritis        Past Surgical History:   Procedure Laterality Date    CRANIOTOMY, WITH NEOPLASM EXCISION USING COMPUTER-ASSISTED NAVIGATION Left 2/29/2024    Procedure: CRANIOTOMY, WITH NEOPLASM EXCISION USING COMPUTER-ASSISTED NAVIGATION;  Surgeon: Felix Szymanski DO;  Location: [facility] OR [address] [place] [address];  Service: Neurosurgery;  Laterality: Left;  (Left temporal crani for rescetion of tumor)  [place]  BRAIN LAB  Navigation - CT with contrast    TUBAL LIGATION  2002       Review of patient's allergies indicates:  No Known Allergies    Medications:  Continuous Infusions:   heparin (porcine) in D5W 25 Units/kg/hr (03/08/24 2379)     Scheduled Meds:   aluminum-magnesium hydroxide-simethicone  30 mL Oral QID (AC & HS)    atenoloL  50 mg Oral Daily    dexAMETHasone  4 mg Intravenous Q12H    famotidine  20 mg Oral BID    gabapentin  100 mg Oral TID    levETIRAcetam  500 mg Oral BID    oxyCODONE  10 mg Oral Q12H     polyethylene glycol  17 g Oral BID    senna-docusate 8.6-50 mg  2 tablet Oral BID    sodium bicarbonate  650 mg Oral Daily    sodium chloride  2,000 mg Oral TID     PRN Meds:acetaminophen, bisacodyL, hydrALAZINE, HYDROmorphone, labetalol, methocarbamoL, ondansetron, oxyCODONE, oxyCODONE    Family History    None       Tobacco Use    Smoking status: Every Day     Current packs/day: 0.50     Types: Cigarettes    Smokeless tobacco: Current    Tobacco comments:     3/4 PPD   Substance and Sexual Activity    Alcohol use: Not Currently     Comment: OCCASIONALLY    Drug use: Never    Sexual activity: Not Currently       Review of Systems   Constitutional:  Positive for activity change and fatigue.   HENT:  Negative for trouble swallowing.    Respiratory:  Negative for shortness of breath.    Gastrointestinal:  Positive for nausea.   Musculoskeletal:  Positive for back pain.   Neurological:  Positive for weakness.   Psychiatric/Behavioral:  Positive for decreased concentration.      Objective:     Vital Signs (Most Recent):  Temp: 98.2 °F (36.8 °C) (03/08/24 0741)  Pulse: 94 (03/08/24 0741)  Resp: 18 (03/08/24 0843)  BP: 134/74 (03/08/24 0741)  SpO2: 95 % (03/08/24 0741) Vital Signs (24h Range):  Temp:  [97.7 °F (36.5 °C)-98.5 °F (36.9 °C)] 98.2 °F (36.8 °C)  Pulse:  [84-94] 94  Resp:  [16-20] 18  SpO2:  [92 %-95 %] 95 %  BP: (121-153)/(72-85) 134/74     Weight: 47.6 kg (105 lb)  Body mass index is 18.6 kg/m².       Physical Exam  Constitutional:       General: She is not in acute distress.  HENT:      Head: Normocephalic and atraumatic.   Pulmonary:      Effort: Respiratory distress present.   Musculoskeletal:      Cervical back: Normal range of motion.      Comments: Weakness noted. Pt able to move extremities.    Skin:     General: Skin is warm and dry.      Coloration: Skin is pale.   Neurological:      Mental Status: She is alert and oriented to person, place, and time.   Psychiatric:         Speech: Speech normal.          Behavior: Behavior is cooperative.            Review of Symptoms      Symptom Assessment (ESAS 0-10 Scale)  Pain:  0  Dyspnea:  0  Anxiety:  0  Nausea:  0  Depression:  0  Anorexia:  0  Fatigue:  0  Insomnia:  0  Restlessness:  0  Agitation:  0         Pain Assessment:  OME in 24 hours:  195  Location(s): back    Back       Location: right        Quality: Throbbing and shooting        Quantity: 6/10 in intensity        Chronicity: Onset 0 second(s) ago, unchanged        Aggravating Factors: Walking and movement        Alleviating Factors: Opiates       Associated Symptoms: Nausea    Living Arrangements:  Lives with family    Psychosocial/Cultural:   See Palliative Psychosocial Note: No  Pt reports she lives with her 22 y/o son in [facility] in [place]. Per pt, she cares for son due to his mental issues. Per pt, son able to help her physically. Pt reports she has a daughter, Thais who loves locally. Per pt she is dealing with liver cancer and received chemo. Pt reports not  and not a big support system. Per pt, her son's godmother is caring for him.   **Primary  to Follow**  Palliative Care  Consult: Yes        Advance Care Planning  Advance Directives:   Living Will: No    Do Not Resuscitate Status: No    Medical Power of : No    Agent's Name:  DaughterThais is NOK    Decision Making:  Patient answered questions  Goals of Care: What is most important right now is to focus on curative/life-prolongation (regardless of treatment burdens). Accordingly, we have decided that the best plan to meet the patient's goals includes continuing with treatment.         Significant Labs: All pertinent labs within the past 24 hours have been reviewed.  CBC:   Recent Labs   Lab 03/08/24  0451   WBC 13.71*  14.13*   HGB 11.2*  11.1*   HCT 34.6*  34.2*   MCV 99*  99*   *  605*       BMP:  Recent Labs   Lab 03/08/24  0451   GLU 82      K 4.3      CO2 22*   BUN 11  "  CREATININE 0.7   CALCIUM 9.4   MG 2.1       LFT:  Lab Results   Component Value Date    AST 85 (H) 03/08/2024    GGT 1,385 (H) 03/05/2024    ALKPHOS 562 (H) 03/08/2024    BILITOT 0.5 03/08/2024     Albumin:   Albumin   Date Value Ref Range Status   03/08/2024 2.4 (L) 3.5 - 5.2 g/dL Final     Protein:   Total Protein   Date Value Ref Range Status   03/08/2024 6.0 6.0 - 8.4 g/dL Final     Lactic acid:   No results found for: "LACTATE"    Significant Imaging: I have reviewed all pertinent imaging results/findings within the past 24 hours.    > 50% of  55 min visit spent in chart review, face to face discussion of goals of care,  symptom assessment, charting, coordination of care and emotional support     Hannah Saunders, CNS  Palliative Medicine  [facility] - Neurosurgery ([facility])                "

## 2024-03-08 NOTE — ASSESSMENT & PLAN NOTE
"Impression: Pt is a 62 y/o female, newly dx SCLC with mets which include the brain. Pt has hx of RA. Pt has poor performance status. Pt is alert, oriented to person, place, and situation. Pt reports back and hip pain. Pt is a full code.     Reason for consult: ACP/GOC    Today: Met with pt along with Shona KAUR LCSW. Pt would like to get closer to [facility] to be by her son and close friend Prachi. At this time, pt interested in palliative treatment for her cancer. Communicated with BRANDT Lees.     3/6/24: Met with pt along with SUYAPA Nagel with pal care. Per pt, her son is now staying in [facility] with his godmother. Pt report he has learning disabilities and ADHD. Per pt, there is not one to care for her at home. Per pt, daughter has stage IV cancer and receiving treatment. Pt has Medicaid paperwork in her chart. She reports she needs assistance filling out. SUYAPA Nagel to reach out to Medicaid representative, Marilynn.     Pt is aware she may be too debilitated to receive chemo.   Attempted to reach out to pt's daughter. Voicemail full.    3/5/24 Met with pt who is aware of her newly dx metastatic cancer. Pt reports "I know I am terminal." Pt reports she wants to know what her treatment options are that may extend her life longer. Pt reports she carefor her 20 y/o son who has mental issues but able to help her at home. They live in a trailer in [place]. Pt reports her daughter Thais lives locally. Per pt she has liver cancer and is receiving active chemo. T reports she is not .     Pt does report she has debility which has prevented ambulation.     MPOA- NOK is Thais landrum. Per pt son unable to make medical decisions.     Code status discussed. Pt reports she wants to be a full code for now while she gets her affairs in order. Per pt, is she is put on life support and not getting better, she would want it stopped and be made comfortable.     Symptom management:     Pain to right hip and back are r/t  " metastatic cancer.   OME in 24 hour is about 170.   Pt reports throbbing and shooting pain relieved with pain meds to a tolerable level.     Current meds:   Dilaudid 1 mg IVP q 4 hrs pain not relieved by pain meds.   Oxycodone 10 mg q 4 hrs prn pain.   Pt on Neurontin 100 mg tid.   Pt on dexamethasone 4 mg IVP     Recs:   Consider starting [place] Contin 15 mg bid.   Will continue to reassess.     Nausea:   Pt on zofran 4 mg IVP q 8 hrs prn nausea.     Debility:   PT/OT working with pt.     Plan:   Communicated with Dr. Glass.   See above recs.   Will continue to follow.

## 2024-03-09 LAB
ALBUMIN SERPL BCP-MCNC: 2.5 G/DL (ref 3.5–5.2)
ALP SERPL-CCNC: 696 U/L (ref 55–135)
ALT SERPL W/O P-5'-P-CCNC: 121 U/L (ref 10–44)
ANION GAP SERPL CALC-SCNC: 9 MMOL/L (ref 8–16)
APTT PPP: 46.2 SEC (ref 21–32)
AST SERPL-CCNC: 96 U/L (ref 10–40)
BASOPHILS # BLD AUTO: 0.02 K/UL (ref 0–0.2)
BASOPHILS NFR BLD: 0.2 % (ref 0–1.9)
BILIRUB SERPL-MCNC: 0.5 MG/DL (ref 0.1–1)
BUN SERPL-MCNC: 10 MG/DL (ref 8–23)
CALCIUM SERPL-MCNC: 9.4 MG/DL (ref 8.7–10.5)
CHLORIDE SERPL-SCNC: 100 MMOL/L (ref 95–110)
CO2 SERPL-SCNC: 24 MMOL/L (ref 23–29)
CREAT SERPL-MCNC: 0.7 MG/DL (ref 0.5–1.4)
DIFFERENTIAL METHOD BLD: ABNORMAL
EOSINOPHIL # BLD AUTO: 0 K/UL (ref 0–0.5)
EOSINOPHIL NFR BLD: 0.2 % (ref 0–8)
ERYTHROCYTE [DISTWIDTH] IN BLOOD BY AUTOMATED COUNT: 14.2 % (ref 11.5–14.5)
EST. GFR  (NO RACE VARIABLE): >60 ML/MIN/1.73 M^2
GLUCOSE SERPL-MCNC: 119 MG/DL (ref 70–110)
HCT VFR BLD AUTO: 34 % (ref 37–48.5)
HGB BLD-MCNC: 10.7 G/DL (ref 12–16)
IMM GRANULOCYTES # BLD AUTO: 0.16 K/UL (ref 0–0.04)
IMM GRANULOCYTES NFR BLD AUTO: 1.3 % (ref 0–0.5)
LYMPHOCYTES # BLD AUTO: 0.5 K/UL (ref 1–4.8)
LYMPHOCYTES NFR BLD: 3.9 % (ref 18–48)
MAGNESIUM SERPL-MCNC: 2.2 MG/DL (ref 1.6–2.6)
MCH RBC QN AUTO: 31.6 PG (ref 27–31)
MCHC RBC AUTO-ENTMCNC: 31.5 G/DL (ref 32–36)
MCV RBC AUTO: 100 FL (ref 82–98)
MONOCYTES # BLD AUTO: 0.9 K/UL (ref 0.3–1)
MONOCYTES NFR BLD: 7 % (ref 4–15)
NEUTROPHILS # BLD AUTO: 11.1 K/UL (ref 1.8–7.7)
NEUTROPHILS NFR BLD: 87.4 % (ref 38–73)
NRBC BLD-RTO: 0 /100 WBC
PHOSPHATE SERPL-MCNC: 3.2 MG/DL (ref 2.7–4.5)
PLATELET # BLD AUTO: 581 K/UL (ref 150–450)
PMV BLD AUTO: 9.8 FL (ref 9.2–12.9)
POTASSIUM SERPL-SCNC: 4.3 MMOL/L (ref 3.5–5.1)
PROT SERPL-MCNC: 6.1 G/DL (ref 6–8.4)
RBC # BLD AUTO: 3.39 M/UL (ref 4–5.4)
SODIUM SERPL-SCNC: 133 MMOL/L (ref 136–145)
WBC # BLD AUTO: 12.71 K/UL (ref 3.9–12.7)

## 2024-03-09 PROCEDURE — 25000003 PHARM REV CODE 250: Performed by: HOSPITALIST

## 2024-03-09 PROCEDURE — 25000003 PHARM REV CODE 250: Performed by: STUDENT IN AN ORGANIZED HEALTH CARE EDUCATION/TRAINING PROGRAM

## 2024-03-09 PROCEDURE — 84100 ASSAY OF PHOSPHORUS: CPT

## 2024-03-09 PROCEDURE — 80053 COMPREHEN METABOLIC PANEL: CPT

## 2024-03-09 PROCEDURE — 25000003 PHARM REV CODE 250

## 2024-03-09 PROCEDURE — 85025 COMPLETE CBC W/AUTO DIFF WBC: CPT

## 2024-03-09 PROCEDURE — 63600175 PHARM REV CODE 636 W HCPCS: Performed by: HOSPITALIST

## 2024-03-09 PROCEDURE — 63600175 PHARM REV CODE 636 W HCPCS

## 2024-03-09 PROCEDURE — 25000003 PHARM REV CODE 250: Performed by: PHYSICIAN ASSISTANT

## 2024-03-09 PROCEDURE — 94761 N-INVAS EAR/PLS OXIMETRY MLT: CPT

## 2024-03-09 PROCEDURE — 25000003 PHARM REV CODE 250: Performed by: PSYCHIATRY & NEUROLOGY

## 2024-03-09 PROCEDURE — 11000001 HC ACUTE MED/SURG PRIVATE ROOM

## 2024-03-09 PROCEDURE — 63600175 PHARM REV CODE 636 W HCPCS: Performed by: STUDENT IN AN ORGANIZED HEALTH CARE EDUCATION/TRAINING PROGRAM

## 2024-03-09 PROCEDURE — 83735 ASSAY OF MAGNESIUM: CPT

## 2024-03-09 PROCEDURE — 85730 THROMBOPLASTIN TIME PARTIAL: CPT | Performed by: HOSPITALIST

## 2024-03-09 RX ADMIN — ALUMINUM HYDROXIDE, MAGNESIUM HYDROXIDE, AND SIMETHICONE 30 ML: 1200; 120; 1200 SUSPENSION ORAL at 06:03

## 2024-03-09 RX ADMIN — SODIUM BICARBONATE 650 MG: 650 TABLET ORAL at 08:03

## 2024-03-09 RX ADMIN — ALUMINUM HYDROXIDE, MAGNESIUM HYDROXIDE, AND SIMETHICONE 30 ML: 1200; 120; 1200 SUSPENSION ORAL at 04:03

## 2024-03-09 RX ADMIN — DOCUSATE SODIUM AND SENNOSIDES 2 TABLET: 8.6; 5 TABLET, FILM COATED ORAL at 08:03

## 2024-03-09 RX ADMIN — FAMOTIDINE 20 MG: 20 TABLET ORAL at 08:03

## 2024-03-09 RX ADMIN — HYDROMORPHONE HYDROCHLORIDE 1 MG: 1 INJECTION, SOLUTION INTRAMUSCULAR; INTRAVENOUS; SUBCUTANEOUS at 11:03

## 2024-03-09 RX ADMIN — DEXAMETHASONE SODIUM PHOSPHATE 4 MG: 4 INJECTION INTRA-ARTICULAR; INTRALESIONAL; INTRAMUSCULAR; INTRAVENOUS; SOFT TISSUE at 08:03

## 2024-03-09 RX ADMIN — POLYETHYLENE GLYCOL 3350 17 G: 17 POWDER, FOR SOLUTION ORAL at 08:03

## 2024-03-09 RX ADMIN — GABAPENTIN 100 MG: 100 CAPSULE ORAL at 08:03

## 2024-03-09 RX ADMIN — HYDROMORPHONE HYDROCHLORIDE 1 MG: 1 INJECTION, SOLUTION INTRAMUSCULAR; INTRAVENOUS; SUBCUTANEOUS at 04:03

## 2024-03-09 RX ADMIN — MORPHINE SULFATE 15 MG: 15 TABLET, EXTENDED RELEASE ORAL at 08:03

## 2024-03-09 RX ADMIN — OXYCODONE HYDROCHLORIDE 10 MG: 10 TABLET ORAL at 05:03

## 2024-03-09 RX ADMIN — OXYCODONE HYDROCHLORIDE 10 MG: 10 TABLET ORAL at 08:03

## 2024-03-09 RX ADMIN — LEVETIRACETAM 500 MG: 500 TABLET, FILM COATED ORAL at 08:03

## 2024-03-09 RX ADMIN — SODIUM CHLORIDE 2000 MG: 1 TABLET ORAL at 08:03

## 2024-03-09 RX ADMIN — ACETAMINOPHEN 650 MG: 325 TABLET ORAL at 01:03

## 2024-03-09 RX ADMIN — GABAPENTIN 100 MG: 100 CAPSULE ORAL at 04:03

## 2024-03-09 RX ADMIN — ATENOLOL 50 MG: 25 TABLET ORAL at 08:03

## 2024-03-09 RX ADMIN — OXYCODONE HYDROCHLORIDE 10 MG: 10 TABLET ORAL at 01:03

## 2024-03-09 RX ADMIN — METHOCARBAMOL 500 MG: 500 TABLET ORAL at 08:03

## 2024-03-09 RX ADMIN — ONDANSETRON 4 MG: 2 INJECTION INTRAMUSCULAR; INTRAVENOUS at 12:03

## 2024-03-09 RX ADMIN — ALUMINUM HYDROXIDE, MAGNESIUM HYDROXIDE, AND SIMETHICONE 30 ML: 1200; 120; 1200 SUSPENSION ORAL at 11:03

## 2024-03-09 RX ADMIN — HYDROMORPHONE HYDROCHLORIDE 1 MG: 1 INJECTION, SOLUTION INTRAMUSCULAR; INTRAVENOUS; SUBCUTANEOUS at 02:03

## 2024-03-09 RX ADMIN — SODIUM CHLORIDE 2000 MG: 1 TABLET ORAL at 04:03

## 2024-03-09 RX ADMIN — ALUMINUM HYDROXIDE, MAGNESIUM HYDROXIDE, AND SIMETHICONE 30 ML: 1200; 120; 1200 SUSPENSION ORAL at 08:03

## 2024-03-09 RX ADMIN — HEPARIN SODIUM AND DEXTROSE 25 UNITS/KG/HR: 10000; 5 INJECTION INTRAVENOUS at 04:03

## 2024-03-09 NOTE — PLAN OF CARE
SW checked progress on hospital transfer to [facility], Deangelo Uerña of [facility] picked up: [facility] remins on diversion. Pt on wait list.     SW will follow.    ALEXA Daniel, LMSW [facility]  [id number]

## 2024-03-09 NOTE — SUBJECTIVE & OBJECTIVE
Interval History: No acute events.   Awaiting [facility]    Review of Systems  Objective:     Vital Signs (Most Recent):  Temp: 98.2 °F (36.8 °C) (03/09/24 1137)  Pulse: 86 (03/09/24 1137)  Resp: 18 (03/09/24 1158)  BP: 128/72 (03/09/24 1137)  SpO2: (!) 93 % (03/09/24 1137) Vital Signs (24h Range):  Temp:  [97.5 °F (36.4 °C)-98.3 °F (36.8 °C)] 98.2 °F (36.8 °C)  Pulse:  [86-95] 86  Resp:  [16-18] 18  SpO2:  [92 %-95 %] 93 %  BP: (107-133)/(67-78) 128/72     Weight: 47.6 kg (105 lb)  Body mass index is 18.6 kg/m².    Intake/Output Summary (Last 24 hours) at 3/9/2024 1330  Last data filed at 3/9/2024 0517  Gross per 24 hour   Intake --   Output 1800 ml   Net -1800 ml         Physical Exam  Constitutional:       General: She is not in acute distress.     Appearance: Normal appearance. She is not toxic-appearing or diaphoretic.   Cardiovascular:      Rate and Rhythm: Normal rate and regular rhythm.      Heart sounds: No murmur heard.     No friction rub. No gallop.   Pulmonary:      Effort: Pulmonary effort is normal. No respiratory distress.      Breath sounds: Normal breath sounds. No wheezing or rales.   Abdominal:      General: Abdomen is flat. There is no distension.      Palpations: Abdomen is soft.      Tenderness: There is no abdominal tenderness. There is no guarding or rebound.   Musculoskeletal:      Right lower leg: No edema.      Left lower leg: No edema.   Neurological:      Mental Status: She is alert.             Significant Labs: All pertinent labs within the past 24 hours have been reviewed.    Significant Imaging: I have reviewed all pertinent imaging results/findings within the past 24 hours.

## 2024-03-09 NOTE — PROGRESS NOTES
Mario Hsieh - Neurosurgery ([facility])  [facility] Medicine  Progress Note    Patient Name: Crow Hines  MRN: [id number]  Patient Class: IP- Inpatient   Admission Date: 2/27/2024  Length of Stay: 11 days  Attending Physician: Theodore Montenegro*  Primary Care Provider: Chika Powell MD        Subjective:     Principal Problem:Brain mass        HPI:  No notes on file    Overview/Hospital Course:  64 y/o Female admitted as transfer from OSH for L temporal lobe mass presenting w/ difficulty walking. Lab work significant for alk phos 203, AST 57, sodium 132, platelets 586. CXR revealed linear and ill-defined hazy opacities of the right mid to lower lung, felt to reflect infiltrate. Diffuse abdominal pain with focal increased tenderness in mid-epigastric area prompted CT abd/pelvis revealing numerous metastatic masses in the liver, metastatic deposits in the spleen and bilateral adrenal masses, hypoattenuating and mildly enhancing mass of the upper pole L kidney. Also w/ intra-abdominal enlarged lymph nodes, consistent with metastatic infiltration, masses of the R lung base with consolidation of the visualized R renal lower lobe and RLL 9 mm nodule. L2 compression fracture w/ mild sclerosis of the vertebra noted, likely pathologic. CTH completed showing large 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 Pt underwent L temporal craniotomy tumor resection on 2/29. Path returned metastatic small cell lung CA.     Interval History: No acute events.   Awaiting CTH    Review of Systems  Objective:     Vital Signs (Most Recent):  Temp: 98.2 °F (36.8 °C) (03/09/24 1137)  Pulse: 86 (03/09/24 1137)  Resp: 18 (03/09/24 1158)  BP: 128/72 (03/09/24 1137)  SpO2: (!) 93 % (03/09/24 1137) Vital Signs (24h Range):  Temp:  [97.5 °F (36.4 °C)-98.3 °F (36.8 °C)] 98.2 °F (36.8 °C)  Pulse:  [86-95] 86  Resp:  [16-18] 18  SpO2:  [92 %-95 %] 93 %  BP: (107-133)/(67-78) 128/72     Weight: 47.6 kg (105 lb)  Body mass index is  18.6 kg/m².    Intake/Output Summary (Last 24 hours) at 3/9/2024 1330  Last data filed at 3/9/2024 0517  Gross per 24 hour   Intake --   Output 1800 ml   Net -1800 ml         Physical Exam  Constitutional:       General: She is not in acute distress.     Appearance: Normal appearance. She is not toxic-appearing or diaphoretic.   Cardiovascular:      Rate and Rhythm: Normal rate and regular rhythm.      Heart sounds: No murmur heard.     No friction rub. No gallop.   Pulmonary:      Effort: Pulmonary effort is normal. No respiratory distress.      Breath sounds: Normal breath sounds. No wheezing or rales.   Abdominal:      General: Abdomen is flat. There is no distension.      Palpations: Abdomen is soft.      Tenderness: There is no abdominal tenderness. There is no guarding or rebound.   Musculoskeletal:      Right lower leg: No edema.      Left lower leg: No edema.   Neurological:      Mental Status: She is alert.             Significant Labs: All pertinent labs within the past 24 hours have been reviewed.    Significant Imaging: I have reviewed all pertinent imaging results/findings within the past 24 hours.    Assessment/Plan:      * Brain mass  -L temporal mass; status post tumor resection on 02/29, pathology showing met small cell lung CA   -scheduled dexamethasone. Continue dex 4q12 for vasogenic edema. Wean to off over 2 weeks  - Keppra 500 mg bid for seizure prophylaxis  - On hep gtt for PE. obtain head CT once therapeutic. If CTH is stable, okay to transition to oral AC upon discharge for treatment of PE.  -PT/OT  -blood pressure control  -pain control  -neurochecks      Vasogenic brain edema  See above      Acute pulmonary embolism without acute cor pulmonale  -Stable respiratory status  -Per Neurosurgery recommendation, awaiting for heparin infusion drip to be therapeutic and then to repeat head CT to rule out bleed and then transitioned over to Eliquis    Compression fracture of lumbar spine,  non-traumatic  Back brace      Metastatic neoplastic disease  -CT abd/pelvis w/ numerous metastatic masses in the liver, likely metastatic deposit in the spleen; bilateral adrenal masses, hypoattenuating and mildly enhancing mass of upper pole L kidney, intra-abdominal enlarged lymph nodes consistent w/ metastatic infiltration.   -masses to R lung base w/ consolidation of visualized R renal lower lobe; R lower lobe 9mm nodule  -L2 vertebra compression fracture w/ mild sclerosis of the vertebra, likely reflecting a pathologic fracture; faint sclerosis of the superior endplate of S1 likely reflects metastatic infiltration  -deferring staging workup including MRI spine and labs to medical oncology  -medical oncology discussion, they affirmed that the patient is a fairly decent candidate for systemic chemotherapy but best for patient to be in [place] to initiate the cycle as she has no follow up options here in the state due to Payor source limitations and residency status  -per rad onc, pt not a good candidate at this time until possibly later in future if she gets chemotherapy.    Per onc, No evidence of visceral crisis currently requiring emergent initiation of inpatient chemotherapy, but pt needs urgent f/u for consideration of palliative systemic therapy if it can be arranged. Pt is a resident of [place] so arranging f/u at [facility] or one closer to home would be best.       VTE Risk Mitigation (From admission, onward)           Ordered     heparin 25,000 units in dextrose 5% 250 mL (100 units/mL) infusion HIGH INTENSITY nomogram - [facility]  Continuous        Question:  Begin at (units/kg/hr)  Answer:  18    03/05/24 1004     Reason for No Pharmacological VTE Prophylaxis  Once        Question:  Reasons:  Answer:  Risk of Bleeding    02/27/24 0556     IP VTE HIGH RISK PATIENT  Once         02/27/24 0556     Place sequential compression device  Until discontinued         02/27/24  0556                    Discharge Planning   JUAN: 3/11/2024     Code Status: Full Code   Is the patient medically ready for discharge?: No    Reason for patient still in hospital (select all that apply): Imaging  Discharge Plan A: Hospital Transfer   Discharge Delays: (!) Ambulance Transport/Facility Transport      Theodore Montenegro MD  Department of Hospital Medicine   [facility] Neurosurgery ([facility])

## 2024-03-09 NOTE — NURSING
Patient went for MRI but was unable to get the scan because she metal in her head.   MD on call notified.

## 2024-03-09 NOTE — PLAN OF CARE
Problem: Adult Inpatient Plan of Care  Goal: Plan of Care Review  Outcome: Ongoing, Progressing     Problem: Adult Inpatient Plan of Care  Goal: Patient-Specific Goal (Individualized)  Outcome: Ongoing, Progressing     Problem: Adult Inpatient Plan of Care  Goal: Optimal Comfort and Wellbeing  Outcome: Ongoing, Progressing     Problem: Skin Injury Risk Increased  Goal: Skin Health and Integrity  Outcome: Ongoing, Progressing

## 2024-03-10 LAB
ALBUMIN SERPL BCP-MCNC: 2.5 G/DL (ref 3.5–5.2)
ALP SERPL-CCNC: 661 U/L (ref 55–135)
ALT SERPL W/O P-5'-P-CCNC: 122 U/L (ref 10–44)
ANION GAP SERPL CALC-SCNC: 11 MMOL/L (ref 8–16)
APTT PPP: 43.9 SEC (ref 21–32)
AST SERPL-CCNC: 98 U/L (ref 10–40)
BASOPHILS # BLD AUTO: 0.02 K/UL (ref 0–0.2)
BASOPHILS # BLD AUTO: 0.02 K/UL (ref 0–0.2)
BASOPHILS NFR BLD: 0.2 % (ref 0–1.9)
BASOPHILS NFR BLD: 0.2 % (ref 0–1.9)
BILIRUB SERPL-MCNC: 0.5 MG/DL (ref 0.1–1)
BUN SERPL-MCNC: 9 MG/DL (ref 8–23)
CALCIUM SERPL-MCNC: 9.4 MG/DL (ref 8.7–10.5)
CHLORIDE SERPL-SCNC: 99 MMOL/L (ref 95–110)
CO2 SERPL-SCNC: 24 MMOL/L (ref 23–29)
CREAT SERPL-MCNC: 0.6 MG/DL (ref 0.5–1.4)
DIFFERENTIAL METHOD BLD: ABNORMAL
DIFFERENTIAL METHOD BLD: ABNORMAL
EOSINOPHIL # BLD AUTO: 0 K/UL (ref 0–0.5)
EOSINOPHIL # BLD AUTO: 0 K/UL (ref 0–0.5)
EOSINOPHIL NFR BLD: 0 % (ref 0–8)
EOSINOPHIL NFR BLD: 0.1 % (ref 0–8)
ERYTHROCYTE [DISTWIDTH] IN BLOOD BY AUTOMATED COUNT: 14.1 % (ref 11.5–14.5)
ERYTHROCYTE [DISTWIDTH] IN BLOOD BY AUTOMATED COUNT: 14.2 % (ref 11.5–14.5)
EST. GFR  (NO RACE VARIABLE): >60 ML/MIN/1.73 M^2
FACT X PPP CHRO-ACNC: 0.69 IU/ML (ref 0.3–0.7)
GLUCOSE SERPL-MCNC: 93 MG/DL (ref 70–110)
HCT VFR BLD AUTO: 34.4 % (ref 37–48.5)
HCT VFR BLD AUTO: 34.5 % (ref 37–48.5)
HGB BLD-MCNC: 10.8 G/DL (ref 12–16)
HGB BLD-MCNC: 11 G/DL (ref 12–16)
IMM GRANULOCYTES # BLD AUTO: 0.16 K/UL (ref 0–0.04)
IMM GRANULOCYTES # BLD AUTO: 0.18 K/UL (ref 0–0.04)
IMM GRANULOCYTES NFR BLD AUTO: 1.2 % (ref 0–0.5)
IMM GRANULOCYTES NFR BLD AUTO: 1.4 % (ref 0–0.5)
LYMPHOCYTES # BLD AUTO: 0.6 K/UL (ref 1–4.8)
LYMPHOCYTES # BLD AUTO: 0.6 K/UL (ref 1–4.8)
LYMPHOCYTES NFR BLD: 4.4 % (ref 18–48)
LYMPHOCYTES NFR BLD: 4.6 % (ref 18–48)
MAGNESIUM SERPL-MCNC: 2.3 MG/DL (ref 1.6–2.6)
MCH RBC QN AUTO: 31.1 PG (ref 27–31)
MCH RBC QN AUTO: 32 PG (ref 27–31)
MCHC RBC AUTO-ENTMCNC: 31.3 G/DL (ref 32–36)
MCHC RBC AUTO-ENTMCNC: 32 G/DL (ref 32–36)
MCV RBC AUTO: 100 FL (ref 82–98)
MCV RBC AUTO: 99 FL (ref 82–98)
MONOCYTES # BLD AUTO: 0.7 K/UL (ref 0.3–1)
MONOCYTES # BLD AUTO: 0.8 K/UL (ref 0.3–1)
MONOCYTES NFR BLD: 5.8 % (ref 4–15)
MONOCYTES NFR BLD: 6 % (ref 4–15)
NEUTROPHILS # BLD AUTO: 11.2 K/UL (ref 1.8–7.7)
NEUTROPHILS # BLD AUTO: 11.4 K/UL (ref 1.8–7.7)
NEUTROPHILS NFR BLD: 87.8 % (ref 38–73)
NEUTROPHILS NFR BLD: 88.3 % (ref 38–73)
NRBC BLD-RTO: 0 /100 WBC
NRBC BLD-RTO: 0 /100 WBC
PHOSPHATE SERPL-MCNC: 3.4 MG/DL (ref 2.7–4.5)
PLATELET # BLD AUTO: 543 K/UL (ref 150–450)
PLATELET # BLD AUTO: 571 K/UL (ref 150–450)
PMV BLD AUTO: 9.5 FL (ref 9.2–12.9)
PMV BLD AUTO: 9.5 FL (ref 9.2–12.9)
POTASSIUM SERPL-SCNC: 4.4 MMOL/L (ref 3.5–5.1)
PROT SERPL-MCNC: 6 G/DL (ref 6–8.4)
RBC # BLD AUTO: 3.44 M/UL (ref 4–5.4)
RBC # BLD AUTO: 3.47 M/UL (ref 4–5.4)
SODIUM SERPL-SCNC: 134 MMOL/L (ref 136–145)
WBC # BLD AUTO: 12.72 K/UL (ref 3.9–12.7)
WBC # BLD AUTO: 12.85 K/UL (ref 3.9–12.7)

## 2024-03-10 PROCEDURE — 11000001 HC ACUTE MED/SURG PRIVATE ROOM

## 2024-03-10 PROCEDURE — 85025 COMPLETE CBC W/AUTO DIFF WBC: CPT | Performed by: HOSPITALIST

## 2024-03-10 PROCEDURE — 80053 COMPREHEN METABOLIC PANEL: CPT

## 2024-03-10 PROCEDURE — 25000003 PHARM REV CODE 250: Performed by: PHYSICIAN ASSISTANT

## 2024-03-10 PROCEDURE — 25000003 PHARM REV CODE 250: Performed by: HOSPITALIST

## 2024-03-10 PROCEDURE — 36415 COLL VENOUS BLD VENIPUNCTURE: CPT | Mod: XB

## 2024-03-10 PROCEDURE — 25000003 PHARM REV CODE 250

## 2024-03-10 PROCEDURE — 25000003 PHARM REV CODE 250: Performed by: STUDENT IN AN ORGANIZED HEALTH CARE EDUCATION/TRAINING PROGRAM

## 2024-03-10 PROCEDURE — 63600175 PHARM REV CODE 636 W HCPCS: Performed by: STUDENT IN AN ORGANIZED HEALTH CARE EDUCATION/TRAINING PROGRAM

## 2024-03-10 PROCEDURE — 85025 COMPLETE CBC W/AUTO DIFF WBC: CPT | Mod: 91

## 2024-03-10 PROCEDURE — 94760 N-INVAS EAR/PLS OXIMETRY 1: CPT

## 2024-03-10 PROCEDURE — 83735 ASSAY OF MAGNESIUM: CPT

## 2024-03-10 PROCEDURE — 63600175 PHARM REV CODE 636 W HCPCS

## 2024-03-10 PROCEDURE — 85520 HEPARIN ASSAY: CPT | Performed by: STUDENT IN AN ORGANIZED HEALTH CARE EDUCATION/TRAINING PROGRAM

## 2024-03-10 PROCEDURE — 63600175 PHARM REV CODE 636 W HCPCS: Performed by: HOSPITALIST

## 2024-03-10 PROCEDURE — 84100 ASSAY OF PHOSPHORUS: CPT

## 2024-03-10 PROCEDURE — 36415 COLL VENOUS BLD VENIPUNCTURE: CPT | Performed by: STUDENT IN AN ORGANIZED HEALTH CARE EDUCATION/TRAINING PROGRAM

## 2024-03-10 PROCEDURE — 25000003 PHARM REV CODE 250: Performed by: PSYCHIATRY & NEUROLOGY

## 2024-03-10 PROCEDURE — 85730 THROMBOPLASTIN TIME PARTIAL: CPT | Performed by: HOSPITALIST

## 2024-03-10 RX ADMIN — OXYCODONE HYDROCHLORIDE 10 MG: 10 TABLET ORAL at 04:03

## 2024-03-10 RX ADMIN — FAMOTIDINE 20 MG: 20 TABLET ORAL at 09:03

## 2024-03-10 RX ADMIN — GABAPENTIN 100 MG: 100 CAPSULE ORAL at 09:03

## 2024-03-10 RX ADMIN — SODIUM CHLORIDE 2000 MG: 1 TABLET ORAL at 08:03

## 2024-03-10 RX ADMIN — LEVETIRACETAM 500 MG: 500 TABLET, FILM COATED ORAL at 08:03

## 2024-03-10 RX ADMIN — GABAPENTIN 100 MG: 100 CAPSULE ORAL at 08:03

## 2024-03-10 RX ADMIN — HYDROMORPHONE HYDROCHLORIDE 1 MG: 1 INJECTION, SOLUTION INTRAMUSCULAR; INTRAVENOUS; SUBCUTANEOUS at 12:03

## 2024-03-10 RX ADMIN — SODIUM CHLORIDE 2000 MG: 1 TABLET ORAL at 04:03

## 2024-03-10 RX ADMIN — OXYCODONE HYDROCHLORIDE 10 MG: 10 TABLET ORAL at 01:03

## 2024-03-10 RX ADMIN — METHOCARBAMOL 500 MG: 500 TABLET ORAL at 08:03

## 2024-03-10 RX ADMIN — HEPARIN SODIUM AND DEXTROSE 25 UNITS/KG/HR: 10000; 5 INJECTION INTRAVENOUS at 05:03

## 2024-03-10 RX ADMIN — MORPHINE SULFATE 15 MG: 15 TABLET, EXTENDED RELEASE ORAL at 08:03

## 2024-03-10 RX ADMIN — LEVETIRACETAM 500 MG: 500 TABLET, FILM COATED ORAL at 09:03

## 2024-03-10 RX ADMIN — POLYETHYLENE GLYCOL 3350 17 G: 17 POWDER, FOR SOLUTION ORAL at 09:03

## 2024-03-10 RX ADMIN — HYDROMORPHONE HYDROCHLORIDE 1 MG: 1 INJECTION, SOLUTION INTRAMUSCULAR; INTRAVENOUS; SUBCUTANEOUS at 09:03

## 2024-03-10 RX ADMIN — HYDROMORPHONE HYDROCHLORIDE 1 MG: 1 INJECTION, SOLUTION INTRAMUSCULAR; INTRAVENOUS; SUBCUTANEOUS at 11:03

## 2024-03-10 RX ADMIN — ATENOLOL 50 MG: 25 TABLET ORAL at 08:03

## 2024-03-10 RX ADMIN — ALUMINUM HYDROXIDE, MAGNESIUM HYDROXIDE, AND SIMETHICONE 30 ML: 1200; 120; 1200 SUSPENSION ORAL at 06:03

## 2024-03-10 RX ADMIN — ACETAMINOPHEN 650 MG: 325 TABLET ORAL at 12:03

## 2024-03-10 RX ADMIN — MORPHINE SULFATE 15 MG: 15 TABLET, EXTENDED RELEASE ORAL at 09:03

## 2024-03-10 RX ADMIN — SODIUM BICARBONATE 650 MG: 650 TABLET ORAL at 08:03

## 2024-03-10 RX ADMIN — ALUMINUM HYDROXIDE, MAGNESIUM HYDROXIDE, AND SIMETHICONE 30 ML: 1200; 120; 1200 SUSPENSION ORAL at 11:03

## 2024-03-10 RX ADMIN — DOCUSATE SODIUM AND SENNOSIDES 2 TABLET: 8.6; 5 TABLET, FILM COATED ORAL at 08:03

## 2024-03-10 RX ADMIN — ALUMINUM HYDROXIDE, MAGNESIUM HYDROXIDE, AND SIMETHICONE 30 ML: 1200; 120; 1200 SUSPENSION ORAL at 04:03

## 2024-03-10 RX ADMIN — DEXAMETHASONE SODIUM PHOSPHATE 4 MG: 4 INJECTION INTRA-ARTICULAR; INTRALESIONAL; INTRAMUSCULAR; INTRAVENOUS; SOFT TISSUE at 09:03

## 2024-03-10 RX ADMIN — GABAPENTIN 100 MG: 100 CAPSULE ORAL at 04:03

## 2024-03-10 RX ADMIN — SODIUM CHLORIDE 2000 MG: 1 TABLET ORAL at 09:03

## 2024-03-10 RX ADMIN — DEXAMETHASONE SODIUM PHOSPHATE 4 MG: 4 INJECTION INTRA-ARTICULAR; INTRALESIONAL; INTRAMUSCULAR; INTRAVENOUS; SOFT TISSUE at 08:03

## 2024-03-10 RX ADMIN — DOCUSATE SODIUM AND SENNOSIDES 2 TABLET: 8.6; 5 TABLET, FILM COATED ORAL at 09:03

## 2024-03-10 NOTE — PLAN OF CARE
CM noted Transfer Center update:    0502   Call to [facility] Handoff   [No callback number listed]   Entered By: Pawel June   Coordinator Handoff  Last update time: 0459  Needs: [facility] to come off diversion           DISHA Figueroa, RN, [facility]  Transitional Care Manager  [phone]  [email or web address]    Mario Hsieh - Neurosurgery (Hospital)  Discharge Reassessment    Primary Care Provider: Chika Powell MD    Expected Discharge Date: 3/11/2024    Reassessment (most recent)       Discharge Reassessment - 03/10/24 1100          Discharge Reassessment    Assessment Type Discharge Planning Reassessment     Discharge Plan A Hospital Transfer     Discharge Plan B Home     Transition of Care Barriers Unisured        Post-Acute Status    Post-Acute Authorization Other     Other Status See Comments   transfer    Discharge Delays Ambulance Transport/Facility Transport

## 2024-03-10 NOTE — SUBJECTIVE & OBJECTIVE
Interval History: No acute events. Patient reports improvement in pain from yesterday.   [facility] today    Review of Systems  Objective:     Vital Signs (Most Recent):  Temp: 98.6 °F (37 °C) (03/10/24 1145)  Pulse: 87 (03/10/24 1145)  Resp: 18 (03/10/24 1312)  BP: 134/73 (03/10/24 1145)  SpO2: (!) 94 % (03/10/24 1145) Vital Signs (24h Range):  Temp:  [97.1 °F (36.2 °C)-98.6 °F (37 °C)] 98.6 °F (37 °C)  Pulse:  [80-95] 87  Resp:  [16-18] 18  SpO2:  [93 %-94 %] 94 %  BP: (110-154)/(65-85) 134/73     Weight: 47.6 kg (105 lb)  Body mass index is 18.6 kg/m².    Intake/Output Summary (Last 24 hours) at 3/10/2024 1442  Last data filed at 3/9/2024 1955  Gross per 24 hour   Intake --   Output 300 ml   Net -300 ml         Physical Exam  Constitutional:       General: She is not in acute distress.     Appearance: Normal appearance. She is not toxic-appearing or diaphoretic.   Cardiovascular:      Rate and Rhythm: Normal rate and regular rhythm.      Heart sounds: No murmur heard.     No friction rub. No gallop.   Pulmonary:      Effort: Pulmonary effort is normal. No respiratory distress.      Breath sounds: Normal breath sounds. No wheezing or rales.   Abdominal:      General: Abdomen is flat. There is no distension.      Palpations: Abdomen is soft.      Tenderness: There is no abdominal tenderness. There is no guarding or rebound.   Musculoskeletal:      Right lower leg: No edema.      Left lower leg: No edema.   Neurological:      Mental Status: She is alert.             Significant Labs: All pertinent labs within the past 24 hours have been reviewed.    Significant Imaging: I have reviewed all pertinent imaging results/findings within the past 24 hours.

## 2024-03-10 NOTE — PROGRESS NOTES
Mario Hsieh - Neurosurgery ([facility])  [facility] Medicine  Progress Note    Patient Name: Crow Hines  MRN: [id number]  Patient Class: IP- Inpatient   Admission Date: 2/27/2024  Length of Stay: 12 days  Attending Physician: Theodore Montenegro*  Primary Care Provider: Chika Powell MD        Subjective:     Principal Problem:Brain mass        HPI:  No notes on file    Overview/Hospital Course:  62 y/o Female admitted as transfer from OSH for L temporal lobe mass presenting w/ difficulty walking. Lab work significant for alk phos 203, AST 57, sodium 132, platelets 586. CXR revealed linear and ill-defined hazy opacities of the right mid to lower lung, felt to reflect infiltrate. Diffuse abdominal pain with focal increased tenderness in mid-epigastric area prompted CT abd/pelvis revealing numerous metastatic masses in the liver, metastatic deposits in the spleen and bilateral adrenal masses, hypoattenuating and mildly enhancing mass of the upper pole L kidney. Also w/ intra-abdominal enlarged lymph nodes, consistent with metastatic infiltration, masses of the R lung base with consolidation of the visualized R renal lower lobe and RLL 9 mm nodule. L2 compression fracture w/ mild sclerosis of the vertebra noted, likely pathologic. CTH completed showing large 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 Pt underwent L temporal craniotomy tumor resection on 2/29. Path returned metastatic small cell lung CA.     Interval History: No acute events. Patient reports improvement in pain from yesterday.   [facility] today    Review of Systems  Objective:     Vital Signs (Most Recent):  Temp: 98.6 °F (37 °C) (03/10/24 1145)  Pulse: 87 (03/10/24 1145)  Resp: 18 (03/10/24 1312)  BP: 134/73 (03/10/24 1145)  SpO2: (!) 94 % (03/10/24 1145) Vital Signs (24h Range):  Temp:  [97.1 °F (36.2 °C)-98.6 °F (37 °C)] 98.6 °F (37 °C)  Pulse:  [80-95] 87  Resp:  [16-18] 18  SpO2:  [93 %-94 %] 94 %  BP: (110-154)/(65-85) 134/73      Weight: 47.6 kg (105 lb)  Body mass index is 18.6 kg/m².    Intake/Output Summary (Last 24 hours) at 3/10/2024 1442  Last data filed at 3/9/2024 1955  Gross per 24 hour   Intake --   Output 300 ml   Net -300 ml         Physical Exam  Constitutional:       General: She is not in acute distress.     Appearance: Normal appearance. She is not toxic-appearing or diaphoretic.   Cardiovascular:      Rate and Rhythm: Normal rate and regular rhythm.      Heart sounds: No murmur heard.     No friction rub. No gallop.   Pulmonary:      Effort: Pulmonary effort is normal. No respiratory distress.      Breath sounds: Normal breath sounds. No wheezing or rales.   Abdominal:      General: Abdomen is flat. There is no distension.      Palpations: Abdomen is soft.      Tenderness: There is no abdominal tenderness. There is no guarding or rebound.   Musculoskeletal:      Right lower leg: No edema.      Left lower leg: No edema.   Neurological:      Mental Status: She is alert.             Significant Labs: All pertinent labs within the past 24 hours have been reviewed.    Significant Imaging: I have reviewed all pertinent imaging results/findings within the past 24 hours.    Assessment/Plan:      * Brain mass  -L temporal mass; status post tumor resection on 02/29, pathology showing met small cell lung CA   -scheduled dexamethasone. Continue dex 4q12 for vasogenic edema. Wean to off over 2 weeks  - Keppra 500 mg bid for seizure prophylaxis  - On hep gtt for PE. obtain head CT once therapeutic. If CTH is stable, okay to transition to oral AC upon discharge for treatment of PE.  -PT/OT  -blood pressure control  -pain control  -neurochecks      Vasogenic brain edema  See above      Acute pulmonary embolism without acute cor pulmonale  -Stable respiratory status  -Per Neurosurgery recommendation, awaiting for heparin infusion drip to be therapeutic and then to repeat head CT to rule out bleed and then transitioned over to  Eliquis    Compression fracture of lumbar spine, non-traumatic  Back brace      Metastatic neoplastic disease  -CT abd/pelvis w/ numerous metastatic masses in the liver, likely metastatic deposit in the spleen; bilateral adrenal masses, hypoattenuating and mildly enhancing mass of upper pole L kidney, intra-abdominal enlarged lymph nodes consistent w/ metastatic infiltration.   -masses to R lung base w/ consolidation of visualized R renal lower lobe; R lower lobe 9mm nodule  -L2 vertebra compression fracture w/ mild sclerosis of the vertebra, likely reflecting a pathologic fracture; faint sclerosis of the superior endplate of S1 likely reflects metastatic infiltration  -deferring staging workup including MRI spine and labs to medical oncology  -medical oncology discussion, they affirmed that the patient is a fairly decent candidate for systemic chemotherapy but best for patient to be in [place] to initiate the cycle as she has no follow up options here in the state due to Payor source limitations and residency status  -per rad onc, pt not a good candidate at this time until possibly later in future if she gets chemotherapy.    Per onc, No evidence of visceral crisis currently requiring emergent initiation of inpatient chemotherapy, but pt needs urgent f/u for consideration of palliative systemic therapy if it can be arranged. Pt is a resident of [place] so arranging f/u at [facility] or one closer to home would be best.       VTE Risk Mitigation (From admission, onward)           Ordered     heparin 25,000 units in dextrose 5% 250 mL (100 units/mL) infusion HIGH INTENSITY nomogram - [facility]  Continuous        Question:  Begin at (units/kg/hr)  Answer:  18    03/05/24 1004     Reason for No Pharmacological VTE Prophylaxis  Once        Question:  Reasons:  Answer:  Risk of Bleeding    02/27/24 0556     IP VTE HIGH RISK PATIENT  Once         02/27/24 0556     Place sequential  compression device  Until discontinued         02/27/24 0556                    Discharge Planning   JUAN: 3/11/2024     Code Status: Full Code   Is the patient medically ready for discharge?: No    Reason for patient still in hospital (select all that apply): Patient trending condition and Treatment  Discharge Plan A: Hospital Transfer   Discharge Delays: (!) Ambulance Transport/Facility Transport      Theodore Montenegro MD  Department of Hospital Medicine   [facility] - Neurosurgery ([facility])

## 2024-03-11 LAB
ALBUMIN SERPL BCP-MCNC: 2.3 G/DL (ref 3.5–5.2)
ALP SERPL-CCNC: 596 U/L (ref 55–135)
ALT SERPL W/O P-5'-P-CCNC: 108 U/L (ref 10–44)
ANION GAP SERPL CALC-SCNC: 9 MMOL/L (ref 8–16)
APTT PPP: 46.2 SEC (ref 21–32)
AST SERPL-CCNC: 94 U/L (ref 10–40)
BASOPHILS # BLD AUTO: 0.02 K/UL (ref 0–0.2)
BASOPHILS # BLD AUTO: 0.03 K/UL (ref 0–0.2)
BASOPHILS NFR BLD: 0.2 % (ref 0–1.9)
BASOPHILS NFR BLD: 0.3 % (ref 0–1.9)
BILIRUB SERPL-MCNC: 0.4 MG/DL (ref 0.1–1)
BUN SERPL-MCNC: 10 MG/DL (ref 8–23)
CALCIUM SERPL-MCNC: 8.9 MG/DL (ref 8.7–10.5)
CHLORIDE SERPL-SCNC: 99 MMOL/L (ref 95–110)
CO2 SERPL-SCNC: 27 MMOL/L (ref 23–29)
CREAT SERPL-MCNC: 0.7 MG/DL (ref 0.5–1.4)
DIFFERENTIAL METHOD BLD: ABNORMAL
DIFFERENTIAL METHOD BLD: ABNORMAL
EOSINOPHIL # BLD AUTO: 0 K/UL (ref 0–0.5)
EOSINOPHIL # BLD AUTO: 0 K/UL (ref 0–0.5)
EOSINOPHIL NFR BLD: 0 % (ref 0–8)
EOSINOPHIL NFR BLD: 0 % (ref 0–8)
ERYTHROCYTE [DISTWIDTH] IN BLOOD BY AUTOMATED COUNT: 14.3 % (ref 11.5–14.5)
ERYTHROCYTE [DISTWIDTH] IN BLOOD BY AUTOMATED COUNT: 14.4 % (ref 11.5–14.5)
EST. GFR  (NO RACE VARIABLE): >60 ML/MIN/1.73 M^2
GLUCOSE SERPL-MCNC: 104 MG/DL (ref 70–110)
HCT VFR BLD AUTO: 31.3 % (ref 37–48.5)
HCT VFR BLD AUTO: 31.7 % (ref 37–48.5)
HGB BLD-MCNC: 10.2 G/DL (ref 12–16)
HGB BLD-MCNC: 10.2 G/DL (ref 12–16)
IMM GRANULOCYTES # BLD AUTO: 0.14 K/UL (ref 0–0.04)
IMM GRANULOCYTES # BLD AUTO: 0.14 K/UL (ref 0–0.04)
IMM GRANULOCYTES NFR BLD AUTO: 1.2 % (ref 0–0.5)
IMM GRANULOCYTES NFR BLD AUTO: 1.2 % (ref 0–0.5)
LYMPHOCYTES # BLD AUTO: 0.4 K/UL (ref 1–4.8)
LYMPHOCYTES # BLD AUTO: 0.4 K/UL (ref 1–4.8)
LYMPHOCYTES NFR BLD: 3.3 % (ref 18–48)
LYMPHOCYTES NFR BLD: 3.4 % (ref 18–48)
MAGNESIUM SERPL-MCNC: 2.2 MG/DL (ref 1.6–2.6)
MCH RBC QN AUTO: 31.8 PG (ref 27–31)
MCH RBC QN AUTO: 32 PG (ref 27–31)
MCHC RBC AUTO-ENTMCNC: 32.2 G/DL (ref 32–36)
MCHC RBC AUTO-ENTMCNC: 32.6 G/DL (ref 32–36)
MCV RBC AUTO: 98 FL (ref 82–98)
MCV RBC AUTO: 99 FL (ref 82–98)
MONOCYTES # BLD AUTO: 0.6 K/UL (ref 0.3–1)
MONOCYTES # BLD AUTO: 0.6 K/UL (ref 0.3–1)
MONOCYTES NFR BLD: 5.1 % (ref 4–15)
MONOCYTES NFR BLD: 5.3 % (ref 4–15)
NEUTROPHILS # BLD AUTO: 10.2 K/UL (ref 1.8–7.7)
NEUTROPHILS # BLD AUTO: 10.3 K/UL (ref 1.8–7.7)
NEUTROPHILS NFR BLD: 89.9 % (ref 38–73)
NEUTROPHILS NFR BLD: 90.1 % (ref 38–73)
NRBC BLD-RTO: 0 /100 WBC
NRBC BLD-RTO: 0 /100 WBC
PHOSPHATE SERPL-MCNC: 3 MG/DL (ref 2.7–4.5)
PLATELET # BLD AUTO: 479 K/UL (ref 150–450)
PLATELET # BLD AUTO: 485 K/UL (ref 150–450)
PMV BLD AUTO: 9.2 FL (ref 9.2–12.9)
PMV BLD AUTO: 9.3 FL (ref 9.2–12.9)
POTASSIUM SERPL-SCNC: 4.3 MMOL/L (ref 3.5–5.1)
PROT SERPL-MCNC: 5.6 G/DL (ref 6–8.4)
RBC # BLD AUTO: 3.19 M/UL (ref 4–5.4)
RBC # BLD AUTO: 3.21 M/UL (ref 4–5.4)
SODIUM SERPL-SCNC: 135 MMOL/L (ref 136–145)
WBC # BLD AUTO: 11.35 K/UL (ref 3.9–12.7)
WBC # BLD AUTO: 11.38 K/UL (ref 3.9–12.7)

## 2024-03-11 PROCEDURE — 84100 ASSAY OF PHOSPHORUS: CPT

## 2024-03-11 PROCEDURE — 25000003 PHARM REV CODE 250

## 2024-03-11 PROCEDURE — 36415 COLL VENOUS BLD VENIPUNCTURE: CPT

## 2024-03-11 PROCEDURE — 63600175 PHARM REV CODE 636 W HCPCS: Performed by: STUDENT IN AN ORGANIZED HEALTH CARE EDUCATION/TRAINING PROGRAM

## 2024-03-11 PROCEDURE — 85025 COMPLETE CBC W/AUTO DIFF WBC: CPT

## 2024-03-11 PROCEDURE — 80053 COMPREHEN METABOLIC PANEL: CPT

## 2024-03-11 PROCEDURE — 85025 COMPLETE CBC W/AUTO DIFF WBC: CPT | Mod: 91 | Performed by: HOSPITALIST

## 2024-03-11 PROCEDURE — 25000003 PHARM REV CODE 250: Performed by: PHYSICIAN ASSISTANT

## 2024-03-11 PROCEDURE — 25000003 PHARM REV CODE 250: Performed by: HOSPITALIST

## 2024-03-11 PROCEDURE — 63600175 PHARM REV CODE 636 W HCPCS: Mod: UD | Performed by: HOSPITALIST

## 2024-03-11 PROCEDURE — 63600175 PHARM REV CODE 636 W HCPCS

## 2024-03-11 PROCEDURE — 85730 THROMBOPLASTIN TIME PARTIAL: CPT | Performed by: HOSPITALIST

## 2024-03-11 PROCEDURE — 83735 ASSAY OF MAGNESIUM: CPT

## 2024-03-11 PROCEDURE — 11000001 HC ACUTE MED/SURG PRIVATE ROOM

## 2024-03-11 PROCEDURE — 25000003 PHARM REV CODE 250: Performed by: STUDENT IN AN ORGANIZED HEALTH CARE EDUCATION/TRAINING PROGRAM

## 2024-03-11 RX ORDER — GABAPENTIN 300 MG/1
300 CAPSULE ORAL 3 TIMES DAILY
Status: DISCONTINUED | OUTPATIENT
Start: 2024-03-11 | End: 2024-03-17 | Stop reason: HOSPADM

## 2024-03-11 RX ADMIN — HYDROMORPHONE HYDROCHLORIDE 1 MG: 1 INJECTION, SOLUTION INTRAMUSCULAR; INTRAVENOUS; SUBCUTANEOUS at 01:03

## 2024-03-11 RX ADMIN — GABAPENTIN 100 MG: 100 CAPSULE ORAL at 08:03

## 2024-03-11 RX ADMIN — SODIUM CHLORIDE 2000 MG: 1 TABLET ORAL at 08:03

## 2024-03-11 RX ADMIN — ALUMINUM HYDROXIDE, MAGNESIUM HYDROXIDE, AND SIMETHICONE 30 ML: 1200; 120; 1200 SUSPENSION ORAL at 04:03

## 2024-03-11 RX ADMIN — ALUMINUM HYDROXIDE, MAGNESIUM HYDROXIDE, AND SIMETHICONE 30 ML: 1200; 120; 1200 SUSPENSION ORAL at 08:03

## 2024-03-11 RX ADMIN — DOCUSATE SODIUM AND SENNOSIDES 2 TABLET: 8.6; 5 TABLET, FILM COATED ORAL at 08:03

## 2024-03-11 RX ADMIN — ALUMINUM HYDROXIDE, MAGNESIUM HYDROXIDE, AND SIMETHICONE 30 ML: 1200; 120; 1200 SUSPENSION ORAL at 11:03

## 2024-03-11 RX ADMIN — SODIUM CHLORIDE 2000 MG: 1 TABLET ORAL at 04:03

## 2024-03-11 RX ADMIN — GABAPENTIN 300 MG: 300 CAPSULE ORAL at 04:03

## 2024-03-11 RX ADMIN — OXYCODONE HYDROCHLORIDE 10 MG: 10 TABLET ORAL at 04:03

## 2024-03-11 RX ADMIN — HEPARIN SODIUM AND DEXTROSE 25 UNITS/KG/HR: 10000; 5 INJECTION INTRAVENOUS at 08:03

## 2024-03-11 RX ADMIN — DEXAMETHASONE SODIUM PHOSPHATE 4 MG: 4 INJECTION INTRA-ARTICULAR; INTRALESIONAL; INTRAMUSCULAR; INTRAVENOUS; SOFT TISSUE at 08:03

## 2024-03-11 RX ADMIN — FAMOTIDINE 20 MG: 20 TABLET ORAL at 08:03

## 2024-03-11 RX ADMIN — HYDROMORPHONE HYDROCHLORIDE 1 MG: 1 INJECTION, SOLUTION INTRAMUSCULAR; INTRAVENOUS; SUBCUTANEOUS at 06:03

## 2024-03-11 RX ADMIN — GABAPENTIN 300 MG: 300 CAPSULE ORAL at 08:03

## 2024-03-11 RX ADMIN — HEPARIN SODIUM AND DEXTROSE 25 UNITS/KG/HR: 10000; 5 INJECTION INTRAVENOUS at 02:03

## 2024-03-11 RX ADMIN — SODIUM BICARBONATE 650 MG: 650 TABLET ORAL at 08:03

## 2024-03-11 RX ADMIN — METHOCARBAMOL 500 MG: 500 TABLET ORAL at 04:03

## 2024-03-11 RX ADMIN — LEVETIRACETAM 500 MG: 500 TABLET, FILM COATED ORAL at 08:03

## 2024-03-11 RX ADMIN — MORPHINE SULFATE 15 MG: 15 TABLET, EXTENDED RELEASE ORAL at 08:03

## 2024-03-11 RX ADMIN — OXYCODONE HYDROCHLORIDE 10 MG: 10 TABLET ORAL at 11:03

## 2024-03-11 RX ADMIN — ATENOLOL 50 MG: 25 TABLET ORAL at 08:03

## 2024-03-11 NOTE — PLAN OF CARE
Problem: Adult Inpatient Plan of Care  Goal: Plan of Care Review  Outcome: Ongoing, Progressing     Problem: Adult Inpatient Plan of Care  Goal: Patient-Specific Goal (Individualized)  Outcome: Ongoing, Progressing     Problem: Adult Inpatient Plan of Care  Goal: Optimal Comfort and Wellbeing  Outcome: Ongoing, Progressing     Problem: Infection  Goal: Absence of Infection Signs and Symptoms  Outcome: Ongoing, Progressing

## 2024-03-11 NOTE — PT/OT/SLP PROGRESS
Occupational Therapy      Patient Name:  Crow Hines   MRN:  [id number]    Patient not seen today secondary to increased pain at this time. Will follow-up as appropriate.    3/11/2024

## 2024-03-11 NOTE — PT/OT/SLP PROGRESS
Physical Therapy  Not Seen  Patient Name:  Crow Hines   MRN:  [id number]  Admitting Diagnosis:  Brain mass   Recent Surgery: Procedure(s) (LRB):  CRANIOTOMY, WITH NEOPLASM EXCISION USING COMPUTER-ASSISTED NAVIGATION (Left) 11 Days Post-Op  Admit Date: 2/27/2024  Length of Stay: 13 days    Patient not seen on this date for treatment - patient lying flat in bed, reporting she can't do anything today and that she is miserable. PT will continue to follow. Please continue progressive mobility as appropriate.      Felisa Benites, PT, DPT  3/11/2024

## 2024-03-11 NOTE — PROGRESS NOTES
Mario Hsieh - Neurosurgery ([facility])  [facility] Medicine  Progress Note    Patient Name: Crow Hines  MRN: [id number]  Patient Class: IP- Inpatient   Admission Date: 2/27/2024  Length of Stay: 13 days  Attending Physician: Theodore Montenegro*  Primary Care Provider: Chika Powell MD        Subjective:     Principal Problem:Brain mass        HPI:  No notes on file    Overview/Hospital Course:  64 y/o Female admitted as transfer from OSH for L temporal lobe mass presenting w/ difficulty walking. Lab work significant for alk phos 203, AST 57, sodium 132, platelets 586. CXR revealed linear and ill-defined hazy opacities of the right mid to lower lung, felt to reflect infiltrate. Diffuse abdominal pain with focal increased tenderness in mid-epigastric area prompted CT abd/pelvis revealing numerous metastatic masses in the liver, metastatic deposits in the spleen and bilateral adrenal masses, hypoattenuating and mildly enhancing mass of the upper pole L kidney. Also w/ intra-abdominal enlarged lymph nodes, consistent with metastatic infiltration, masses of the R lung base with consolidation of the visualized R renal lower lobe and RLL 9 mm nodule. L2 compression fracture w/ mild sclerosis of the vertebra noted, likely pathologic. CTH completed showing large 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 Pt underwent L temporal craniotomy tumor resection on 2/29. Path returned metastatic small cell lung CA.     Interval History: CTH without hemorrhage  Pain worse this morning per patient    Review of Systems  Objective:     Vital Signs (Most Recent):  Temp: 98 °F (36.7 °C) (03/11/24 1154)  Pulse: 87 (03/11/24 1154)  Resp: 18 (03/11/24 1308)  BP: 131/69 (03/11/24 1154)  SpO2: (!) 92 % (03/11/24 1154) Vital Signs (24h Range):  Temp:  [97 °F (36.1 °C)-98 °F (36.7 °C)] 98 °F (36.7 °C)  Pulse:  [83-93] 87  Resp:  [16-18] 18  SpO2:  [92 %-94 %] 92 %  BP: (121-135)/(69-79) 131/69     Weight: 47.6 kg (105  lb)  Body mass index is 18.6 kg/m².    Intake/Output Summary (Last 24 hours) at 3/11/2024 1459  Last data filed at 3/10/2024 1830  Gross per 24 hour   Intake --   Output 1250 ml   Net -1250 ml         Physical Exam  Constitutional:       General: She is not in acute distress.     Appearance: Normal appearance. She is not toxic-appearing or diaphoretic.   Cardiovascular:      Rate and Rhythm: Normal rate and regular rhythm.      Heart sounds: No murmur heard.     No friction rub. No gallop.   Pulmonary:      Effort: Pulmonary effort is normal. No respiratory distress.      Breath sounds: Normal breath sounds. No wheezing or rales.   Abdominal:      General: Abdomen is flat. There is no distension.      Palpations: Abdomen is soft.      Tenderness: There is no abdominal tenderness. There is no guarding or rebound.   Musculoskeletal:      Right lower leg: No edema.      Left lower leg: No edema.   Neurological:      Mental Status: She is alert.             Significant Labs: All pertinent labs within the past 24 hours have been reviewed.    Significant Imaging: I have reviewed all pertinent imaging results/findings within the past 24 hours.    Assessment/Plan:      * Brain mass  -L temporal mass; status post tumor resection on 02/29, pathology showing met small cell lung CA   -scheduled dexamethasone. Continue dex 4q12 for vasogenic edema. Wean to off over 2 weeks  - Keppra 500 mg bid for seizure prophylaxis  - On hep gtt for PE. obtain head CT once therapeutic. If CTH is stable, okay to transition to oral AC upon discharge for treatment of PE.  -PT/OT  -blood pressure control  -pain control  -neurochecks      Vasogenic brain edema  See above      Acute pulmonary embolism without acute cor pulmonale  -Stable respiratory status  -Per Neurosurgery recommendation, awaiting for heparin infusion drip to be therapeutic and then to repeat head CT to rule out bleed and then transitioned over to Eliquis    Compression fracture  of lumbar spine, non-traumatic  Back brace      Metastatic neoplastic disease  -CT abd/pelvis w/ numerous metastatic masses in the liver, likely metastatic deposit in the spleen; bilateral adrenal masses, hypoattenuating and mildly enhancing mass of upper pole L kidney, intra-abdominal enlarged lymph nodes consistent w/ metastatic infiltration.   -masses to R lung base w/ consolidation of visualized R renal lower lobe; R lower lobe 9mm nodule  -L2 vertebra compression fracture w/ mild sclerosis of the vertebra, likely reflecting a pathologic fracture; faint sclerosis of the superior endplate of S1 likely reflects metastatic infiltration  -deferring staging workup including MRI spine and labs to medical oncology  -medical oncology discussion, they affirmed that the patient is a fairly decent candidate for systemic chemotherapy but best for patient to be in [place] to initiate the cycle as she has no follow up options here in the state due to Payor source limitations and residency status  -per rad onc, pt not a good candidate at this time until possibly later in future if she gets chemotherapy.    Per onc, No evidence of visceral crisis currently requiring emergent initiation of inpatient chemotherapy, but pt needs urgent f/u for consideration of palliative systemic therapy if it can be arranged. Pt is a resident of [place] so arranging f/u at [facility] or one closer to home would be best.     Palliative care consulted  Pain management      VTE Risk Mitigation (From admission, onward)           Ordered     heparin 25,000 units in dextrose 5% 250 mL (100 units/mL) infusion HIGH INTENSITY nomogram - [facility]  Continuous        Question:  Begin at (units/kg/hr)  Answer:  18    03/05/24 1004     Reason for No Pharmacological VTE Prophylaxis  Once        Question:  Reasons:  Answer:  Risk of Bleeding    02/27/24 0556     IP VTE HIGH RISK PATIENT  Once         02/27/24 0556     Place  sequential compression device  Until discontinued         02/27/24 0556                    Discharge Planning   JUAN: 3/11/2024     Code Status: Full Code   Is the patient medically ready for discharge?: No    Reason for patient still in hospital (select all that apply): Patient trending condition and Treatment  Discharge Plan A: Hospital Transfer   Discharge Delays: None known at this time      Theodore Montenegro MD  Department of Hospital Medicine   [facility] Neurosurgery ([facility])

## 2024-03-11 NOTE — NURSING
Nurses Note -- 4 Eyes      3/11/2024   1:58 AM      Skin assessed during: Daily Assessment      [x] No Altered Skin Integrity Present    [x]Prevention Measures Documented      [] Yes- Altered Skin Integrity Present or Discovered   [] LDA Added if Not in Epic (Describe Wound)   [] New Altered Skin Integrity was Present on Admit and Documented in LDA   [] Wound Image Taken    Wound Care Consulted? No    Attending Nurse:  SYD Roa    Second RN/Staff Member:  nito VELARDE  Patient has stapled surgical incision to head from craniotomy.

## 2024-03-11 NOTE — SUBJECTIVE & OBJECTIVE
Interval History: CTH without hemorrhage  Pain worse this morning per patient    Review of Systems  Objective:     Vital Signs (Most Recent):  Temp: 98 °F (36.7 °C) (03/11/24 1154)  Pulse: 87 (03/11/24 1154)  Resp: 18 (03/11/24 1308)  BP: 131/69 (03/11/24 1154)  SpO2: (!) 92 % (03/11/24 1154) Vital Signs (24h Range):  Temp:  [97 °F (36.1 °C)-98 °F (36.7 °C)] 98 °F (36.7 °C)  Pulse:  [83-93] 87  Resp:  [16-18] 18  SpO2:  [92 %-94 %] 92 %  BP: (121-135)/(69-79) 131/69     Weight: 47.6 kg (105 lb)  Body mass index is 18.6 kg/m².    Intake/Output Summary (Last 24 hours) at 3/11/2024 1459  Last data filed at 3/10/2024 1830  Gross per 24 hour   Intake --   Output 1250 ml   Net -1250 ml         Physical Exam  Constitutional:       General: She is not in acute distress.     Appearance: Normal appearance. She is not toxic-appearing or diaphoretic.   Cardiovascular:      Rate and Rhythm: Normal rate and regular rhythm.      Heart sounds: No murmur heard.     No friction rub. No gallop.   Pulmonary:      Effort: Pulmonary effort is normal. No respiratory distress.      Breath sounds: Normal breath sounds. No wheezing or rales.   Abdominal:      General: Abdomen is flat. There is no distension.      Palpations: Abdomen is soft.      Tenderness: There is no abdominal tenderness. There is no guarding or rebound.   Musculoskeletal:      Right lower leg: No edema.      Left lower leg: No edema.   Neurological:      Mental Status: She is alert.             Significant Labs: All pertinent labs within the past 24 hours have been reviewed.    Significant Imaging: I have reviewed all pertinent imaging results/findings within the past 24 hours.

## 2024-03-11 NOTE — PLAN OF CARE
Problem: Adult Inpatient Plan of Care  Goal: Plan of Care Review  Outcome: Ongoing, Progressing     Problem: Adult Inpatient Plan of Care  Goal: Patient-Specific Goal (Individualized)  Outcome: Ongoing, Progressing     Problem: Adult Inpatient Plan of Care  Goal: Optimal Comfort and Wellbeing  Outcome: Ongoing, Progressing     Problem: Infection  Goal: Absence of Infection Signs and Symptoms  Outcome: Ongoing, Progressing     Problem: Infection  Goal: Absence of Infection Signs and Symptoms  Outcome: Ongoing, Progressing     Problem: Skin Injury Risk Increased  Goal: Skin Health and Integrity  Outcome: Ongoing, Progressing

## 2024-03-11 NOTE — PLAN OF CARE
[facility] (Hospital)  Discharge Reassessment    Primary Care Provider: Chika Powell MD    Expected Discharge Date: 3/11/2024    Patient expected to be med ready soon.  Patient is currently uninsured but could benefit from continued care at [facility], [place].  Transfer initiated , however, no beds available at this time as hospital is on diversion.    Discharge Plan A and Plan B have been determined by review of patient's clinical status, future medical and therapeutic needs, and coverage/benefits for post-acute care in coordination with multidisciplinary team members.     Reassessment (most recent)       Discharge Reassessment - 03/11/24 1236          Discharge Reassessment    Assessment Type Discharge Planning Reassessment     Did the patient's condition or plan change since previous assessment? No     Discharge Plan discussed with: Patient     Communicated JUAN with patient/caregiver Date not available/Unable to determine     Discharge Plan A Hospital Transfer     Discharge Plan B Home with family     DME Needed Upon Discharge  other (see comments)     Transition of Care Barriers Other (see comments)   pending acceptance and bed availability    Why the patient remains in the hospital Requires continued medical care        Post-Acute Status    Discharge Delays None known at this time

## 2024-03-11 NOTE — ASSESSMENT & PLAN NOTE
-CT abd/pelvis w/ numerous metastatic masses in the liver, likely metastatic deposit in the spleen; bilateral adrenal masses, hypoattenuating and mildly enhancing mass of upper pole L kidney, intra-abdominal enlarged lymph nodes consistent w/ metastatic infiltration.   -masses to R lung base w/ consolidation of visualized R renal lower lobe; R lower lobe 9mm nodule  -L2 vertebra compression fracture w/ mild sclerosis of the vertebra, likely reflecting a pathologic fracture; faint sclerosis of the superior endplate of S1 likely reflects metastatic infiltration  -deferring staging workup including MRI spine and labs to medical oncology  -medical oncology discussion, they affirmed that the patient is a fairly decent candidate for systemic chemotherapy but best for patient to be in [place] to initiate the cycle as she has no follow up options here in the state due to Payor source limitations and residency status  -per rad onc, pt not a good candidate at this time until possibly later in future if she gets chemotherapy.    Per onc, No evidence of visceral crisis currently requiring emergent initiation of inpatient chemotherapy, but pt needs urgent f/u for consideration of palliative systemic therapy if it can be arranged. Pt is a resident of [place] so arranging f/u at [facility] or one closer to home would be best.     Palliative care consulted  Pain management

## 2024-03-12 LAB
ALBUMIN SERPL BCP-MCNC: 2.4 G/DL (ref 3.5–5.2)
ALP SERPL-CCNC: 545 U/L (ref 55–135)
ALT SERPL W/O P-5'-P-CCNC: 99 U/L (ref 10–44)
ANION GAP SERPL CALC-SCNC: 10 MMOL/L (ref 8–16)
APTT PPP: 41.8 SEC (ref 21–32)
AST SERPL-CCNC: 91 U/L (ref 10–40)
BASOPHILS # BLD AUTO: 0.01 K/UL (ref 0–0.2)
BASOPHILS # BLD AUTO: 0.03 K/UL (ref 0–0.2)
BASOPHILS NFR BLD: 0.1 % (ref 0–1.9)
BASOPHILS NFR BLD: 0.2 % (ref 0–1.9)
BILIRUB SERPL-MCNC: 0.5 MG/DL (ref 0.1–1)
BUN SERPL-MCNC: 10 MG/DL (ref 8–23)
CALCIUM SERPL-MCNC: 9.2 MG/DL (ref 8.7–10.5)
CHLORIDE SERPL-SCNC: 99 MMOL/L (ref 95–110)
CO2 SERPL-SCNC: 26 MMOL/L (ref 23–29)
CREAT SERPL-MCNC: 0.6 MG/DL (ref 0.5–1.4)
DIFFERENTIAL METHOD BLD: ABNORMAL
DIFFERENTIAL METHOD BLD: ABNORMAL
EOSINOPHIL # BLD AUTO: 0 K/UL (ref 0–0.5)
EOSINOPHIL # BLD AUTO: 0 K/UL (ref 0–0.5)
EOSINOPHIL NFR BLD: 0 % (ref 0–8)
EOSINOPHIL NFR BLD: 0 % (ref 0–8)
ERYTHROCYTE [DISTWIDTH] IN BLOOD BY AUTOMATED COUNT: 14.6 % (ref 11.5–14.5)
ERYTHROCYTE [DISTWIDTH] IN BLOOD BY AUTOMATED COUNT: 14.6 % (ref 11.5–14.5)
EST. GFR  (NO RACE VARIABLE): >60 ML/MIN/1.73 M^2
GLUCOSE SERPL-MCNC: 96 MG/DL (ref 70–110)
HCT VFR BLD AUTO: 31.5 % (ref 37–48.5)
HCT VFR BLD AUTO: 31.9 % (ref 37–48.5)
HGB BLD-MCNC: 10.1 G/DL (ref 12–16)
HGB BLD-MCNC: 10.2 G/DL (ref 12–16)
IMM GRANULOCYTES # BLD AUTO: 0.15 K/UL (ref 0–0.04)
IMM GRANULOCYTES # BLD AUTO: 0.16 K/UL (ref 0–0.04)
IMM GRANULOCYTES NFR BLD AUTO: 1.2 % (ref 0–0.5)
IMM GRANULOCYTES NFR BLD AUTO: 1.2 % (ref 0–0.5)
LYMPHOCYTES # BLD AUTO: 0.5 K/UL (ref 1–4.8)
LYMPHOCYTES # BLD AUTO: 0.5 K/UL (ref 1–4.8)
LYMPHOCYTES NFR BLD: 3.6 % (ref 18–48)
LYMPHOCYTES NFR BLD: 3.8 % (ref 18–48)
MAGNESIUM SERPL-MCNC: 2.2 MG/DL (ref 1.6–2.6)
MCH RBC QN AUTO: 32.1 PG (ref 27–31)
MCH RBC QN AUTO: 32.1 PG (ref 27–31)
MCHC RBC AUTO-ENTMCNC: 31.7 G/DL (ref 32–36)
MCHC RBC AUTO-ENTMCNC: 32.4 G/DL (ref 32–36)
MCV RBC AUTO: 101 FL (ref 82–98)
MCV RBC AUTO: 99 FL (ref 82–98)
MONOCYTES # BLD AUTO: 0.7 K/UL (ref 0.3–1)
MONOCYTES # BLD AUTO: 0.8 K/UL (ref 0.3–1)
MONOCYTES NFR BLD: 5.7 % (ref 4–15)
MONOCYTES NFR BLD: 6 % (ref 4–15)
NEUTROPHILS # BLD AUTO: 11.3 K/UL (ref 1.8–7.7)
NEUTROPHILS # BLD AUTO: 11.7 K/UL (ref 1.8–7.7)
NEUTROPHILS NFR BLD: 89 % (ref 38–73)
NEUTROPHILS NFR BLD: 89.2 % (ref 38–73)
NRBC BLD-RTO: 0 /100 WBC
NRBC BLD-RTO: 0 /100 WBC
PHOSPHATE SERPL-MCNC: 3.3 MG/DL (ref 2.7–4.5)
PLATELET # BLD AUTO: 393 K/UL (ref 150–450)
PLATELET # BLD AUTO: 396 K/UL (ref 150–450)
PMV BLD AUTO: 9.1 FL (ref 9.2–12.9)
PMV BLD AUTO: 9.6 FL (ref 9.2–12.9)
POTASSIUM SERPL-SCNC: 4 MMOL/L (ref 3.5–5.1)
PROT SERPL-MCNC: 5.7 G/DL (ref 6–8.4)
RBC # BLD AUTO: 3.15 M/UL (ref 4–5.4)
RBC # BLD AUTO: 3.18 M/UL (ref 4–5.4)
SODIUM SERPL-SCNC: 135 MMOL/L (ref 136–145)
WBC # BLD AUTO: 12.65 K/UL (ref 3.9–12.7)
WBC # BLD AUTO: 13.09 K/UL (ref 3.9–12.7)

## 2024-03-12 PROCEDURE — 99233 SBSQ HOSP IP/OBS HIGH 50: CPT | Mod: ,,, | Performed by: INTERNAL MEDICINE

## 2024-03-12 PROCEDURE — 25000003 PHARM REV CODE 250: Performed by: STUDENT IN AN ORGANIZED HEALTH CARE EDUCATION/TRAINING PROGRAM

## 2024-03-12 PROCEDURE — 25000003 PHARM REV CODE 250: Performed by: HOSPITALIST

## 2024-03-12 PROCEDURE — 36415 COLL VENOUS BLD VENIPUNCTURE: CPT

## 2024-03-12 PROCEDURE — 80053 COMPREHEN METABOLIC PANEL: CPT

## 2024-03-12 PROCEDURE — 85025 COMPLETE CBC W/AUTO DIFF WBC: CPT | Mod: 91 | Performed by: HOSPITALIST

## 2024-03-12 PROCEDURE — 11000001 HC ACUTE MED/SURG PRIVATE ROOM

## 2024-03-12 PROCEDURE — 63600175 PHARM REV CODE 636 W HCPCS: Mod: UD | Performed by: STUDENT IN AN ORGANIZED HEALTH CARE EDUCATION/TRAINING PROGRAM

## 2024-03-12 PROCEDURE — 25000003 PHARM REV CODE 250: Performed by: INTERNAL MEDICINE

## 2024-03-12 PROCEDURE — 25000003 PHARM REV CODE 250

## 2024-03-12 PROCEDURE — 25000003 PHARM REV CODE 250: Performed by: PHYSICIAN ASSISTANT

## 2024-03-12 PROCEDURE — 63600175 PHARM REV CODE 636 W HCPCS: Mod: UD

## 2024-03-12 PROCEDURE — 97530 THERAPEUTIC ACTIVITIES: CPT

## 2024-03-12 PROCEDURE — 84100 ASSAY OF PHOSPHORUS: CPT

## 2024-03-12 PROCEDURE — 97535 SELF CARE MNGMENT TRAINING: CPT | Mod: CO

## 2024-03-12 PROCEDURE — 63600175 PHARM REV CODE 636 W HCPCS: Mod: UD | Performed by: HOSPITALIST

## 2024-03-12 PROCEDURE — 85730 THROMBOPLASTIN TIME PARTIAL: CPT | Performed by: HOSPITALIST

## 2024-03-12 PROCEDURE — 85025 COMPLETE CBC W/AUTO DIFF WBC: CPT

## 2024-03-12 PROCEDURE — 83735 ASSAY OF MAGNESIUM: CPT

## 2024-03-12 RX ORDER — MORPHINE SULFATE 30 MG/1
30 TABLET, FILM COATED, EXTENDED RELEASE ORAL EVERY 8 HOURS
Status: DISCONTINUED | OUTPATIENT
Start: 2024-03-12 | End: 2024-03-12

## 2024-03-12 RX ORDER — LOPERAMIDE HYDROCHLORIDE 2 MG/1
2 CAPSULE ORAL 4 TIMES DAILY PRN
Status: DISCONTINUED | OUTPATIENT
Start: 2024-03-12 | End: 2024-03-17 | Stop reason: HOSPADM

## 2024-03-12 RX ORDER — DEXAMETHASONE SODIUM PHOSPHATE 4 MG/ML
2 INJECTION, SOLUTION INTRA-ARTICULAR; INTRALESIONAL; INTRAMUSCULAR; INTRAVENOUS; SOFT TISSUE EVERY 12 HOURS
Status: DISCONTINUED | OUTPATIENT
Start: 2024-03-12 | End: 2024-03-17 | Stop reason: HOSPADM

## 2024-03-12 RX ORDER — OXYCODONE HYDROCHLORIDE 10 MG/1
10 TABLET ORAL EVERY 4 HOURS PRN
Status: DISCONTINUED | OUTPATIENT
Start: 2024-03-12 | End: 2024-03-17 | Stop reason: HOSPADM

## 2024-03-12 RX ORDER — MORPHINE SULFATE 30 MG/1
30 TABLET, FILM COATED, EXTENDED RELEASE ORAL EVERY 12 HOURS
Status: DISCONTINUED | OUTPATIENT
Start: 2024-03-12 | End: 2024-03-17 | Stop reason: HOSPADM

## 2024-03-12 RX ORDER — MORPHINE SULFATE 15 MG/1
15 TABLET, FILM COATED, EXTENDED RELEASE ORAL ONCE
Status: COMPLETED | OUTPATIENT
Start: 2024-03-12 | End: 2024-03-12

## 2024-03-12 RX ADMIN — OXYCODONE HYDROCHLORIDE 15 MG: 10 TABLET ORAL at 05:03

## 2024-03-12 RX ADMIN — FAMOTIDINE 20 MG: 20 TABLET ORAL at 09:03

## 2024-03-12 RX ADMIN — SODIUM CHLORIDE 2000 MG: 1 TABLET ORAL at 08:03

## 2024-03-12 RX ADMIN — LOPERAMIDE HYDROCHLORIDE 2 MG: 2 CAPSULE ORAL at 09:03

## 2024-03-12 RX ADMIN — ATENOLOL 50 MG: 25 TABLET ORAL at 08:03

## 2024-03-12 RX ADMIN — DEXAMETHASONE SODIUM PHOSPHATE 2 MG: 4 INJECTION INTRA-ARTICULAR; INTRALESIONAL; INTRAMUSCULAR; INTRAVENOUS; SOFT TISSUE at 09:03

## 2024-03-12 RX ADMIN — DEXAMETHASONE SODIUM PHOSPHATE 4 MG: 4 INJECTION INTRA-ARTICULAR; INTRALESIONAL; INTRAMUSCULAR; INTRAVENOUS; SOFT TISSUE at 08:03

## 2024-03-12 RX ADMIN — ACETAMINOPHEN 650 MG: 325 TABLET ORAL at 01:03

## 2024-03-12 RX ADMIN — GABAPENTIN 300 MG: 300 CAPSULE ORAL at 08:03

## 2024-03-12 RX ADMIN — SODIUM CHLORIDE 2000 MG: 1 TABLET ORAL at 09:03

## 2024-03-12 RX ADMIN — LEVETIRACETAM 500 MG: 500 TABLET, FILM COATED ORAL at 08:03

## 2024-03-12 RX ADMIN — MORPHINE SULFATE 15 MG: 15 TABLET, EXTENDED RELEASE ORAL at 08:03

## 2024-03-12 RX ADMIN — ALUMINUM HYDROXIDE, MAGNESIUM HYDROXIDE, AND SIMETHICONE 30 ML: 1200; 120; 1200 SUSPENSION ORAL at 11:03

## 2024-03-12 RX ADMIN — HYDROMORPHONE HYDROCHLORIDE 1 MG: 1 INJECTION, SOLUTION INTRAMUSCULAR; INTRAVENOUS; SUBCUTANEOUS at 11:03

## 2024-03-12 RX ADMIN — MORPHINE SULFATE 15 MG: 15 TABLET, EXTENDED RELEASE ORAL at 09:03

## 2024-03-12 RX ADMIN — MORPHINE SULFATE 30 MG: 30 TABLET, FILM COATED, EXTENDED RELEASE ORAL at 09:03

## 2024-03-12 RX ADMIN — HEPARIN SODIUM AND DEXTROSE 25 UNITS/KG/HR: 10000; 5 INJECTION INTRAVENOUS at 05:03

## 2024-03-12 RX ADMIN — GABAPENTIN 300 MG: 300 CAPSULE ORAL at 03:03

## 2024-03-12 RX ADMIN — OXYCODONE HYDROCHLORIDE 10 MG: 10 TABLET ORAL at 04:03

## 2024-03-12 RX ADMIN — HYDROMORPHONE HYDROCHLORIDE 1 MG: 1 INJECTION, SOLUTION INTRAMUSCULAR; INTRAVENOUS; SUBCUTANEOUS at 07:03

## 2024-03-12 RX ADMIN — SODIUM BICARBONATE 650 MG: 650 TABLET ORAL at 08:03

## 2024-03-12 RX ADMIN — OXYCODONE HYDROCHLORIDE 10 MG: 10 TABLET ORAL at 12:03

## 2024-03-12 RX ADMIN — ALUMINUM HYDROXIDE, MAGNESIUM HYDROXIDE, AND SIMETHICONE 30 ML: 1200; 120; 1200 SUSPENSION ORAL at 09:03

## 2024-03-12 RX ADMIN — LEVETIRACETAM 500 MG: 500 TABLET, FILM COATED ORAL at 09:03

## 2024-03-12 RX ADMIN — HYDROMORPHONE HYDROCHLORIDE 1 MG: 1 INJECTION, SOLUTION INTRAMUSCULAR; INTRAVENOUS; SUBCUTANEOUS at 03:03

## 2024-03-12 RX ADMIN — OXYCODONE HYDROCHLORIDE 15 MG: 10 TABLET ORAL at 01:03

## 2024-03-12 RX ADMIN — METHOCARBAMOL 500 MG: 500 TABLET ORAL at 08:03

## 2024-03-12 RX ADMIN — LOPERAMIDE HYDROCHLORIDE 2 MG: 2 CAPSULE ORAL at 05:03

## 2024-03-12 RX ADMIN — GABAPENTIN 300 MG: 300 CAPSULE ORAL at 09:03

## 2024-03-12 RX ADMIN — FAMOTIDINE 20 MG: 20 TABLET ORAL at 08:03

## 2024-03-12 RX ADMIN — SODIUM CHLORIDE 2000 MG: 1 TABLET ORAL at 03:03

## 2024-03-12 RX ADMIN — ALUMINUM HYDROXIDE, MAGNESIUM HYDROXIDE, AND SIMETHICONE 30 ML: 1200; 120; 1200 SUSPENSION ORAL at 04:03

## 2024-03-12 RX ADMIN — HYDROMORPHONE HYDROCHLORIDE 1 MG: 1 INJECTION, SOLUTION INTRAMUSCULAR; INTRAVENOUS; SUBCUTANEOUS at 06:03

## 2024-03-12 NOTE — PT/OT/SLP PROGRESS
"Occupational Therapy   Co-Treatment with PT    Name: Crow Hines  MRN: [id number]  Admitting Diagnosis:  Brain mass  12 Days Post-Op  Procedure(s):  CRANIOTOMY, WITH NEOPLASM EXCISION USING COMPUTER-ASSISTED NAVIGATION   Recommendations:     Discharge Recommendations: High Intensity Therapy  Discharge Equipment Recommendations:  hospital bed, wheelchair, lift device  Barriers to discharge:   (increased skilled assistance required)    Assessment:     Crow Hines is a 63 y.o. female with a medical diagnosis of Brain mass.  She presents with the following performance deficits affecting function are weakness, impaired endurance, impaired self care skills, impaired functional mobility, gait instability, decreased safety awareness, decreased lower extremity function, pain, impaired balance, decreased upper extremity function, impaired coordination, decreased coordination, decreased ROM. Patient primarily limited by c/o pain but made good progress with functional transfers and activity tolerance today. Patient continues to demonstrate the need for moderate intensity therapy on a daily basis post acute exhibited by decreased independence with self-care and functional mobility     Rehab Prognosis:  Good; patient would benefit from acute skilled OT services to address these deficits and reach maximum level of function.       Plan:     Patient to be seen 3 x/week to address the above listed problems via self-care/home management, therapeutic activities, therapeutic exercises, neuromuscular re-education  Plan of Care Expires: 03/27/24  Plan of Care Reviewed with: patient    Subjective     Chief Complaint: c/o pain; "I haven't been able to eat anything."  Patient/Family Comments/goals: patient agreeable to therapy with selective participation  Pain/Comfort:  Pain Rating 1:  (unrated)  Location - Orientation 1: generalized  Location 1:  (R hip, R LE, and back)  Pain Addressed 1: Pre-medicate for activity, Reposition, " Distraction, Cessation of Activity  Pain Rating Post-Intervention 1: 9/10    Objective:     Communicated with: nurse ba and OTR prior to session.  Patient found HOB elevated with telemetry, peripheral IV upon OT entry to room.  A client care conference was completed by the OTR and the MOBLEY prior to treatment by the MOBLEY to discuss the patient's POC and current status.    General Precautions: Standard, fall    Orthopedic Precautions:spinal precautions  Braces: LSO (for comfort)  Respiratory Status: Room air     Occupational Performance:     Bed Mobility:    Patient completed Scooting/Bridging with contact guard assistance  Patient completed Supine to Sit with minimum assistance and HOB elevated  Patient completed Sit to Supine with minimum assistance and HOB elevated      Functional Mobility/Transfers:  Patient completed Bed <> BSC Transfer using Squat Pivot technique with maximal assistance with no assistive device  Functional Mobility: static sitting balance EOB: SBA with B UE support    Activities of Daily Living:  Feeding:  SET-UP eat cereal HOB elevated  Grooming: SET-UP facial hygiene HOB elevated  Upper Body Dressing: maximal assistance doff and don clean gown seated on BSC  Toileting: total assistance perirectal hygiene and gown management in squat stance      [facility] 6 Click ADL: 15    Treatment & Education:  Patient edu on OT POC, goals, and current progress. Patient edu on pain management strategies to maximize activity tolerance during therapy. Patient provided with BSC and encouraged to utilize with nursing (A) of 2 persons to promote increased OOB tolerance/activity. Patient verbalized understanding. Addressed all patient questions/concerns within MOBLEY scope of practice. Co-treatment performed with PT due to patient's complexity and benefit of 2 skilled therapists to facilitate functional and safe occupational performance, accommodate patient's activity tolerance, and maximize patient's participation  in therapy.     Patient left HOB elevated with all lines intact, call button in reach, and bed alarm on    GOALS:   Multidisciplinary Problems       Occupational Therapy Goals          Problem: Occupational Therapy    Goal Priority Disciplines Outcome Interventions   Occupational Therapy Goal     OT, PT/OT Ongoing, Progressing    Description: Goals to be met by: 3/27/24     Patient will increase functional independence with ADLs by performing:    UE Dressing with Minimal Assistance.  LE Dressing with Maximum Assistance.  Grooming while EOB with Moderate Assistance.  Toileting from bedside commode with Maximum Assistance for hygiene and clothing management.   Rolling to Bilateral with Moderate Assistance.   Supine to sit with Contact Guard Assistance.  Stand pivot transfers with Maximum Assistance.  Step transfer with Maximum Assistance  Toilet transfer to BSC with Maximum Assistance.    Patient DC recommendation updated to moderate intensity due to functional ability at this time                         Time Tracking:     OT Date of Treatment: 03/12/24  OT Start Time: 1027  OT Stop Time: 1052  OT Total Time (min): 25 min    Billable Minutes:Self Care/Home Management 25    OT/LYNDA: LYNDA     Number of LYNDA visits since last OT visit: 1    3/12/2024

## 2024-03-12 NOTE — PROGRESS NOTES
[facility] ([facility])  Hospital Medicine  Progress Note    Patient Name: Crow Hines  MRN: [id number]  Patient Class: IP- Inpatient   Admission Date: 2/27/2024  Length of Stay: 14 days  Attending Physician: Theodore Montenegro*  Primary Care Provider: Chika Powell MD        Subjective:     Principal Problem:Brain mass        HPI:  No notes on file    Overview/Hospital Course:  64 y/o Female admitted as transfer from OSH for L temporal lobe mass presenting w/ difficulty walking. Lab work significant for alk phos 203, AST 57, sodium 132, platelets 586. CXR revealed linear and ill-defined hazy opacities of the right mid to lower lung, felt to reflect infiltrate. Diffuse abdominal pain with focal increased tenderness in mid-epigastric area prompted CT abd/pelvis revealing numerous metastatic masses in the liver, metastatic deposits in the spleen and bilateral adrenal masses, hypoattenuating and mildly enhancing mass of the upper pole L kidney. Also w/ intra-abdominal enlarged lymph nodes, consistent with metastatic infiltration, masses of the R lung base with consolidation of the visualized R renal lower lobe and RLL 9 mm nodule. L2 compression fracture w/ mild sclerosis of the vertebra noted, likely pathologic. CTH completed showing large 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 Pt underwent L temporal craniotomy tumor resection on 2/29. Path returned metastatic small cell lung CA.     Interval History: Patient states some improvement in pain this morning  Having diarrhea    Review of Systems  Objective:     Vital Signs (Most Recent):  Temp: 97.2 °F (36.2 °C) (03/12/24 1220)  Pulse: 87 (03/12/24 1220)  Resp: 16 (03/12/24 1320)  BP: 121/72 (03/12/24 1220)  SpO2: (!) 94 % (03/12/24 1220) Vital Signs (24h Range):  Temp:  [97.2 °F (36.2 °C)-98.2 °F (36.8 °C)] 97.2 °F (36.2 °C)  Pulse:  [84-99] 87  Resp:  [16-18] 16  SpO2:  [90 %-94 %] 94 %  BP: (121-139)/(72-84) 121/72      Weight: 47.6 kg (105 lb)  Body mass index is 18.6 kg/m².  No intake or output data in the 24 hours ending 03/12/24 1512      Physical Exam  Constitutional:       General: She is not in acute distress.     Appearance: Normal appearance. She is not toxic-appearing or diaphoretic.   Cardiovascular:      Rate and Rhythm: Normal rate and regular rhythm.      Heart sounds: No murmur heard.     No friction rub. No gallop.   Pulmonary:      Effort: Pulmonary effort is normal. No respiratory distress.      Breath sounds: Normal breath sounds. No wheezing or rales.   Abdominal:      General: Abdomen is flat. There is no distension.      Palpations: Abdomen is soft.      Tenderness: There is no abdominal tenderness. There is no guarding or rebound.   Musculoskeletal:      Right lower leg: No edema.      Left lower leg: No edema.   Neurological:      Mental Status: She is alert.             Significant Labs: All pertinent labs within the past 24 hours have been reviewed.    Significant Imaging: I have reviewed all pertinent imaging results/findings within the past 24 hours.    Assessment/Plan:      * Brain mass  -L temporal mass; status post tumor resection on 02/29, pathology showing met small cell lung CA   -scheduled dexamethasone. Continue dex 4q12 for vasogenic edema. Wean to off over 2 weeks  - Keppra 500 mg bid for seizure prophylaxis  - On hep gtt for PE. obtain head CT once therapeutic. If CTH is stable, okay to transition to oral AC upon discharge for treatment of PE.  -PT/OT  -blood pressure control  -pain control  -neurochecks      Vasogenic brain edema  See above      Acute pulmonary embolism without acute cor pulmonale  -Stable respiratory status  -Per Neurosurgery recommendation, awaiting for heparin infusion drip to be therapeutic and then to repeat head CT to rule out bleed and then transitioned over to Eliquis    Compression fracture of lumbar spine, non-traumatic  Back brace      Metastatic neoplastic  disease  -CT abd/pelvis w/ numerous metastatic masses in the liver, likely metastatic deposit in the spleen; bilateral adrenal masses, hypoattenuating and mildly enhancing mass of upper pole L kidney, intra-abdominal enlarged lymph nodes consistent w/ metastatic infiltration.   -masses to R lung base w/ consolidation of visualized R renal lower lobe; R lower lobe 9mm nodule  -L2 vertebra compression fracture w/ mild sclerosis of the vertebra, likely reflecting a pathologic fracture; faint sclerosis of the superior endplate of S1 likely reflects metastatic infiltration  -deferring staging workup including MRI spine and labs to medical oncology  -medical oncology discussion, they affirmed that the patient is a fairly decent candidate for systemic chemotherapy but best for patient to be in [place] to initiate the cycle as she has no follow up options here in the state due to Payor source limitations and residency status  -per rad onc, pt not a good candidate at this time until possibly later in future if she gets chemotherapy.    Per onc, No evidence of visceral crisis currently requiring emergent initiation of inpatient chemotherapy, but pt needs urgent f/u for consideration of palliative systemic therapy if it can be arranged. Pt is a resident of [place] so arranging f/u at [facility] or one closer to home would be best.     Palliative care consulted  Pain management      VTE Risk Mitigation (From admission, onward)           Ordered     heparin 25,000 units in dextrose 5% 250 mL (100 units/mL) infusion HIGH INTENSITY nomogram - [facility]  Continuous        Question:  Begin at (units/kg/hr)  Answer:  18    03/05/24 1004     Reason for No Pharmacological VTE Prophylaxis  Once        Question:  Reasons:  Answer:  Risk of Bleeding    02/27/24 0556     IP VTE HIGH RISK PATIENT  Once         02/27/24 0556     Place sequential compression device  Until discontinued         02/27/24 0520                     Discharge Planning   JUAN: 3/12/2024     Code Status: Full Code   Is the patient medically ready for discharge?: No    Reason for patient still in hospital (select all that apply): Patient trending condition  Discharge Plan A: Hospital Transfer   Discharge Delays: None known at this time      Theodore Montenegro MD  Department of Hospital Medicine   [facility] ([facility])

## 2024-03-12 NOTE — SUBJECTIVE & OBJECTIVE
Interval History: Patient states some improvement in pain this morning  Having diarrhea    Review of Systems  Objective:     Vital Signs (Most Recent):  Temp: 97.2 °F (36.2 °C) (03/12/24 1220)  Pulse: 87 (03/12/24 1220)  Resp: 16 (03/12/24 1320)  BP: 121/72 (03/12/24 1220)  SpO2: (!) 94 % (03/12/24 1220) Vital Signs (24h Range):  Temp:  [97.2 °F (36.2 °C)-98.2 °F (36.8 °C)] 97.2 °F (36.2 °C)  Pulse:  [84-99] 87  Resp:  [16-18] 16  SpO2:  [90 %-94 %] 94 %  BP: (121-139)/(72-84) 121/72     Weight: 47.6 kg (105 lb)  Body mass index is 18.6 kg/m².  No intake or output data in the 24 hours ending 03/12/24 1512      Physical Exam  Constitutional:       General: She is not in acute distress.     Appearance: Normal appearance. She is not toxic-appearing or diaphoretic.   Cardiovascular:      Rate and Rhythm: Normal rate and regular rhythm.      Heart sounds: No murmur heard.     No friction rub. No gallop.   Pulmonary:      Effort: Pulmonary effort is normal. No respiratory distress.      Breath sounds: Normal breath sounds. No wheezing or rales.   Abdominal:      General: Abdomen is flat. There is no distension.      Palpations: Abdomen is soft.      Tenderness: There is no abdominal tenderness. There is no guarding or rebound.   Musculoskeletal:      Right lower leg: No edema.      Left lower leg: No edema.   Neurological:      Mental Status: She is alert.             Significant Labs: All pertinent labs within the past 24 hours have been reviewed.    Significant Imaging: I have reviewed all pertinent imaging results/findings within the past 24 hours.

## 2024-03-12 NOTE — ASSESSMENT & PLAN NOTE
"Impression: Pt is a 62 y/o female, newly dx SCLC with mets which include the brain. Pt has hx of RA. Pt has poor performance status. Pt is alert, oriented to person, place, and situation. Pt reports back and hip pain. Pt is a full code.     Reason for consult: ACP/GOC    3/8 Met with pt along with Shona KAUR LCSW. Pt would like to get closer to [facility] to be by her son and close friend Prachi. At this time, pt interested in palliative treatment for her cancer. Communicated with BRANDT Lees.     3/6/24: Met with pt along with SUYAPA Nagel with pal care. Per pt, her son is now staying in [facility] with his godmother. Pt report he has learning disabilities and ADHD. Per pt, there is not one to care for her at home. Per pt, daughter has stage IV cancer and receiving treatment. Pt has Medicaid paperwork in her chart. She reports she needs assistance filling out. SUYAPA Nagel to reach out to Medicaid representative, Marilynn.     Pt is aware she may be too debilitated to receive chemo.   Attempted to reach out to pt's daughter. Voicemail full.    3/5/24 Met with pt who is aware of her newly dx metastatic cancer. Pt reports "I know I am terminal." Pt reports she wants to know what her treatment options are that may extend her life longer. Pt reports she carefor her 22 y/o son who has mental issues but able to help her at home. They live in a trailer in [place]. Pt reports her daughter Thais lives locally. Per pt she has liver cancer and is receiving active chemo. T reports she is not .     Pt does report she has debility which has prevented ambulation.     MPOA- NOK is Thais landrum. Per pt son unable to make medical decisions.     Code status discussed. Pt reports she wants to be a full code for now while she gets her affairs in order. Per pt, if she is put on life support and not getting better, she would want it stopped and be made comfortable.     Symptom management:     Pain to right hip and back are r/t  metastatic " cancer.   OME in 24 hour is about 150  Pt reports throbbing and shooting pain not relieved with current pain regimen     Current meds:   Dilaudid 1 mg IVP q 4 hrs pain not relieved by pain meds.   Oxycodone 10 mg q 4 hrs prn pain.   Pt on Neurontin 100 mg tid.   Pt on dexamethasone 4 mg IVP   MS contin 15mg BID    Recs:   Increase Mscontin to 30mg BID. Increase oxycodone to 15mg. Continue IV dilaudid for breakthrough   Will continue to reassess.     Nausea:   Pt on zofran 4 mg IVP q 8 hrs prn nausea.     Debility:   PT/OT working with pt.     Discussed with Dr. Montenegro

## 2024-03-12 NOTE — SUBJECTIVE & OBJECTIVE
Interval History: Pt complaining of uncontrolled pain. Waiting transfer       Past Medical History:   Diagnosis Date    Arthritis        Past Surgical History:   Procedure Laterality Date    CRANIOTOMY, WITH NEOPLASM EXCISION USING COMPUTER-ASSISTED NAVIGATION Left 2/29/2024    Procedure: CRANIOTOMY, WITH NEOPLASM EXCISION USING COMPUTER-ASSISTED NAVIGATION;  Surgeon: Felix Szymanski DO;  Location: [facility] OR [address] [place] [address];  Service: Neurosurgery;  Laterality: Left;  (Left temporal crani for rescetion of tumor)  [place]  BRAIN LAB  Navigation - CT with contrast    TUBAL LIGATION  2002       Review of patient's allergies indicates:  No Known Allergies    Medications:  Continuous Infusions:   heparin (porcine) in D5W 25 Units/kg/hr (03/11/24 2019)     Scheduled Meds:   aluminum-magnesium hydroxide-simethicone  30 mL Oral QID (AC & HS)    atenoloL  50 mg Oral Daily    dexAMETHasone  2 mg Intravenous Q12H    famotidine  20 mg Oral BID    gabapentin  300 mg Oral TID    levETIRAcetam  500 mg Oral BID    morphine  30 mg Oral Q12H    sodium bicarbonate  650 mg Oral Daily    sodium chloride  2,000 mg Oral TID     PRN Meds:acetaminophen, bisacodyL, hydrALAZINE, HYDROmorphone, labetalol, loperamide, methocarbamoL, ondansetron, oxyCODONE, oxyCODONE    Family History    None       Tobacco Use    Smoking status: Every Day     Current packs/day: 0.50     Types: Cigarettes    Smokeless tobacco: Current    Tobacco comments:     3/4 PPD   Substance and Sexual Activity    Alcohol use: Not Currently     Comment: OCCASIONALLY    Drug use: Never    Sexual activity: Not Currently       Review of Systems   Constitutional:  Positive for activity change and fatigue.   HENT:  Negative for trouble swallowing.    Respiratory:  Negative for shortness of breath.    Gastrointestinal:  Positive for nausea.   Musculoskeletal:  Positive for back pain.   Neurological:  Positive for weakness.   Psychiatric/Behavioral:  Positive for decreased concentration.       Objective:     Vital Signs (Most Recent):  Temp: 97.2 °F (36.2 °C) (03/12/24 1547)  Pulse: 96 (03/12/24 1547)  Resp: 18 (03/12/24 1547)  BP: 110/69 (03/12/24 1547)  SpO2: (!) 92 % (03/12/24 1547) Vital Signs (24h Range):  Temp:  [97.2 °F (36.2 °C)-98.2 °F (36.8 °C)] 97.2 °F (36.2 °C)  Pulse:  [87-99] 96  Resp:  [16-18] 18  SpO2:  [90 %-94 %] 92 %  BP: (110-139)/(69-84) 110/69     Weight: 47.6 kg (105 lb)  Body mass index is 18.6 kg/m².       Physical Exam  Constitutional:       General: She is not in acute distress.  HENT:      Head: Normocephalic and atraumatic.   Pulmonary:      Effort: Respiratory distress present.   Musculoskeletal:      Cervical back: Normal range of motion.      Comments: Weakness noted. Pt able to move extremities.    Skin:     General: Skin is warm and dry.      Coloration: Skin is pale.   Neurological:      Mental Status: She is alert and oriented to person, place, and time.   Psychiatric:         Speech: Speech normal.         Behavior: Behavior is cooperative.          Review of Symptoms      Symptom Assessment (ESAS 0-10 Scale)  Pain:  9  Dyspnea:  0  Anxiety:  0  Nausea:  0  Depression:  0  Anorexia:  0  Fatigue:  0  Insomnia:  0  Restlessness:  0  Agitation:  0         Pain Assessment:  OME in 24 hours:  150  Location(s): back    Back       Location: right        Quality: Throbbing and shooting        Quantity: 6/10 in intensity        Chronicity: Onset 0 second(s) ago, unchanged        Aggravating Factors: Walking and movement        Alleviating Factors: Opiates       Associated Symptoms: Nausea    Living Arrangements:  Lives with family    Psychosocial/Cultural:   See Palliative Psychosocial Note: No  Pt reports she lives with her 20 y/o son in [facility] in [place]. Per pt, she cares for son due to his mental issues. Per pt, son able to help her physically. Pt reports she has a daughter, Thais who loves locally. Per pt she is dealing with liver cancer and received chemo. Pt reports not  " and not a big support system. Per pt, her son's godmother is caring for him.   **Primary  to Follow**  Palliative Care  Consult: Yes      Advance Care Planning   Advance Directives:   Living Will: No    Do Not Resuscitate Status: No    Medical Power of : No    Agent's Name:  DaughterThais is NOK    Decision Making:  Patient answered questions  Goals of Care: What is most important right now is to focus on curative/life-prolongation (regardless of treatment burdens). Accordingly, we have decided that the best plan to meet the patient's goals includes continuing with treatment.         Significant Labs: All pertinent labs within the past 24 hours have been reviewed.  CBC:   Recent Labs   Lab 03/12/24 0421   WBC 12.65  13.09*   HGB 10.2*  10.1*   HCT 31.5*  31.9*   MCV 99*  101*     396       BMP:  Recent Labs   Lab 03/12/24 0421   GLU 96   *   K 4.0   CL 99   CO2 26   BUN 10   CREATININE 0.6   CALCIUM 9.2   MG 2.2       LFT:  Lab Results   Component Value Date    AST 91 (H) 03/12/2024    GGT 1,385 (H) 03/05/2024    ALKPHOS 545 (H) 03/12/2024    BILITOT 0.5 03/12/2024     Albumin:   Albumin   Date Value Ref Range Status   03/12/2024 2.4 (L) 3.5 - 5.2 g/dL Final     Protein:   Total Protein   Date Value Ref Range Status   03/12/2024 5.7 (L) 6.0 - 8.4 g/dL Final     Lactic acid:   No results found for: "LACTATE"    Significant Imaging: I have reviewed all pertinent imaging results/findings within the past 24 hours.    CT head without contrast 3/10/24  Impression:     Evolving postsurgical changes with no new acute intracranial process.  No acute hemorrhage.  "

## 2024-03-12 NOTE — PT/OT/SLP PROGRESS
Physical Therapy Co-Treatment  Co-treatment performed to appropriately and safely address patient's strength and endurance deficits while facilitating functional tasks in addition to accommodating for patient's activity/pain tolerance.    Patient Name:  Crow Hines   MRN:  [id number]    Recommendations:     Discharge Recommendations: Moderate Intensity Therapy  Discharge Equipment Recommendations: hospital bed, wheelchair  Barriers to discharge: Inaccessible home and Decreased caregiver support    Assessment:     Crow Hines is a 63 y.o. female admitted with a medical diagnosis of Brain mass.  She presents with the following impairments/functional limitations: weakness, impaired endurance, impaired self care skills, impaired functional mobility, gait instability, impaired balance, impaired sensation, decreased upper extremity function, decreased lower extremity function, decreased safety awareness, pain, orthopedic precautions. Pt very limited by pain, but was able to progress to t/fing to a bedside commode with maxA with a squat pivot t/f. Pt would benefit from a moderate intensity/frequency therapy for: Dynamic/static standing/sitting balance through skilled balance training, strengthening with the use of skilled therapeutic exercises interventions, and mobility through adaptive equipment training. Pt continues to benefit from a collaborative PT/OT program to improve quality of life and focus on recovery of impairments.      Rehab Prognosis: Good; patient would benefit from acute skilled PT services to address these deficits and reach maximum level of function.    Recent Surgery: Procedure(s) (LRB):  CRANIOTOMY, WITH NEOPLASM EXCISION USING COMPUTER-ASSISTED NAVIGATION (Left) 12 Days Post-Op    Plan:     During this hospitalization, patient to be seen 3 x/week to address the identified rehab impairments via gait training, therapeutic activities, therapeutic exercises, neuromuscular re-education and progress  "toward the following goals:    Plan of Care Expires:  04/01/24    Subjective     Chief Complaint: pain  Patient/Family Comments/goals: to be able to walk to the bathroom  "I can't feel when I have to go"  Pain/Comfort:  Pain Rating 1:  (not rated)  Location 1:  (RLE, R hip, and bilateral back)  Pain Addressed 1: Reposition, Distraction, Cessation of Activity  Pain Rating Post-Intervention 1: 9/10      Objective:     Communicated with RN prior to session.  Patient found supine with telemetry, peripheral IV upon PT entry to room.     General Precautions: Standard, fall  Orthopedic Precautions: spinal precautions  Braces: LSO (for comfort)  Respiratory Status: Room air     Functional Mobility:  Bed Mobility:     Scooting: contact guard assistance  Supine to Sit: minimum assistance  Sit to Supine: minimum assistance  Transfers:     Bed to and from BSC: maximal assistance with  no AD  using  Squat Pivot  Gait: FOX 2/2 pain  Balance:   Static Sitting: SBA-CGA  Dynamic Sitting: CGA, pt unable to tolerate prolonged sitting at this time 2/2 pain  Static Standing: FOX 2/2 pain  Dynamic Standing: FOX 2/2 pain      AM-PAC 6 CLICK MOBILITY  Turning over in bed (including adjusting bedclothes, sheets and blankets)?: 3  Sitting down on and standing up from a chair with arms (e.g., wheelchair, bedside commode, etc.): 3  Moving from lying on back to sitting on the side of the bed?: 3  Moving to and from a bed to a chair (including a wheelchair)?: 2  Need to walk in hospital room?: 1  Climbing 3-5 steps with a railing?: 1  Basic Mobility Total Score: 13       Treatment & Education:  Patient educated on role of therapy, goals of session, and benefits of mobilizing.   Discussed PT plan of care during hospitalization.   Patient educated on calling for assistance.   Patient educated on how their diagnosis impacts their mobility within PT scope of practice.   All questions answered within PT scope of practice.    Patient left HOB elevated " with all lines intact, call button in reach, bed alarm on, and RN notified.    GOALS:   Multidisciplinary Problems       Physical Therapy Goals          Problem: Physical Therapy    Goal Priority Disciplines Outcome Goal Variances Interventions   Physical Therapy Goal     PT, PT/OT Ongoing, Progressing     Description: Goals to be met by: 3/12/2024     Patient will increase functional independence with mobility by performin. Supine to sit with [place]  2. Sit to supine with [place]  3. Rolling to Left and Right with [place].  4. Sit to stand transfer with Contact Guard Assistance with RW  5. Bed to chair transfer with Contact Guard Assistance using Rolling Walker  6. Gait  x 50 feet with Contact Guard Assistance using Rolling Walker.   7. Ascend/descend 3 stair with right Handrails Minimal Assistance using No Assistive Device.   8. Lower extremity exercise program x10 reps per handout, with supervision                             Time Tracking:     PT Received On: 24  PT Start Time: 1027     PT Stop Time: 1052  PT Total Time (min): 25 min     Billable Minutes: Therapeutic Activity 25    Treatment Type: Treatment  PT/PTA: PT     Number of PTA visits since last PT visit: 0     2024

## 2024-03-12 NOTE — PLAN OF CARE
CM in communication via secure chat with [facility] transfer center supervisor to advise that the [facility] bed is still needed as the patient at this time is unable to receive any OP benefits as she is currently uninsured. SSI disability and [place] Medicaid are pending, applications submitted.

## 2024-03-12 NOTE — PROGRESS NOTES
"[facility] - Neurosurgery ([facility])  Palliative Medicine  Progress Note    Patient Name: Crow Hines  MRN: [id number]  Admission Date: 2/27/2024  Hospital Length of Stay: 14 days  Code Status: Full Code   Attending Provider: Theodore Montenegro*  Consulting Provider: Danelle Moore MD  Primary Care Physician: Chiak Powell MD  Principal Problem:Brain mass    Patient information was obtained from patient and primary team.      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 Pt has hx of RA. Pt has poor performance status. Pt is alert, oriented to person, place, and situation. Pt reports back and hip pain. Pt is a full code.     Reason for consult: ACP/GOC    3/8 Met with pt along with Shona KAUR LCSW. Pt would like to get closer to [facility] to be by her son and close friend Prachi. At this time, pt interested in palliative treatment for her cancer. Communicated with BRANDT Lees.     3/6/24: Met with pt along with SUYAPA Nagel with pal care. Per pt, her son is now staying in [facility] with his godmother. Pt report he has learning disabilities and ADHD. Per pt, there is not one to care for her at home. Per pt, daughter has stage IV cancer and receiving treatment. Pt has Medicaid paperwork in her chart. She reports she needs assistance filling out. SUYAPA Nagel to reach out to Medicaid representative, Marilynn.     Pt is aware she may be too debilitated to receive chemo.   Attempted to reach out to pt's daughter. Voicemail full.    3/5/24 Met with pt who is aware of her newly dx metastatic cancer. Pt reports "I know I am terminal." Pt reports she wants to know what her treatment options are that may extend her life longer. Pt reports she carefor her 20 y/o son who has mental issues but able to help her at home. They live in a trailer in [place]. Pt reports her daughter Thais lives locally. Per pt she has liver cancer " and is receiving active chemo. T reports she is not .     Pt does report she has debility which has prevented ambulation.     MPOA- NOK is Thais landrum. Per pt son unable to make medical decisions.     Code status discussed. Pt reports she wants to be a full code for now while she gets her affairs in order. Per pt, if she is put on life support and not getting better, she would want it stopped and be made comfortable.     Symptom management:     Pain to right hip and back are r/t  metastatic cancer.   OME in 24 hour is about 150  Pt reports throbbing and shooting pain not relieved with current pain regimen     Current meds:   Dilaudid 1 mg IVP q 4 hrs pain not relieved by pain meds.   Oxycodone 10 mg q 4 hrs prn pain.   Pt on Neurontin 100 mg tid.   Pt on dexamethasone 4 mg IVP   MS contin 15mg BID    Recs:   Increase Mscontin to 30mg BID. Increase oxycodone to 15mg. Continue IV dilaudid for breakthrough   Will continue to reassess.     Nausea:   Pt on zofran 4 mg IVP q 8 hrs prn nausea.     Debility:   PT/OT working with pt.     Discussed with Dr. Montenegro             I will follow-up with patient. Please contact us if you have any additional questions.    Subjective:     Chief Complaint: No chief complaint on file.      HPI:   Pt is a 64 y/o F with pMHx of HTN and rheumatoid arthritis presenting as transfer from OSH for neurosurgery eval of L temporal lobe mass. Per chart review, pt initially presented to the ED with complaints that everything hurts.   Reportedly began seeing a rheumatologist 9 months ago where she was diagnosed w/ RA. Methotrexate therapy was initiated. Patient reported a continually decline since that time, leading to her being bed-bound for the last 2 months 2/2 to difficulty walking. She stated her rheumatologist believed that she was not tolerating the methotrexate. On presentation to ED, she reported inability to tolerate abdominal pain any longer. Lab work significant for alk  phos 203, AST 57, sodium 132, platelets 586. CXR revealed linear and ill-defined hazy opacities of the right mid to lower lung, felt to reflect infiltrate. Diffuse abdominal pain with focal increased tenderness in mid-epigastric area prompted CT abd/pelvis revealing numerous metastatic masses in the liver, metastatic deposits in the spleen and bilateral adrenal masses, hypoattenuating and mildly enhancing mass of the upper pole L kidney. Also w/ intra-abdominal enlarged lymph nodes, consistent with metastatic infiltration, masses of the R lung base with consolidation of the visualized R renal lower lobe and RLL 9 mm nodule. L2 compression fracture w/ mild sclerosis of the vertebra noted, likely pathologic. CTH completed showing large 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 She was transferred to [facility] for higher level of care and will be admitted to [facility] for further management.        Hospital Course:  No notes on file    Interval History: Pt complaining of uncontrolled pain. Waiting transfer       Past Medical History:   Diagnosis Date    Arthritis        Past Surgical History:   Procedure Laterality Date    CRANIOTOMY, WITH NEOPLASM EXCISION USING COMPUTER-ASSISTED NAVIGATION Left 2/29/2024    Procedure: CRANIOTOMY, WITH NEOPLASM EXCISION USING COMPUTER-ASSISTED NAVIGATION;  Surgeon: Felix Szymanski DO;  Location: [facility] OR [address] [place] [address];  Service: Neurosurgery;  Laterality: Left;  (Left temporal crani for rescetion of tumor)  [place]  BRAIN LAB  Navigation - CT with contrast    TUBAL LIGATION  2002       Review of patient's allergies indicates:  No Known Allergies    Medications:  Continuous Infusions:   heparin (porcine) in D5W 25 Units/kg/hr (03/11/24 2019)     Scheduled Meds:   aluminum-magnesium hydroxide-simethicone  30 mL Oral QID (AC & HS)    atenoloL  50 mg Oral Daily    dexAMETHasone  2 mg Intravenous Q12H    famotidine  20 mg Oral BID    gabapentin  300 mg Oral TID    levETIRAcetam  500 mg  Oral BID    morphine  30 mg Oral Q12H    sodium bicarbonate  650 mg Oral Daily    sodium chloride  2,000 mg Oral TID     PRN Meds:acetaminophen, bisacodyL, hydrALAZINE, HYDROmorphone, labetalol, loperamide, methocarbamoL, ondansetron, oxyCODONE, oxyCODONE    Family History    None       Tobacco Use    Smoking status: Every Day     Current packs/day: 0.50     Types: Cigarettes    Smokeless tobacco: Current    Tobacco comments:     3/4 PPD   Substance and Sexual Activity    Alcohol use: Not Currently     Comment: OCCASIONALLY    Drug use: Never    Sexual activity: Not Currently       Review of Systems   Constitutional:  Positive for activity change and fatigue.   HENT:  Negative for trouble swallowing.    Respiratory:  Negative for shortness of breath.    Gastrointestinal:  Positive for nausea.   Musculoskeletal:  Positive for back pain.   Neurological:  Positive for weakness.   Psychiatric/Behavioral:  Positive for decreased concentration.      Objective:     Vital Signs (Most Recent):  Temp: 97.2 °F (36.2 °C) (03/12/24 1547)  Pulse: 96 (03/12/24 1547)  Resp: 18 (03/12/24 1547)  BP: 110/69 (03/12/24 1547)  SpO2: (!) 92 % (03/12/24 1547) Vital Signs (24h Range):  Temp:  [97.2 °F (36.2 °C)-98.2 °F (36.8 °C)] 97.2 °F (36.2 °C)  Pulse:  [87-99] 96  Resp:  [16-18] 18  SpO2:  [90 %-94 %] 92 %  BP: (110-139)/(69-84) 110/69     Weight: 47.6 kg (105 lb)  Body mass index is 18.6 kg/m².       Physical Exam  Constitutional:       General: She is not in acute distress.  HENT:      Head: Normocephalic and atraumatic.   Pulmonary:      Effort: Respiratory distress present.   Musculoskeletal:      Cervical back: Normal range of motion.      Comments: Weakness noted. Pt able to move extremities.    Skin:     General: Skin is warm and dry.      Coloration: Skin is pale.   Neurological:      Mental Status: She is alert and oriented to person, place, and time.   Psychiatric:         Speech: Speech normal.         Behavior: Behavior is  cooperative.          Review of Symptoms      Symptom Assessment (ESAS 0-10 Scale)  Pain:  9  Dyspnea:  0  Anxiety:  0  Nausea:  0  Depression:  0  Anorexia:  0  Fatigue:  0  Insomnia:  0  Restlessness:  0  Agitation:  0         Pain Assessment:  OME in 24 hours:  150  Location(s): back    Back       Location: right        Quality: Throbbing and shooting        Quantity: 6/10 in intensity        Chronicity: Onset 0 second(s) ago, unchanged        Aggravating Factors: Walking and movement        Alleviating Factors: Opiates       Associated Symptoms: Nausea    Living Arrangements:  Lives with family    Psychosocial/Cultural:   See Palliative Psychosocial Note: No  Pt reports she lives with her 22 y/o son in [facility] in [place]. Per pt, she cares for son due to his mental issues. Per pt, son able to help her physically. Pt reports she has a daughter, Thais who loves locally. Per pt she is dealing with liver cancer and received chemo. Pt reports not  and not a big support system. Per pt, her son's godmother is caring for him.   **Primary  to Follow**  Palliative Care  Consult: Yes      Advance Care Planning   Advance Directives:   Living Will: No    Do Not Resuscitate Status: No    Medical Power of : No    Agent's Name:  DaughterThais is NOK    Decision Making:  Patient answered questions  Goals of Care: What is most important right now is to focus on curative/life-prolongation (regardless of treatment burdens). Accordingly, we have decided that the best plan to meet the patient's goals includes continuing with treatment.         Significant Labs: All pertinent labs within the past 24 hours have been reviewed.  CBC:   Recent Labs   Lab 03/12/24 0421   WBC 12.65  13.09*   HGB 10.2*  10.1*   HCT 31.5*  31.9*   MCV 99*  101*     396       BMP:  Recent Labs   Lab 03/12/24 0421   GLU 96   *   K 4.0   CL 99   CO2 26   BUN 10   CREATININE 0.6   CALCIUM 9.2   MG  "2.2       LFT:  Lab Results   Component Value Date    AST 91 (H) 03/12/2024    GGT 1,385 (H) 03/05/2024    ALKPHOS 545 (H) 03/12/2024    BILITOT 0.5 03/12/2024     Albumin:   Albumin   Date Value Ref Range Status   03/12/2024 2.4 (L) 3.5 - 5.2 g/dL Final     Protein:   Total Protein   Date Value Ref Range Status   03/12/2024 5.7 (L) 6.0 - 8.4 g/dL Final     Lactic acid:   No results found for: "LACTATE"    Significant Imaging: I have reviewed all pertinent imaging results/findings within the past 24 hours.    CT head without contrast 3/10/24  Impression:     Evolving postsurgical changes with no new acute intracranial process.  No acute hemorrhage.    Danelle Moore MD  Palliative Medicine  [facility] - Neurosurgery ([facility])                "

## 2024-03-12 NOTE — PROGRESS NOTES
Mario Hsieh - Neurosurgery ([facility])  Adult Nutrition  Progress Note    SUMMARY       Recommendations    1. Continue Regular Diet as toelrated.   2. Recommend Boost Plus (or alternative) QD to help meet nutritional needs.   3. Monitor I/O's, weight and labs.   4. RD to monitor.    Goals: to meet % of EEN/EPN by next RD f/u  Nutrition Goal Status: progressing towards goal  Communication of RD Recs: other (comment)    Assessment and Plan    Endocrine  Moderate malnutrition  Malnutrition Type:  Context: chronic illness  Level: moderate    Related to (etiology):   Brain Mass    Signs and Symptoms (as evidenced by):   Mild wasting muscle/fat and -15% wt loss x 3mo     Malnutrition Characteristic Summary:  Weight Loss (Malnutrition): greater than 7.5% in 3 months (-15% x 3mo)  Subcutaneous Fat (Malnutrition): mild depletion  Muscle Mass (Malnutrition): mild depletion      Interventions/Recommendations (treatment strategy):  Collaboration of nutrition care with other providers  ONS    Nutrition Diagnosis Status:   Continues         Malnutrition Assessment  Malnutrition Context: chronic illness  Malnutrition Level: moderate      Micronutrient Evaluation Summary: no deficiencies   Weight Loss (Malnutrition): greater than 7.5% in 3 months (-15% x 3mo)  Subcutaneous Fat (Malnutrition): mild depletion  Muscle Mass (Malnutrition): mild depletion   Orbital Region (Subcutaneous Fat Loss): mild depletion   [place] Region (Muscle Loss): mild depletion  Clavicle Bone Region (Muscle Loss): mild depletion  Clavicle and Acromion Bone Region (Muscle Loss): mild depletion  Dorsal Hand (Muscle Loss): mild depletion                 Reason for Assessment    Reason For Assessment: RD follow-up  Diagnosis: cancer diagnosis/related complications (left temporal brain mass)  Relevant Medical History: HTN, RA  Interdisciplinary Rounds: did not attend  General Information Comments: RD follow-up. Patient reports appetite/PO intake continue to  "improve. Per chart % consumed at meals, RD provided ONS patient grateful would like supplement added to orders. No c/o N/VC, endorses diarrhea.  Nutrition Discharge Planning: adequate PO intake    Nutrition Risk Screen    Nutrition Risk Screen: no indicators present    Nutrition/Diet History    Patient Reported Diet/Restrictions/Preferences: general  Typical Food/Fluid Intake: 2-3 small meals  Spiritual, Cultural Beliefs, Adventism Practices, Values that Affect Care: no  Food Allergies: NKFA    Anthropometrics    Temp: 97.2 °F (36.2 °C)  Height: 5' 3" (160 cm)  Height (inches): 63 in  Weight Method: Bed Scale  Weight: 47.6 kg (105 lb)  Weight (lb): 105 lb  Ideal Body Weight (IBW), Female: 115 lb  % Ideal Body Weight, Female (lb): 91.3 %  BMI (Calculated): 18.6  BMI Grade: 18.5-24.9 - normal       Lab/Procedures/Meds    Pertinent Labs Reviewed: reviewed  Pertinent Labs Comments: Na 135, , TP 5.7, AST 91, ALT 99  Pertinent Medications Reviewed: reviewed  Pertinent Medications Comments: Dexamethasone, enoxaparin, famotidine, senna-docusate, morphine, oxycodone, loperamide      Estimated/Assessed Needs    Weight Used For Calorie Calculations: 47.6 kg (104 lb 15 oz)  Energy Calorie Requirements (kcal): 1428- 1666 kcal  Energy Need Method: Kcal/kg (30-35 kcal/kg)  Protein Requirements: 57- 72g (1.2-1.5g/kg)  Weight Used For Protein Calculations: 47.6 kg (104 lb 15 oz)  Fluid Requirements (mL): 1ml/1kcal or per MD  Estimated Fluid Requirement Method: RDA Method  RDA Method (mL): 1428         Nutrition Prescription Ordered    Current Diet Order: Regular diet    Evaluation of Received Nutrient/Fluid Intake    I/O: -8.22 L (Since admit)  Energy Calories Required: meeting needs  Protein Required: meeting needs  Fluid Required:  (as per MD)  Comments: LBM: 3/11  Tolerance: tolerating  % Intake of Estimated Energy Needs: 75 - 100 %  % Meal Intake: 75 - 100 %    Nutrition Risk    Level of Risk/Frequency of Follow-up: "  (RD to f/u x 1/week)     Monitor and Evaluation    Food and Nutrient Intake: energy intake, food and beverage intake  Food and Nutrient Adminstration: diet order  Physical Activity and Function: nutrition-related ADLs and IADLs  Anthropometric Measurements: weight change, weight, body mass index  Biochemical Data, Medical Tests and Procedures: electrolyte and renal panel, gastrointestinal profile, glucose/endocrine profile, inflammatory profile  Nutrition-Focused Physical Findings: overall appearance, skin     Nutrition Follow-Up    RD Follow-up?: Yes    Иван Gallego Registration Eligible, Provisional LDN

## 2024-03-12 NOTE — PLAN OF CARE
Recommendations    1. Continue Regular Diet as toelrated.   2. Recommend Boost Plus (or alternative) QD to help meet nutritional needs.   3. Monitor I/O's, weight and labs.   4. RD to monitor.    Goals: to meet % of EEN/EPN by next RD f/u  Nutrition Goal Status: progressing towards goal  Communication of RD Recs: other (comment)

## 2024-03-13 ENCOUNTER — PATIENT MESSAGE (OUTPATIENT)
Dept: RHEUMATOLOGY | Facility: CLINIC | Age: 64
End: 2024-03-13
Payer: MEDICAID

## 2024-03-13 LAB
ALBUMIN SERPL BCP-MCNC: 2.3 G/DL (ref 3.5–5.2)
ALP SERPL-CCNC: 565 U/L (ref 55–135)
ALT SERPL W/O P-5'-P-CCNC: 94 U/L (ref 10–44)
ANION GAP SERPL CALC-SCNC: 8 MMOL/L (ref 8–16)
APTT PPP: 43.8 SEC (ref 21–32)
AST SERPL-CCNC: 92 U/L (ref 10–40)
BASOPHILS # BLD AUTO: 0.01 K/UL (ref 0–0.2)
BASOPHILS NFR BLD: 0.1 % (ref 0–1.9)
BILIRUB SERPL-MCNC: 0.6 MG/DL (ref 0.1–1)
BUN SERPL-MCNC: 11 MG/DL (ref 8–23)
CALCIUM SERPL-MCNC: 9.1 MG/DL (ref 8.7–10.5)
CHLORIDE SERPL-SCNC: 101 MMOL/L (ref 95–110)
CO2 SERPL-SCNC: 26 MMOL/L (ref 23–29)
CREAT SERPL-MCNC: 0.7 MG/DL (ref 0.5–1.4)
DIFFERENTIAL METHOD BLD: ABNORMAL
EOSINOPHIL # BLD AUTO: 0 K/UL (ref 0–0.5)
EOSINOPHIL NFR BLD: 0.1 % (ref 0–8)
ERYTHROCYTE [DISTWIDTH] IN BLOOD BY AUTOMATED COUNT: 14.7 % (ref 11.5–14.5)
EST. GFR  (NO RACE VARIABLE): >60 ML/MIN/1.73 M^2
GLUCOSE SERPL-MCNC: 102 MG/DL (ref 70–110)
HCT VFR BLD AUTO: 30.7 % (ref 37–48.5)
HGB BLD-MCNC: 9.9 G/DL (ref 12–16)
IMM GRANULOCYTES # BLD AUTO: 0.2 K/UL (ref 0–0.04)
IMM GRANULOCYTES NFR BLD AUTO: 1.5 % (ref 0–0.5)
LYMPHOCYTES # BLD AUTO: 0.4 K/UL (ref 1–4.8)
LYMPHOCYTES NFR BLD: 3 % (ref 18–48)
MAGNESIUM SERPL-MCNC: 2.2 MG/DL (ref 1.6–2.6)
MCH RBC QN AUTO: 31.5 PG (ref 27–31)
MCHC RBC AUTO-ENTMCNC: 32.2 G/DL (ref 32–36)
MCV RBC AUTO: 98 FL (ref 82–98)
MONOCYTES # BLD AUTO: 0.7 K/UL (ref 0.3–1)
MONOCYTES NFR BLD: 5.1 % (ref 4–15)
NEUTROPHILS # BLD AUTO: 12 K/UL (ref 1.8–7.7)
NEUTROPHILS NFR BLD: 90.2 % (ref 38–73)
NRBC BLD-RTO: 0 /100 WBC
PHOSPHATE SERPL-MCNC: 3.5 MG/DL (ref 2.7–4.5)
PLATELET # BLD AUTO: 220 K/UL (ref 150–450)
PMV BLD AUTO: 9.2 FL (ref 9.2–12.9)
POTASSIUM SERPL-SCNC: 4.2 MMOL/L (ref 3.5–5.1)
PROT SERPL-MCNC: 5.6 G/DL (ref 6–8.4)
RBC # BLD AUTO: 3.14 M/UL (ref 4–5.4)
SODIUM SERPL-SCNC: 135 MMOL/L (ref 136–145)
WBC # BLD AUTO: 13.25 K/UL (ref 3.9–12.7)

## 2024-03-13 PROCEDURE — 99233 SBSQ HOSP IP/OBS HIGH 50: CPT | Mod: ,,, | Performed by: INTERNAL MEDICINE

## 2024-03-13 PROCEDURE — 25000003 PHARM REV CODE 250: Performed by: STUDENT IN AN ORGANIZED HEALTH CARE EDUCATION/TRAINING PROGRAM

## 2024-03-13 PROCEDURE — 97535 SELF CARE MNGMENT TRAINING: CPT | Mod: CO

## 2024-03-13 PROCEDURE — 25000003 PHARM REV CODE 250

## 2024-03-13 PROCEDURE — 83735 ASSAY OF MAGNESIUM: CPT

## 2024-03-13 PROCEDURE — 97530 THERAPEUTIC ACTIVITIES: CPT

## 2024-03-13 PROCEDURE — 25000003 PHARM REV CODE 250: Performed by: HOSPITALIST

## 2024-03-13 PROCEDURE — 85730 THROMBOPLASTIN TIME PARTIAL: CPT | Performed by: HOSPITALIST

## 2024-03-13 PROCEDURE — 63600175 PHARM REV CODE 636 W HCPCS: Mod: UD

## 2024-03-13 PROCEDURE — 63600175 PHARM REV CODE 636 W HCPCS: Mod: UD | Performed by: HOSPITALIST

## 2024-03-13 PROCEDURE — 84100 ASSAY OF PHOSPHORUS: CPT

## 2024-03-13 PROCEDURE — 80053 COMPREHEN METABOLIC PANEL: CPT

## 2024-03-13 PROCEDURE — 11000001 HC ACUTE MED/SURG PRIVATE ROOM

## 2024-03-13 PROCEDURE — 25000003 PHARM REV CODE 250: Performed by: INTERNAL MEDICINE

## 2024-03-13 PROCEDURE — 85025 COMPLETE CBC W/AUTO DIFF WBC: CPT

## 2024-03-13 PROCEDURE — 25000003 PHARM REV CODE 250: Performed by: PHYSICIAN ASSISTANT

## 2024-03-13 PROCEDURE — 63600175 PHARM REV CODE 636 W HCPCS: Mod: UD | Performed by: STUDENT IN AN ORGANIZED HEALTH CARE EDUCATION/TRAINING PROGRAM

## 2024-03-13 RX ADMIN — DEXAMETHASONE SODIUM PHOSPHATE 2 MG: 4 INJECTION INTRA-ARTICULAR; INTRALESIONAL; INTRAMUSCULAR; INTRAVENOUS; SOFT TISSUE at 08:03

## 2024-03-13 RX ADMIN — ATENOLOL 50 MG: 25 TABLET ORAL at 08:03

## 2024-03-13 RX ADMIN — GABAPENTIN 300 MG: 300 CAPSULE ORAL at 08:03

## 2024-03-13 RX ADMIN — FAMOTIDINE 20 MG: 20 TABLET ORAL at 08:03

## 2024-03-13 RX ADMIN — HYDROMORPHONE HYDROCHLORIDE 1 MG: 1 INJECTION, SOLUTION INTRAMUSCULAR; INTRAVENOUS; SUBCUTANEOUS at 03:03

## 2024-03-13 RX ADMIN — OXYCODONE HYDROCHLORIDE 10 MG: 10 TABLET ORAL at 04:03

## 2024-03-13 RX ADMIN — ALUMINUM HYDROXIDE, MAGNESIUM HYDROXIDE, AND SIMETHICONE 30 ML: 1200; 120; 1200 SUSPENSION ORAL at 10:03

## 2024-03-13 RX ADMIN — ALUMINUM HYDROXIDE, MAGNESIUM HYDROXIDE, AND SIMETHICONE 30 ML: 1200; 120; 1200 SUSPENSION ORAL at 03:03

## 2024-03-13 RX ADMIN — OXYCODONE HYDROCHLORIDE 15 MG: 10 TABLET ORAL at 10:03

## 2024-03-13 RX ADMIN — ACETAMINOPHEN 650 MG: 325 TABLET ORAL at 10:03

## 2024-03-13 RX ADMIN — SODIUM CHLORIDE 2000 MG: 1 TABLET ORAL at 08:03

## 2024-03-13 RX ADMIN — ALUMINUM HYDROXIDE, MAGNESIUM HYDROXIDE, AND SIMETHICONE 30 ML: 1200; 120; 1200 SUSPENSION ORAL at 08:03

## 2024-03-13 RX ADMIN — METHOCARBAMOL 500 MG: 500 TABLET ORAL at 05:03

## 2024-03-13 RX ADMIN — OXYCODONE HYDROCHLORIDE 10 MG: 10 TABLET ORAL at 05:03

## 2024-03-13 RX ADMIN — HEPARIN SODIUM AND DEXTROSE 25 UNITS/KG/HR: 10000; 5 INJECTION INTRAVENOUS at 04:03

## 2024-03-13 RX ADMIN — ALUMINUM HYDROXIDE, MAGNESIUM HYDROXIDE, AND SIMETHICONE 30 ML: 1200; 120; 1200 SUSPENSION ORAL at 05:03

## 2024-03-13 RX ADMIN — LEVETIRACETAM 500 MG: 500 TABLET, FILM COATED ORAL at 08:03

## 2024-03-13 RX ADMIN — SODIUM CHLORIDE 2000 MG: 1 TABLET ORAL at 03:03

## 2024-03-13 RX ADMIN — MORPHINE SULFATE 30 MG: 30 TABLET, FILM COATED, EXTENDED RELEASE ORAL at 08:03

## 2024-03-13 RX ADMIN — GABAPENTIN 300 MG: 300 CAPSULE ORAL at 03:03

## 2024-03-13 RX ADMIN — HYDROMORPHONE HYDROCHLORIDE 1 MG: 1 INJECTION, SOLUTION INTRAMUSCULAR; INTRAVENOUS; SUBCUTANEOUS at 11:03

## 2024-03-13 RX ADMIN — HYDROMORPHONE HYDROCHLORIDE 1 MG: 1 INJECTION, SOLUTION INTRAMUSCULAR; INTRAVENOUS; SUBCUTANEOUS at 07:03

## 2024-03-13 RX ADMIN — SODIUM BICARBONATE 650 MG: 650 TABLET ORAL at 08:03

## 2024-03-13 NOTE — ASSESSMENT & PLAN NOTE
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 Pt has hx of RA. Pt has poor performance status. Pt is alert, oriented to person, place, and situation. Pt reports back and hip pain. Pt is a full code.     Reason for consult: ACP/GOC    3/8 Met with pt along with Shona KAUR LCSW. Pt would like to get closer to [facility] to be by her son and close friend Prachi. At this time, pt interested in palliative treatment for her cancer. Communicated with BRANDT Lees.     3/6/24: Met with pt along with SUYAPA Nagel with pal care. Per pt, her son is now staying in [facility] with his godmother. Pt report he has learning disabilities and ADHD. Per pt, there is not one to care for her at home. Per pt, daughter has stage IV cancer and receiving treatment. Pt has Medicaid paperwork in her chart. She reports she needs assistance filling out. SUYAPA Nagel to reach out to Medicaid representative, Marilynn.     Pt is aware she may be too debilitated to receive chemo.   Attempted to reach out to pt's daughter. Voicemail full.    3/5/24 Met with pt who is aware of her newly dx metastatic cancer. Pt reports "I know I am terminal." Pt reports she wants to know what her treatment options are that may extend her life longer. Pt reports she carefor her 20 y/o son who has mental issues but able to help her at home. They live in a trailer in [place]. Pt reports her daughter Thais lives locally. Per pt she has liver cancer and is receiving active chemo. T reports she is not .     Pt does report she has debility which has prevented ambulation.     MPOA- NOK is Thais landrum. Per pt son unable to make medical decisions.     Code status discussed. Pt reports she wants to be a full code for now while she gets her affairs in order. Per pt, if she is put on life support and not getting better, she would want it stopped and be made comfortable.     Symptom management:     Pain to right hip and back are r/t  metastatic " cancer.   OME in 24 hour is about 205  Pt reports throbbing and shooting pain improving with current pain regimen     Current meds:   Dilaudid 1 mg IVP q 4 hrs pain not relieved by pain meds.   Oxycodone 15 mg q 4 hrs prn pain.   Pt on Neurontin 100 mg tid.   Pt on dexamethasone 4 mg IVP   MS contin 30mg BID    Recs:   Continue Mscontin 30mg BID. Continue oxycodone 15mg. Continue IV dilaudid for breakthrough   Will continue to reassess.     Nausea:   Pt on zofran 4 mg IVP q 8 hrs prn nausea.     Debility:   PT/OT working with pt.     Discussed with Dr. Montenegro

## 2024-03-13 NOTE — PT/OT/SLP PROGRESS
"Physical Therapy Co-Treatment  Co-treatment performed to appropriately and safely address patient's strength and endurance deficits while facilitating functional tasks in addition to accommodating for patient's activity/pain tolerance.    Patient Name:  Crow Hines   MRN:  [id number]    Recommendations:     Discharge Recommendations: Moderate Intensity Therapy  Discharge Equipment Recommendations: hospital bed, wheelchair  Barriers to discharge: Inaccessible home and Decreased caregiver support    Assessment:     Crow Hines is a 63 y.o. female admitted with a medical diagnosis of Brain mass.  She presents with the following impairments/functional limitations: weakness, impaired endurance, impaired self care skills, impaired functional mobility, gait instability, impaired balance, decreased upper extremity function, decreased lower extremity function, decreased safety awareness, pain, impaired sensation, orthopedic precautions. Pt was able to tolerate increased time sitting this date, however pain still significantly increased while sitting EOB and standing. Pt did report her body feeling numb and feeling like she was in "la la land." Rn notified of patient complaints. MMT of BUE and BLE as well as sensation screen performed. Strength symmetrical and sensation symmetrical and pt reports numbness bilaterally. Pt would benefit from a moderate intensity/frequency therapy for: Dynamic/static standing/sitting balance through skilled balance training, strengthening with the use of skilled therapeutic exercises interventions, and mobility through adaptive equipment training. Pt continues to benefit from a collaborative PT/OT program to improve quality of life and focus on recovery of impairments.      Rehab Prognosis: Good; patient would benefit from acute skilled PT services to address these deficits and reach maximum level of function.    Recent Surgery: Procedure(s) (LRB):  CRANIOTOMY, WITH NEOPLASM EXCISION USING " "COMPUTER-ASSISTED NAVIGATION (Left) 14 Days Post-Op    Plan:     During this hospitalization, patient to be seen 3 x/week to address the identified rehab impairments via gait training, therapeutic activities, therapeutic exercises, neuromuscular re-education and progress toward the following goals:    Plan of Care Expires:  04/01/24    Subjective     Chief Complaint: FOX  Patient/Family Comments/goals:   Pt reporting her body feels numb and that she feels like she is in "la la land"  "See girls, I need you for direction" - referring to Vcing for safe movement with decreased pain  Pain/Comfort:    5/10, not rated at end of session      Objective:     Communicated with RN prior to session.  Patient found HOB elevated with telemetry, peripheral IV upon PT entry to room.     General Precautions: Standard, fall  Orthopedic Precautions: spinal precautions  Braces: LSO (for comfort)  Respiratory Status: Room air     Functional Mobility:  Bed Mobility:     Scooting: contact guard assistance  Supine to Sit: moderate assistance  Sit to Supine: minimum assistance  Transfers:     Sit to Stand:  moderate assistance with 4 wheeled walker  Sit to Stand:  minimum assistance with 4 wheeled walker; pt unable to do further mobility 2/2 pain and needed to return to seated and then supine  Gait: FOX this date  Balance:   Static Sitting: SBA-CGA  Dynamic Sitting: CGA  Static Standing: Fidel-modA with rollator  Dynamic Standing: N/T      AM-PAC 6 CLICK MOBILITY   14       Treatment & Education:  Patient educated on role of therapy, goals of session, and benefits of mobilizing.   Discussed PT plan of care during hospitalization.   Patient educated on calling for assistance.   Patient educated on how their diagnosis impacts their mobility within PT scope of practice.   All questions answered within PT scope of practice.    Patient left HOB elevated with all lines intact, call button in reach, bed alarm on, and RN notified..    GOALS: "   Multidisciplinary Problems       Physical Therapy Goals          Problem: Physical Therapy    Goal Priority Disciplines Outcome Goal Variances Interventions   Physical Therapy Goal     PT, PT/OT Ongoing, Progressing     Description: Goals to be met by: 3/12/2024     Patient will increase functional independence with mobility by performin. Supine to sit with [place]  2. Sit to supine with [place]  3. Rolling to Left and Right with [place].  4. Sit to stand transfer with Contact Guard Assistance with RW  5. Bed to chair transfer with Contact Guard Assistance using Rolling Walker  6. Gait  x 50 feet with Contact Guard Assistance using Rolling Walker.   7. Ascend/descend 3 stair with right Handrails Minimal Assistance using No Assistive Device.   8. Lower extremity exercise program x10 reps per handout, with supervision                             Time Tracking:     PT Received On: 24  PT Start Time: 1313     PT Stop Time: 1339  PT Total Time (min): 26 min     Billable Minutes: Therapeutic Activity 26    Treatment Type: Treatment  PT/PTA: PT     Number of PTA visits since last PT visit: 0     2024

## 2024-03-13 NOTE — SUBJECTIVE & OBJECTIVE
Interval History: Pt reports pain control improved. She reports feeling frustrated with not being able to get to the bedside commode without assistance. Feels motivated to get stronger. Waiting for transfer to [place]      Past Medical History:   Diagnosis Date    Arthritis        Past Surgical History:   Procedure Laterality Date    CRANIOTOMY, WITH NEOPLASM EXCISION USING COMPUTER-ASSISTED NAVIGATION Left 2/29/2024    Procedure: CRANIOTOMY, WITH NEOPLASM EXCISION USING COMPUTER-ASSISTED NAVIGATION;  Surgeon: Felix Szymanski DO;  Location: [facility] OR [address] [place] [address];  Service: Neurosurgery;  Laterality: Left;  (Left temporal crani for rescetion of tumor)  [place]  BRAIN LAB  Navigation - CT with contrast    TUBAL LIGATION  2002       Review of patient's allergies indicates:  No Known Allergies    Medications:  Continuous Infusions:   heparin (porcine) in D5W 25 Units/kg/hr (03/12/24 8633)     Scheduled Meds:   aluminum-magnesium hydroxide-simethicone  30 mL Oral QID (AC & HS)    atenoloL  50 mg Oral Daily    dexAMETHasone  2 mg Intravenous Q12H    famotidine  20 mg Oral BID    gabapentin  300 mg Oral TID    levETIRAcetam  500 mg Oral BID    morphine  30 mg Oral Q12H    sodium bicarbonate  650 mg Oral Daily    sodium chloride  2,000 mg Oral TID     PRN Meds:acetaminophen, bisacodyL, hydrALAZINE, HYDROmorphone, labetalol, loperamide, methocarbamoL, ondansetron, oxyCODONE, oxyCODONE    Family History    None       Tobacco Use    Smoking status: Every Day     Current packs/day: 0.50     Types: Cigarettes    Smokeless tobacco: Current    Tobacco comments:     3/4 PPD   Substance and Sexual Activity    Alcohol use: Not Currently     Comment: OCCASIONALLY    Drug use: Never    Sexual activity: Not Currently       Review of Systems   Constitutional:  Positive for activity change and fatigue.   HENT:  Negative for trouble swallowing.    Respiratory:  Negative for shortness of breath.    Gastrointestinal:  Positive for  nausea.   Musculoskeletal:  Positive for back pain.   Neurological:  Positive for weakness.   Psychiatric/Behavioral:  Positive for decreased concentration.      Objective:     Vital Signs (Most Recent):  Temp: 97.7 °F (36.5 °C) (03/13/24 1556)  Pulse: 80 (03/13/24 1556)  Resp: 19 (03/13/24 1558)  BP: 101/62 (03/13/24 1608)  SpO2: (!) 92 % (03/13/24 1556) Vital Signs (24h Range):  Temp:  [97.2 °F (36.2 °C)-98.4 °F (36.9 °C)] 97.7 °F (36.5 °C)  Pulse:  [80-92] 80  Resp:  [17-19] 19  SpO2:  [91 %-94 %] 92 %  BP: ()/(50-69) 101/62     Weight: 47.6 kg (105 lb)  Body mass index is 18.6 kg/m².       Physical Exam  Constitutional:       General: She is not in acute distress.  HENT:      Head: Normocephalic and atraumatic.   Pulmonary:      Effort: Respiratory distress present.   Musculoskeletal:      Cervical back: Normal range of motion.      Comments: Weakness noted. Pt able to move extremities.    Skin:     General: Skin is warm and dry.      Coloration: Skin is pale.   Neurological:      Mental Status: She is alert and oriented to person, place, and time.   Psychiatric:         Speech: Speech normal.         Behavior: Behavior is cooperative.          Review of Symptoms      Symptom Assessment (ESAS 0-10 Scale)  Pain:  0  Dyspnea:  0  Anxiety:  0  Nausea:  0  Depression:  0  Anorexia:  0  Fatigue:  0  Insomnia:  0  Restlessness:  0  Agitation:  0         Pain Assessment:  OME in 24 hours:  150 -> 205  Location(s): back    Back       Location: right        Quality: Throbbing and shooting        Quantity: 6/10 in intensity        Chronicity: Onset 0 second(s) ago, unchanged        Aggravating Factors: Walking and movement        Alleviating Factors: Opiates       Associated Symptoms: Nausea    Living Arrangements:  Lives with family    Psychosocial/Cultural:   See Palliative Psychosocial Note: No  Pt reports she lives with her 22 y/o son in [facility] in [place]. Per pt, she cares for son due to his mental issues. Per pt,  "son able to help her physically. Pt reports she has a daughter, Thais who loves locally. Per pt she is dealing with liver cancer and received chemo. Pt reports not  and not a big support system. Per pt, her son's godmother is caring for him.   **Primary  to Follow**  Palliative Care  Consult: Yes      Advance Care Planning   Advance Directives:   Living Will: No    Do Not Resuscitate Status: No    Medical Power of : No    Agent's Name:  Daughter, Thais Hines is NOK    Decision Making:  Patient answered questions  Goals of Care: What is most important right now is to focus on curative/life-prolongation (regardless of treatment burdens). Accordingly, we have decided that the best plan to meet the patient's goals includes continuing with treatment.         Significant Labs: All pertinent labs within the past 24 hours have been reviewed.  CBC:   Recent Labs   Lab 03/13/24  0531   WBC 13.25*   HGB 9.9*   HCT 30.7*   MCV 98          BMP:  Recent Labs   Lab 03/13/24  0531      *   K 4.2      CO2 26   BUN 11   CREATININE 0.7   CALCIUM 9.1   MG 2.2       LFT:  Lab Results   Component Value Date    AST 92 (H) 03/13/2024    GGT 1,385 (H) 03/05/2024    ALKPHOS 565 (H) 03/13/2024    BILITOT 0.6 03/13/2024     Albumin:   Albumin   Date Value Ref Range Status   03/13/2024 2.3 (L) 3.5 - 5.2 g/dL Final     Protein:   Total Protein   Date Value Ref Range Status   03/13/2024 5.6 (L) 6.0 - 8.4 g/dL Final     Lactic acid:   No results found for: "LACTATE"    Significant Imaging: I have reviewed all pertinent imaging results/findings within the past 24 hours.    CT head without contrast 3/10/24  Impression:     Evolving postsurgical changes with no new acute intracranial process.  No acute hemorrhage.  "

## 2024-03-13 NOTE — ASSESSMENT & PLAN NOTE
-Stable respiratory status  -Per Neurosurgery recommendation, awaiting for heparin infusion drip to be therapeutic and then to repeat head CT to rule out bleed

## 2024-03-13 NOTE — ASSESSMENT & PLAN NOTE
-CT abd/pelvis w/ numerous metastatic masses in the liver, likely metastatic deposit in the spleen; bilateral adrenal masses, hypoattenuating and mildly enhancing mass of upper pole L kidney, intra-abdominal enlarged lymph nodes consistent w/ metastatic infiltration.   -masses to R lung base w/ consolidation of visualized R renal lower lobe; R lower lobe 9mm nodule  -L2 vertebra compression fracture w/ mild sclerosis of the vertebra, likely reflecting a pathologic fracture; faint sclerosis of the superior endplate of S1 likely reflects metastatic infiltration  -deferring staging workup including MRI spine and labs to medical oncology  -medical oncology discussion, they affirmed that the patient is a fairly decent candidate for systemic chemotherapy but best for patient to be in [place] to initiate the cycle as she has no follow up options here in the state due to Pay[employer] source limitations and residency status  -per rad onc, pt not a good candidate at this time until possibly later in future if she gets chemotherapy.    Per onc, No evidence of visceral crisis currently requiring emergent initiation of inpatient chemotherapy, but pt needs urgent f/u for consideration of palliative systemic therapy if it can be arranged. Pt is a resident of [place] so arranging f/u at [facility] or one closer to home would be best.     Palliative care consulted  Pain management

## 2024-03-13 NOTE — PROGRESS NOTES
"Mario Hsieh - Neurosurgery ([facility])  Palliative Medicine  Progress Note    Patient Name: Crow Hines  MRN: [id number]  Admission Date: 2/27/2024  Hospital Length of Stay: 15 days  Code Status: Full Code   Attending Provider: Theodore Montenegro*  Consulting Provider: Danelle Moore MD  Primary Care Physician: Chika Powell MD  Principal Problem:Brain mass    Patient information was obtained from patient and primary team.      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 Pt has hx of RA. Pt has poor performance status. Pt is alert, oriented to person, place, and situation. Pt reports back and hip pain. Pt is a full code.     Reason for consult: ACP/GOC    3/8 Met with pt along with Shona KAUR LCSW. Pt would like to get closer to [facility] to be by her son and close friend Prachi. At this time, pt interested in palliative treatment for her cancer. Communicated with BRANDT Lees.     3/6/24: Met with pt along with SUYAPA Nagel with pal care. Per pt, her son is now staying in [facility] with his godmother. Pt report he has learning disabilities and ADHD. Per pt, there is not one to care for her at home. Per pt, daughter has stage IV cancer and receiving treatment. Pt has Medicaid paperwork in her chart. She reports she needs assistance filling out. SUYAPA Nagel to reach out to Medicaid representative, Marilynn.     Pt is aware she may be too debilitated to receive chemo.   Attempted to reach out to pt's daughter. Voicemail full.    3/5/24 Met with pt who is aware of her newly dx metastatic cancer. Pt reports "I know I am terminal." Pt reports she wants to know what her treatment options are that may extend her life longer. Pt reports she carefor her 20 y/o son who has mental issues but able to help her at home. They live in a trailer in [place]. Pt reports her daughter Thais lives locally. Per pt she has liver cancer " and is receiving active chemo. T reports she is not .     Pt does report she has debility which has prevented ambulation.     MPOA- NOK is Thais landrum. Per pt son unable to make medical decisions.     Code status discussed. Pt reports she wants to be a full code for now while she gets her affairs in order. Per pt, if she is put on life support and not getting better, she would want it stopped and be made comfortable.     Symptom management:     Pain to right hip and back are r/t  metastatic cancer.   OME in 24 hour is about 205  Pt reports throbbing and shooting pain improving with current pain regimen     Current meds:   Dilaudid 1 mg IVP q 4 hrs pain not relieved by pain meds.   Oxycodone 15 mg q 4 hrs prn pain.   Pt on Neurontin 100 mg tid.   Pt on dexamethasone 4 mg IVP   MS contin 30mg BID    Recs:   Continue Mscontin 30mg BID. Continue oxycodone 15mg. Continue IV dilaudid for breakthrough   Will continue to reassess.     Nausea:   Pt on zofran 4 mg IVP q 8 hrs prn nausea.     Debility:   PT/OT working with pt.     Discussed with Dr. Montenegro             I will follow-up with patient. Please contact us if you have any additional questions.    Subjective:     Chief Complaint: No chief complaint on file.      HPI:   Pt is a 62 y/o F with pMHx of HTN and rheumatoid arthritis presenting as transfer from OSH for neurosurgery eval of L temporal lobe mass. Per chart review, pt initially presented to the ED with complaints that everything hurts.   Reportedly began seeing a rheumatologist 9 months ago where she was diagnosed w/ RA. Methotrexate therapy was initiated. Patient reported a continually decline since that time, leading to her being bed-bound for the last 2 months 2/2 to difficulty walking. She stated her rheumatologist believed that she was not tolerating the methotrexate. On presentation to ED, she reported inability to tolerate abdominal pain any longer. Lab work significant for alk phos 203,  AST 57, sodium 132, platelets 586. CXR revealed linear and ill-defined hazy opacities of the right mid to lower lung, felt to reflect infiltrate. Diffuse abdominal pain with focal increased tenderness in mid-epigastric area prompted CT abd/pelvis revealing numerous metastatic masses in the liver, metastatic deposits in the spleen and bilateral adrenal masses, hypoattenuating and mildly enhancing mass of the upper pole L kidney. Also w/ intra-abdominal enlarged lymph nodes, consistent with metastatic infiltration, masses of the R lung base with consolidation of the visualized R renal lower lobe and RLL 9 mm nodule. L2 compression fracture w/ mild sclerosis of the vertebra noted, likely pathologic. CTH completed showing large 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 She was [place] [facility] [place] higher level of care and will be admitted to [facility] for further management.        Hospital Course:  No notes on file    Interval History: Pt reports pain control improved. She reports feeling frustrated with not being able to get to the bedside commode without assistance. Feels motivated to get stronger. Waiting for transfer to [place]      Past Medical History:   Diagnosis Date    Arthritis        Past Surgical History:   Procedure Laterality Date    CRANIOTOMY, WITH NEOPLASM EXCISION USING COMPUTER-ASSISTED NAVIGATION Left 2/29/2024    Procedure: CRANIOTOMY, WITH NEOPLASM EXCISION USING COMPUTER-ASSISTED NAVIGATION;  Surgeon: Felix Szymanski DO;  Location: [facility] OR [address] [place] [address];  Service: Neurosurgery;  Laterality: Left;  (Left temporal crani for rescetion of tumor)  [place]  BRAIN LAB  Navigation - CT with contrast    TUBAL LIGATION  2002       Review of patient's allergies indicates:  No Known Allergies    Medications:  Continuous Infusions:   heparin (porcine) in D5W 25 Units/kg/hr (03/12/24 7453)     Scheduled Meds:   aluminum-magnesium hydroxide-simethicone  30 mL Oral QID (AC & HS)    atenoloL  50 mg Oral  Daily    dexAMETHasone  2 mg Intravenous Q12H    famotidine  20 mg Oral BID    gabapentin  300 mg Oral TID    levETIRAcetam  500 mg Oral BID    morphine  30 mg Oral Q12H    sodium bicarbonate  650 mg Oral Daily    sodium chloride  2,000 mg Oral TID     PRN Meds:acetaminophen, bisacodyL, hydrALAZINE, HYDROmorphone, labetalol, loperamide, methocarbamoL, ondansetron, oxyCODONE, oxyCODONE    Family History    None       Tobacco Use    Smoking status: Every Day     Current packs/day: 0.50     Types: Cigarettes    Smokeless tobacco: Current    Tobacco comments:     3/4 PPD   Substance and Sexual Activity    Alcohol use: Not Currently     Comment: OCCASIONALLY    Drug use: Never    Sexual activity: Not Currently       Review of Systems   Constitutional:  Positive for activity change and fatigue.   HENT:  Negative for trouble swallowing.    Respiratory:  Negative for shortness of breath.    Gastrointestinal:  Positive for nausea.   Musculoskeletal:  Positive for back pain.   Neurological:  Positive for weakness.   Psychiatric/Behavioral:  Positive for decreased concentration.      Objective:     Vital Signs (Most Recent):  Temp: 97.7 °F (36.5 °C) (03/13/24 1556)  Pulse: 80 (03/13/24 1556)  Resp: 19 (03/13/24 1558)  BP: 101/62 (03/13/24 1608)  SpO2: (!) 92 % (03/13/24 1556) Vital Signs (24h Range):  Temp:  [97.2 °F (36.2 °C)-98.4 °F (36.9 °C)] 97.7 °F (36.5 °C)  Pulse:  [80-92] 80  Resp:  [17-19] 19  SpO2:  [91 %-94 %] 92 %  BP: ()/(50-69) 101/62     Weight: 47.6 kg (105 lb)  Body mass index is 18.6 kg/m².       Physical Exam  Constitutional:       General: She is not in acute distress.  HENT:      Head: Normocephalic and atraumatic.   Pulmonary:      Effort: Respiratory distress present.   Musculoskeletal:      Cervical back: Normal range of motion.      Comments: Weakness noted. Pt able to move extremities.    Skin:     General: Skin is warm and dry.      Coloration: Skin is pale.   Neurological:      Mental Status:  She is alert and oriented to person, place, and time.   Psychiatric:         Speech: Speech normal.         Behavior: Behavior is cooperative.          Review of Symptoms      Symptom Assessment (ESAS 0-10 Scale)  Pain:  0  Dyspnea:  0  Anxiety:  0  Nausea:  0  Depression:  0  Anorexia:  0  Fatigue:  0  Insomnia:  0  Restlessness:  0  Agitation:  0         Pain Assessment:  OME in 24 hours:  150 -> 205  Location(s): back    Back       Location: right        Quality: Throbbing and shooting        Quantity: 6/10 in intensity        Chronicity: Onset 0 second(s) ago, unchanged        Aggravating Factors: Walking and movement        Alleviating Factors: Opiates       Associated Symptoms: Nausea    Living Arrangements:  Lives with family    Psychosocial/Cultural:   See Palliative Psychosocial Note: No  Pt reports she lives with her 22 y/o son in [facility] in [place]. Per pt, she cares for son due to his mental issues. Per pt, son able to help her physically. Pt reports she has a daughter, Thais who loves locally. Per pt she is dealing with liver cancer and received chemo. Pt reports not  and not a big support system. Per pt, her son's godmother is caring for him.   **Primary  to Follow**  Palliative Care  Consult: Yes      Advance Care Planning  Advance Directives:   Living Will: No    Do Not Resuscitate Status: No    Medical Power of : No    Agent's Name:  DaughterThais is NOK    Decision Making:  Patient answered questions  Goals of Care: What is most important right now is to focus on curative/life-prolongation (regardless of treatment burdens). Accordingly, we have decided that the best plan to meet the patient's goals includes continuing with treatment.         Significant Labs: All pertinent labs within the past 24 hours have been reviewed.  CBC:   Recent Labs   Lab 03/13/24  0531   WBC 13.25*   HGB 9.9*   HCT 30.7*   MCV 98          BMP:  Recent Labs   Lab  "03/13/24  0531      *   K 4.2      CO2 26   BUN 11   CREATININE 0.7   CALCIUM 9.1   MG 2.2       LFT:  Lab Results   Component Value Date    AST 92 (H) 03/13/2024    GGT 1,385 (H) 03/05/2024    ALKPHOS 565 (H) 03/13/2024    BILITOT 0.6 03/13/2024     Albumin:   Albumin   Date Value Ref Range Status   03/13/2024 2.3 (L) 3.5 - 5.2 g/dL Final     Protein:   Total Protein   Date Value Ref Range Status   03/13/2024 5.6 (L) 6.0 - 8.4 g/dL Final     Lactic acid:   No results found for: "LACTATE"    Significant Imaging: I have reviewed all pertinent imaging results/findings within the past 24 hours.    CT head without contrast 3/10/24  Impression:     Evolving postsurgical changes with no new acute intracranial process.  No acute hemorrhage.     Danelle Moore MD  Palliative Medicine  [facility] - Neurosurgery ([facility])                "

## 2024-03-13 NOTE — PROGRESS NOTES
Mario Hsieh - Neurosurgery ([facility])  Hospital Medicine  Progress Note    Patient Name: Crow Hines  MRN: [id number]  Patient Class: IP- Inpatient   Admission Date: 2/27/2024  Length of Stay: 15 days  Attending Physician: Theodore Montenegro*  Primary Care Provider: Chika Powell MD        Subjective:     Principal Problem:Brain mass        HPI:  No notes on file    Overview/Hospital Course:  62 y/o Female admitted as transfer from OSH for L temporal lobe mass presenting w/ difficulty walking. Lab work significant for alk phos 203, AST 57, sodium 132, platelets 586. CXR revealed linear and ill-defined hazy opacities of the right mid to lower lung, felt to reflect infiltrate. Diffuse abdominal pain with focal increased tenderness in mid-epigastric area prompted CT abd/pelvis revealing numerous metastatic masses in the liver, metastatic deposits in the spleen and bilateral adrenal masses, hypoattenuating and mildly enhancing mass of the upper pole L kidney. Also w/ intra-abdominal enlarged lymph nodes, consistent with metastatic infiltration, masses of the R lung base with consolidation of the visualized R renal lower lobe and RLL 9 mm nodule. L2 compression fracture w/ mild sclerosis of the vertebra noted, likely pathologic. CTH completed showing large 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 Pt underwent L temporal craniotomy tumor resection on 2/29. Path returned metastatic small cell lung CA. Heparin gtt for PE. CTH when therapeutic negative for hemorrhage.     Interval History: [place] contin increased per palliative for improved pain control.   Weaning decadron.     Review of Systems  Objective:     Vital Signs (Most Recent):  Temp: 98 °F (36.7 °C) (03/13/24 1134)  Pulse: 86 (03/13/24 1134)  Resp: 18 (03/13/24 1134)  BP: 110/69 (03/13/24 1134)  SpO2: (!) 94 % (03/13/24 1134) Vital Signs (24h Range):  Temp:  [97.2 °F (36.2 °C)-98.4 °F (36.9 °C)] 98 °F (36.7 °C)  Pulse:  [83-96] 86  Resp:   [17-19] 18  SpO2:  [91 %-94 %] 94 %  BP: (101-117)/(55-69) 110/69     Weight: 47.6 kg (105 lb)  Body mass index is 18.6 kg/m².    Intake/Output Summary (Last 24 hours) at 3/13/2024 1433  Last data filed at 3/12/2024 2000  Gross per 24 hour   Intake --   Output 500 ml   Net -500 ml         Physical Exam  Constitutional:       General: She is not in acute distress.     Appearance: Normal appearance. She is not toxic-appearing or diaphoretic.   Cardiovascular:      Rate and Rhythm: Normal rate and regular rhythm.      Heart sounds: No murmur heard.     No friction rub. No gallop.   Pulmonary:      Effort: Pulmonary effort is normal. No respiratory distress.      Breath sounds: Normal breath sounds. No wheezing or rales.   Abdominal:      General: Abdomen is flat. There is no distension.      Palpations: Abdomen is soft.      Tenderness: There is no abdominal tenderness. There is no guarding or rebound.   Musculoskeletal:      Right lower leg: No edema.      Left lower leg: No edema.   Neurological:      Mental Status: She is alert.             Significant Labs: All pertinent labs within the past 24 hours have been reviewed.    Significant Imaging: I have reviewed all pertinent imaging results/findings within the past 24 hours.    Assessment/Plan:      * Brain mass  -L temporal mass; status post tumor resection on 02/29, pathology showing met small cell lung CA   -scheduled dexamethasone. Continue dex 4q12 for vasogenic edema. Wean to off over 2 weeks  - Keppra 500 mg bid for seizure prophylaxis  - On hep gtt for PE. obtain head CT once therapeutic. If CTH is stable, okay to transition to oral AC upon discharge for treatment of PE.  -PT/OT  -blood pressure control  -pain control  -neurochecks      Vasogenic brain edema  See above      Acute pulmonary embolism without acute cor pulmonale  -Stable respiratory status  -Per Neurosurgery recommendation, awaiting for heparin infusion drip to be therapeutic and then to repeat  head CT to rule out bleed    Compression fracture of lumbar spine, non-traumatic  Back brace      Metastatic neoplastic disease  -CT abd/pelvis w/ numerous metastatic masses in the liver, likely metastatic deposit in the spleen; bilateral adrenal masses, hypoattenuating and mildly enhancing mass of upper pole L kidney, intra-abdominal enlarged lymph nodes consistent w/ metastatic infiltration.   -masses to R lung base w/ consolidation of visualized R renal lower lobe; R lower lobe 9mm nodule  -L2 vertebra compression fracture w/ mild sclerosis of the vertebra, likely reflecting a pathologic fracture; faint sclerosis of the superior endplate of S1 likely reflects metastatic infiltration  -deferring staging workup including MRI spine and labs to medical oncology  -medical oncology discussion, they affirmed that the patient is a fairly decent candidate for systemic chemotherapy but best for patient to be in [place] to initiate the cycle as she has no follow up options here in the state due to Payor source limitations and residency status  -per rad onc, pt not a good candidate at this time until possibly later in future if she gets chemotherapy.    Per onc, No evidence of visceral crisis currently requiring emergent initiation of inpatient chemotherapy, but pt needs urgent f/u for consideration of palliative systemic therapy if it can be arranged. Pt is a resident of [place] so arranging f/u at [facility] or one closer to home would be best.     Palliative care consulted  Pain management      VTE Risk Mitigation (From admission, onward)           Ordered     heparin 25,000 units in dextrose 5% 250 mL (100 units/mL) infusion HIGH INTENSITY nomogram - [facility]  Continuous        Question:  Begin at (units/kg/hr)  Answer:  18    03/05/24 1004     Reason for No Pharmacological VTE Prophylaxis  Once        Question:  Reasons:  Answer:  Risk of Bleeding    02/27/24 0556     IP VTE HIGH RISK  PATIENT  Once         02/27/24 0556     Place sequential compression device  Until discontinued         02/27/24 0556                    Discharge Planning   JUAN: 3/12/2024     Code Status: Full Code   Is the patient medically ready for discharge?: No    Reason for patient still in hospital (select all that apply): Pending disposition  Discharge Plan A: Hospital Transfer   Discharge Delays: None known at this time      Theodroe Montenegro MD  Department of Hospital Medicine   [facility] Neurosurgery ([facility])

## 2024-03-13 NOTE — PT/OT/SLP PROGRESS
"Occupational Therapy   Co-Treatment with PT    Name: Crow Hines  MRN: [id number]  Admitting Diagnosis:  Brain mass  13 Days Post-Op  Procedure(s):  CRANIOTOMY, WITH NEOPLASM EXCISION USING COMPUTER-ASSISTED NAVIGATION   Recommendations:     Discharge Recommendations: Moderate Intensity Therapy  Discharge Equipment Recommendations:  hospital bed, wheelchair, lift device  Barriers to discharge:   (increased skilled assistance required)    Assessment:     Crow Hines is a 63 y.o. female with a medical diagnosis of Brain mass.  She presents with the following performance deficits affecting function are weakness, impaired endurance, impaired self care skills, impaired functional mobility, gait instability, impaired balance, pain, decreased lower extremity function, decreased safety awareness, decreased upper extremity function, decreased coordination, impaired coordination, decreased ROM, orthopedic precautions. Patient primarily limited by c/o pain affecting activity tolerance but demonstrated improved initiation and good progress with bed mobility and sit<>stand transfers. Patient has demonstrated sufficient progression to warrant high intensity therapy evidenced by objectives noted below.    Rehab Prognosis:  Good; patient would benefit from acute skilled OT services to address these deficits and reach maximum level of function.       Plan:     Patient to be seen 3 x/week to address the above listed problems via self-care/home management, therapeutic activities, therapeutic exercises, neuromuscular re-education  Plan of Care Expires: 03/27/24  Plan of Care Reviewed with: patient    Subjective     Chief Complaint: "my whole left face is feeling numb. I just feel like I'm in [place]."  Patient/Family Comments/goals: "Can you get me some ice cream?"  Pain/Comfort:  Pain Rating 1: 5/10  Location - Orientation 1: generalized  Location 1: back  Pain Addressed 1: Reposition, Pre-medicate for activity, " Distraction  Pain Rating Post-Intervention 1:  (unrated)    Objective:     Communicated with: nurse cosme prior to session.  Patient found HOB elevated with telemetry, peripheral IV upon OT entry to room.     General Precautions: Standard, fall    Orthopedic Precautions:spinal precautions  Braces: LSO (for comfort)  Respiratory Status: Room air     Occupational Performance:     Bed Mobility:    Patient completed Scooting/Bridging in supine to HOB with contact guard assistance and B overhead rails   Patient completed Supine to Sit with moderate assistance, with side rail, and HOB elevated with B LE management  2nd trial: CGA, HOB elevated  Patient completed Sit to Supine with contact guard assistance, with side rail, and HOB flat   2nd trial: Min(A), HOB flat    Functional Mobility/Transfers:  Patient completed Sit <> Stand Transfer with moderate assistance  with  4 wheeled walker   2nd trial: Min(A) with rollator/4 wheeled walker  Functional Mobility: patient deferred 2/2 pain    Activities of Daily Living:  Upper Body Dressing: maximal assistance don LSO seated EOB  Toileting: total assistance perirectal hygiene and shaniqua pad changed standing      [facility] 6 Click ADL: 15    Treatment & Education:  Patient edu on OT POC, goals, and current progress. Addressed all patient questions/concerns within [facility] scope of practice. Co-treatment performed with PT due to patient's complexity and benefit of 2 skilled therapists to facilitate functional and safe occupational performance, accommodate patient's activity tolerance, and maximize patient's participation in therapy.     Patient left HOB elevated with all lines intact, call button in reach, bed alarm on, and nurse notified    GOALS:   Multidisciplinary Problems       Occupational Therapy Goals          Problem: Occupational Therapy    Goal Priority Disciplines Outcome Interventions   Occupational Therapy Goal     OT, PT/OT Ongoing, Progressing    Description: Goals to be  met by: 3/27/24     Patient will increase functional independence with ADLs by performing:    UE Dressing with Minimal Assistance.  LE Dressing with Maximum Assistance.  Grooming while EOB with Moderate Assistance.  Toileting from bedside commode with Maximum Assistance for hygiene and clothing management.   Rolling to Bilateral with Moderate Assistance.   Supine to sit with Contact Guard Assistance.  Stand pivot transfers with Maximum Assistance.  Step transfer with Maximum Assistance  Toilet transfer to [facility] with Maximum Assistance.    Patient DC recommendation updated to moderate intensity due to functional ability at this time                         Time Tracking:     OT Date of Treatment: 03/13/24  OT Start Time: 1312  OT Stop Time: 1339  OT Total Time (min): 27 min    Billable Minutes:Self Care/Home Management 27    OT/LYNDA: LYNDA     Number of LYNDA visits since last OT visit: 2    3/13/2024

## 2024-03-13 NOTE — PLAN OF CARE
[facility] transfer center reached out to CM for update on this patient.  CM advised that the plan is patient is still to receive palliative chemo, patient is still on heparin drip and patient is currently pending [place] Medicaid and SSI.

## 2024-03-13 NOTE — SUBJECTIVE & OBJECTIVE
Interval History: [place] contin increased per palliative for improved pain control.   Weaning decadron.     Review of Systems  Objective:     Vital Signs (Most Recent):  Temp: 98 °F (36.7 °C) (03/13/24 1134)  Pulse: 86 (03/13/24 1134)  Resp: 18 (03/13/24 1134)  BP: 110/69 (03/13/24 1134)  SpO2: (!) 94 % (03/13/24 1134) Vital Signs (24h Range):  Temp:  [97.2 °F (36.2 °C)-98.4 °F (36.9 °C)] 98 °F (36.7 °C)  Pulse:  [83-96] 86  Resp:  [17-19] 18  SpO2:  [91 %-94 %] 94 %  BP: (101-117)/(55-69) 110/69     Weight: 47.6 kg (105 lb)  Body mass index is 18.6 kg/m².    Intake/Output Summary (Last 24 hours) at 3/13/2024 1433  Last data filed at 3/12/2024 2000  Gross per 24 hour   Intake --   Output 500 ml   Net -500 ml         Physical Exam  Constitutional:       General: She is not in acute distress.     Appearance: Normal appearance. She is not toxic-appearing or diaphoretic.   Cardiovascular:      Rate and Rhythm: Normal rate and regular rhythm.      Heart sounds: No murmur heard.     No friction rub. No gallop.   Pulmonary:      Effort: Pulmonary effort is normal. No respiratory distress.      Breath sounds: Normal breath sounds. No wheezing or rales.   Abdominal:      General: Abdomen is flat. There is no distension.      Palpations: Abdomen is soft.      Tenderness: There is no abdominal tenderness. There is no guarding or rebound.   Musculoskeletal:      Right lower leg: No edema.      Left lower leg: No edema.   Neurological:      Mental Status: She is alert.             Significant Labs: All pertinent labs within the past 24 hours have been reviewed.    Significant Imaging: I have reviewed all pertinent imaging results/findings within the past 24 hours.

## 2024-03-14 PROBLEM — Z71.89 GOALS OF CARE, COUNSELING/DISCUSSION: Status: ACTIVE | Noted: 2024-03-14

## 2024-03-14 LAB
ALBUMIN SERPL BCP-MCNC: 2.3 G/DL (ref 3.5–5.2)
ALP SERPL-CCNC: 596 U/L (ref 55–135)
ALT SERPL W/O P-5'-P-CCNC: 94 U/L (ref 10–44)
ANION GAP SERPL CALC-SCNC: 10 MMOL/L (ref 8–16)
APTT PPP: 44.7 SEC (ref 21–32)
AST SERPL-CCNC: 104 U/L (ref 10–40)
BASOPHILS # BLD AUTO: 0.01 K/UL (ref 0–0.2)
BASOPHILS NFR BLD: 0.1 % (ref 0–1.9)
BILIRUB SERPL-MCNC: 0.7 MG/DL (ref 0.1–1)
BUN SERPL-MCNC: 13 MG/DL (ref 8–23)
CALCIUM SERPL-MCNC: 9.2 MG/DL (ref 8.7–10.5)
CHLORIDE SERPL-SCNC: 100 MMOL/L (ref 95–110)
CO2 SERPL-SCNC: 26 MMOL/L (ref 23–29)
CREAT SERPL-MCNC: 0.7 MG/DL (ref 0.5–1.4)
DIFFERENTIAL METHOD BLD: ABNORMAL
DNA RANGE(S) EXAMINED NAR: NORMAL
EOSINOPHIL # BLD AUTO: 0 K/UL (ref 0–0.5)
EOSINOPHIL NFR BLD: 0 % (ref 0–8)
ERYTHROCYTE [DISTWIDTH] IN BLOOD BY AUTOMATED COUNT: 14.9 % (ref 11.5–14.5)
EST. GFR  (NO RACE VARIABLE): >60 ML/MIN/1.73 M^2
GENE DIS ANL INTERP-IMP: NORMAL
GENE DIS ASSESSED: NORMAL
GENE MUT TESTED BLD/T: 6.8 M/MB
GLUCOSE SERPL-MCNC: 94 MG/DL (ref 70–110)
HCT VFR BLD AUTO: 30.3 % (ref 37–48.5)
HGB BLD-MCNC: 10 G/DL (ref 12–16)
IMM GRANULOCYTES # BLD AUTO: 0.26 K/UL (ref 0–0.04)
IMM GRANULOCYTES NFR BLD AUTO: 2 % (ref 0–0.5)
LYMPHOCYTES # BLD AUTO: 0.8 K/UL (ref 1–4.8)
LYMPHOCYTES NFR BLD: 6.4 % (ref 18–48)
MAGNESIUM SERPL-MCNC: 2.2 MG/DL (ref 1.6–2.6)
MCH RBC QN AUTO: 31.8 PG (ref 27–31)
MCHC RBC AUTO-ENTMCNC: 33 G/DL (ref 32–36)
MCV RBC AUTO: 97 FL (ref 82–98)
MONOCYTES # BLD AUTO: 0.8 K/UL (ref 0.3–1)
MONOCYTES NFR BLD: 6.2 % (ref 4–15)
MSI CA SPEC-IMP: NORMAL
NEUTROPHILS # BLD AUTO: 11 K/UL (ref 1.8–7.7)
NEUTROPHILS NFR BLD: 85.3 % (ref 38–73)
NRBC BLD-RTO: 0 /100 WBC
PHOSPHATE SERPL-MCNC: 3.3 MG/DL (ref 2.7–4.5)
PLATELET # BLD AUTO: 45 K/UL (ref 150–450)
PLATELET BLD QL SMEAR: ABNORMAL
PMV BLD AUTO: 10.4 FL (ref 9.2–12.9)
POTASSIUM SERPL-SCNC: 4.4 MMOL/L (ref 3.5–5.1)
PROT SERPL-MCNC: 5.7 G/DL (ref 6–8.4)
RBC # BLD AUTO: 3.14 M/UL (ref 4–5.4)
REASON FOR STUDY: NORMAL
SODIUM SERPL-SCNC: 136 MMOL/L (ref 136–145)
TEMPUS FUSIONADDENDUM: NORMAL
TEMPUS LCA: NORMAL
TEMPUS PORTAL: NORMAL
TEMPUS TRIAL1: NORMAL
TEMPUS TRIAL2: NORMAL
TEMPUS TRIAL3: NORMAL
TEMPUS TRIALCOUNT: 3
WBC # BLD AUTO: 12.87 K/UL (ref 3.9–12.7)

## 2024-03-14 PROCEDURE — 25000003 PHARM REV CODE 250: Performed by: FAMILY MEDICINE

## 2024-03-14 PROCEDURE — 25000003 PHARM REV CODE 250: Performed by: PHYSICIAN ASSISTANT

## 2024-03-14 PROCEDURE — 25000003 PHARM REV CODE 250

## 2024-03-14 PROCEDURE — 25000003 PHARM REV CODE 250: Performed by: HOSPITALIST

## 2024-03-14 PROCEDURE — 85730 THROMBOPLASTIN TIME PARTIAL: CPT | Performed by: HOSPITALIST

## 2024-03-14 PROCEDURE — 80053 COMPREHEN METABOLIC PANEL: CPT

## 2024-03-14 PROCEDURE — 63600175 PHARM REV CODE 636 W HCPCS: Mod: UD | Performed by: STUDENT IN AN ORGANIZED HEALTH CARE EDUCATION/TRAINING PROGRAM

## 2024-03-14 PROCEDURE — 99233 SBSQ HOSP IP/OBS HIGH 50: CPT | Mod: ,,, | Performed by: INTERNAL MEDICINE

## 2024-03-14 PROCEDURE — 36415 COLL VENOUS BLD VENIPUNCTURE: CPT | Mod: XB | Performed by: HOSPITALIST

## 2024-03-14 PROCEDURE — 84100 ASSAY OF PHOSPHORUS: CPT

## 2024-03-14 PROCEDURE — 25000003 PHARM REV CODE 250: Performed by: INTERNAL MEDICINE

## 2024-03-14 PROCEDURE — 36415 COLL VENOUS BLD VENIPUNCTURE: CPT | Performed by: HOSPITALIST

## 2024-03-14 PROCEDURE — 63600175 PHARM REV CODE 636 W HCPCS: Mod: UD

## 2024-03-14 PROCEDURE — 83735 ASSAY OF MAGNESIUM: CPT

## 2024-03-14 PROCEDURE — 25000003 PHARM REV CODE 250: Performed by: STUDENT IN AN ORGANIZED HEALTH CARE EDUCATION/TRAINING PROGRAM

## 2024-03-14 PROCEDURE — 11000001 HC ACUTE MED/SURG PRIVATE ROOM

## 2024-03-14 PROCEDURE — 63600175 PHARM REV CODE 636 W HCPCS: Mod: UD | Performed by: HOSPITALIST

## 2024-03-14 PROCEDURE — 85025 COMPLETE CBC W/AUTO DIFF WBC: CPT | Performed by: HOSPITALIST

## 2024-03-14 RX ORDER — GUAIFENESIN 100 MG/5ML
200 SOLUTION ORAL EVERY 4 HOURS PRN
Status: DISCONTINUED | OUTPATIENT
Start: 2024-03-14 | End: 2024-03-17 | Stop reason: HOSPADM

## 2024-03-14 RX ADMIN — FAMOTIDINE 20 MG: 20 TABLET ORAL at 08:03

## 2024-03-14 RX ADMIN — METHOCARBAMOL 500 MG: 500 TABLET ORAL at 10:03

## 2024-03-14 RX ADMIN — ALUMINUM HYDROXIDE, MAGNESIUM HYDROXIDE, AND SIMETHICONE 30 ML: 1200; 120; 1200 SUSPENSION ORAL at 05:03

## 2024-03-14 RX ADMIN — SODIUM BICARBONATE 650 MG: 650 TABLET ORAL at 08:03

## 2024-03-14 RX ADMIN — OXYCODONE HYDROCHLORIDE 15 MG: 10 TABLET ORAL at 02:03

## 2024-03-14 RX ADMIN — OXYCODONE HYDROCHLORIDE 15 MG: 10 TABLET ORAL at 04:03

## 2024-03-14 RX ADMIN — HYDROMORPHONE HYDROCHLORIDE 1 MG: 1 INJECTION, SOLUTION INTRAMUSCULAR; INTRAVENOUS; SUBCUTANEOUS at 10:03

## 2024-03-14 RX ADMIN — OXYCODONE HYDROCHLORIDE 15 MG: 10 TABLET ORAL at 11:03

## 2024-03-14 RX ADMIN — ATENOLOL 50 MG: 25 TABLET ORAL at 08:03

## 2024-03-14 RX ADMIN — DEXAMETHASONE SODIUM PHOSPHATE 2 MG: 4 INJECTION INTRA-ARTICULAR; INTRALESIONAL; INTRAMUSCULAR; INTRAVENOUS; SOFT TISSUE at 08:03

## 2024-03-14 RX ADMIN — LEVETIRACETAM 500 MG: 500 TABLET, FILM COATED ORAL at 08:03

## 2024-03-14 RX ADMIN — MORPHINE SULFATE 30 MG: 30 TABLET, FILM COATED, EXTENDED RELEASE ORAL at 08:03

## 2024-03-14 RX ADMIN — GUAIFENESIN 200 MG: 200 SOLUTION ORAL at 11:03

## 2024-03-14 RX ADMIN — BISACODYL 10 MG: 10 SUPPOSITORY RECTAL at 03:03

## 2024-03-14 RX ADMIN — SODIUM CHLORIDE 2000 MG: 1 TABLET ORAL at 08:03

## 2024-03-14 RX ADMIN — HEPARIN SODIUM AND DEXTROSE 25 UNITS/KG/HR: 10000; 5 INJECTION INTRAVENOUS at 02:03

## 2024-03-14 RX ADMIN — GABAPENTIN 300 MG: 300 CAPSULE ORAL at 08:03

## 2024-03-14 RX ADMIN — ALUMINUM HYDROXIDE, MAGNESIUM HYDROXIDE, AND SIMETHICONE 30 ML: 1200; 120; 1200 SUSPENSION ORAL at 08:03

## 2024-03-14 RX ADMIN — OXYCODONE HYDROCHLORIDE 15 MG: 10 TABLET ORAL at 08:03

## 2024-03-14 NOTE — PROGRESS NOTES
[facility] - Neurosurgery ([facility])  [facility] Medicine  Progress Note    Patient Name: Crow Hines  MRN: [id number]  Patient Class: IP- Inpatient   Admission Date: 2/27/2024  Length of Stay: 16 days  Attending Physician: Huong Burgess MD  Primary Care Provider: Chika Powell MD        Subjective:     Principal Problem:Brain mass        HPI:  64 y/o Female admitted as transfer from OSH for L temporal lobe mass presenting w/ difficulty walking. Lab work significant for alk phos 203, AST 57, sodium 132, platelets 586. CXR revealed linear and ill-defined hazy opacities of the right mid to lower lung, felt to reflect infiltrate. Diffuse abdominal pain with focal increased tenderness in mid-epigastric area prompted CT abd/pelvis revealing numerous metastatic masses in the liver, metastatic deposits in the spleen and bilateral adrenal masses, hypoattenuating and mildly enhancing mass of the upper pole L kidney. Also w/ intra-abdominal enlarged lymph nodes, consistent with metastatic infiltration, masses of the R lung base with consolidation of the visualized R renal lower lobe and RLL 9 mm nodule. L2 compression fracture w/ mild sclerosis of the vertebra noted, likely pathologic. CTH completed showing large 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 Pt underwent L temporal craniotomy tumor resection on 2/29. Path returned metastatic small cell lung CA. Heparin gtt for PE. CTH when therapeutic negative for hemorrhage.     Overview/Hospital Course:  3/14- /71  SpO2 90%  other VSSComplicated social situation and discharge. Pt lives in trailer in [place] and has no transport. No insurance. Trying transferring to [facility] in [place] per onc here since pt is [place] resident w/ no insurance; if that is not possible then back up option may be home hospice until her insurance is approved and then can re-evaluate if she wants to pursue palliative chemo. Has new Metastatic small cell ca, poor prognosis.  See GOC  note below.     Interval History: see above    Review of Systems   Constitutional:  Positive for activity change. Negative for appetite change.   HENT:  Negative for trouble swallowing.    Respiratory:  Negative for shortness of breath.    Cardiovascular:  Negative for chest pain and leg swelling.   Gastrointestinal:  Negative for abdominal pain, constipation and diarrhea.   Genitourinary:  Negative for difficulty urinating.   Musculoskeletal:  Positive for back pain. Negative for gait problem.   Neurological:  Negative for numbness and headaches.   Psychiatric/Behavioral:  Negative for agitation, behavioral problems and confusion.      Objective:     Vital Signs (Most Recent):  Temp: 97.9 °F (36.6 °C) (03/14/24 0823)  Pulse: 93 (03/14/24 0823)  Resp: 14 (03/14/24 0852)  BP: 110/71 (03/14/24 0823)  SpO2: (!) 90 % (03/14/24 0823) Vital Signs (24h Range):  Temp:  [97.7 °F (36.5 °C)-98.7 °F (37.1 °C)] 97.9 °F (36.6 °C)  Pulse:  [80-93] 93  Resp:  [12-19] 14  SpO2:  [90 %-94 %] 90 %  BP: ()/(50-73) 110/71     Weight: 47.6 kg (105 lb)  Body mass index is 18.6 kg/m².    Intake/Output Summary (Last 24 hours) at 3/14/2024 0920  Last data filed at 3/13/2024 2100  Gross per 24 hour   Intake --   Output 350 ml   Net -350 ml         Physical Exam  Constitutional:       General: She is not in acute distress.     Appearance: Normal appearance. She is ill-appearing. She is not toxic-appearing or diaphoretic.      Comments: underweight   HENT:      Head: Atraumatic.      Mouth/Throat:      Mouth: Mucous membranes are moist.      Pharynx: Oropharynx is clear. No posterior oropharyngeal erythema.   Eyes:      General: No scleral icterus.     Extraocular Movements: Extraocular movements intact.      Conjunctiva/sclera: Conjunctivae normal.      Pupils: Pupils are equal, round, and reactive to light.   Neck:      Vascular: No carotid bruit.   Cardiovascular:      Rate and Rhythm: Normal rate and regular rhythm.      Pulses:  Normal pulses.      Heart sounds: Normal heart sounds. No murmur heard.     No friction rub. No gallop.   Pulmonary:      Effort: Pulmonary effort is normal. No respiratory distress.      Breath sounds: Normal breath sounds. No stridor. No wheezing, rhonchi or rales.   Chest:      Chest wall: No tenderness.   Abdominal:      General: Abdomen is flat. Bowel sounds are normal. There is no distension.      Palpations: Abdomen is soft. There is no mass.      Tenderness: There is no abdominal tenderness. There is no right CVA tenderness, left CVA tenderness, guarding or rebound.   Musculoskeletal:         General: No swelling, tenderness, deformity or signs of injury. Normal range of motion.      Cervical back: Normal range of motion and neck supple. No rigidity or tenderness.      Right lower leg: No edema.      Left lower leg: No edema.   Lymphadenopathy:      Cervical: No cervical adenopathy.   Skin:     General: Skin is warm and dry.      Coloration: Skin is not jaundiced or pale.      Findings: No bruising, erythema, lesion or rash.   Neurological:      General: No focal deficit present.      Mental Status: She is alert and oriented to person, place, and time. Mental status is at baseline.      Cranial Nerves: No cranial nerve deficit.      Sensory: No sensory deficit.      Motor: No weakness.   Psychiatric:         Mood and Affect: Mood normal.         Behavior: Behavior normal.         Thought Content: Thought content normal.         Judgment: Judgment normal.             Significant Labs: All pertinent labs within the past 24 hours have been reviewed.  CBC:   Recent Labs   Lab 03/13/24  0531   WBC 13.25*   HGB 9.9*   HCT 30.7*        CMP:   Recent Labs   Lab 03/13/24  0531   *   K 4.2      CO2 26      BUN 11   CREATININE 0.7   CALCIUM 9.1   PROT 5.6*   ALBUMIN 2.3*   BILITOT 0.6   ALKPHOS 565*   AST 92*   ALT 94*   ANIONGAP 8       Significant Imaging: I have reviewed all pertinent  imaging results/findings within the past 24 hours.    Assessment/Plan:      * Brain mass  -L temporal mass; status post tumor resection on 02/29, pathology showing met small cell lung CA   -scheduled dexamethasone. Continue dex 4q12 for vasogenic edema. Wean to off over 2 weeks  - Keppra 500 mg bid for seizure prophylaxis  - On hep gtt for PE. obtain head CT once therapeutic. If CTH is stable, okay to transition to oral AC upon discharge for treatment of PE.  -PT/OT  -blood pressure control  -pain control  -neurochecks      Metastatic neoplastic disease  -CT abd/pelvis w/ numerous metastatic masses in the liver, likely metastatic deposit in the spleen; bilateral adrenal masses, hypoattenuating and mildly enhancing mass of upper pole L kidney, intra-abdominal enlarged lymph nodes consistent w/ metastatic infiltration.   -masses to R lung base w/ consolidation of visualized R renal lower lobe; R lower lobe 9mm nodule  -L2 vertebra compression fracture w/ mild sclerosis of the vertebra, likely reflecting a pathologic fracture; faint sclerosis of the superior endplate of S1 likely reflects metastatic infiltration  -deferring staging workup including MRI spine and labs to medical oncology  -medical oncology discussion, they affirmed that the patient is a fairly decent candidate for systemic chemotherapy but best for patient to be in [place] to initiate the cycle as she has no follow up options here in the state due to Payor source limitations and residency status  -per rad onc, pt not a good candidate at this time until possibly later in future if she gets chemotherapy.    Per onc, No evidence of visceral crisis currently requiring emergent initiation of inpatient chemotherapy, but pt needs urgent f/u for consideration of palliative systemic therapy if it can be arranged. Pt is a resident of [place] so arranging f/u at [facility] or one closer to home would be best.     Palliative  "care consulted  Pain management    Cerebral edema  Due to metastatic small cell lung ca to the brain,      Vasogenic brain edema  See above      Brain compression  See above      Compression fracture of lumbar spine, non-traumatic  Back brace      Acute pulmonary embolism without acute cor pulmonale  -Stable respiratory status  -Per Neurosurgery recommendation, awaiting for heparin infusion drip to be therapeutic and then to repeat head CT to rule out bleed    3/14- PTTs therapeutic, CT head ordered. Will need oral anticoagulant ir able to tolerate.    Palliative care encounter  Appreciate assistance  Pt needs supportive care In [place]  Hospice is appropriate  May be a candidate for systemic chemotherapy which would be palliative, not a cure.      Hyponatremia  Patient has hyponatremia which is controlled,We will aim to correct the sodium by 4-6mEq in 24 hours. We will monitor sodium Daily. The hyponatremia is due to Dehydration/hypovolemia. We will obtain the following studies: see labs. We will treat the hyponatremia with IV fluids. The patient's sodium results have been reviewed and are listed below.  No results for input(s): "NA" in the last 24 hours.    Pain  Scheduled: LA morphine 30 bid, gabapentin  PRN: Dilaudid, oxycodone, robaxin      Cancer related pain  Due to small cell Cancer  See " pain" above      Debility  Patient with Acute debility due to  cancer . Latest AMPAC and GEMS scores have been reviewed. Evaluation for etiology is complete. Plan includes  hospice care/ supportive care.  .    Advance care planning  See palliative care      Goals of care, counseling/discussion  Advance Care Planning    Does patient have Capacity? Yes  Contact:  Pt has son w special needs and DTR who cannot help her. She makes decisions for herself  Goals/Wishes: wants NH placement, Understands she has a poor prognosis, wants comfort measures, if able to get support would consider palliative chemotherapy, Does not have " assistance at home.   Code Status- DNR confirmed 12:30 pm on 3/14.  Pain management- stable, see pain management above  Prognosis-  less than six months.   Family discussions- They are not present. We discussed her diagnosis and future care needs. She is aware of poor prognosis, We discussed code status and hospice. She would like to go to a NH as she can no longer care for herself independently.   Functional Status - limited to bed    Post acute care plan:  Hospice is appropriate, NH with hospice is appropriate as well. CM working on Medicaid approval, supportive care, possible transfer to [facility] for palliative chemotherapy or other forms of support. .   Time spent on Goals of Care:  10 minutes at bedside on 3/14.           Thrombocytosis  monitor      Transaminitis  monitor      Moderate malnutrition  Nutrition consulted. Most recent weight and BMI monitored-     Measurements:  Wt Readings from Last 1 Encounters:   02/28/24 47.6 kg (105 lb)   Body mass index is 18.6 kg/m².    Patient has been screened and assessed by RD.    Malnutrition Type:  Context: chronic illness  Level: moderate    Malnutrition Characteristic Summary:  Weight Loss (Malnutrition): greater than 7.5% in 3 months (-15% x 3mo)  Subcutaneous Fat (Malnutrition): mild depletion  Muscle Mass (Malnutrition): mild depletion    Interventions/Recommendations (treatment strategy):  1. Continue Regular Diet as toelrated. 2. Recommend Boost Plus (or alternative) QD to help meet nutritional needs. 3. Monitor I/O's, weight and labs. 4. RD to monitor.      HTN (hypertension)  Chronic, controlled. Latest blood pressure and vitals reviewed-     Temp:  [97.7 °F (36.5 °C)-98.7 °F (37.1 °C)]   Pulse:  [80-93]   Resp:  [12-19]   BP: ()/(50-73)   SpO2:  [90 %-94 %] .   Home meds for hypertension were reviewed and noted below.   Hypertension Medications               atenoloL (TENORMIN) 50 MG tablet Take 1 tablet (50 mg total) by mouth once daily.             While in the hospital, will manage blood pressure as follows; Adjust home antihypertensive regimen as follows- adjust as needed    Will utilize p.r.n. blood pressure medication only if patient's blood pressure greater than 180/110 and she develops symptoms such as worsening chest pain or shortness of breath.      VTE Risk Mitigation (From admission, onward)           Ordered     heparin 25,000 units in dextrose 5% 250 mL (100 units/mL) infusion HIGH INTENSITY nomogram - [facility]  Continuous        Question:  Begin at (units/kg/hr)  Answer:  18    03/05/24 1004     Reason for No Pharmacological VTE Prophylaxis  Once        Question:  Reasons:  Answer:  Risk of Bleeding    02/27/24 0556     IP VTE HIGH RISK PATIENT  Once         02/27/24 0556     Place sequential compression device  Until discontinued         02/27/24 0556                    Discharge Planning   JUAN: 3/14/2024     Code Status: DNR   Is the patient medically ready for discharge?: No    Reason for patient still in hospital (select all that apply): Patient trending condition  Discharge Plan A: Hospital Transfer   Discharge Delays: None known at this time        Huong Burgess MD  Department of Hospital Medicine   [facility] - Neurosurgery ([facility])

## 2024-03-14 NOTE — ASSESSMENT & PLAN NOTE
Appreciate assistance  Pt needs supportive care In [place]  Hospice is appropriate  May be a candidate for systemic chemotherapy which would be palliative, not a cure.

## 2024-03-14 NOTE — ASSESSMENT & PLAN NOTE
"Patient has hyponatremia which is controlled,We will aim to correct the sodium by 4-6mEq in 24 hours. We will monitor sodium Daily. The hyponatremia is due to Dehydration/hypovolemia. We will obtain the following studies: see labs. We will treat the hyponatremia with IV fluids. The patient's sodium results have been reviewed and are listed below.  No results for input(s): "NA" in the last 24 hours.  "

## 2024-03-14 NOTE — ASSESSMENT & PLAN NOTE
-CT abd/pelvis w/ numerous metastatic masses in the liver, likely metastatic deposit in the spleen; bilateral adrenal masses, hypoattenuating and mildly enhancing mass of upper pole L kidney, intra-abdominal enlarged lymph nodes consistent w/ metastatic infiltration.   -masses to R lung base w/ consolidation of visualized R renal lower lobe; R lower lobe 9mm nodule  -L2 vertebra compression fracture w/ mild sclerosis of the vertebra, likely reflecting a pathologic fracture; faint sclerosis of the superior endplate of S1 likely reflects metastatic infiltration  -deferring staging workup including MRI spine and labs to medical oncology  -medical oncology discussion, they affirmed that the patient is a fairly decent candidate for systemic chemotherapy but best for patient to be in [place] to initiate the cycle as she has no follow up options here in the state due to Payor source limitations and residency status  -per rad onc, pt not a good candidate at this time until possibly later in future if she gets chemotherapy.    Per onc, No evidence of visceral crisis currently requiring emergent initiation of inpatient chemotherapy, but pt needs urgent f/u for consideration of palliative systemic therapy if it can be arranged. Pt is a resident of [place] so arranging f/u at [facility] or one closer to home would be best.     Palliative care consulted  Pain management

## 2024-03-14 NOTE — HPI
64 y/o Female admitted as transfer from OSH for L temporal lobe mass presenting w/ difficulty walking. Lab work significant for alk phos 203, AST 57, sodium 132, platelets 586. CXR revealed linear and ill-defined hazy opacities of the right mid to lower lung, felt to reflect infiltrate. Diffuse abdominal pain with focal increased tenderness in mid-epigastric area prompted CT abd/pelvis revealing numerous metastatic masses in the liver, metastatic deposits in the spleen and bilateral adrenal masses, hypoattenuating and mildly enhancing mass of the upper pole L kidney. Also w/ intra-abdominal enlarged lymph nodes, consistent with metastatic infiltration, masses of the R lung base with consolidation of the visualized R renal lower lobe and RLL 9 mm nodule. L2 compression fracture w/ mild sclerosis of the vertebra noted, likely pathologic. CTH completed showing large 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 Pt underwent L temporal craniotomy tumor resection on 2/29. Path returned metastatic small cell lung CA. Heparin gtt for PE. CTH when therapeutic negative for hemorrhage.

## 2024-03-14 NOTE — PLAN OF CARE
[facility] - Neurosurgery (Hospital)  Discharge Reassessment    Primary Care Provider: Chika Powell MD    Expected Discharge Date: 3/15/2024    Patient is pending transfer to [facility] which was sending hospital.  Patient is uninsured. Plan for patient to get palliative chemo, however, patient is currently uninsured.  Patient is pending [place] Medicaid and SSI disability, applications taken.    Discharge Plan A and Plan B have been determined by review of patient's clinical status, future medical and therapeutic needs, and coverage/benefits for post-acute care in coordination with multidisciplinary team members.   Reassessment (most recent)       Discharge Reassessment - 03/14/24 1431          Discharge Reassessment    Assessment Type Discharge Planning Reassessment     Did the patient's condition or plan change since previous assessment? No     Discharge Plan discussed with: Patient     Communicated JUAN with patient/caregiver Date not available/Unable to determine     Discharge Plan A Hospital Transfer     Discharge Plan B Hospice/home     DME Needed Upon Discharge  other (see comments)   tbd    Transition of Care Barriers Unisured;No family/friends to help     Why the patient remains in the hospital Requires continued medical care        Post-Acute Status    Discharge Delays Payor Issues

## 2024-03-14 NOTE — ASSESSMENT & PLAN NOTE
Nutrition consulted. Most recent weight and BMI monitored-     Measurements:  Wt Readings from Last 1 Encounters:   02/28/24 47.6 kg (105 lb)   Body mass index is 18.6 kg/m².    Patient has been screened and assessed by RD.    Malnutrition Type:  Context: chronic illness  Level: moderate    Malnutrition Characteristic Summary:  Weight Loss (Malnutrition): greater than 7.5% in 3 months (-15% x 3mo)  Subcutaneous Fat (Malnutrition): mild depletion  Muscle Mass (Malnutrition): mild depletion    Interventions/Recommendations (treatment strategy):  1. Continue Regular Diet as toelrated. 2. Recommend Boost Plus (or alternative) QD to help meet nutritional needs. 3. Monitor I/O's, weight and labs. 4. RD to monitor.

## 2024-03-14 NOTE — ASSESSMENT & PLAN NOTE
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 Pt has hx of RA. Pt has poor performance status. Pt is alert, oriented to person, place, and situation. Pt reports back and hip pain. Pt is a full code.     Reason for consult: ACP/GOC    3/8 Met with pt along with Shona KAUR LCSW. Pt would like to get closer to [facility] to be by her son and close friend Prachi. At this time, pt interested in palliative treatment for her cancer. Communicated with BRANDT Lees.     3/6/24: Met with pt along with SUYAPA Nagel with pal care. Per pt, her son is now staying in [facility] with his godmother. Pt report he has learning disabilities and ADHD. Per pt, there is not one to care for her at home. Per pt, daughter has stage IV cancer and receiving treatment. Pt has Medicaid paperwork in her chart. She reports she needs assistance filling out. SUYAPA Nagel to reach out to Medicaid representative, Marilynn.     Pt is aware she may be too debilitated to receive chemo.   Attempted to reach out to pt's daughter. Voicemail full.    3/5/24 Met with pt who is aware of her newly dx metastatic cancer. Pt reports "I know I am terminal." Pt reports she wants to know what her treatment options are that may extend her life longer. Pt reports she carefor her 22 y/o son who has mental issues but able to help her at home. They live in a trailer in [place]. Pt reports her daughter Thais lives locally. Per pt she has liver cancer and is receiving active chemo. T reports she is not .     Pt does report she has debility which has prevented ambulation.     MPOA- NOK is Thais landrum. Per pt son unable to make medical decisions.     Code status discussed. Pt reports she wants to be a full code for now while she gets her affairs in order. Per pt, if she is put on life support and not getting better, she would want it stopped and be made comfortable.     Symptom management:     Pain to right hip and back are r/t  metastatic " cancer.   OME in 24 hour is about 205  Pt reports throbbing and shooting pain improving with current pain regimen     Current meds:   Dilaudid 1 mg IVP q 4 hrs pain not relieved by pain meds.   Oxycodone 15 mg q 4 hrs prn pain.   Pt on Neurontin 100 mg tid.   Pt on dexamethasone 4 mg IVP   MS contin 30mg BID    Recs:   Continue [place]contin 30mg BID. Continue oxycodone 15mg. Continue IV dilaudid for breakthrough       Nausea:   Pt on zofran 4 mg IVP q 8 hrs prn nausea.     Debility:   PT/OT working with pt.     Discussed with primary team

## 2024-03-14 NOTE — ASSESSMENT & PLAN NOTE
Advance Care Planning     Does patient have Capacity? Yes  Contact:  Pt has son w special needs and DTR who cannot help her. She makes decisions for herself  Goals/Wishes: wants NH placement, Understands she has a poor prognosis, wants comfort measures, if able to get support would consider palliative chemotherapy, Does not have assistance at home.   Code Status- DNR confirmed 12:30 pm on 3/14.  Pain management- stable, see pain management above  Prognosis-  less than six months.   Family discussions- They are not present. We discussed her diagnosis and future care needs. She is aware of poor prognosis, We discussed code status and hospice. She would like to go to a NH as she can no longer care for herself independently.   Functional Status - limited to bed    Post acute care plan:  Hospice is appropriate, NH with hospice is appropriate as well. CM working on Medicaid approval, supportive care, possible transfer to [facility] for palliative chemotherapy or other forms of support. .   Time spent on Goals of Care:  10 minutes at bedside on 3/14.

## 2024-03-14 NOTE — SUBJECTIVE & OBJECTIVE
Interval History: Reports pain is worse today than yesterday. Getting frustrated waiting for discharge. No family at bedside.       Past Medical History:   Diagnosis Date    Arthritis        Past Surgical History:   Procedure Laterality Date    CRANIOTOMY, WITH NEOPLASM EXCISION USING COMPUTER-ASSISTED NAVIGATION Left 2/29/2024    Procedure: CRANIOTOMY, WITH NEOPLASM EXCISION USING COMPUTER-ASSISTED NAVIGATION;  Surgeon: Felix Szymanski DO;  Location: [facility] OR [address] [place] [address];  Service: Neurosurgery;  Laterality: Left;  (Left temporal crani for rescetion of tumor)  [place]  BRAIN LAB  Navigation - CT with contrast    TUBAL LIGATION  2002       Review of patient's allergies indicates:  No Known Allergies    Medications:  Continuous Infusions:   heparin (porcine) in D5W 25 Units/kg/hr (03/14/24 1414)     Scheduled Meds:   aluminum-magnesium hydroxide-simethicone  30 mL Oral QID (AC & HS)    atenoloL  50 mg Oral Daily    dexAMETHasone  2 mg Intravenous Q12H    famotidine  20 mg Oral BID    gabapentin  300 mg Oral TID    levETIRAcetam  500 mg Oral BID    morphine  30 mg Oral Q12H    sodium bicarbonate  650 mg Oral Daily    sodium chloride  2,000 mg Oral TID     PRN Meds:acetaminophen, bisacodyL, guaiFENesin 100 mg/5 ml, hydrALAZINE, HYDROmorphone, labetalol, loperamide, methocarbamoL, ondansetron, oxyCODONE, oxyCODONE    Family History    None       Tobacco Use    Smoking status: Every Day     Current packs/day: 0.50     Types: Cigarettes    Smokeless tobacco: Current    Tobacco comments:     3/4 PPD   Substance and Sexual Activity    Alcohol use: Not Currently     Comment: OCCASIONALLY    Drug use: Never    Sexual activity: Not Currently       Review of Systems   Constitutional:  Positive for activity change and fatigue.   HENT:  Negative for trouble swallowing.    Respiratory:  Negative for shortness of breath.    Gastrointestinal:  Positive for nausea.   Musculoskeletal:  Positive for back pain.   Neurological:  Positive  for weakness.   Psychiatric/Behavioral:  Positive for decreased concentration.      Objective:     Vital Signs (Most Recent):  Temp: 98.3 °F (36.8 °C) (03/14/24 1156)  Pulse: 89 (03/14/24 1156)  Resp: 14 (03/14/24 1413)  BP: (!) 92/59 (03/14/24 1156)  SpO2: (!) 90 % (03/14/24 1156) Vital Signs (24h Range):  Temp:  [97.7 °F (36.5 °C)-98.7 °F (37.1 °C)] 98.3 °F (36.8 °C)  Pulse:  [80-93] 89  Resp:  [12-19] 14  SpO2:  [90 %-92 %] 90 %  BP: ()/(50-73) 92/59     Weight: 47.6 kg (105 lb)  Body mass index is 18.6 kg/m².       Physical Exam  Constitutional:       General: She is not in acute distress.  HENT:      Head: Normocephalic and atraumatic.   Pulmonary:      Effort: Respiratory distress present.   Musculoskeletal:      Cervical back: Normal range of motion.      Comments: Weakness noted. Pt able to move extremities.    Skin:     General: Skin is warm and dry.      Coloration: Skin is pale.   Neurological:      Mental Status: She is alert and oriented to person, place, and time.   Psychiatric:         Speech: Speech normal.         Behavior: Behavior is cooperative.            Review of Symptoms      Symptom Assessment (ESAS 0-10 Scale)  Pain:  5  Dyspnea:  0  Anxiety:  0  Nausea:  0  Depression:  0  Anorexia:  0  Fatigue:  0  Insomnia:  0  Restlessness:  0  Agitation:  0         Pain Assessment:  OME in 24 hours:  150 -> 205 -> 167.5  Location(s): back    Back       Location: right        Quality: Throbbing and shooting        Quantity: 6/10 in intensity        Chronicity: Onset 0 second(s) ago, unchanged        Aggravating Factors: Walking and movement        Alleviating Factors: Opiates       Associated Symptoms: Nausea    Living Arrangements:  Lives with family    Psychosocial/Cultural:   See Palliative Psychosocial Note: No  Pt reports she lives with her 20 y/o son in [facility] in [place]. Per pt, she cares for son due to his mental issues. Per pt, son able to help her physically. Pt reports she has a daughter,  "Thais who loves locally. Per pt she is dealing with liver cancer and received chemo. Pt reports not  and not a big support system. Per pt, her son's godmother is caring for him.   **Primary  to Follow**  Palliative Care  Consult: Yes        Advance Care Planning   Advance Directives:   Living Will: No    Do Not Resuscitate Status: No    Medical Power of : No    Agent's Name:  Daughter, Thais Hines is NOK    Decision Making:  Patient answered questions  Goals of Care: What is most important right now is to focus on curative/life-prolongation (regardless of treatment burdens). Accordingly, we have decided that the best plan to meet the patient's goals includes continuing with treatment.         Significant Labs: All pertinent labs within the past 24 hours have been reviewed.  CBC:   Recent Labs   Lab 03/13/24  0531   WBC 13.25*   HGB 9.9*   HCT 30.7*   MCV 98          BMP:  No results for input(s): "GLU", "NA", "K", "CL", "CO2", "BUN", "CREATININE", "CALCIUM", "MG" in the last 24 hours.    LFT:  Lab Results   Component Value Date    AST 92 (H) 03/13/2024    GGT 1,385 (H) 03/05/2024    ALKPHOS 565 (H) 03/13/2024    BILITOT 0.6 03/13/2024     Albumin:   Albumin   Date Value Ref Range Status   03/13/2024 2.3 (L) 3.5 - 5.2 g/dL Final     Protein:   Total Protein   Date Value Ref Range Status   03/13/2024 5.6 (L) 6.0 - 8.4 g/dL Final     Lactic acid:   No results found for: "LACTATE"    Significant Imaging: I have reviewed all pertinent imaging results/findings within the past 24 hours.    CT head without contrast 3/10/24  Impression:     Evolving postsurgical changes with no new acute intracranial process.  No acute hemorrhage.  "

## 2024-03-14 NOTE — PLAN OF CARE
Pt care priority pain management, pain administered per MAR. Pt refused oral meds in the afternoon, provider notified. CT scan complete. Heparin continued at 11.9 mL/hr, aPTT in therapeutic range. Pt A&O x4 at start of shift, increasingly disoriented to situation and confused about plan of care. Anticipated d/c to rehab with palliative care. Pt remained safe throughout shift, bed locked and in lowest position, bed alarm on, side rails up x3, call bell within reach.    -Lizzeth Mcintosh RN     Problem: Adult Inpatient Plan of Care  Goal: Plan of Care Review  Outcome: Ongoing, Progressing  Goal: Patient-Specific Goal (Individualized)  Outcome: Ongoing, Progressing  Goal: Absence of Hospital-Acquired Illness or Injury  Outcome: Ongoing, Progressing  Goal: Optimal Comfort and Wellbeing  Outcome: Ongoing, Progressing  Goal: Readiness for Transition of Care  Outcome: Ongoing, Progressing     Problem: Infection  Goal: Absence of Infection Signs and Symptoms  Outcome: Ongoing, Progressing     Problem: Skin Injury Risk Increased  Goal: Skin Health and Integrity  Outcome: Ongoing, Progressing     Problem: Fall Injury Risk  Goal: Absence of Fall and Fall-Related Injury  Outcome: Ongoing, Progressing     Problem: Constipation  Goal: Effective Bowel Elimination  Outcome: Ongoing, Progressing     Problem: Coping Ineffective (Oncology Care)  Goal: Effective Coping  Outcome: Ongoing, Progressing     Problem: Fatigue (Oncology Care)  Goal: Improved Activity Tolerance  Outcome: Ongoing, Progressing     Problem: Pain Acute (Oncology Care)  Goal: Optimal Pain Control  Outcome: Ongoing, Progressing     Problem: Coping Ineffective  Goal: Effective Coping  Outcome: Ongoing, Progressing

## 2024-03-14 NOTE — SUBJECTIVE & OBJECTIVE
Interval History: see above    Review of Systems   Constitutional:  Positive for activity change. Negative for appetite change.   HENT:  Negative for trouble swallowing.    Respiratory:  Negative for shortness of breath.    Cardiovascular:  Negative for chest pain and leg swelling.   Gastrointestinal:  Negative for abdominal pain, constipation and diarrhea.   Genitourinary:  Negative for difficulty urinating.   Musculoskeletal:  Positive for back pain. Negative for gait problem.   Neurological:  Negative for numbness and headaches.   Psychiatric/Behavioral:  Negative for agitation, behavioral problems and confusion.      Objective:     Vital Signs (Most Recent):  Temp: 97.9 °F (36.6 °C) (03/14/24 0823)  Pulse: 93 (03/14/24 0823)  Resp: 14 (03/14/24 0852)  BP: 110/71 (03/14/24 0823)  SpO2: (!) 90 % (03/14/24 0823) Vital Signs (24h Range):  Temp:  [97.7 °F (36.5 °C)-98.7 °F (37.1 °C)] 97.9 °F (36.6 °C)  Pulse:  [80-93] 93  Resp:  [12-19] 14  SpO2:  [90 %-94 %] 90 %  BP: ()/(50-73) 110/71     Weight: 47.6 kg (105 lb)  Body mass index is 18.6 kg/m².    Intake/Output Summary (Last 24 hours) at 3/14/2024 0920  Last data filed at 3/13/2024 2100  Gross per 24 hour   Intake --   Output 350 ml   Net -350 ml         Physical Exam  Constitutional:       General: She is not in acute distress.     Appearance: Normal appearance. She is ill-appearing. She is not toxic-appearing or diaphoretic.      Comments: underweight   HENT:      Head: Atraumatic.      Mouth/Throat:      Mouth: Mucous membranes are moist.      Pharynx: Oropharynx is clear. No posterior oropharyngeal erythema.   Eyes:      General: No scleral icterus.     Extraocular Movements: Extraocular movements intact.      Conjunctiva/sclera: Conjunctivae normal.      Pupils: Pupils are equal, round, and reactive to light.   Neck:      Vascular: No carotid bruit.   Cardiovascular:      Rate and Rhythm: Normal rate and regular rhythm.      Pulses: Normal pulses.       Heart sounds: Normal heart sounds. No murmur heard.     No friction rub. No gallop.   Pulmonary:      Effort: Pulmonary effort is normal. No respiratory distress.      Breath sounds: Normal breath sounds. No stridor. No wheezing, rhonchi or rales.   Chest:      Chest wall: No tenderness.   Abdominal:      General: Abdomen is flat. Bowel sounds are normal. There is no distension.      Palpations: Abdomen is soft. There is no mass.      Tenderness: There is no abdominal tenderness. There is no right CVA tenderness, left CVA tenderness, guarding or rebound.   Musculoskeletal:         General: No swelling, tenderness, deformity or signs of injury. Normal range of motion.      Cervical back: Normal range of motion and neck supple. No rigidity or tenderness.      Right lower leg: No edema.      Left lower leg: No edema.   Lymphadenopathy:      Cervical: No cervical adenopathy.   Skin:     General: Skin is warm and dry.      Coloration: Skin is not jaundiced or pale.      Findings: No bruising, erythema, lesion or rash.   Neurological:      General: No focal deficit present.      Mental Status: She is alert and oriented to person, place, and time. Mental status is at baseline.      Cranial Nerves: No cranial nerve deficit.      Sensory: No sensory deficit.      Motor: No weakness.   Psychiatric:         Mood and Affect: Mood normal.         Behavior: Behavior normal.         Thought Content: Thought content normal.         Judgment: Judgment normal.             Significant Labs: All pertinent labs within the past 24 hours have been reviewed.  CBC:   Recent Labs   Lab 03/13/24  0531   WBC 13.25*   HGB 9.9*   HCT 30.7*        CMP:   Recent Labs   Lab 03/13/24  0531   *   K 4.2      CO2 26      BUN 11   CREATININE 0.7   CALCIUM 9.1   PROT 5.6*   ALBUMIN 2.3*   BILITOT 0.6   ALKPHOS 565*   AST 92*   ALT 94*   ANIONGAP 8       Significant Imaging: I have reviewed all pertinent imaging results/findings  within the past 24 hours.

## 2024-03-14 NOTE — PROGRESS NOTES
"[facility] - Neurosurgery ([facility])  Palliative Medicine  Progress Note    Patient Name: Crow Hines  MRN: [id number]  Admission Date: 2/27/2024  Hospital Length of Stay: 16 days  Code Status: DNR   Attending Provider: Huong Burgess MD  Consulting Provider: Danelle Moore MD  Primary Care Physician: Chika Powell MD  Principal Problem:Brain mass    Patient information was obtained from patient and primary team.      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 Pt has hx of RA. Pt has poor performance status. Pt is alert, oriented to person, place, and situation. Pt reports back and hip pain. Pt is a full code.     Reason for consult: ACP/GOC    3/8 Met with pt along with Shona KAUR LCSW. Pt would like to get closer to [facility] to be by her son and close friend Prachi. At this time, pt interested in palliative treatment for her cancer. Communicated with BRANDT Lees.     3/6/24: Met with pt along with SUYAPA Nagel with pal care. Per pt, her son is now staying in [facility] with his godmother. Pt report he has learning disabilities and ADHD. Per pt, there is not one to care for her at home. Per pt, daughter has stage IV cancer and receiving treatment. Pt has Medicaid paperwork in her chart. She reports she needs assistance filling out. SUYAPA Nagel to reach out to Medicaid representative, Marilynn.     Pt is aware she may be too debilitated to receive chemo.   Attempted to reach out to pt's daughter. Voicemail full.    3/5/24 Met with pt who is aware of her newly dx metastatic cancer. Pt reports "I know I am terminal." Pt reports she wants to know what her treatment options are that may extend her life longer. Pt reports she carefor her 22 y/o son who has mental issues but able to help her at home. They live in a trailer in [place]. Pt reports her daughter Thais lives locally. Per pt she has liver cancer and is " receiving active chemo. T reports she is not .     Pt does report she has debility which has prevented ambulation.     MPOA- NOK is Thais landrum. Per pt son unable to make medical decisions.     Code status discussed. Pt reports she wants to be a full code for now while she gets her affairs in order. Per pt, if she is put on life support and not getting better, she would want it stopped and be made comfortable.     Symptom management:     Pain to right hip and back are r/t  metastatic cancer.   OME in 24 hour is about 205  Pt reports throbbing and shooting pain improving with current pain regimen     Current meds:   Dilaudid 1 mg IVP q 4 hrs pain not relieved by pain meds.   Oxycodone 15 mg q 4 hrs prn pain.   Pt on Neurontin 100 mg tid.   Pt on dexamethasone 4 mg IVP   MS contin 30mg BID    Recs:   Continue Mscontin 30mg BID. Continue oxycodone 15mg. Continue IV dilaudid for breakthrough       Nausea:   Pt on zofran 4 mg IVP q 8 hrs prn nausea.     Debility:   PT/OT working with pt.     Discussed with primary team              I will follow-up with patient. Please contact us if you have any additional questions.    Subjective:     Chief Complaint: No chief complaint on file.      HPI:   Pt is a 62 y/o F with pMHx of HTN and rheumatoid arthritis presenting as transfer from OSH for neurosurgery eval of L temporal lobe mass. Per chart review, pt initially presented to the ED with complaints that everything hurts.   Reportedly began seeing a rheumatologist 9 months ago where she was diagnosed w/ RA. Methotrexate therapy was initiated. Patient reported a continually decline since that time, leading to her being bed-bound for the last 2 months 2/2 to difficulty walking. She stated her rheumatologist believed that she was not tolerating the methotrexate. On presentation to ED, she reported inability to tolerate abdominal pain any longer. Lab work significant for alk phos 203, AST 57, sodium 132, platelets 586.  CXR revealed linear and ill-defined hazy opacities of the right mid to lower lung, felt to reflect infiltrate. Diffuse abdominal pain with focal increased tenderness in mid-epigastric area prompted CT abd/pelvis revealing numerous metastatic masses in the liver, metastatic deposits in the spleen and bilateral adrenal masses, hypoattenuating and mildly enhancing mass of the upper pole L kidney. Also w/ intra-abdominal enlarged lymph nodes, consistent with metastatic infiltration, masses of the R lung base with consolidation of the visualized R renal lower lobe and RLL 9 mm nodule. L2 compression fracture w/ mild sclerosis of the vertebra noted, likely pathologic. CTH completed showing large 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 She was transferred to [facility] for higher level of care and will be admitted to [facility] for further management.        Hospital Course:  No notes on file    Interval History: Reports pain is worse today than yesterday. Getting frustrated waiting for discharge. No family at bedside.       Past Medical History:   Diagnosis Date    Arthritis        Past Surgical History:   Procedure Laterality Date    CRANIOTOMY, WITH NEOPLASM EXCISION USING COMPUTER-ASSISTED NAVIGATION Left 2/29/2024    Procedure: CRANIOTOMY, WITH NEOPLASM EXCISION USING COMPUTER-ASSISTED NAVIGATION;  Surgeon: Felix Szymanski DO;  Location: [facility] OR [address] [place] [address];  Service: Neurosurgery;  Laterality: Left;  (Left temporal crani for rescetion of tumor)  [place]  BRAIN LAB  Navigation - CT with contrast    TUBAL LIGATION  2002       Review of patient's allergies indicates:  No Known Allergies    Medications:  Continuous Infusions:   heparin (porcine) in D5W 25 Units/kg/hr (03/14/24 1414)     Scheduled Meds:   aluminum-magnesium hydroxide-simethicone  30 mL Oral QID (AC & HS)    atenoloL  50 mg Oral Daily    dexAMETHasone  2 mg Intravenous Q12H    famotidine  20 mg Oral BID    gabapentin  300 mg Oral TID    levETIRAcetam   500 mg Oral BID    morphine  30 mg Oral Q12H    sodium bicarbonate  650 mg Oral Daily    sodium chloride  2,000 mg Oral TID     PRN Meds:acetaminophen, bisacodyL, guaiFENesin 100 mg/5 ml, hydrALAZINE, HYDROmorphone, labetalol, loperamide, methocarbamoL, ondansetron, oxyCODONE, oxyCODONE    Family History    None       Tobacco Use    Smoking status: Every Day     Current packs/day: 0.50     Types: Cigarettes    Smokeless tobacco: Current    Tobacco comments:     3/4 PPD   Substance and Sexual Activity    Alcohol use: Not Currently     Comment: OCCASIONALLY    Drug use: Never    Sexual activity: Not Currently       Review of Systems   Constitutional:  Positive for activity change and fatigue.   HENT:  Negative for trouble swallowing.    Respiratory:  Negative for shortness of breath.    Gastrointestinal:  Positive for nausea.   Musculoskeletal:  Positive for back pain.   Neurological:  Positive for weakness.   Psychiatric/Behavioral:  Positive for decreased concentration.      Objective:     Vital Signs (Most Recent):  Temp: 98.3 °F (36.8 °C) (03/14/24 1156)  Pulse: 89 (03/14/24 1156)  Resp: 14 (03/14/24 1413)  BP: (!) 92/59 (03/14/24 1156)  SpO2: (!) 90 % (03/14/24 1156) Vital Signs (24h Range):  Temp:  [97.7 °F (36.5 °C)-98.7 °F (37.1 °C)] 98.3 °F (36.8 °C)  Pulse:  [80-93] 89  Resp:  [12-19] 14  SpO2:  [90 %-92 %] 90 %  BP: ()/(50-73) 92/59     Weight: 47.6 kg (105 lb)  Body mass index is 18.6 kg/m².       Physical Exam  Constitutional:       General: She is not in acute distress.  HENT:      Head: Normocephalic and atraumatic.   Pulmonary:      Effort: Respiratory distress present.   Musculoskeletal:      Cervical back: Normal range of motion.      Comments: Weakness noted. Pt able to move extremities.    Skin:     General: Skin is warm and dry.      Coloration: Skin is pale.   Neurological:      Mental Status: She is alert and oriented to person, place, and time.   Psychiatric:         Speech: Speech  "normal.         Behavior: Behavior is cooperative.            Review of Symptoms      Symptom Assessment (ESAS 0-10 Scale)  Pain:  5  Dyspnea:  0  Anxiety:  0  Nausea:  0  Depression:  0  Anorexia:  0  Fatigue:  0  Insomnia:  0  Restlessness:  0  Agitation:  0         Pain Assessment:  OME in 24 hours:  150 -> 205 -> 167.5  Location(s): back    Back       Location: right        Quality: Throbbing and shooting        Quantity: 6/10 in intensity        Chronicity: Onset 0 second(s) ago, unchanged        Aggravating Factors: Walking and movement        Alleviating Factors: Opiates       Associated Symptoms: Nausea    Living Arrangements:  Lives with family    Psychosocial/Cultural:   See Palliative Psychosocial Note: No  Pt reports she lives with her 22 y/o son in [facility] in [place]. Per pt, she cares for son due to his mental issues. Per pt, son able to help her physically. Pt reports she has a daughter, Thais who loves locally. Per pt she is dealing with liver cancer and received chemo. Pt reports not  and not a big support system. Per pt, her son's godmother is caring for him.   **Primary  to Follow**  Palliative Care  Consult: Yes        Advance Care Planning  Advance Directives:   Living Will: No    Do Not Resuscitate Status: No    Medical Power of : No    Agent's Name:  DaughterThais is NOK    Decision Making:  Patient answered questions  Goals of Care: What is most important right now is to focus on curative/life-prolongation (regardless of treatment burdens). Accordingly, we have decided that the best plan to meet the patient's goals includes continuing with treatment.         Significant Labs: All pertinent labs within the past 24 hours have been reviewed.  CBC:   Recent Labs   Lab 03/13/24  0531   WBC 13.25*   HGB 9.9*   HCT 30.7*   MCV 98          BMP:  No results for input(s): "GLU", "NA", "K", "CL", "CO2", "BUN", "CREATININE", "CALCIUM", "MG" in the last " "24 hours.    LFT:  Lab Results   Component Value Date    AST 92 (H) 03/13/2024    GGT 1,385 (H) 03/05/2024    ALKPHOS 565 (H) 03/13/2024    BILITOT 0.6 03/13/2024     Albumin:   Albumin   Date Value Ref Range Status   03/13/2024 2.3 (L) 3.5 - 5.2 g/dL Final     Protein:   Total Protein   Date Value Ref Range Status   03/13/2024 5.6 (L) 6.0 - 8.4 g/dL Final     Lactic acid:   No results found for: "LACTATE"    Significant Imaging: I have reviewed all pertinent imaging results/findings within the past 24 hours.    CT head without contrast 3/10/24  Impression:     Evolving postsurgical changes with no new acute intracranial process.  No acute hemorrhage.    Danelle Moore MD  Palliative Medicine  [facility] - Neurosurgery ([facility])                "

## 2024-03-14 NOTE — ASSESSMENT & PLAN NOTE
Patient with Acute debility due to  cancer . Latest AMPAC and GEMS scores have been reviewed. Evaluation for etiology is complete. Plan includes  hospice care/ supportive care.  .

## 2024-03-14 NOTE — PLAN OF CARE
CM in communication with BRANDT Blunt, CM Manager to discuss discharge plan for this patient.  Patient remains uninsured, application pending for [place] Medicaid and SSI. Patient in need of palliative chemo. CM contacted referral center to initiate transfer to [facility] who was sending facility as original plan of trying to get patient to [facility] has not taken place.  Patient no longer needs the NPU for continued care.    1:25 [facility] is on diversion.

## 2024-03-14 NOTE — ASSESSMENT & PLAN NOTE
-Stable respiratory status  -Per Neurosurgery recommendation, awaiting for heparin infusion drip to be therapeutic and then to repeat head CT to rule out bleed    3/14- PTTs therapeutic, CT head ordered. Will need oral anticoagulant ir able to tolerate.

## 2024-03-14 NOTE — ASSESSMENT & PLAN NOTE
Chronic, controlled. Latest blood pressure and vitals reviewed-     Temp:  [97.7 °F (36.5 °C)-98.7 °F (37.1 °C)]   Pulse:  [80-93]   Resp:  [12-19]   BP: ()/(50-73)   SpO2:  [90 %-94 %] .   Home meds for hypertension were reviewed and noted below.   Hypertension Medications               atenoloL (TENORMIN) 50 MG tablet Take 1 tablet (50 mg total) by mouth once daily.            While in the hospital, will manage blood pressure as follows; Adjust home antihypertensive regimen as follows- adjust as needed    Will utilize p.r.n. blood pressure medication only if patient's blood pressure greater than 180/110 and she develops symptoms such as worsening chest pain or shortness of breath.

## 2024-03-15 PROBLEM — K59.01 SLOW TRANSIT CONSTIPATION: Status: ACTIVE | Noted: 2024-03-15

## 2024-03-15 LAB
ALBUMIN SERPL BCP-MCNC: 2.2 G/DL (ref 3.5–5.2)
ALP SERPL-CCNC: 525 U/L (ref 55–135)
ALT SERPL W/O P-5'-P-CCNC: 86 U/L (ref 10–44)
ANION GAP SERPL CALC-SCNC: 10 MMOL/L (ref 8–16)
APTT PPP: 44.8 SEC (ref 21–32)
AST SERPL-CCNC: 106 U/L (ref 10–40)
BASOPHILS # BLD AUTO: 0.01 K/UL (ref 0–0.2)
BASOPHILS NFR BLD: 0.1 % (ref 0–1.9)
BILIRUB SERPL-MCNC: 0.7 MG/DL (ref 0.1–1)
BUN SERPL-MCNC: 15 MG/DL (ref 8–23)
CALCIUM SERPL-MCNC: 9 MG/DL (ref 8.7–10.5)
CHLORIDE SERPL-SCNC: 100 MMOL/L (ref 95–110)
CO2 SERPL-SCNC: 24 MMOL/L (ref 23–29)
CREAT SERPL-MCNC: 0.6 MG/DL (ref 0.5–1.4)
DIFFERENTIAL METHOD BLD: ABNORMAL
EOSINOPHIL # BLD AUTO: 0 K/UL (ref 0–0.5)
EOSINOPHIL NFR BLD: 0.1 % (ref 0–8)
ERYTHROCYTE [DISTWIDTH] IN BLOOD BY AUTOMATED COUNT: 15 % (ref 11.5–14.5)
EST. GFR  (NO RACE VARIABLE): >60 ML/MIN/1.73 M^2
GLUCOSE SERPL-MCNC: 76 MG/DL (ref 70–110)
HCT VFR BLD AUTO: 30.6 % (ref 37–48.5)
HGB BLD-MCNC: 9.9 G/DL (ref 12–16)
IMM GRANULOCYTES # BLD AUTO: 0.15 K/UL (ref 0–0.04)
IMM GRANULOCYTES NFR BLD AUTO: 1.5 % (ref 0–0.5)
LYMPHOCYTES # BLD AUTO: 0.6 K/UL (ref 1–4.8)
LYMPHOCYTES NFR BLD: 6.1 % (ref 18–48)
MAGNESIUM SERPL-MCNC: 2.3 MG/DL (ref 1.6–2.6)
MCH RBC QN AUTO: 32.6 PG (ref 27–31)
MCHC RBC AUTO-ENTMCNC: 32.4 G/DL (ref 32–36)
MCV RBC AUTO: 101 FL (ref 82–98)
MONOCYTES # BLD AUTO: 0.6 K/UL (ref 0.3–1)
MONOCYTES NFR BLD: 6.2 % (ref 4–15)
NEUTROPHILS # BLD AUTO: 8.8 K/UL (ref 1.8–7.7)
NEUTROPHILS NFR BLD: 86 % (ref 38–73)
NRBC BLD-RTO: 0 /100 WBC
PHOSPHATE SERPL-MCNC: 3.1 MG/DL (ref 2.7–4.5)
PLATELET # BLD AUTO: 26 K/UL (ref 150–450)
PLATELET BLD QL SMEAR: ABNORMAL
PMV BLD AUTO: 11.1 FL (ref 9.2–12.9)
POTASSIUM SERPL-SCNC: 4.1 MMOL/L (ref 3.5–5.1)
PROT SERPL-MCNC: 5.6 G/DL (ref 6–8.4)
RBC # BLD AUTO: 3.04 M/UL (ref 4–5.4)
SODIUM SERPL-SCNC: 134 MMOL/L (ref 136–145)
WBC # BLD AUTO: 10.26 K/UL (ref 3.9–12.7)

## 2024-03-15 PROCEDURE — 63600175 PHARM REV CODE 636 W HCPCS: Mod: UD | Performed by: HOSPITALIST

## 2024-03-15 PROCEDURE — 11000001 HC ACUTE MED/SURG PRIVATE ROOM

## 2024-03-15 PROCEDURE — 97535 SELF CARE MNGMENT TRAINING: CPT | Mod: CO

## 2024-03-15 PROCEDURE — 25000003 PHARM REV CODE 250

## 2024-03-15 PROCEDURE — 25000003 PHARM REV CODE 250: Performed by: HOSPITALIST

## 2024-03-15 PROCEDURE — 25000003 PHARM REV CODE 250: Performed by: STUDENT IN AN ORGANIZED HEALTH CARE EDUCATION/TRAINING PROGRAM

## 2024-03-15 PROCEDURE — 83735 ASSAY OF MAGNESIUM: CPT

## 2024-03-15 PROCEDURE — 25000003 PHARM REV CODE 250: Performed by: INTERNAL MEDICINE

## 2024-03-15 PROCEDURE — 94761 N-INVAS EAR/PLS OXIMETRY MLT: CPT

## 2024-03-15 PROCEDURE — 84100 ASSAY OF PHOSPHORUS: CPT

## 2024-03-15 PROCEDURE — 25000003 PHARM REV CODE 250: Performed by: PHYSICIAN ASSISTANT

## 2024-03-15 PROCEDURE — 63600175 PHARM REV CODE 636 W HCPCS: Mod: UD | Performed by: STUDENT IN AN ORGANIZED HEALTH CARE EDUCATION/TRAINING PROGRAM

## 2024-03-15 PROCEDURE — 97530 THERAPEUTIC ACTIVITIES: CPT | Mod: CQ

## 2024-03-15 PROCEDURE — 80053 COMPREHEN METABOLIC PANEL: CPT

## 2024-03-15 PROCEDURE — 97116 GAIT TRAINING THERAPY: CPT | Mod: CQ

## 2024-03-15 PROCEDURE — C1751 CATH, INF, PER/CENT/MIDLINE: HCPCS

## 2024-03-15 PROCEDURE — 85730 THROMBOPLASTIN TIME PARTIAL: CPT | Performed by: HOSPITALIST

## 2024-03-15 PROCEDURE — 25000003 PHARM REV CODE 250: Mod: UD | Performed by: INTERNAL MEDICINE

## 2024-03-15 PROCEDURE — 85025 COMPLETE CBC W/AUTO DIFF WBC: CPT | Performed by: HOSPITALIST

## 2024-03-15 RX ORDER — BISACODYL 10 MG/1
10 SUPPOSITORY RECTAL DAILY PRN
Status: DISCONTINUED | OUTPATIENT
Start: 2024-03-15 | End: 2024-03-17 | Stop reason: HOSPADM

## 2024-03-15 RX ORDER — POLYETHYLENE GLYCOL 3350 17 G/17G
17 POWDER, FOR SOLUTION ORAL 2 TIMES DAILY
Status: DISCONTINUED | OUTPATIENT
Start: 2024-03-15 | End: 2024-03-17 | Stop reason: HOSPADM

## 2024-03-15 RX ADMIN — DEXAMETHASONE SODIUM PHOSPHATE 2 MG: 4 INJECTION INTRA-ARTICULAR; INTRALESIONAL; INTRAMUSCULAR; INTRAVENOUS; SOFT TISSUE at 08:03

## 2024-03-15 RX ADMIN — GABAPENTIN 300 MG: 300 CAPSULE ORAL at 08:03

## 2024-03-15 RX ADMIN — OXYCODONE HYDROCHLORIDE 15 MG: 10 TABLET ORAL at 10:03

## 2024-03-15 RX ADMIN — HEPARIN SODIUM AND DEXTROSE 25 UNITS/KG/HR: 10000; 5 INJECTION INTRAVENOUS at 11:03

## 2024-03-15 RX ADMIN — ALUMINUM HYDROXIDE, MAGNESIUM HYDROXIDE, AND SIMETHICONE 30 ML: 1200; 120; 1200 SUSPENSION ORAL at 05:03

## 2024-03-15 RX ADMIN — FAMOTIDINE 20 MG: 20 TABLET ORAL at 08:03

## 2024-03-15 RX ADMIN — OXYCODONE HYDROCHLORIDE 10 MG: 10 TABLET ORAL at 03:03

## 2024-03-15 RX ADMIN — ALUMINUM HYDROXIDE, MAGNESIUM HYDROXIDE, AND SIMETHICONE 30 ML: 1200; 120; 1200 SUSPENSION ORAL at 11:03

## 2024-03-15 RX ADMIN — SODIUM BICARBONATE 650 MG: 650 TABLET ORAL at 08:03

## 2024-03-15 RX ADMIN — SODIUM CHLORIDE 250 ML: 9 INJECTION, SOLUTION INTRAVENOUS at 10:03

## 2024-03-15 RX ADMIN — SODIUM CHLORIDE 2000 MG: 1 TABLET ORAL at 08:03

## 2024-03-15 RX ADMIN — OXYCODONE HYDROCHLORIDE 15 MG: 10 TABLET ORAL at 08:03

## 2024-03-15 RX ADMIN — METHOCARBAMOL 500 MG: 500 TABLET ORAL at 01:03

## 2024-03-15 RX ADMIN — SODIUM CHLORIDE 2000 MG: 1 TABLET ORAL at 03:03

## 2024-03-15 RX ADMIN — OXYCODONE HYDROCHLORIDE 15 MG: 10 TABLET ORAL at 01:03

## 2024-03-15 RX ADMIN — ALUMINUM HYDROXIDE, MAGNESIUM HYDROXIDE, AND SIMETHICONE 30 ML: 1200; 120; 1200 SUSPENSION ORAL at 06:03

## 2024-03-15 RX ADMIN — LEVETIRACETAM 500 MG: 500 TABLET, FILM COATED ORAL at 09:03

## 2024-03-15 RX ADMIN — MORPHINE SULFATE 30 MG: 30 TABLET, FILM COATED, EXTENDED RELEASE ORAL at 09:03

## 2024-03-15 RX ADMIN — ALUMINUM HYDROXIDE, MAGNESIUM HYDROXIDE, AND SIMETHICONE 30 ML: 1200; 120; 1200 SUSPENSION ORAL at 08:03

## 2024-03-15 RX ADMIN — GABAPENTIN 300 MG: 300 CAPSULE ORAL at 03:03

## 2024-03-15 RX ADMIN — POLYETHYLENE GLYCOL 3350 17 G: 17 POWDER, FOR SOLUTION ORAL at 08:03

## 2024-03-15 RX ADMIN — LEVETIRACETAM 500 MG: 500 TABLET, FILM COATED ORAL at 08:03

## 2024-03-15 RX ADMIN — ACETAMINOPHEN 650 MG: 325 TABLET ORAL at 03:03

## 2024-03-15 RX ADMIN — OXYCODONE HYDROCHLORIDE 15 MG: 10 TABLET ORAL at 05:03

## 2024-03-15 RX ADMIN — ATENOLOL 50 MG: 25 TABLET ORAL at 08:03

## 2024-03-15 RX ADMIN — MORPHINE SULFATE 30 MG: 30 TABLET, FILM COATED, EXTENDED RELEASE ORAL at 08:03

## 2024-03-15 NOTE — PLAN OF CARE
On-call CM returned call to  ([phone]) with [facility] who stated their agency covers a wide area near [place], [place] and may be available when patient is ready for discharge.

## 2024-03-15 NOTE — PLAN OF CARE
Pt care priority pain management, pain administered per MAR. Heparin continued at 11.9 mL/hr, aPTT in therapeutic range. Pt A&O x4, vocalized grief and depression after diagnosis conversation with doctor. Emotional support offered, consult to spiritual care requested. D/c delayed. Pt remained safe throughout shift, bed locked and in lowest position, bed alarm on, side rails up x3, call bell within reach.     -Lizzeth Mcintosh RN     Problem: Adult Inpatient Plan of Care  Goal: Plan of Care Review  Outcome: Ongoing, Progressing  Goal: Patient-Specific Goal (Individualized)  Outcome: Ongoing, Progressing  Goal: Absence of Hospital-Acquired Illness or Injury  Outcome: Ongoing, Progressing  Goal: Optimal Comfort and Wellbeing  Outcome: Ongoing, Progressing  Goal: Readiness for Transition of Care  Outcome: Ongoing, Progressing     Problem: Infection  Goal: Absence of Infection Signs and Symptoms  Outcome: Ongoing, Progressing     Problem: Skin Injury Risk Increased  Goal: Skin Health and Integrity  Outcome: Ongoing, Progressing     Problem: Fall Injury Risk  Goal: Absence of Fall and Fall-Related Injury  Outcome: Ongoing, Progressing     Problem: Constipation  Goal: Effective Bowel Elimination  Outcome: Ongoing, Progressing     Problem: Coping Ineffective (Oncology Care)  Goal: Effective Coping  Outcome: Ongoing, Progressing     Problem: Fatigue (Oncology Care)  Goal: Improved Activity Tolerance  Outcome: Ongoing, Progressing     Problem: Oral Intake Altered (Oncology Care)  Goal: Optimal Oral Intake  Outcome: Ongoing, Progressing     Problem: Pain Acute (Oncology Care)  Goal: Optimal Pain Control  Outcome: Ongoing, Progressing     Problem: Impaired Wound Healing  Goal: Optimal Wound Healing  Outcome: Ongoing, Progressing     Problem: Coping Ineffective  Goal: Effective Coping  Outcome: Ongoing, Progressing

## 2024-03-15 NOTE — ASSESSMENT & PLAN NOTE
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 Continue dex 4q12 for vasogenic edema. Wean to off over 2 weeks  - Keppra 500 mg bid for seizure prophylaxis  - On hep gtt for PE. obtain head CT once therapeutic. If CTH is stable, okay to transition to oral AC upon discharge for treatment of PE.  -PT/OT  -blood pressure control  -pain control  -neurochecks    3/15- has [place] decline

## 2024-03-15 NOTE — ASSESSMENT & PLAN NOTE
Chronic, controlled. Latest blood pressure and vitals reviewed-     Temp:  [97.8 °F (36.6 °C)-98.3 °F (36.8 °C)]   Pulse:  [88-92]   Resp:  [14-18]   BP: ()/(59-70)   SpO2:  [90 %-93 %] .   Home meds for hypertension were reviewed and noted below.   Hypertension Medications               atenoloL (TENORMIN) 50 MG tablet Take 1 tablet (50 mg total) by mouth once daily.            While in the hospital, will manage blood pressure as follows; Adjust home antihypertensive regimen as follows- adjust as needed    Will utilize p.r.n. blood pressure medication only if patient's blood pressure greater than 180/110 and she develops symptoms such as worsening chest pain or shortness of breath.

## 2024-03-15 NOTE — ASSESSMENT & PLAN NOTE
Advance Care Planning     Does patient have Capacity? Yes  Contact:  Pt has son w special needs and DTR who cannot help her. She makes decisions for herself  Goals/Wishes: wants NH placement, Understands she has a poor prognosis, wants comfort measures, if able to get support would consider palliative chemotherapy, Does not have assistance at home.   Code Status- DNR confirmed 12:30 pm on 3/14.  Pain management- stable, see pain management above  Prognosis-  less than six months.   Family discussions-   3/14- They are not present. We discussed her diagnosis and future care needs. She is aware of poor prognosis, We discussed code status and hospice. She would like to go to a NH as she can no longer care for herself independently.   3/15- Pt gives me permission to speak to her DTR,. Called Thais Hines (Daughter) [phone] (Mobile) and left VM. Dtr has stage 4 cancer and is undergoing chemotherapy, and cannot take care of patient. Thais's brother, who has autism is with her godmother. We discussed her care and condtion and discharge options. They have no other family members who can be with patient at home. We discussed that pt is having pain, and not able to transport easily which is needed for chemotherapy. Thais expressed understanding. We discussed that prognosis was poor, and dependent on pt's oral intake. Our patient has previously told Thais before admission that she wants DNR code status. Dtr wants to be informed of events and progress and willing to help make decision.   Functional Status - limited to bed    Post acute care plan:  Hospice is appropriate, NH with hospice is appropriate as well. CM working on Medicaid approval, supportive care, possible transfer to [facility] for palliative chemotherapy or other forms of support. .   Time spent on Goals of Care:  10 minutes at bedside on 3/14. 12 minutes on phone 3/15.

## 2024-03-15 NOTE — ASSESSMENT & PLAN NOTE
Patient has hyponatremia which is controlled,We will aim to correct the sodium by 4-6mEq in 24 hours. We will monitor sodium Daily. The hyponatremia is due to Dehydration/hypovolemia. We will obtain the following studies: see labs. We will treat the hyponatremia with IV fluids. The patient's sodium results have been reviewed and are listed below.  Recent Labs   Lab 03/15/24  0717   *

## 2024-03-15 NOTE — PLAN OF CARE
CM sent home hospice referral.  Patient is not insured.  CM advised we are looking for a hospice that can provide indigent care as patient is currently uninsured.  [place] and [place]   03/15/24 1526   Post-Acute Status   Post-Acute Authorization Hospice   Hospice Status Referrals Sent     ability alex pending.

## 2024-03-15 NOTE — SUBJECTIVE & OBJECTIVE
Interval History: see above    Review of Systems   Constitutional:  Positive for activity change. Negative for appetite change.   HENT:  Negative for trouble swallowing.    Respiratory:  Negative for shortness of breath.    Cardiovascular:  Negative for chest pain and leg swelling.   Gastrointestinal:  Negative for abdominal pain, constipation and diarrhea.   Genitourinary:  Negative for difficulty urinating.   Musculoskeletal:  Positive for back pain. Negative for gait problem.   Neurological:  Negative for numbness and headaches.   Psychiatric/Behavioral:  Negative for agitation, behavioral problems and confusion.      Objective:     Vital Signs (Most Recent):  Temp: 98.3 °F (36.8 °C) (03/15/24 0744)  Pulse: 91 (03/15/24 0753)  Resp: 16 (03/15/24 0837)  BP: 122/70 (03/15/24 0744)  SpO2: (!) 93 % (03/15/24 0753) Vital Signs (24h Range):  Temp:  [97.8 °F (36.6 °C)-98.3 °F (36.8 °C)] 98.3 °F (36.8 °C)  Pulse:  [88-92] 91  Resp:  [14-18] 16  SpO2:  [90 %-93 %] 93 %  BP: ()/(59-70) 122/70     Weight: 47.6 kg (105 lb)  Body mass index is 18.6 kg/m².    Intake/Output Summary (Last 24 hours) at 3/15/2024 0839  Last data filed at 3/14/2024 2000  Gross per 24 hour   Intake --   Output 100 ml   Net -100 ml           Physical Exam  Constitutional:       General: She is not in acute distress.     Appearance: Normal appearance. She is ill-appearing. She is not toxic-appearing or diaphoretic.      Comments: underweight   HENT:      Head: Atraumatic.      Mouth/Throat:      Mouth: Mucous membranes are moist.      Pharynx: Oropharynx is clear. No posterior oropharyngeal erythema.   Eyes:      General: No scleral icterus.     Extraocular Movements: Extraocular movements intact.      Conjunctiva/sclera: Conjunctivae normal.      Pupils: Pupils are equal, round, and reactive to light.   Neck:      Vascular: No carotid bruit.   Cardiovascular:      Rate and Rhythm: Normal rate and regular rhythm.      Pulses: Normal pulses.       Heart sounds: Normal heart sounds. No murmur heard.     No friction rub. No gallop.   Pulmonary:      Effort: Pulmonary effort is normal. No respiratory distress.      Breath sounds: Normal breath sounds. No stridor. No wheezing, rhonchi or rales.   Chest:      Chest wall: No tenderness.   Abdominal:      General: Abdomen is flat. Bowel sounds are normal. There is no distension.      Palpations: Abdomen is soft. There is no mass.      Tenderness: There is no abdominal tenderness. There is no right CVA tenderness, left CVA tenderness, guarding or rebound.   Musculoskeletal:         General: No swelling, tenderness, deformity or signs of injury. Normal range of motion.      Cervical back: Normal range of motion and neck supple. No rigidity or tenderness.      Right lower leg: No edema.      Left lower leg: No edema.   Lymphadenopathy:      Cervical: No cervical adenopathy.   Skin:     General: Skin is warm and dry.      Coloration: Skin is not jaundiced or pale.      Findings: No bruising, erythema, lesion or rash.   Neurological:      General: No focal deficit present.      Mental Status: She is alert and oriented to person, place, and time. Mental status is at baseline.      Cranial Nerves: No cranial nerve deficit.      Sensory: No sensory deficit.      Motor: No weakness.   Psychiatric:         Mood and Affect: Mood normal.         Behavior: Behavior normal.         Thought Content: Thought content normal.         Judgment: Judgment normal.             Significant Labs: All pertinent labs within the past 24 hours have been reviewed.  CBC:   Recent Labs   Lab 03/14/24  1939   WBC 12.87*   HGB 10.0*   HCT 30.3*   PLT 45*       CMP:   Recent Labs   Lab 03/14/24  1455 03/15/24  0717    134*   K 4.4 4.1    100   CO2 26 24   GLU 94 76   BUN 13 15   CREATININE 0.7 0.6   CALCIUM 9.2 9.0   PROT 5.7* 5.6*   ALBUMIN 2.3* 2.2*   BILITOT 0.7 0.7   ALKPHOS 596* 525*   * 106*   ALT 94* 86*   ANIONGAP 10 10          Significant Imaging: I have reviewed all pertinent imaging results/findings within the past 24 hours.

## 2024-03-15 NOTE — PT/OT/SLP PROGRESS
"Occupational Therapy   Treatment    Name: Crow Hines  MRN: [id number]  Admitting Diagnosis:  Brain mass  15 Days Post-Op  Procedure(s):  CRANIOTOMY, WITH NEOPLASM EXCISION USING COMPUTER-ASSISTED NAVIGATION     Recommendations:     Discharge Recommendations: Moderate Intensity Therapy  Discharge Equipment Recommendations:  hospital bed, wheelchair, lift device  Barriers to discharge:   (increased skilled assistance required)    Assessment:     Crow Hines is a 63 y.o. female with a medical diagnosis of Brain mass.  She presents with the following performance deficits affecting function are weakness, impaired endurance, decreased upper extremity function, decreased lower extremity function, impaired functional mobility, impaired self care skills, gait instability, impaired balance, decreased safety awareness, pain, decreased ROM, orthopedic precautions, impaired coordination, decreased coordination. Patient primarily limited by poor recall and c/o pain today. Patient has demonstrated sufficient progression to warrant high intensity therapy evidenced by objectives noted below.      Rehab Prognosis:  Fair; patient would benefit from acute skilled OT services to address these deficits and reach maximum level of function.       Plan:     Patient to be seen 3 x/week to address the above listed problems via self-care/home management, therapeutic activities, therapeutic exercises, neuromuscular re-education  Plan of Care Expires: 03/27/24  Plan of Care Reviewed with: patient    Subjective     Chief Complaint: c/o pain  Patient/Family Comments/goals: "we gotta get this pain medicine coordinated better"  Pain/Comfort:  Pain Rating 1:  (unrated)  Location - Orientation 1: generalized  Location 1: back  Pain Addressed 1: Reposition, Distraction, Pre-medicate for activity, Nurse notified  Pain Rating Post-Intervention 1:  (unrated)    Objective:     Communicated with: nurse bain prior to session.  Patient found HOB " elevated with telemetry, peripheral IV, PureWick upon OT entry to room.    General Precautions: Standard, fall    Orthopedic Precautions:spinal precautions  Braces: LSO (for comfort)  Respiratory Status: Room air     Occupational Performance:     Bed Mobility:    Patient completed Scooting/Bridging with contact guard assistance  Patient completed Supine to Sit with minimum assistance  Patient completed Sit to Supine with moderate assistance     Functional Mobility/Transfers:  Patient completed Sit <> Stand Transfer with contact guard assistance  with  4 wheeled walker   Patient completed Bed > BSC Transfer using Stand Pivot technique with minimum assistance with no assistive device  Patient completed BSC > Bed Transfer using Squat Pivot technique with maximum assistance with no assistive device  Functional Mobility: 2 small steps to L using rollator with CGA, max cues for sequencing    Activities of Daily Living:  Lower Body Dressing: total assistance don B socks HOB elevated  Toileting: total assistance manage gown and perirectal hygiene squat stance  Upper Body Dressing: Total assistance don/doff LSO seated EOB      AMPAC 6 Click ADL: 15    Treatment & Education:  Patient agreeable to therapy on 2nd attempt. Patient edu on OT POC, goals, and current progress. Addressed all patient questions/concerns within [facility] scope of practice. Co-treatment performed with PTA due to patient's complexity and benefit of 2 skilled therapists to facilitate functional and safe occupational performance, accommodate patient's activity tolerance, and maximize patient's participation in therapy.     Patient left HOB elevated with all lines intact, call button in reach, bed alarm on, and nurse notified    GOALS:   Multidisciplinary Problems       Occupational Therapy Goals          Problem: Occupational Therapy    Goal Priority Disciplines Outcome Interventions   Occupational Therapy Goal     OT, PT/OT Ongoing, Progressing    Description:  Goals to be met by: 3/27/24     Patient will increase functional independence with ADLs by performing:    UE Dressing with Minimal Assistance.  LE Dressing with Maximum Assistance.  Grooming while EOB with Moderate Assistance.  Toileting from bedside commode with Maximum Assistance for hygiene and clothing management.   Rolling to Bilateral with Moderate Assistance.   Supine to sit with Contact Guard Assistance.  Stand pivot transfers with Maximum Assistance.  Step transfer with Maximum Assistance  Toilet transfer to BSC with Maximum Assistance.    Patient DC recommendation updated to moderate intensity due to functional ability at this time                         Time Tracking:     OT Date of Treatment: 03/15/24  OT Start Time: 1128  OT Stop Time: 1151  OT Total Time (min): 23 min    Billable Minutes:Self Care/Home Management 23    OT/LYNDA: LYNDA     Number of LYNDA visits since last OT visit: 3    3/15/2024

## 2024-03-15 NOTE — ASSESSMENT & PLAN NOTE
-CT abd/pelvis w/ numerous metastatic masses in the liver, likely metastatic deposit in the spleen; bilateral adrenal masses, hypoattenuating and mildly enhancing mass of upper pole L kidney, intra-abdominal enlarged lymph nodes consistent w/ metastatic infiltration.   -masses to R lung base w/ consolidation of visualized R renal lower lobe; R lower lobe 9mm nodule  -L2 vertebra compression fracture w/ mild sclerosis of the vertebra, likely reflecting a pathologic fracture; faint sclerosis of the superior endplate of S1 likely reflects metastatic infiltration  -deferring staging workup including MRI spine and labs to medical oncology  -medical oncology discussion, they affirmed that the patient is a fairly decent candidate for systemic chemotherapy but best for patient to be in [place] to initiate the cycle as she has no follow up options here in the state due to Payor source limitations and residency status  -per rad onc, pt not a good candidate at this time until possibly later in future if she gets chemotherapy.    Per onc, No evidence of visceral crisis currently requiring emergent initiation of inpatient chemotherapy, but pt needs urgent f/u for consideration of palliative systemic therapy if it can be arranged. Pt is a resident of [place] so arranging f/u at [facility] or one closer to home would be best.     Palliative care consulted  Pain management

## 2024-03-15 NOTE — CHAPLAIN
"Palliative Care     Patient: Crow Hines  MRN: [id number]  : 1960  Age: 63 y.o.  Hospital Length of Stay: 17  Code Status: Code Status Discussion Note  Attending Provider: Huong Burgess MD  Principal Problem: Brain mass    Non-clinical observations of patient/room: Visited [facility] pt; she was alone, semi-sleeping, awoke upon entry and knock; acknowledged, closed eyes again; seemed pleasantly confused. 10 min visit    Pt was calm, still, relaxed, but also confirmed she's in pain, when I asked. She intermittently asked/said odd things-- "what are the rules and regulations....", "where;s my tomato soup" and she said she has two daughters and one is in the hospital with cancer (not sure if that's true).     A [place]  had visited her a couple weeks ago and indicated pt requested prayer and is [facility], but when I asked if she was [facility] (per Epic) she shook her head no, so I did not proceed with offering prayer. Instead, I provided silent compassionate presence, while holding her hand; spoke softly to her to relax and close her eyes, no need to talk; did this for several minutes and she seemingly fell asleep, deep breathing and relaxed hand, but as soon as I removed my hand she woke....that's when she asked about tomato soup. I suggested she ask her nurse, but for now get some rest and pt said, "ok, thank you."     Palliative  will continue to follow. Lord, in your mercy.        **Spiritual Care Dept. Chaplains are available evenings, overnight and weekends **    In Peace,  Rev. Gaby Sanchez MDiv, [facility]  Board Certified   Palliative Medicine Department  [phone]  "

## 2024-03-15 NOTE — PT/OT/SLP PROGRESS
"Physical Therapy Treatment  Co Treatment with Occupational Therapy     Patient Name:  Crow Hines   MRN:  [id number]    Recommendations:     Discharge Recommendations: Moderate Intensity Therapy  Discharge Equipment Recommendations: hospital bed, wheelchair  Barriers to discharge: Inaccessible home and Decreased caregiver support    Assessment:     Crow Hines is a 63 y.o. female admitted with a medical diagnosis of Brain mass.  She presents with the following impairments/functional limitations: weakness, impaired endurance, impaired self care skills, impaired functional mobility, gait instability, impaired balance, decreased upper extremity function, decreased lower extremity function, decreased safety awareness, pain, impaired sensation, orthopedic precautions.    Pt agreeable to session at this time. Pt tolerates session well with emphasis on bed mobility, transfers, and gait training. Session limited by pt c/o pain. Pt will continue to benefit from skilled therapy services.    Rehab Prognosis: Good; patient would benefit from acute skilled PT services to address these deficits and reach maximum level of function.    Recent Surgery: Procedure(s) (LRB):  CRANIOTOMY, WITH NEOPLASM EXCISION USING COMPUTER-ASSISTED NAVIGATION (Left) 15 Days Post-Op    Plan:     During this hospitalization, patient to be seen 3 x/week to address the identified rehab impairments via gait training, therapeutic activities, therapeutic exercises, neuromuscular re-education and progress toward the following goals:    Plan of Care Expires:  04/01/24    Subjective     Chief Complaint: back pain  Patient/Family Comments/goals: "Let's go I'm gonna poo"  Pain/Comfort:  Pain Rating 1: other (see comments) (Pain not rated)  Location - Orientation 1: generalized  Location 1: back  Pain Addressed 1: Pre-medicate for activity, Reposition, Distraction  Pain Rating Post-Intervention 1: other (see comments) (Pain not rated)      Objective: "     Communicated with RN (Carlie) prior to session.  Patient found HOB elevated with peripheral IV, PureWick upon PTA entry to room.     Attempted session at 9:01 am with pt requesting therapists to try again at a later 2/2 to wanting pain meds to kick in    General Precautions: Standard, fall  Orthopedic Precautions: spinal precautions  Braces: LSO (for comfort)  Respiratory Status: Room air     Functional Mobility:  Bed Mobility:     Scooting: anteriorly to EOB contact guard assistance; to HOB CGA utilizing bed rails to pull up  Supine to Sit: minimum assistance for Trunk/BLEs  Sit to Supine: moderate assistance for BLEs  Transfers:     Sit to Stand:  contact guard assistance with 4 wheeled walker  Bed to Bedside Commode: minimum assistance with no AD via stand pivot  Bedside Commode to Bed: max assistance with no AD via squat pivot  Gait: Pt takes 2 steps forward and 2 steps backward CGA with rollator requiring max verbal cues      AM-PAC 6 CLICK MOBILITY  Turning over in bed (including adjusting bedclothes, sheets and blankets)?: 3  Sitting down on and standing up from a chair with arms (e.g., wheelchair, bedside commode, etc.): 3  Moving from lying on back to sitting on the side of the bed?: 3  Moving to and from a bed to a chair (including a wheelchair)?: 2  Need to walk in hospital room?: 2  Climbing 3-5 steps with a railing?: 1  Basic Mobility Total Score: 14       Treatment & Education:  Patient provided with daily orientation and goals of this PT session.     Pt educated to call for assistance and to transfer with hospital staff only.  Also, pt was educated on the effects of prolonged immobility and the importance of performing OOB activity and exercises to promote healing and reduce recovery time.    Co tx performed with OT due to patient need for 2 skilled therapist to ensure patient and staff safety and to accommondate for activity tolerance.    Patient left HOB elevated with all lines intact, call  button in reach, and RN notified.    GOALS:   Multidisciplinary Problems       Physical Therapy Goals          Problem: Physical Therapy    Goal Priority Disciplines Outcome Goal Variances Interventions   Physical Therapy Goal     PT, PT/OT Ongoing, Progressing     Description: Goals to be met by: 3/12/2024     Patient will increase functional independence with mobility by performin. Supine to sit with [place]  2. Sit to supine with [place]  3. Rolling to Left and Right with [place].  4. Sit to stand transfer with Contact Guard Assistance with RW  5. Bed to chair transfer with Contact Guard Assistance using Rolling Walker  6. Gait  x 50 feet with Contact Guard Assistance using Rolling Walker.   7. Ascend/descend 3 stair with right Handrails Minimal Assistance using No Assistive Device.   8. Lower extremity exercise program x10 reps per handout, with supervision                             Time Tracking:     PT Received On: 03/15/24  PT Start Time: 1128     PT Stop Time: 1151  PT Total Time (min): 23 min     Billable Minutes: Gait Training 8 and Therapeutic Activity 15    Treatment Type: Treatment  PT/PTA: PTA     Number of PTA visits since last PT visit: 1     03/15/2024

## 2024-03-15 NOTE — ASSESSMENT & PLAN NOTE
-Stable respiratory status  -Per Neurosurgery recommendation, awaiting for heparin infusion drip to be therapeutic and then to repeat head CT to rule out bleed    3/14- PTTs therapeutic, CT head -  Evolving postsurgical change from recent left temporal mass resection as above. No new hemorrhage or major vascular distribution infarct.  3/15- Will need oral anticoagulant, if able to take po and not hospice.

## 2024-03-15 NOTE — PROGRESS NOTES
Mario Hsieh - Neurosurgery ([facility])  [facility] Medicine  Progress Note    Patient Name: Crow Hines  MRN: [id number]  Patient Class: IP- Inpatient   Admission Date: 2/27/2024  Length of Stay: 17 days  Attending Physician: Huong Burgess MD  Primary Care Provider: Chika Powell MD        Subjective:     Principal Problem:Brain mass        HPI:  64 y/o Female admitted as transfer from OSH for L temporal lobe mass presenting w/ difficulty walking. Lab work significant for alk phos 203, AST 57, sodium 132, platelets 586. CXR revealed linear and ill-defined hazy opacities of the right mid to lower lung, felt to reflect infiltrate. Diffuse abdominal pain with focal increased tenderness in mid-epigastric area prompted CT abd/pelvis revealing numerous metastatic masses in the liver, metastatic deposits in the spleen and bilateral adrenal masses, hypoattenuating and mildly enhancing mass of the upper pole L kidney. Also w/ intra-abdominal enlarged lymph nodes, consistent with metastatic infiltration, masses of the R lung base with consolidation of the visualized R renal lower lobe and RLL 9 mm nodule. L2 compression fracture w/ mild sclerosis of the vertebra noted, likely pathologic. CTH completed showing large 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 Pt underwent L temporal craniotomy tumor resection on 2/29. Path returned metastatic small cell lung CA. Heparin gtt for PE. CTH when therapeutic negative for hemorrhage.     Overview/Hospital Course:  3/14- /71  SpO2 90%  other VSSComplicated social situation and discharge. Pt lives in trailer in [place] and has no transport. No insurance. Trying transferring to [facility] in [place] per onc here since pt is [place] resident w/ no insurance; if that is not possible then back up option may be home hospice until her insurance is approved and then can re-evaluate if she wants to pursue palliative chemo. Has new Metastatic small cell ca, poor prognosis.  See GOC  note below.     3/15- pt getting confused. Will need to reach out to family. Likely too sick to receive chemo. Constipation. Start miralax and leah supp. Has abd pain. Wbc 12. On heparin to for PE, CMP stable. Appreciate PC and  follow up. See GOC below. Plts 26, 000 not bleeding.  If bleeds, dc heparin and transfuse platelets.     Interval History: see above    Review of Systems   Constitutional:  Positive for activity change. Negative for appetite change.   HENT:  Negative for trouble swallowing.    Respiratory:  Negative for shortness of breath.    Cardiovascular:  Negative for chest pain and leg swelling.   Gastrointestinal:  Negative for abdominal pain, constipation and diarrhea.   Genitourinary:  Negative for difficulty urinating.   Musculoskeletal:  Positive for back pain. Negative for gait problem.   Neurological:  Negative for numbness and headaches.   Psychiatric/Behavioral:  Negative for agitation, behavioral problems and confusion.      Objective:     Vital Signs (Most Recent):  Temp: 98.3 °F (36.8 °C) (03/15/24 0744)  Pulse: 91 (03/15/24 0753)  Resp: 16 (03/15/24 0837)  BP: 122/70 (03/15/24 0744)  SpO2: (!) 93 % (03/15/24 0753) Vital Signs (24h Range):  Temp:  [97.8 °F (36.6 °C)-98.3 °F (36.8 °C)] 98.3 °F (36.8 °C)  Pulse:  [88-92] 91  Resp:  [14-18] 16  SpO2:  [90 %-93 %] 93 %  BP: ()/(59-70) 122/70     Weight: 47.6 kg (105 lb)  Body mass index is 18.6 kg/m².    Intake/Output Summary (Last 24 hours) at 3/15/2024 0839  Last data filed at 3/14/2024 2000  Gross per 24 hour   Intake --   Output 100 ml   Net -100 ml           Physical Exam  Constitutional:       General: She is not in acute distress.     Appearance: Normal appearance. She is ill-appearing. She is not toxic-appearing or diaphoretic.      Comments: underweight   HENT:      Head: Atraumatic.      Mouth/Throat:      Mouth: Mucous membranes are moist.      Pharynx: Oropharynx is clear. No posterior oropharyngeal erythema.   Eyes:       General: No scleral icterus.     Extraocular Movements: Extraocular movements intact.      Conjunctiva/sclera: Conjunctivae normal.      Pupils: Pupils are equal, round, and reactive to light.   Neck:      Vascular: No carotid bruit.   Cardiovascular:      Rate and Rhythm: Normal rate and regular rhythm.      Pulses: Normal pulses.      Heart sounds: Normal heart sounds. No murmur heard.     No friction rub. No gallop.   Pulmonary:      Effort: Pulmonary effort is normal. No respiratory distress.      Breath sounds: Normal breath sounds. No stridor. No wheezing, rhonchi or rales.   Chest:      Chest wall: No tenderness.   Abdominal:      General: Abdomen is flat. Bowel sounds are normal. There is no distension.      Palpations: Abdomen is soft. There is no mass.      Tenderness: There is no abdominal tenderness. There is no right CVA tenderness, left CVA tenderness, guarding or rebound.   Musculoskeletal:         General: No swelling, tenderness, deformity or signs of injury. Normal range of motion.      Cervical back: Normal range of motion and neck supple. No rigidity or tenderness.      Right lower leg: No edema.      Left lower leg: No edema.   Lymphadenopathy:      Cervical: No cervical adenopathy.   Skin:     General: Skin is warm and dry.      Coloration: Skin is not jaundiced or pale.      Findings: No bruising, erythema, lesion or rash.   Neurological:      General: No focal deficit present.      Mental Status: She is alert and oriented to person, place, and time. Mental status is at baseline.      Cranial Nerves: No cranial nerve deficit.      Sensory: No sensory deficit.      Motor: No weakness.   Psychiatric:         Mood and Affect: Mood normal.         Behavior: Behavior normal.         Thought Content: Thought content normal.         Judgment: Judgment normal.             Significant Labs: All pertinent labs within the past 24 hours have been reviewed.  CBC:   Recent Labs   Lab 03/14/24 1939   WBC  12.87*   HGB 10.0*   HCT 30.3*   PLT 45*       CMP:   Recent Labs   Lab 03/14/24  1455 03/15/24  0717    134*   K 4.4 4.1    100   CO2 26 24   GLU 94 76   BUN 13 15   CREATININE 0.7 0.6   CALCIUM 9.2 9.0   PROT 5.7* 5.6*   ALBUMIN 2.3* 2.2*   BILITOT 0.7 0.7   ALKPHOS 596* 525*   * 106*   ALT 94* 86*   ANIONGAP 10 10         Significant Imaging: I have reviewed all pertinent imaging results/findings within the past 24 hours.    Assessment/Plan:      * Brain mass  -L temporal mass; status post tumor resection on 02/29, pathology showing met small cell lung CA   -scheduled dexamethasone. Continue dex 4q12 for vasogenic edema. Wean to off over 2 weeks  - Keppra 500 mg bid for seizure prophylaxis  - On hep gtt for PE. obtain head CT once therapeutic. If CTH is stable, okay to transition to oral AC upon discharge for treatment of PE.  -PT/OT  -blood pressure control  -pain control  -neurochecks    3/15- has [place] decline      Metastatic neoplastic disease  -CT abd/pelvis w/ numerous metastatic masses in the liver, likely metastatic deposit in the spleen; bilateral adrenal masses, hypoattenuating and mildly enhancing mass of upper pole L kidney, intra-abdominal enlarged lymph nodes consistent w/ metastatic infiltration.   -masses to R lung base w/ consolidation of visualized R renal lower lobe; R lower lobe 9mm nodule  -L2 vertebra compression fracture w/ mild sclerosis of the vertebra, likely reflecting a pathologic fracture; faint sclerosis of the superior endplate of S1 likely reflects metastatic infiltration  -deferring staging workup including MRI spine and labs to medical oncology  -medical oncology discussion, they affirmed that the patient is a fairly decent candidate for systemic chemotherapy but best for patient to be in [place] to initiate the cycle as she has no follow up options here in the state due to Payor source limitations and residency status  -per rad onc, pt not a good candidate at  "this time until possibly later in future if she gets chemotherapy.    Per onc, No evidence of visceral crisis currently requiring emergent initiation of inpatient chemotherapy, but pt needs urgent f/u for consideration of palliative systemic therapy if it can be arranged. Pt is a resident of [place] so arranging f/u at [facility] or one closer to home would be best.     Palliative care consulted  Pain management    Cerebral edema  Due to metastatic small cell lung ca to the brain,  - on dexamethazone and keppra      Vasogenic brain edema  See above      Brain compression  See above      Compression fracture of lumbar spine, non-traumatic  Back brace      Acute pulmonary embolism without acute cor pulmonale  -Stable respiratory status  -Per Neurosurgery recommendation, awaiting for heparin infusion drip to be therapeutic and then to repeat head CT to rule out bleed    3/14- PTTs therapeutic, CT head -  Evolving postsurgical change from recent left temporal mass resection as above. No new hemorrhage or major vascular distribution infarct.  3/15- Will need oral anticoagulant, if able to take po and not hospice.     Palliative care encounter  Appreciate assistance  Pt needs supportive care In [place]  Hospice is appropriate  May be a candidate for systemic chemotherapy which would be palliative, not a cure.      Hyponatremia  Patient has hyponatremia which is controlled,We will aim to correct the sodium by 4-6mEq in 24 hours. We will monitor sodium Daily. The hyponatremia is due to Dehydration/hypovolemia. We will obtain the following studies: see labs. We will treat the hyponatremia with IV fluids. The patient's sodium results have been reviewed and are listed below.  Recent Labs   Lab 03/15/24  0717   *       Pain  Scheduled: LA morphine 30 bid, gabapentin  PRN: Dilaudid, oxycodone, robaxin      Cancer related pain  Due to small cell Cancer  See " pain" " above      Debility  Patient with Acute debility due to  cancer . Latest AMPAC and GEMS scores have been reviewed. Evaluation for etiology is complete. Plan includes  hospice care/ supportive care.  .    Advance care planning  See palliative care      Slow transit constipation  Miralkori   Doculax supp      Goals of care, counseling/discussion  Advance Care Planning    Does patient have Capacity? Yes  Contact:  Pt has son w special needs and DTR who cannot help her. She makes decisions for herself  Goals/Wishes: wants NH placement, Understands she has a poor prognosis, wants comfort measures, if able to get support would consider palliative chemotherapy, Does not have assistance at home.   Code Status- DNR confirmed 12:30 pm on 3/14.  Pain management- stable, see pain management above  Prognosis-  less than six months.   Family discussions-   3/14- They are not present. We discussed her diagnosis and future care needs. She is aware of poor prognosis, We discussed code status and hospice. She would like to go to a NH as she can no longer care for herself independently.   3/15- Pt gives me permission to speak to her DTR,. Called Thais Hines (Daughter) [phone] ([place]) and left VM. Dtr has stage 4 cancer and is undergoing chemotherapy, and cannot take care of patient. Thais's brother, who has autism is with her godmother. We discussed her care and condtion and discharge options. They have no other family members who can be with patient at home. We discussed that pt is having pain, and not able to transport easily which is needed for chemotherapy. Thais expressed understanding. We discussed that prognosis was poor, and dependent on pt's oral intake. Our patient has previously told Thais before admission that she wants DNR code status. Dtr wants to be informed of events and progress and willing to help make decision.   Functional Status - limited to bed    Post acute care plan:  Hospice is appropriate, NH with  hospice is appropriate as well. CM working on Medicaid approval, supportive care, possible transfer to [facility] for palliative chemotherapy or other forms of support. .   Time spent on Goals of Care:  10 minutes at bedside on 3/14. 12 minutes on phone 3/15.           Thrombocytosis  monitor      Transaminitis  monitor      Moderate malnutrition  Nutrition consulted. Most recent weight and BMI monitored-     Measurements:  Wt Readings from Last 1 Encounters:   02/28/24 47.6 kg (105 lb)   Body mass index is 18.6 kg/m².    Patient has been screened and assessed by RD.    Malnutrition Type:  Context: chronic illness  Level: moderate    Malnutrition Characteristic Summary:  Weight Loss (Malnutrition): greater than 7.5% in 3 months (-15% x 3mo)  Subcutaneous Fat (Malnutrition): mild depletion  Muscle Mass (Malnutrition): mild depletion    Interventions/Recommendations (treatment strategy):  1. Continue Regular Diet as toelrated. 2. Recommend Boost Plus (or alternative) QD to help meet nutritional needs. 3. Monitor I/O's, weight and labs. 4. RD to monitor.      HTN (hypertension)  Chronic, controlled. Latest blood pressure and vitals reviewed-     Temp:  [97.8 °F (36.6 °C)-98.3 °F (36.8 °C)]   Pulse:  [88-92]   Resp:  [14-18]   BP: ()/(59-70)   SpO2:  [90 %-93 %] .   Home meds for hypertension were reviewed and noted below.   Hypertension Medications               atenoloL (TENORMIN) 50 MG tablet Take 1 tablet (50 mg total) by mouth once daily.            While in the hospital, will manage blood pressure as follows; Adjust home antihypertensive regimen as follows- adjust as needed    Will utilize p.r.n. blood pressure medication only if patient's blood pressure greater than 180/110 and she develops symptoms such as worsening chest pain or shortness of breath.      VTE Risk Mitigation (From admission, onward)           Ordered     heparin 25,000 units in dextrose 5% 250 mL (100 units/mL) infusion HIGH INTENSITY  nomogram - [facility]  Continuous        Question:  Begin at (units/kg/hr)  Answer:  18    03/05/24 1004     Reason for No Pharmacological VTE Prophylaxis  Once        Question:  Reasons:  Answer:  Risk of Bleeding    02/27/24 0556     IP VTE HIGH RISK PATIENT  Once         02/27/24 0556     Place sequential compression device  Until discontinued         02/27/24 0556                    Discharge Planning   JUAN: 3/18/2024     Code Status: DNR   Is the patient medically ready for discharge?: Yes    Reason for patient still in hospital (select all that apply): Patient trending condition  Discharge Plan A: Hospital Transfer   Discharge Delays: (!) Payor Issues      Huong Burgess MD  Department of Hospital Medicine   [facility] - Neurosurgery ([facility])

## 2024-03-16 PROBLEM — D69.6 THROMBOCYTOPENIA: Status: ACTIVE | Noted: 2024-03-06

## 2024-03-16 LAB
ALBUMIN SERPL BCP-MCNC: 2.2 G/DL (ref 3.5–5.2)
ALP SERPL-CCNC: 503 U/L (ref 55–135)
ALT SERPL W/O P-5'-P-CCNC: 78 U/L (ref 10–44)
ANION GAP SERPL CALC-SCNC: 11 MMOL/L (ref 8–16)
APTT PPP: 46.4 SEC (ref 21–32)
AST SERPL-CCNC: 100 U/L (ref 10–40)
BACTERIA #/AREA URNS AUTO: ABNORMAL /HPF
BASOPHILS # BLD AUTO: 0.03 K/UL (ref 0–0.2)
BASOPHILS NFR BLD: 0.3 % (ref 0–1.9)
BILIRUB SERPL-MCNC: 0.7 MG/DL (ref 0.1–1)
BILIRUB UR QL STRIP: NEGATIVE
BUN SERPL-MCNC: 14 MG/DL (ref 8–23)
CALCIUM SERPL-MCNC: 9 MG/DL (ref 8.7–10.5)
CHLORIDE SERPL-SCNC: 102 MMOL/L (ref 95–110)
CLARITY UR REFRACT.AUTO: ABNORMAL
CO2 SERPL-SCNC: 25 MMOL/L (ref 23–29)
COLOR UR AUTO: YELLOW
CREAT SERPL-MCNC: 0.7 MG/DL (ref 0.5–1.4)
DIFFERENTIAL METHOD BLD: ABNORMAL
EOSINOPHIL # BLD AUTO: 0 K/UL (ref 0–0.5)
EOSINOPHIL NFR BLD: 0 % (ref 0–8)
ERYTHROCYTE [DISTWIDTH] IN BLOOD BY AUTOMATED COUNT: 15.1 % (ref 11.5–14.5)
EST. GFR  (NO RACE VARIABLE): >60 ML/MIN/1.73 M^2
GLUCOSE SERPL-MCNC: 80 MG/DL (ref 70–110)
GLUCOSE UR QL STRIP: NEGATIVE
HCT VFR BLD AUTO: 30.1 % (ref 37–48.5)
HGB BLD-MCNC: 9.7 G/DL (ref 12–16)
HGB UR QL STRIP: NEGATIVE
HYALINE CASTS UR QL AUTO: 0 /LPF
IMM GRANULOCYTES # BLD AUTO: 0.15 K/UL (ref 0–0.04)
IMM GRANULOCYTES NFR BLD AUTO: 1.5 % (ref 0–0.5)
KETONES UR QL STRIP: ABNORMAL
LEUKOCYTE ESTERASE UR QL STRIP: ABNORMAL
LYMPHOCYTES # BLD AUTO: 0.5 K/UL (ref 1–4.8)
LYMPHOCYTES NFR BLD: 5.4 % (ref 18–48)
MAGNESIUM SERPL-MCNC: 2.2 MG/DL (ref 1.6–2.6)
MCH RBC QN AUTO: 32.4 PG (ref 27–31)
MCHC RBC AUTO-ENTMCNC: 32.2 G/DL (ref 32–36)
MCV RBC AUTO: 101 FL (ref 82–98)
MICROSCOPIC COMMENT: ABNORMAL
MONOCYTES # BLD AUTO: 0.7 K/UL (ref 0.3–1)
MONOCYTES NFR BLD: 6.7 % (ref 4–15)
NEUTROPHILS # BLD AUTO: 8.7 K/UL (ref 1.8–7.7)
NEUTROPHILS NFR BLD: 86.1 % (ref 38–73)
NITRITE UR QL STRIP: NEGATIVE
NRBC BLD-RTO: 0 /100 WBC
PH UR STRIP: 6 [PH] (ref 5–8)
PHOSPHATE SERPL-MCNC: 3.1 MG/DL (ref 2.7–4.5)
PLATELET # BLD AUTO: 17 K/UL (ref 150–450)
PLATELET BLD QL SMEAR: ABNORMAL
PMV BLD AUTO: 13.1 FL (ref 9.2–12.9)
POTASSIUM SERPL-SCNC: 4.5 MMOL/L (ref 3.5–5.1)
PROT SERPL-MCNC: 5.5 G/DL (ref 6–8.4)
PROT UR QL STRIP: ABNORMAL
RBC # BLD AUTO: 2.99 M/UL (ref 4–5.4)
RBC #/AREA URNS AUTO: 5 /HPF (ref 0–4)
SODIUM SERPL-SCNC: 138 MMOL/L (ref 136–145)
SP GR UR STRIP: 1.03 (ref 1–1.03)
SQUAMOUS #/AREA URNS AUTO: 5 /HPF
URN SPEC COLLECT METH UR: ABNORMAL
WBC # BLD AUTO: 10.09 K/UL (ref 3.9–12.7)
WBC #/AREA URNS AUTO: 97 /HPF (ref 0–5)

## 2024-03-16 PROCEDURE — 36415 COLL VENOUS BLD VENIPUNCTURE: CPT | Performed by: HOSPITALIST

## 2024-03-16 PROCEDURE — 87088 URINE BACTERIA CULTURE: CPT | Performed by: HOSPITALIST

## 2024-03-16 PROCEDURE — 25000003 PHARM REV CODE 250: Performed by: PHYSICIAN ASSISTANT

## 2024-03-16 PROCEDURE — 80053 COMPREHEN METABOLIC PANEL: CPT

## 2024-03-16 PROCEDURE — 25000242 PHARM REV CODE 250 ALT 637 W/ HCPCS: Performed by: HOSPITALIST

## 2024-03-16 PROCEDURE — 25000003 PHARM REV CODE 250: Performed by: INTERNAL MEDICINE

## 2024-03-16 PROCEDURE — 63600175 PHARM REV CODE 636 W HCPCS: Mod: UD | Performed by: STUDENT IN AN ORGANIZED HEALTH CARE EDUCATION/TRAINING PROGRAM

## 2024-03-16 PROCEDURE — 94640 AIRWAY INHALATION TREATMENT: CPT

## 2024-03-16 PROCEDURE — 11000001 HC ACUTE MED/SURG PRIVATE ROOM

## 2024-03-16 PROCEDURE — 25000003 PHARM REV CODE 250: Performed by: HOSPITALIST

## 2024-03-16 PROCEDURE — 27000221 HC OXYGEN, UP TO 24 HOURS

## 2024-03-16 PROCEDURE — 94761 N-INVAS EAR/PLS OXIMETRY MLT: CPT

## 2024-03-16 PROCEDURE — 25000003 PHARM REV CODE 250

## 2024-03-16 PROCEDURE — 99900035 HC TECH TIME PER 15 MIN (STAT)

## 2024-03-16 PROCEDURE — 85730 THROMBOPLASTIN TIME PARTIAL: CPT | Performed by: HOSPITALIST

## 2024-03-16 PROCEDURE — 81001 URINALYSIS AUTO W/SCOPE: CPT | Performed by: HOSPITALIST

## 2024-03-16 PROCEDURE — 87086 URINE CULTURE/COLONY COUNT: CPT | Performed by: HOSPITALIST

## 2024-03-16 PROCEDURE — 63600175 PHARM REV CODE 636 W HCPCS: Performed by: HOSPITALIST

## 2024-03-16 PROCEDURE — 63600175 PHARM REV CODE 636 W HCPCS: Mod: UD | Performed by: HOSPITALIST

## 2024-03-16 PROCEDURE — 85025 COMPLETE CBC W/AUTO DIFF WBC: CPT

## 2024-03-16 PROCEDURE — 84100 ASSAY OF PHOSPHORUS: CPT

## 2024-03-16 PROCEDURE — C1751 CATH, INF, PER/CENT/MIDLINE: HCPCS

## 2024-03-16 PROCEDURE — 83735 ASSAY OF MAGNESIUM: CPT

## 2024-03-16 PROCEDURE — 87077 CULTURE AEROBIC IDENTIFY: CPT | Mod: 59 | Performed by: HOSPITALIST

## 2024-03-16 PROCEDURE — 25000003 PHARM REV CODE 250: Performed by: STUDENT IN AN ORGANIZED HEALTH CARE EDUCATION/TRAINING PROGRAM

## 2024-03-16 PROCEDURE — 87186 SC STD MICRODIL/AGAR DIL: CPT | Mod: 59 | Performed by: HOSPITALIST

## 2024-03-16 RX ORDER — IPRATROPIUM BROMIDE AND ALBUTEROL SULFATE 2.5; .5 MG/3ML; MG/3ML
3 SOLUTION RESPIRATORY (INHALATION)
Status: DISCONTINUED | OUTPATIENT
Start: 2024-03-16 | End: 2024-03-17 | Stop reason: HOSPADM

## 2024-03-16 RX ORDER — DOCUSATE SODIUM 283 MG/5ML
1 LIQUID RECTAL DAILY
Status: DISCONTINUED | OUTPATIENT
Start: 2024-03-16 | End: 2024-03-17 | Stop reason: HOSPADM

## 2024-03-16 RX ADMIN — OXYCODONE HYDROCHLORIDE 15 MG: 10 TABLET ORAL at 06:03

## 2024-03-16 RX ADMIN — POLYETHYLENE GLYCOL 3350 17 G: 17 POWDER, FOR SOLUTION ORAL at 09:03

## 2024-03-16 RX ADMIN — GABAPENTIN 300 MG: 300 CAPSULE ORAL at 03:03

## 2024-03-16 RX ADMIN — METHOCARBAMOL 500 MG: 500 TABLET ORAL at 08:03

## 2024-03-16 RX ADMIN — SODIUM CHLORIDE 2000 MG: 1 TABLET ORAL at 08:03

## 2024-03-16 RX ADMIN — OXYCODONE HYDROCHLORIDE 15 MG: 10 TABLET ORAL at 05:03

## 2024-03-16 RX ADMIN — SODIUM BICARBONATE 650 MG: 650 TABLET ORAL at 08:03

## 2024-03-16 RX ADMIN — FAMOTIDINE 20 MG: 20 TABLET ORAL at 08:03

## 2024-03-16 RX ADMIN — IPRATROPIUM BROMIDE AND ALBUTEROL SULFATE 3 ML: .5; 3 SOLUTION RESPIRATORY (INHALATION) at 08:03

## 2024-03-16 RX ADMIN — HEPARIN SODIUM AND DEXTROSE 25 UNITS/KG/HR: 10000; 5 INJECTION INTRAVENOUS at 05:03

## 2024-03-16 RX ADMIN — OXYCODONE HYDROCHLORIDE 15 MG: 10 TABLET ORAL at 01:03

## 2024-03-16 RX ADMIN — LEVETIRACETAM 500 MG: 500 TABLET, FILM COATED ORAL at 09:03

## 2024-03-16 RX ADMIN — LEVETIRACETAM 500 MG: 500 TABLET, FILM COATED ORAL at 08:03

## 2024-03-16 RX ADMIN — APIXABAN 5 MG: 5 TABLET, FILM COATED ORAL at 09:03

## 2024-03-16 RX ADMIN — POLYETHYLENE GLYCOL 3350 17 G: 17 POWDER, FOR SOLUTION ORAL at 08:03

## 2024-03-16 RX ADMIN — DEXAMETHASONE SODIUM PHOSPHATE 2 MG: 4 INJECTION INTRA-ARTICULAR; INTRALESIONAL; INTRAMUSCULAR; INTRAVENOUS; SOFT TISSUE at 08:03

## 2024-03-16 RX ADMIN — SODIUM CHLORIDE, POTASSIUM CHLORIDE, SODIUM LACTATE AND CALCIUM CHLORIDE 250 ML: 600; 310; 30; 20 INJECTION, SOLUTION INTRAVENOUS at 03:03

## 2024-03-16 RX ADMIN — MORPHINE SULFATE 30 MG: 30 TABLET, FILM COATED, EXTENDED RELEASE ORAL at 08:03

## 2024-03-16 RX ADMIN — GABAPENTIN 300 MG: 300 CAPSULE ORAL at 09:03

## 2024-03-16 RX ADMIN — APIXABAN 5 MG: 5 TABLET, FILM COATED ORAL at 01:03

## 2024-03-16 RX ADMIN — GABAPENTIN 300 MG: 300 CAPSULE ORAL at 08:03

## 2024-03-16 RX ADMIN — ALUMINUM HYDROXIDE, MAGNESIUM HYDROXIDE, AND SIMETHICONE 30 ML: 1200; 120; 1200 SUSPENSION ORAL at 05:03

## 2024-03-16 RX ADMIN — ALUMINUM HYDROXIDE, MAGNESIUM HYDROXIDE, AND SIMETHICONE 30 ML: 1200; 120; 1200 SUSPENSION ORAL at 11:03

## 2024-03-16 RX ADMIN — ACETAMINOPHEN 650 MG: 325 TABLET ORAL at 08:03

## 2024-03-16 RX ADMIN — SODIUM CHLORIDE 2000 MG: 1 TABLET ORAL at 09:03

## 2024-03-16 RX ADMIN — DEXAMETHASONE SODIUM PHOSPHATE 2 MG: 4 INJECTION INTRA-ARTICULAR; INTRALESIONAL; INTRAMUSCULAR; INTRAVENOUS; SOFT TISSUE at 09:03

## 2024-03-16 RX ADMIN — FAMOTIDINE 20 MG: 20 TABLET ORAL at 09:03

## 2024-03-16 RX ADMIN — SODIUM CHLORIDE 2000 MG: 1 TABLET ORAL at 03:03

## 2024-03-16 RX ADMIN — MORPHINE SULFATE 30 MG: 30 TABLET, FILM COATED, EXTENDED RELEASE ORAL at 09:03

## 2024-03-16 RX ADMIN — IPRATROPIUM BROMIDE AND ALBUTEROL SULFATE 3 ML: .5; 3 SOLUTION RESPIRATORY (INHALATION) at 03:03

## 2024-03-16 RX ADMIN — ATENOLOL 50 MG: 25 TABLET ORAL at 08:03

## 2024-03-16 NOTE — SUBJECTIVE & OBJECTIVE
Interval History: see above    Review of Systems   Constitutional:  Positive for activity change. Negative for appetite change.   HENT:  Negative for trouble swallowing.    Respiratory:  Negative for shortness of breath.    Cardiovascular:  Negative for chest pain and leg swelling.   Gastrointestinal:  Negative for abdominal pain, constipation and diarrhea.   Genitourinary:  Negative for difficulty urinating.   Musculoskeletal:  Positive for back pain. Negative for gait problem.   Neurological:  Negative for numbness and headaches.   Psychiatric/Behavioral:  Negative for agitation, behavioral problems and confusion.      Objective:     Vital Signs (Most Recent):  Temp: 96.5 °F (35.8 °C) (03/16/24 0714)  Pulse: 96 (03/16/24 0714)  Resp: 14 (03/16/24 0857)  BP: 111/69 (03/16/24 0714)  SpO2: (!) 92 % (03/16/24 0714) Vital Signs (24h Range):  Temp:  [96.5 °F (35.8 °C)-98.4 °F (36.9 °C)] 96.5 °F (35.8 °C)  Pulse:  [83-96] 96  Resp:  [12-20] 14  SpO2:  [91 %-95 %] 92 %  BP: ()/(53-69) 111/69     Weight: 47.6 kg (105 lb)  Body mass index is 18.6 kg/m².    Intake/Output Summary (Last 24 hours) at 3/16/2024 0906  Last data filed at 3/16/2024 0840  Gross per 24 hour   Intake --   Output 400 ml   Net -400 ml           Physical Exam  Constitutional:       General: She is not in acute distress.     Appearance: Normal appearance. She is ill-appearing. She is not toxic-appearing or diaphoretic.      Comments: underweight   HENT:      Head: Atraumatic.      Mouth/Throat:      Mouth: Mucous membranes are moist.      Pharynx: Oropharynx is clear. No posterior oropharyngeal erythema.   Eyes:      General: No scleral icterus.     Extraocular Movements: Extraocular movements intact.      Conjunctiva/sclera: Conjunctivae normal.      Pupils: Pupils are equal, round, and reactive to light.   Neck:      Vascular: No carotid bruit.   Cardiovascular:      Rate and Rhythm: Normal rate and regular rhythm.      Pulses: Normal pulses.       Heart sounds: Normal heart sounds. No murmur heard.     No friction rub. No gallop.   Pulmonary:      Effort: Pulmonary effort is normal. No respiratory distress.      Breath sounds: Normal breath sounds. No stridor. No wheezing, rhonchi or rales.   Chest:      Chest wall: No tenderness.   Abdominal:      General: Abdomen is flat. Bowel sounds are normal. There is no distension.      Palpations: Abdomen is soft. There is no mass.      Tenderness: There is no abdominal tenderness. There is no right CVA tenderness, left CVA tenderness, guarding or rebound.   Musculoskeletal:         General: No swelling, tenderness, deformity or signs of injury. Normal range of motion.      Cervical back: Normal range of motion and neck supple. No rigidity or tenderness.      Right lower leg: No edema.      Left lower leg: No edema.   Lymphadenopathy:      Cervical: No cervical adenopathy.   Skin:     General: Skin is warm and dry.      Coloration: Skin is not jaundiced or pale.      Findings: No bruising, erythema, lesion or rash.   Neurological:      General: No focal deficit present.      Mental Status: She is alert and oriented to person, place, and time. Mental status is at baseline.      Cranial Nerves: No cranial nerve deficit.      Sensory: No sensory deficit.      Motor: No weakness.   Psychiatric:         Mood and Affect: Mood normal.         Behavior: Behavior normal.         Thought Content: Thought content normal.         Judgment: Judgment normal.             Significant Labs: All pertinent labs within the past 24 hours have been reviewed.  CBC:   Recent Labs   Lab 03/14/24  1939 03/15/24  0717   WBC 12.87* 10.26   HGB 10.0* 9.9*   HCT 30.3* 30.6*   PLT 45* 26*       CMP:   Recent Labs   Lab 03/14/24  1455 03/15/24  0717    134*   K 4.4 4.1    100   CO2 26 24   GLU 94 76   BUN 13 15   CREATININE 0.7 0.6   CALCIUM 9.2 9.0   PROT 5.7* 5.6*   ALBUMIN 2.3* 2.2*   BILITOT 0.7 0.7   ALKPHOS 596* 525*   *  106*   ALT 94* 86*   ANIONGAP 10 10         Significant Imaging: I have reviewed all pertinent imaging results/findings within the past 24 hours.

## 2024-03-16 NOTE — ASSESSMENT & PLAN NOTE
8/4/2022         RE: Lorie Rosales  [address] [place] [address]        Dear Colleague,    Thank you for referring your patient, Lorie Rosales, to the [facility] [place]. Please see a copy of my visit note below.    [facility]    Reason for Visit  Lorie Rosales is a 42 year old year old female who is being seen for New Patient (Dyspnea )    HPI  Patient is 42 years old female with past medical history of exercise-induced dyspnea since she was a teenager.  She has never received an asthma diagnosis but Advair 100 was tried with 40% improvement in symptoms.  She had spirometry back in [place] [several years ago] and was told that it was normal.  Her dyspnea is triggered by running and sometimes after talking she would feel she cannot take a deep breath in.  Her symptoms are more prominent in late spring and early summer. She denies subjective wheezing, cough, sputum production, nocturnal symptoms, nasal symptoms, sinus symptoms, or symptoms to suggest an allergic reaction.  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  There is no symptomatic variation with occupation.  She was prescribed also albuterol as needed and several years ago but does not take it due to dizziness.  She is a lifetime non-smoker.  No history of ER visits or urgent care visits for shortness of breath.    Review of Systems   Constitutional: Negative for chills and fever.   HENT: Negative for congestion, hearing loss, sinus pain and sore throat.    Respiratory: Positive for shortness of breath. Negative for cough, hemoptysis, sputum production and wheezing.    Neurological: Negative for tremors, speech change and focal weakness.   Psychiatric/Behavioral: Negative for memory loss. The patient is not nervous/anxious and does not have insomnia.         Current Outpatient Medications   Medication     FALMINA 0.1-20 MG-MCG tablet  Advance Care Planning     Does patient have Capacity? Yes  Contact:  Pt has son w special needs and DTR who cannot help her. She makes decisions for herself  Goals/Wishes: wants NH placement, Understands she has a poor prognosis, wants comfort measures, if able to get support would consider palliative chemotherapy, Does not have assistance at home.   Code Status- DNR confirmed 12:30 pm on 3/14.  Pain management- stable, see pain management above  Prognosis-  less than six months.   Family discussions-   3/14- They are not present. We discussed her diagnosis and future care needs. She is aware of poor prognosis, We discussed code status and hospice. She would like to go to a NH as she can no longer care for herself independently.   3/15- Pt gives me permission to speak to her DTR,. Called Thais Hines (Daughter) [phone] (Mobile) and left VM. Dtr has stage 4 cancer and is undergoing chemotherapy, and cannot take care of patient. Thais's brother, who has autism is with her godmother. We discussed her care and condtion and discharge options. They have no other family members who can be with patient at home. We discussed that pt is having pain, and not able to transport easily which is needed for chemotherapy. Thais expressed understanding. We discussed that prognosis was poor, and dependent on pt's oral intake. Our patient has previously told Thais before admission that she wants DNR code status. Dtr wants to be informed of events and progress and willing to help make decision.   Functional Status - limited to bed    Post acute care plan:  Hospice is appropriate, NH with hospice is appropriate as well. CM working on Medicaid approval, supportive care, possible transfer to [facility] for palliative chemotherapy or other forms of support. .   Time spent on Goals of Care:  10 minutes at bedside on 3/14. 12 minutes on phone 3/15.              fluticasone-salmeterol (ADVAIR) 100-50 MCG/DOSE inhaler     meclizine (ANTIVERT) 25 MG tablet     ondansetron (ZOFRAN-ODT) 8 MG ODT tab     No current facility-administered medications for this visit.     No Known Allergies  Past Medical History:   Diagnosis Date     Tension headache     Neuro consult     Vertigo        No past surgical history on file.    Social History     Socioeconomic History     Marital status:      Spouse name: Not on file     Number of children: Not on file     Years of education: Not on file     Highest education level: Not on file   Occupational History     Not on file   Tobacco Use     Smoking status: Never Smoker     Smokeless tobacco: Never Used   Substance and Sexual Activity     Alcohol use: No     Drug use: No     Sexual activity: Yes     Birth control/protection: Pill   Other Topics Concern     Parent/sibling w/ CABG, MI or angioplasty before 65F 55M? Not Asked   Social History Narrative     Not on file     Social Determinants of Health     Financial Resource Strain: Not on file   Food Insecurity: Not on file   Transportation Needs: Not on file   Physical Activity: Not on file   Stress: Not on file   Social Connections: Not on file   Intimate Partner Violence: Not on file   Housing Stability: Not on file       Family History   Problem Relation Age of Onset     Aneurysm Mother 49        brain         Vitals: /79 (BP Location: Right arm, Patient Position: Chair, Cuff Size: Adult Small)   Pulse 67   Wt 50.8 kg (112 lb)   SpO2 98%   BMI 19.53 kg/m      Exam:   GENERAL APPEARANCE: Well developed, well nourished, alert, and in no apparent distress.  LYMPHATICS: No significant cervical, or supraclavicular nodes.  RESP: good air flow throughout.  No crackles. No rhonchi. No wheezes.  CV: Normal S1, S2, regular rhythm, normal rate. No murmur.  No LE edema.   ABD: Soft, lax, nontender.  MS: extremities normal. No clubbing. No cyanosis.  SKIN: no rash on limited  exam.  NEURO: Mentation intact, speech normal, normal gait and stance.  PSYCH: mentation appears normal. and affect normal/bright.    Results:  PFTs today 8/4/22      Chest imaging:  Reviewed CXR images from today and compared to 1/2022  Unchanged mild hyperinflation  Scoliosis with hardware       Assessment and plan:   1.  Exercise-induced asthma  Patient is 42 years old female with exertional dyspnea since being a teenager with constellation of historic clues, radiologic and PFT findings to suggest exercise-induced asthma.  She also has seasonal variation.  Although her spirometry shows no obstruction, she does have significant air trapping [].  Since this is likely exercise-induced asthma, obstruction would not show up on spirometry and less exercise PFTs are performed.  She does have significant symptomatic response to Advair but still short of breath in spring and summer and with exercise.  Plan:  CBC with differential rule out eosinophilia  Check IgE level today  Exhaled nitric oxide with next visit  Increase Advair to 250, 1 puff twice daily  Levalbuterol as needed and prior to exercise [did not tolerate albuterol in the past]  PCV 23 vaccine today       Return to clinic in 6 months       Again, thank you for allowing me to participate in the care of your patient.        Sincerely,        Margy Roldan MD

## 2024-03-16 NOTE — ASSESSMENT & PLAN NOTE
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 Continue dex 4q12 for vasogenic edema. Wean to off over 2 weeks  - Keppra 500 mg bid for seizure prophylaxis  - On hep gtt for PE. obtain head CT once therapeutic. If CTH is stable, okay to transition to oral AC upon discharge for treatment of PE.  -PT/OT  -blood pressure control  -pain control  -neurochecks    3/16- has MS decline, some confusion that is waxing ans waning

## 2024-03-16 NOTE — NURSING
"VS unstable- provider notified:  "SpO2 at 89%, respirations 10/min, BP 85/52. She is not using the IS despite encouragement and coaching. Repositioned and starting on 2L of O2, plz let me know if there is anything else you would like me to do."    -Lizzeth Mcintosh         "

## 2024-03-16 NOTE — PLAN OF CARE
Pt is pending lateral transfer to [facility] - facility remains on diversion.      03/16     0851   Call to Nurse Station    [phone]   Entered By: Franchesca Sanchez RN   0714:  96.5  111/69  96  12  92% room air  47.6kg     Updated pt's nurse Lizzeth that [facility] is still on diversion - no change in patient's status          DISHA Figueroa, RN, [facility]  Transitional Care Manager  [phone]  [email or web address]    Mario Hsieh - Neurosurgery (Hospital)  Discharge Reassessment    Primary Care Provider: Chika Powell MD    Expected Discharge Date: 3/18/2024    Reassessment (most recent)       Discharge Reassessment - 03/16/24 1458          Discharge Reassessment    Assessment Type Discharge Planning Reassessment     Discharge Plan A Hospital Transfer     Discharge Plan B Hospice/home     Transition of Care Barriers Unisured     Why the patient remains in the hospital Insurance issues        Post-Acute Status    Post-Acute Authorization Placement     Post-Acute Placement Status Pending Bed Availability     Discharge Delays Post-Acute Set-up

## 2024-03-16 NOTE — PLAN OF CARE
Pt care priority pain management, pain administered per MAR. Large incontinent BM. VS unstable, provider notified; 2L oxygen, repositioned, bolus per provider order. Heparin stopped per order @ 1200.  Pt A&O x3 disoriented to situation and PoC despite reorientation. Emotional support offered, spiritual care consulted. D/c delayed due to diversion at [facility]. Pt remained safe throughout shift, bed locked and in lowest position, bed alarm on, side rails up x3, call bell within reach.      -Lizzeth Mcintosh    Problem: Adult Inpatient Plan of Care  Goal: Plan of Care Review  Outcome: Ongoing, Progressing  Goal: Patient-Specific Goal (Individualized)  Outcome: Ongoing, Progressing  Goal: Absence of Hospital-Acquired Illness or Injury  Outcome: Ongoing, Progressing  Goal: Optimal Comfort and Wellbeing  Outcome: Ongoing, Progressing  Goal: Readiness for Transition of Care  Outcome: Ongoing, Progressing     Problem: Infection  Goal: Absence of Infection Signs and Symptoms  Outcome: Ongoing, Progressing     Problem: Skin Injury Risk Increased  Goal: Skin Health and Integrity  Outcome: Ongoing, Progressing     Problem: Fall Injury Risk  Goal: Absence of Fall and Fall-Related Injury  Outcome: Ongoing, Progressing     Problem: Constipation  Goal: Effective Bowel Elimination  Outcome: Ongoing, Progressing     Problem: Coping Ineffective (Oncology Care)  Goal: Effective Coping  Outcome: Ongoing, Progressing     Problem: Fatigue (Oncology Care)  Goal: Improved Activity Tolerance  Outcome: Ongoing, Progressing     Problem: Oral Intake Altered (Oncology Care)  Goal: Optimal Oral Intake  Outcome: Ongoing, Progressing     Problem: Pain Acute (Oncology Care)  Goal: Optimal Pain Control  Outcome: Ongoing, Progressing     Problem: Impaired Wound Healing  Goal: Optimal Wound Healing  Outcome: Ongoing, Progressing     Problem: Coping Ineffective  Goal: Effective Coping  Outcome: Ongoing, Progressing

## 2024-03-16 NOTE — ASSESSMENT & PLAN NOTE
Found to have mets  Nsgy is following   Monitor  HIT lab  Dc heparin  Start eliquis for PE: will give maintenance 5mg bid since she has been on full dose heparin and has bleeding risk

## 2024-03-16 NOTE — NURSING
Nurses Note -- 4 Eyes      3/16/2024   7:14 AM      Skin assessed during: Q Shift Change      [] No Altered Skin Integrity Present    []Prevention Measures Documented      [x] Yes- Altered Skin Integrity Present or Discovered   [x] LDA Added if Not in Epic (Describe Wound)  MAD   [] New Altered Skin Integrity was Present on Admit and Documented in LDA   [] Wound Image Taken    Wound Care Consulted? Yes    Attending Nurse:  Carlie Costa RN/Staff Member:  Lizzeth

## 2024-03-16 NOTE — ASSESSMENT & PLAN NOTE
Chronic, controlled. Latest blood pressure and vitals reviewed-     Temp:  [96.5 °F (35.8 °C)-98.4 °F (36.9 °C)]   Pulse:  [83-96]   Resp:  [12-20]   BP: ()/(53-69)   SpO2:  [91 %-95 %] .   Home meds for hypertension were reviewed and noted below.   Hypertension Medications               atenoloL (TENORMIN) 50 MG tablet Take 1 tablet (50 mg total) by mouth once daily.            While in the hospital, will manage blood pressure as follows; Adjust home antihypertensive regimen as follows- adjust as needed    Will utilize p.r.n. blood pressure medication only if patient's blood pressure greater than 180/110 and she develops symptoms such as worsening chest pain or shortness of breath.

## 2024-03-16 NOTE — PROGRESS NOTES
Mario Hsieh - Neurosurgery ([facility])  [facility] Medicine  Progress Note    Patient Name: Crow Hines  MRN: [id number]  Patient Class: IP- Inpatient   Admission Date: 2/27/2024  Length of Stay: 18 days  Attending Physician: Huong Burgess MD  Primary Care Provider: Chika Powell MD        Subjective:     Principal Problem:Brain mass        HPI:  62 y/o Female admitted as transfer from OSH for L temporal lobe mass presenting w/ difficulty walking. Lab work significant for alk phos 203, AST 57, sodium 132, platelets 586. CXR revealed linear and ill-defined hazy opacities of the right mid to lower lung, felt to reflect infiltrate. Diffuse abdominal pain with focal increased tenderness in mid-epigastric area prompted CT abd/pelvis revealing numerous metastatic masses in the liver, metastatic deposits in the spleen and bilateral adrenal masses, hypoattenuating and mildly enhancing mass of the upper pole L kidney. Also w/ intra-abdominal enlarged lymph nodes, consistent with metastatic infiltration, masses of the R lung base with consolidation of the visualized R renal lower lobe and RLL 9 mm nodule. L2 compression fracture w/ mild sclerosis of the vertebra noted, likely pathologic. CTH completed showing large 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 Pt underwent L temporal craniotomy tumor resection on 2/29. Path returned metastatic small cell lung CA. Heparin gtt for PE. CTH when therapeutic negative for hemorrhage.     Overview/Hospital Course:  3/14- /71  SpO2 90%  other VSSComplicated social situation and discharge. Pt lives in trailer in [place] and has no transport. No insurance. Trying transferring to [facility] in [place] per onc here since pt is [place] resident w/ no insurance; if that is not possible then back up option may be home hospice until her insurance is approved and then can re-evaluate if she wants to pursue palliative chemo. Has new Metastatic small cell ca, poor prognosis.  See GOC  note below.     3/15- pt getting confused. Will need to reach out to family. Likely too sick to receive chemo. Constipation. Start miralax and leah supp. Has abd pain. Wbc 12. On heparin to for PE, CMP stable. Appreciate PC and  follow up. See GOC below. Plts 26, 000 not bleeding.  If bleeds, dc heparin and transfuse platelets.     3/16- episode of choking with breakfast today. UA sent.  SLP swallow eval. not eating well.  need to monitor plts and Hb. Abd pain, and + large BMs this am. Plates still dropping. Check HIT. Dc heparin. Will start eliquis for PE. No bleeding. MS is stable.     Interval History: see above    Review of Systems   Constitutional:  Positive for activity change. Negative for appetite change.   HENT:  Negative for trouble swallowing.    Respiratory:  Negative for shortness of breath.    Cardiovascular:  Negative for chest pain and leg swelling.   Gastrointestinal:  Negative for abdominal pain, constipation and diarrhea.   Genitourinary:  Negative for difficulty urinating.   Musculoskeletal:  Positive for back pain. Negative for gait problem.   Neurological:  Negative for numbness and headaches.   Psychiatric/Behavioral:  Negative for agitation, behavioral problems and confusion.      Objective:     Vital Signs (Most Recent):  Temp: 96.5 °F (35.8 °C) (03/16/24 0714)  Pulse: 96 (03/16/24 0714)  Resp: 14 (03/16/24 0857)  BP: 111/69 (03/16/24 0714)  SpO2: (!) 92 % (03/16/24 0714) Vital Signs (24h Range):  Temp:  [96.5 °F (35.8 °C)-98.4 °F (36.9 °C)] 96.5 °F (35.8 °C)  Pulse:  [83-96] 96  Resp:  [12-20] 14  SpO2:  [91 %-95 %] 92 %  BP: ()/(53-69) 111/69     Weight: 47.6 kg (105 lb)  Body mass index is 18.6 kg/m².    Intake/Output Summary (Last 24 hours) at 3/16/2024 0906  Last data filed at 3/16/2024 0840  Gross per 24 hour   Intake --   Output 400 ml   Net -400 ml           Physical Exam  Constitutional:       General: She is not in acute distress.     Appearance: Normal appearance. She  is ill-appearing. She is not toxic-appearing or diaphoretic.      Comments: underweight   HENT:      Head: Atraumatic.      Mouth/Throat:      Mouth: Mucous membranes are moist.      Pharynx: Oropharynx is clear. No posterior oropharyngeal erythema.   Eyes:      General: No scleral icterus.     Extraocular Movements: Extraocular movements intact.      Conjunctiva/sclera: Conjunctivae normal.      Pupils: Pupils are equal, round, and reactive to light.   Neck:      Vascular: No carotid bruit.   Cardiovascular:      Rate and Rhythm: Normal rate and regular rhythm.      Pulses: Normal pulses.      Heart sounds: Normal heart sounds. No murmur heard.     No friction rub. No gallop.   Pulmonary:      Effort: Pulmonary effort is normal. No respiratory distress.      Breath sounds: Normal breath sounds. No stridor. No wheezing, rhonchi or rales.   Chest:      Chest wall: No tenderness.   Abdominal:      General: Abdomen is flat. Bowel sounds are normal. There is no distension.      Palpations: Abdomen is soft. There is no mass.      Tenderness: There is no abdominal tenderness. There is no right CVA tenderness, left CVA tenderness, guarding or rebound.   Musculoskeletal:         General: No swelling, tenderness, deformity or signs of injury. Normal range of motion.      Cervical back: Normal range of motion and neck supple. No rigidity or tenderness.      Right lower leg: No edema.      Left lower leg: No edema.   Lymphadenopathy:      Cervical: No cervical adenopathy.   Skin:     General: Skin is warm and dry.      Coloration: Skin is not jaundiced or pale.      Findings: No bruising, erythema, lesion or rash.   Neurological:      General: No focal deficit present.      Mental Status: She is alert and oriented to person, place, and time. Mental status is at baseline.      Cranial Nerves: No cranial nerve deficit.      Sensory: No sensory deficit.      Motor: No weakness.   Psychiatric:         Mood and Affect: Mood normal.          Behavior: Behavior normal.         Thought Content: Thought content normal.         Judgment: Judgment normal.             Significant Labs: All pertinent labs within the past 24 hours have been reviewed.  CBC:   Recent Labs   Lab 03/14/24  1939 03/15/24  0717   WBC 12.87* 10.26   HGB 10.0* 9.9*   HCT 30.3* 30.6*   PLT 45* 26*       CMP:   Recent Labs   Lab 03/14/24  1455 03/15/24  0717    134*   K 4.4 4.1    100   CO2 26 24   GLU 94 76   BUN 13 15   CREATININE 0.7 0.6   CALCIUM 9.2 9.0   PROT 5.7* 5.6*   ALBUMIN 2.3* 2.2*   BILITOT 0.7 0.7   ALKPHOS 596* 525*   * 106*   ALT 94* 86*   ANIONGAP 10 10         Significant Imaging: I have reviewed all pertinent imaging results/findings within the past 24 hours.    Assessment/Plan:      * Brain mass  -L temporal mass; status post tumor resection on 02/29, pathology showing met small cell lung CA   -scheduled dexamethasone. Continue dex 4q12 for vasogenic edema. Wean to off over 2 weeks  - Keppra 500 mg bid for seizure prophylaxis  - On hep gtt for PE. obtain head CT once therapeutic. If CTH is stable, okay to transition to oral AC upon discharge for treatment of PE.  -PT/OT  -blood pressure control  -pain control  -neurochecks    3/16- has [place] decline, some confusion that is waxing ans waning      Slow transit constipation  Miralz   Doculax supp  3/16- enema today      Metastatic neoplastic disease  -CT abd/pelvis w/ numerous metastatic masses in the liver, likely metastatic deposit in the spleen; bilateral adrenal masses, hypoattenuating and mildly enhancing mass of upper pole L kidney, intra-abdominal enlarged lymph nodes consistent w/ metastatic infiltration.   -masses to R lung base w/ consolidation of visualized R renal lower lobe; R lower lobe 9mm nodule  -L2 vertebra compression fracture w/ mild sclerosis of the vertebra, likely reflecting a pathologic fracture; faint sclerosis of the superior endplate of S1 likely reflects metastatic  infiltration  -deferring staging workup including MRI spine and labs to medical oncology  -medical oncology discussion, they affirmed that the patient is a fairly decent candidate for systemic chemotherapy but best for patient to be in [place] to initiate the cycle as she has no follow up options here in the state due to Payor source limitations and residency status  -per rad onc, pt not a good candidate at this time until possibly later in future if she gets chemotherapy.    Per onc, No evidence of visceral crisis currently requiring emergent initiation of inpatient chemotherapy, but pt needs urgent f/u for consideration of palliative systemic therapy if it can be arranged. Pt is a resident of [place] so arranging f/u at [facility] or one closer to home would be best.     Palliative care consulted  Pain management    Cerebral edema  Due to metastatic small cell lung ca to the brain,  - on dexamethazone and keppra      Vasogenic brain edema  See above      Brain compression  See above      Compression fracture of lumbar spine, non-traumatic  Back brace      Acute pulmonary embolism without acute cor pulmonale  -Stable respiratory status  -Per Neurosurgery recommendation, awaiting for heparin infusion drip to be therapeutic and then to repeat head CT to rule out bleed    3/14- PTTs therapeutic, CT head -  Evolving postsurgical change from recent left temporal mass resection as above. No new hemorrhage or major vascular distribution infarct.  3/15- Will need oral anticoagulant, if able to take po and not hospice.   3/16- cbc pending    Palliative care encounter  Appreciate assistance  Pt needs supportive care In [place]  Hospice is appropriate  May be a candidate for systemic chemotherapy which would be palliative, not a cure.      Hyponatremia  Patient has hyponatremia which is controlled,We will aim to correct the sodium by 4-6mEq in 24 hours. We will monitor sodium Daily.  "The hyponatremia is due to Dehydration/hypovolemia. We will obtain the following studies: see labs. We will treat the hyponatremia with IV fluids. The patient's sodium results have been reviewed and are listed below.  No results for input(s): "NA" in the last 24 hours.      Pain  Scheduled: LA morphine 30 bid, gabapentin  PRN: Dilaudid, oxycodone, robaxin      Cancer related pain  Due to small cell Cancer  See " pain" above      Debility  Patient with Acute debility due to  cancer . Latest AMPAC and GEMS scores have been reviewed. Evaluation for etiology is complete. Plan includes  hospice care/ supportive care.  .    Advance care planning  See palliative care      Goals of care, counseling/discussion  Advance Care Planning    Does patient have Capacity? Yes  Contact:  Pt has son w special needs and DTR who cannot help her. She makes decisions for herself  Goals/Wishes: wants NH placement, Understands she has a poor prognosis, wants comfort measures, if able to get support would consider palliative chemotherapy, Does not have assistance at home.   Code Status- DNR confirmed 12:30 pm on 3/14.  Pain management- stable, see pain management above  Prognosis-  less than six months.   Family discussions-   3/14- They are not present. We discussed her diagnosis and future care needs. She is aware of poor prognosis, We discussed code status and hospice. She would like to go to a NH as she can no longer care for herself independently.   3/15- Pt gives me permission to speak to her DTR,. Called Thais Hines (Daughter) [phone] ([place]) and left . Dtr has stage 4 cancer and is undergoing chemotherapy, and cannot take care of patient. Thais's brother, who has autism is with her godmother. We discussed her care and condtion and discharge options. They have no other family members who can be with patient at home. We discussed that pt is having pain, and not able to transport easily which is needed for chemotherapy. Thais " expressed understanding. We discussed that prognosis was poor, and dependent on pt's oral intake. Our patient has previously told Thais before admission that she wants DNR code status. Dtr wants to be informed of events and progress and willing to help make decision.   Functional Status - limited to bed    Post acute care plan:  Hospice is appropriate, NH with hospice is appropriate as well. CM working on Medicaid approval, supportive care, possible transfer to [facility] for palliative chemotherapy or other forms of support. .   Time spent on Goals of Care:  10 minutes at bedside on 3/14. 12 minutes on phone 3/15.          Thrombocytopenia  Found to have mets  Nsgy is following   Monitor  HIT lab  Dc heparin  Start eliquis for PE: will give maintenance 5mg bid since she has been on full dose heparin and has bleeding risk      Transaminitis  monitor      Moderate malnutrition  Nutrition consulted. Most recent weight and BMI monitored-     Measurements:  Wt Readings from Last 1 Encounters:   02/28/24 47.6 kg (105 lb)   Body mass index is 18.6 kg/m².    Patient has been screened and assessed by RD.    Malnutrition Type:  Context: chronic illness  Level: moderate    Malnutrition Characteristic Summary:  Weight Loss (Malnutrition): greater than 7.5% in 3 months (-15% x 3mo)  Subcutaneous Fat (Malnutrition): mild depletion  Muscle Mass (Malnutrition): mild depletion    Interventions/Recommendations (treatment strategy):  1. Continue Regular Diet as toelrated. 2. Recommend Boost Plus (or alternative) QD to help meet nutritional needs. 3. Monitor I/O's, weight and labs. 4. RD to monitor.      HTN (hypertension)  Chronic, controlled. Latest blood pressure and vitals reviewed-     Temp:  [96.5 °F (35.8 °C)-98.4 °F (36.9 °C)]   Pulse:  [83-96]   Resp:  [12-20]   BP: ()/(53-69)   SpO2:  [91 %-95 %] .   Home meds for hypertension were reviewed and noted below.   Hypertension Medications               atenoloL  (TENORMIN) 50 MG tablet Take 1 tablet (50 mg total) by mouth once daily.            While in the hospital, will manage blood pressure as follows; Adjust home antihypertensive regimen as follows- adjust as needed    Will utilize p.r.n. blood pressure medication only if patient's blood pressure greater than 180/110 and she develops symptoms such as worsening chest pain or shortness of breath.      VTE Risk Mitigation (From admission, onward)           Ordered     apixaban tablet 5 mg  2 times daily         03/16/24 1203     Reason for No Pharmacological VTE Prophylaxis  Once        Question:  Reasons:  Answer:  Risk of Bleeding    02/27/24 0556     IP VTE HIGH RISK PATIENT  Once         02/27/24 0556     Place sequential compression device  Until discontinued         02/27/24 0556                    Discharge Planning   JUAN: 3/18/2024     Code Status: DNR   Is the patient medically ready for discharge?: Yes    Reason for patient still in hospital (select all that apply): Patient trending condition  Discharge Plan A: Hospital Transfer   Discharge Delays: (!) Payor Issues      Huong Burgess MD  Department of Hospital Medicine   [facility] ([facility])

## 2024-03-16 NOTE — CHAPLAIN
03/15/24 2147   Clinical Encounter Type   Visit Type Initial Visit   Visit Category Spiritual Assessment   Visited With Patient;Health care provider   Length of Visit 15 Minutes   Continue Visiting Yes   Referral From    Referral To    Patient Spiritual Encounters   Care Provided Compassionate presence   Comments - Patient Pt. was sleeping when I entered room. Nurse was about to administer meds and woke her. I addressed Pt., but she was unresponsive. Still appeared to be sleeping. Nurse woke her again and asked Pt. if she preferred  to come back tomorrow. Pt. agreed. Will F/U.

## 2024-03-16 NOTE — ASSESSMENT & PLAN NOTE
-CT abd/pelvis w/ numerous metastatic masses in the liver, likely metastatic deposit in the spleen; bilateral adrenal masses, hypoattenuating and mildly enhancing mass of upper pole L kidney, intra-abdominal enlarged lymph nodes consistent w/ metastatic infiltration.   -masses to R lung base w/ consolidation of visualized R renal lower lobe; R lower lobe 9mm nodule  -L2 vertebra compression fracture w/ mild sclerosis of the vertebra, likely reflecting a pathologic fracture; faint sclerosis of the superior endplate of S1 likely reflects metastatic infiltration  -deferring staging workup including MRI spine and labs to medical oncology  -medical oncology discussion, they affirmed that the patient is a fairly decent candidate for systemic chemotherapy but best for patient to be in [place] to initiate the cycle as she has no follow up options here in the state due to Payor source limitations and residency status  -per rad onc, pt not a good candidate at this time until possibly later in future if she gets chemotherapy.    Per onc, No evidence of visceral crisis currently requiring emergent initiation of inpatient chemotherapy, but pt needs urgent f/u for consideration of palliative systemic therapy if it can be arranged. Pt is a resident of [place] so arranging f/u at [facility] or one closer to home would be best.     Palliative care consulted  Pain management

## 2024-03-16 NOTE — ASSESSMENT & PLAN NOTE
-Stable respiratory status  -Per Neurosurgery recommendation, awaiting for heparin infusion drip to be therapeutic and then to repeat head CT to rule out bleed    3/14- PTTs therapeutic, CT head -  Evolving postsurgical change from recent left temporal mass resection as above. No new hemorrhage or major vascular distribution infarct.  3/15- Will need oral anticoagulant, if able to take po and not hospice.   3/16- cbc pending

## 2024-03-17 ENCOUNTER — HOSPITAL ENCOUNTER (INPATIENT)
Facility: HOSPITAL | Age: 64
LOS: 5 days | Discharge: HOSPICE | End: 2024-03-22
Attending: HOSPITALIST | Admitting: INTERNAL MEDICINE
Payer: MEDICAID

## 2024-03-17 VITALS
TEMPERATURE: 99 F | SYSTOLIC BLOOD PRESSURE: 107 MMHG | HEART RATE: 92 BPM | HEIGHT: 63 IN | BODY MASS INDEX: 18.61 KG/M2 | RESPIRATION RATE: 14 BRPM | WEIGHT: 105 LBS | DIASTOLIC BLOOD PRESSURE: 69 MMHG | OXYGEN SATURATION: 97 %

## 2024-03-17 DIAGNOSIS — Z03.89 RULED OUT FOR MYOCARDIAL INFARCTION: ICD-10-CM

## 2024-03-17 DIAGNOSIS — G93.89 BRAIN MASS: ICD-10-CM

## 2024-03-17 DIAGNOSIS — C79.9 METASTATIC MALIGNANT NEOPLASM, UNSPECIFIED SITE: ICD-10-CM

## 2024-03-17 DIAGNOSIS — C80.1 SMALL CELL CARCINOMA: ICD-10-CM

## 2024-03-17 DIAGNOSIS — M48.56XA NONTRAUMATIC COMPRESSION FRACTURE OF L2 VERTEBRA, INITIAL ENCOUNTER: Primary | ICD-10-CM

## 2024-03-17 DIAGNOSIS — I26.99 ACUTE PULMONARY EMBOLISM WITHOUT ACUTE COR PULMONALE, UNSPECIFIED PULMONARY EMBOLISM TYPE: ICD-10-CM

## 2024-03-17 LAB
ALBUMIN SERPL BCP-MCNC: 2.1 G/DL (ref 3.5–5.2)
ALP SERPL-CCNC: 705 U/L (ref 55–135)
ALT SERPL W/O P-5'-P-CCNC: 83 U/L (ref 10–44)
ANION GAP SERPL CALC-SCNC: 10 MMOL/L (ref 8–16)
AST SERPL-CCNC: 112 U/L (ref 10–40)
BASOPHILS # BLD AUTO: 0.01 K/UL (ref 0–0.2)
BASOPHILS NFR BLD: 0.1 % (ref 0–1.9)
BILIRUB SERPL-MCNC: 0.7 MG/DL (ref 0.1–1)
BUN SERPL-MCNC: 13 MG/DL (ref 8–23)
CALCIUM SERPL-MCNC: 8.8 MG/DL (ref 8.7–10.5)
CHLORIDE SERPL-SCNC: 106 MMOL/L (ref 95–110)
CO2 SERPL-SCNC: 24 MMOL/L (ref 23–29)
CREAT SERPL-MCNC: 0.6 MG/DL (ref 0.5–1.4)
DIFFERENTIAL METHOD BLD: ABNORMAL
EOSINOPHIL # BLD AUTO: 0 K/UL (ref 0–0.5)
EOSINOPHIL NFR BLD: 0 % (ref 0–8)
ERYTHROCYTE [DISTWIDTH] IN BLOOD BY AUTOMATED COUNT: 15.1 % (ref 11.5–14.5)
EST. GFR  (NO RACE VARIABLE): >60 ML/MIN/1.73 M^2
GLUCOSE SERPL-MCNC: 94 MG/DL (ref 70–110)
HCT VFR BLD AUTO: 28.7 % (ref 37–48.5)
HGB BLD-MCNC: 9 G/DL (ref 12–16)
IMM GRANULOCYTES # BLD AUTO: 0.17 K/UL (ref 0–0.04)
IMM GRANULOCYTES NFR BLD AUTO: 2 % (ref 0–0.5)
LYMPHOCYTES # BLD AUTO: 0.3 K/UL (ref 1–4.8)
LYMPHOCYTES NFR BLD: 3.7 % (ref 18–48)
MAGNESIUM SERPL-MCNC: 2.2 MG/DL (ref 1.6–2.6)
MCH RBC QN AUTO: 32.3 PG (ref 27–31)
MCHC RBC AUTO-ENTMCNC: 31.4 G/DL (ref 32–36)
MCV RBC AUTO: 103 FL (ref 82–98)
MONOCYTES # BLD AUTO: 0.5 K/UL (ref 0.3–1)
MONOCYTES NFR BLD: 5.5 % (ref 4–15)
NEUTROPHILS # BLD AUTO: 7.5 K/UL (ref 1.8–7.7)
NEUTROPHILS NFR BLD: 88.7 % (ref 38–73)
NRBC BLD-RTO: 0 /100 WBC
PHOSPHATE SERPL-MCNC: 2.4 MG/DL (ref 2.7–4.5)
PLATELET # BLD AUTO: 35 K/UL (ref 150–450)
PLATELET BLD QL SMEAR: ABNORMAL
PMV BLD AUTO: 12.6 FL (ref 9.2–12.9)
POTASSIUM SERPL-SCNC: 4.2 MMOL/L (ref 3.5–5.1)
PROT SERPL-MCNC: 5.2 G/DL (ref 6–8.4)
RBC # BLD AUTO: 2.79 M/UL (ref 4–5.4)
SODIUM SERPL-SCNC: 140 MMOL/L (ref 136–145)
WBC # BLD AUTO: 8.42 K/UL (ref 3.9–12.7)

## 2024-03-17 PROCEDURE — 25000003 PHARM REV CODE 250

## 2024-03-17 PROCEDURE — 25000003 PHARM REV CODE 250: Performed by: HOSPITALIST

## 2024-03-17 PROCEDURE — 80053 COMPREHEN METABOLIC PANEL: CPT

## 2024-03-17 PROCEDURE — 94640 AIRWAY INHALATION TREATMENT: CPT

## 2024-03-17 PROCEDURE — 63600175 PHARM REV CODE 636 W HCPCS: Mod: UD | Performed by: INTERNAL MEDICINE

## 2024-03-17 PROCEDURE — 99900031 HC PATIENT EDUCATION (STAT)

## 2024-03-17 PROCEDURE — 25000003 PHARM REV CODE 250: Performed by: INTERNAL MEDICINE

## 2024-03-17 PROCEDURE — 36415 COLL VENOUS BLD VENIPUNCTURE: CPT

## 2024-03-17 PROCEDURE — 99406 BEHAV CHNG SMOKING 3-10 MIN: CPT

## 2024-03-17 PROCEDURE — 12000002 HC ACUTE/MED SURGE SEMI-PRIVATE ROOM

## 2024-03-17 PROCEDURE — 85025 COMPLETE CBC W/AUTO DIFF WBC: CPT

## 2024-03-17 PROCEDURE — 94761 N-INVAS EAR/PLS OXIMETRY MLT: CPT

## 2024-03-17 PROCEDURE — 25000003 PHARM REV CODE 250: Performed by: PHYSICIAN ASSISTANT

## 2024-03-17 PROCEDURE — 84100 ASSAY OF PHOSPHORUS: CPT

## 2024-03-17 PROCEDURE — 94799 UNLISTED PULMONARY SVC/PX: CPT

## 2024-03-17 PROCEDURE — 83735 ASSAY OF MAGNESIUM: CPT

## 2024-03-17 PROCEDURE — 25000003 PHARM REV CODE 250: Performed by: STUDENT IN AN ORGANIZED HEALTH CARE EDUCATION/TRAINING PROGRAM

## 2024-03-17 PROCEDURE — 99900035 HC TECH TIME PER 15 MIN (STAT)

## 2024-03-17 PROCEDURE — 27000221 HC OXYGEN, UP TO 24 HOURS

## 2024-03-17 PROCEDURE — 25000242 PHARM REV CODE 250 ALT 637 W/ HCPCS: Performed by: INTERNAL MEDICINE

## 2024-03-17 PROCEDURE — 25000242 PHARM REV CODE 250 ALT 637 W/ HCPCS: Performed by: HOSPITALIST

## 2024-03-17 PROCEDURE — 63600175 PHARM REV CODE 636 W HCPCS: Mod: UD | Performed by: STUDENT IN AN ORGANIZED HEALTH CARE EDUCATION/TRAINING PROGRAM

## 2024-03-17 RX ORDER — SODIUM,POTASSIUM PHOSPHATES 280-250MG
2 POWDER IN PACKET (EA) ORAL
Status: DISCONTINUED | OUTPATIENT
Start: 2024-03-17 | End: 2024-03-22 | Stop reason: HOSPADM

## 2024-03-17 RX ORDER — IPRATROPIUM BROMIDE AND ALBUTEROL SULFATE 2.5; .5 MG/3ML; MG/3ML
3 SOLUTION RESPIRATORY (INHALATION)
Qty: 75 ML | Refills: 0 | Status: SHIPPED | OUTPATIENT
Start: 2024-03-17 | End: 2025-03-17

## 2024-03-17 RX ORDER — SODIUM BICARBONATE 650 MG/1
650 TABLET ORAL DAILY
Qty: 30 TABLET | Refills: 11 | Status: ON HOLD | OUTPATIENT
Start: 2024-03-18 | End: 2024-03-22 | Stop reason: HOSPADM

## 2024-03-17 RX ORDER — GABAPENTIN 300 MG/1
300 CAPSULE ORAL 3 TIMES DAILY
Qty: 90 CAPSULE | Refills: 11 | Status: ON HOLD | OUTPATIENT
Start: 2024-03-17 | End: 2024-03-22 | Stop reason: HOSPADM

## 2024-03-17 RX ORDER — DEXAMETHASONE 2 MG/1
2 TABLET ORAL EVERY 12 HOURS
Status: DISCONTINUED | OUTPATIENT
Start: 2024-03-17 | End: 2024-03-22

## 2024-03-17 RX ORDER — OXYCODONE HYDROCHLORIDE 10 MG/1
10 TABLET ORAL EVERY 6 HOURS PRN
Status: DISCONTINUED | OUTPATIENT
Start: 2024-03-17 | End: 2024-03-22

## 2024-03-17 RX ORDER — OXYCODONE HYDROCHLORIDE 10 MG/1
10 TABLET ORAL EVERY 4 HOURS PRN
Qty: 28 TABLET | Refills: 0 | Status: ON HOLD | OUTPATIENT
Start: 2024-03-17 | End: 2024-03-22 | Stop reason: HOSPADM

## 2024-03-17 RX ORDER — GUAIFENESIN 100 MG/5ML
200 SOLUTION ORAL EVERY 4 HOURS PRN
Qty: 473 ML | Refills: 0 | Status: ON HOLD | OUTPATIENT
Start: 2024-03-17 | End: 2024-03-22 | Stop reason: HOSPADM

## 2024-03-17 RX ORDER — HYDROMORPHONE HYDROCHLORIDE 1 MG/ML
0.5 INJECTION, SOLUTION INTRAMUSCULAR; INTRAVENOUS; SUBCUTANEOUS EVERY 6 HOURS PRN
Status: DISCONTINUED | OUTPATIENT
Start: 2024-03-17 | End: 2024-03-18

## 2024-03-17 RX ORDER — BISACODYL 10 MG/1
10 SUPPOSITORY RECTAL DAILY PRN
Qty: 10 SUPPOSITORY | Refills: 0 | Status: SHIPPED | OUTPATIENT
Start: 2024-03-17

## 2024-03-17 RX ORDER — MORPHINE SULFATE 15 MG/1
30 TABLET, FILM COATED, EXTENDED RELEASE ORAL EVERY 12 HOURS
Status: DISCONTINUED | OUTPATIENT
Start: 2024-03-17 | End: 2024-03-18

## 2024-03-17 RX ORDER — POLYETHYLENE GLYCOL 3350 17 G/17G
17 POWDER, FOR SOLUTION ORAL 2 TIMES DAILY
Status: DISCONTINUED | OUTPATIENT
Start: 2024-03-17 | End: 2024-03-22 | Stop reason: HOSPADM

## 2024-03-17 RX ORDER — SODIUM CHLORIDE 1 G/1
2000 TABLET ORAL 3 TIMES DAILY
Qty: 90 TABLET | Refills: 1 | Status: ON HOLD | OUTPATIENT
Start: 2024-03-17 | End: 2024-03-22 | Stop reason: HOSPADM

## 2024-03-17 RX ORDER — LANOLIN ALCOHOL/MO/W.PET/CERES
800 CREAM (GRAM) TOPICAL
Status: DISCONTINUED | OUTPATIENT
Start: 2024-03-17 | End: 2024-03-22 | Stop reason: HOSPADM

## 2024-03-17 RX ORDER — ONDANSETRON HYDROCHLORIDE 2 MG/ML
4 INJECTION, SOLUTION INTRAVENOUS EVERY 6 HOURS PRN
Status: DISCONTINUED | OUTPATIENT
Start: 2024-03-17 | End: 2024-03-22 | Stop reason: HOSPADM

## 2024-03-17 RX ORDER — POLYETHYLENE GLYCOL 3350 17 G/17G
17 POWDER, FOR SOLUTION ORAL 2 TIMES DAILY
Qty: 30 EACH | Refills: 0 | Status: ON HOLD | OUTPATIENT
Start: 2024-03-17 | End: 2024-03-22 | Stop reason: HOSPADM

## 2024-03-17 RX ORDER — FAMOTIDINE 20 MG/1
20 TABLET, FILM COATED ORAL 2 TIMES DAILY
Status: DISCONTINUED | OUTPATIENT
Start: 2024-03-17 | End: 2024-03-22 | Stop reason: HOSPADM

## 2024-03-17 RX ORDER — ATENOLOL 50 MG/1
50 TABLET ORAL DAILY
Status: DISCONTINUED | OUTPATIENT
Start: 2024-03-18 | End: 2024-03-17

## 2024-03-17 RX ORDER — ALUMINUM HYDROXIDE, MAGNESIUM HYDROXIDE, AND SIMETHICONE 1200; 120; 1200 MG/30ML; MG/30ML; MG/30ML
30 SUSPENSION ORAL 4 TIMES DAILY PRN
Status: DISCONTINUED | OUTPATIENT
Start: 2024-03-17 | End: 2024-03-22 | Stop reason: HOSPADM

## 2024-03-17 RX ORDER — LEVETIRACETAM 500 MG/1
500 TABLET ORAL 2 TIMES DAILY
Qty: 60 TABLET | Refills: 11 | Status: SHIPPED | OUTPATIENT
Start: 2024-03-17 | End: 2025-03-17

## 2024-03-17 RX ORDER — IPRATROPIUM BROMIDE AND ALBUTEROL SULFATE 2.5; .5 MG/3ML; MG/3ML
3 SOLUTION RESPIRATORY (INHALATION)
Status: DISCONTINUED | OUTPATIENT
Start: 2024-03-17 | End: 2024-03-22 | Stop reason: HOSPADM

## 2024-03-17 RX ORDER — DEXAMETHASONE 1 MG/1
2 TABLET ORAL EVERY 12 HOURS
Qty: 40 TABLET | Refills: 0 | Status: ON HOLD | OUTPATIENT
Start: 2024-03-17 | End: 2024-03-22 | Stop reason: HOSPADM

## 2024-03-17 RX ORDER — FOLIC ACID 1 MG/1
1 TABLET ORAL DAILY
Status: DISCONTINUED | OUTPATIENT
Start: 2024-03-18 | End: 2024-03-22 | Stop reason: HOSPADM

## 2024-03-17 RX ORDER — ACETAMINOPHEN 325 MG/1
650 TABLET ORAL EVERY 6 HOURS PRN
Qty: 30 TABLET | Refills: 0 | Status: ON HOLD | OUTPATIENT
Start: 2024-03-17 | End: 2024-03-22 | Stop reason: HOSPADM

## 2024-03-17 RX ORDER — LEVETIRACETAM 500 MG/1
500 TABLET ORAL 2 TIMES DAILY
Status: DISCONTINUED | OUTPATIENT
Start: 2024-03-17 | End: 2024-03-22 | Stop reason: HOSPADM

## 2024-03-17 RX ORDER — FAMOTIDINE 20 MG/1
20 TABLET, FILM COATED ORAL 2 TIMES DAILY
Qty: 60 TABLET | Refills: 11 | Status: ON HOLD | OUTPATIENT
Start: 2024-03-17 | End: 2024-03-22 | Stop reason: HOSPADM

## 2024-03-17 RX ORDER — MORPHINE SULFATE 30 MG/1
30 TABLET, FILM COATED, EXTENDED RELEASE ORAL EVERY 12 HOURS
Qty: 60 TABLET | Refills: 0 | Status: ON HOLD | OUTPATIENT
Start: 2024-03-17 | End: 2024-03-22 | Stop reason: HOSPADM

## 2024-03-17 RX ORDER — METHOCARBAMOL 500 MG/1
500 TABLET, FILM COATED ORAL 3 TIMES DAILY PRN
Qty: 30 TABLET | Refills: 1 | Status: ON HOLD | OUTPATIENT
Start: 2024-03-17 | End: 2024-03-22 | Stop reason: HOSPADM

## 2024-03-17 RX ADMIN — OXYCODONE HYDROCHLORIDE 10 MG: 10 TABLET ORAL at 06:03

## 2024-03-17 RX ADMIN — FAMOTIDINE 20 MG: 20 TABLET ORAL at 08:03

## 2024-03-17 RX ADMIN — IPRATROPIUM BROMIDE AND ALBUTEROL SULFATE 3 ML: 2.5; .5 SOLUTION RESPIRATORY (INHALATION) at 07:03

## 2024-03-17 RX ADMIN — APIXABAN 5 MG: 5 TABLET, FILM COATED ORAL at 09:03

## 2024-03-17 RX ADMIN — SODIUM CHLORIDE 2000 MG: 1 TABLET ORAL at 02:03

## 2024-03-17 RX ADMIN — OXYCODONE HYDROCHLORIDE 10 MG: 10 TABLET ORAL at 03:03

## 2024-03-17 RX ADMIN — POLYETHYLENE GLYCOL 3350 17 G: 17 POWDER, FOR SOLUTION ORAL at 08:03

## 2024-03-17 RX ADMIN — OXYCODONE HYDROCHLORIDE 15 MG: 10 TABLET ORAL at 09:03

## 2024-03-17 RX ADMIN — METHOCARBAMOL 500 MG: 500 TABLET ORAL at 09:03

## 2024-03-17 RX ADMIN — LEVETIRACETAM 500 MG: 500 TABLET, FILM COATED ORAL at 08:03

## 2024-03-17 RX ADMIN — OXYCODONE HYDROCHLORIDE 15 MG: 10 TABLET ORAL at 12:03

## 2024-03-17 RX ADMIN — DEXAMETHASONE SODIUM PHOSPHATE 2 MG: 4 INJECTION INTRA-ARTICULAR; INTRALESIONAL; INTRAMUSCULAR; INTRAVENOUS; SOFT TISSUE at 09:03

## 2024-03-17 RX ADMIN — DEXAMETHASONE 2 MG: 2 TABLET ORAL at 08:03

## 2024-03-17 RX ADMIN — MORPHINE SULFATE 30 MG: 15 TABLET, EXTENDED RELEASE ORAL at 08:03

## 2024-03-17 RX ADMIN — GABAPENTIN 300 MG: 300 CAPSULE ORAL at 02:03

## 2024-03-17 RX ADMIN — LEVETIRACETAM 500 MG: 500 TABLET, FILM COATED ORAL at 09:03

## 2024-03-17 RX ADMIN — SODIUM BICARBONATE 650 MG: 650 TABLET ORAL at 09:03

## 2024-03-17 RX ADMIN — FAMOTIDINE 20 MG: 20 TABLET ORAL at 09:03

## 2024-03-17 RX ADMIN — OXYCODONE HYDROCHLORIDE 10 MG: 10 TABLET ORAL at 05:03

## 2024-03-17 RX ADMIN — SODIUM CHLORIDE 2000 MG: 1 TABLET ORAL at 09:03

## 2024-03-17 RX ADMIN — MORPHINE SULFATE 30 MG: 30 TABLET, FILM COATED, EXTENDED RELEASE ORAL at 09:03

## 2024-03-17 RX ADMIN — APIXABAN 5 MG: 5 TABLET, FILM COATED ORAL at 08:03

## 2024-03-17 RX ADMIN — IPRATROPIUM BROMIDE AND ALBUTEROL SULFATE 3 ML: .5; 3 SOLUTION RESPIRATORY (INHALATION) at 08:03

## 2024-03-17 RX ADMIN — ATENOLOL 50 MG: 25 TABLET ORAL at 09:03

## 2024-03-17 RX ADMIN — GABAPENTIN 300 MG: 300 CAPSULE ORAL at 09:03

## 2024-03-17 RX ADMIN — IPRATROPIUM BROMIDE AND ALBUTEROL SULFATE 3 ML: .5; 3 SOLUTION RESPIRATORY (INHALATION) at 01:03

## 2024-03-17 NOTE — PLAN OF CARE
Mario Hsieh - Neurosurgery (Hospital)  Discharge Final Note    Primary Care Provider: Chika Powell MD    Expected Discharge Date: 3/17/2024    Patient to be transferred to [facility] for continued care.      Final Discharge Note (most recent)       Final Note - 03/17/24 1403          Final Note    Assessment Type Final Discharge Note     Anticipated Discharge Disposition Another Health Care Institution Not Defined        Post-Acute Status    Discharge Delays None known at this time                     Important Message from Medicare

## 2024-03-17 NOTE — ASSESSMENT & PLAN NOTE
-CT abd/pelvis w/ numerous metastatic masses in the liver, likely metastatic deposit in the spleen; bilateral adrenal masses, hypoattenuating and mildly enhancing mass of upper pole L kidney, intra-abdominal enlarged lymph nodes consistent w/ metastatic infiltration.   -masses to R lung base w/ consolidation of visualized R renal lower lobe; R lower lobe 9mm nodule  -L2 vertebra compression fracture w/ mild sclerosis of the vertebra, likely reflecting a pathologic fracture; faint sclerosis of the superior endplate of S1 likely reflects metastatic infiltration  -deferring staging workup including MRI spine and labs to medical oncology  -medical oncology discussion, they affirmed that the patient is a fairly decent candidate for systemic chemotherapy but best for patient to be in [place] to initiate the cycle as she has no follow up options here in the state due to Payor source limitations and residency status  -per rad onc, pt not a good candidate at this time until possibly later in future if she gets chemotherapy.    Per onc, No evidence of visceral crisis currently requiring emergent initiation of inpatient chemotherapy, but pt needs urgent f/u for consideration of palliative systemic therapy if it can be arranged. Pt is a resident of [place] so arranging f/u at [facility] [place] [facility] or one closer to home would be best.     Palliative care consulted  Pain management  3/17- alk phos rising with liver mets.

## 2024-03-17 NOTE — SUBJECTIVE & OBJECTIVE
Past Medical History:   Diagnosis Date    Arthritis        Past Surgical History:   Procedure Laterality Date    CRANIOTOMY, WITH NEOPLASM EXCISION USING COMPUTER-ASSISTED NAVIGATION Left 2/29/2024    Procedure: CRANIOTOMY, WITH NEOPLASM EXCISION USING COMPUTER-ASSISTED NAVIGATION;  Surgeon: Felix Szymanski DO;  Location: [facility] OR [address] [place] [address];  Service: Neurosurgery;  Laterality: Left;  (Left temporal crani for rescetion of tumor)  [facility]  BRAIN LAB  Navigation - CT with contrast    TUBAL LIGATION  2002       Review of patient's allergies indicates:  No Known Allergies    Current Facility-Administered Medications on File Prior to Encounter   Medication    [DISCONTINUED] acetaminophen tablet 650 mg    [DISCONTINUED] albuterol-ipratropium 2.5 mg-0.5 mg/3 mL nebulizer solution 3 mL    [DISCONTINUED] apixaban tablet 5 mg    [DISCONTINUED] atenoloL tablet 50 mg    [DISCONTINUED] bisacodyL suppository 10 mg    [DISCONTINUED] bisacodyL suppository 10 mg    [DISCONTINUED] dexAMETHasone injection 2 mg    [DISCONTINUED] docusate sodium 1 enema    [DISCONTINUED] famotidine tablet 20 mg    [DISCONTINUED] gabapentin capsule 300 mg    [DISCONTINUED] guaiFENesin 100 mg/5 ml syrup 200 mg    [DISCONTINUED] hydrALAZINE injection 10 mg    [DISCONTINUED] HYDROmorphone injection 1 mg    [DISCONTINUED] labetalol 20 mg/4 mL (5 mg/mL) IV syring    [DISCONTINUED] levETIRAcetam tablet 500 mg    [DISCONTINUED] loperamide capsule 2 mg    [DISCONTINUED] methocarbamoL tablet 500 mg    [DISCONTINUED] morphine 12 hr tablet 30 mg    [DISCONTINUED] ondansetron injection 4 mg    [DISCONTINUED] oxyCODONE immediate release tablet 15 mg    [DISCONTINUED] oxyCODONE immediate release tablet Tab 10 mg    [DISCONTINUED] polyethylene glycol packet 17 g    [DISCONTINUED] sodium bicarbonate tablet 650 mg    [DISCONTINUED] sodium chloride oral tablet 2,000 mg     Current Outpatient Medications on File Prior to Encounter   Medication Sig    acetaminophen (TYLENOL)  325 MG tablet Take 2 tablets (650 mg total) by mouth every 6 (six) hours as needed (temp greater than 99.5 F).    albuterol-ipratropium (DUO-NEB) 2.5 mg-0.5 mg/3 mL nebulizer solution Take 3 mLs by nebulization every 6 (six) hours while awake. Rescue    apixaban (ELIQUIS) 5 mg Tab Take 1 tablet (5 mg total) by mouth 2 (two) times daily.    atenoloL (TENORMIN) 50 MG tablet Take 1 tablet (50 mg total) by mouth once daily.    bisacodyL (DULCOLAX) 10 mg Supp Place 1 suppository (10 mg total) rectally daily as needed (Until bowel movement if patient has no bowel movement for 2 days).    dexAMETHasone (DECADRON) 1 MG Tab Take 2 tablets (2 mg total) by mouth every 12 (twelve) hours. for 10 days    famotidine (PEPCID) 20 MG tablet Take 1 tablet (20 mg total) by mouth 2 (two) times daily.    folic acid (FOLVITE) 1 MG tablet Take 1 tablet (1 mg total) by mouth once daily.    gabapentin (NEURONTIN) 300 MG capsule Take 1 capsule (300 mg total) by mouth 3 (three) times daily.    guaiFENesin 100 mg/5 ml (ROBITUSSIN) 100 mg/5 mL syrup Take 10 mLs (200 mg total) by mouth every 4 (four) hours as needed for Congestion.    levETIRAcetam (KEPPRA) 500 MG Tab Take 1 tablet (500 mg total) by mouth 2 (two) times daily.    methocarbamoL (ROBAXIN) 500 MG Tab Take 1 tablet (500 mg total) by mouth 3 (three) times daily as needed.    morphine (MS CONTIN) 30 MG 12 hr tablet Take 1 tablet (30 mg total) by mouth every 12 (twelve) hours.    oxyCODONE (ROXICODONE) 10 mg Tab immediate release tablet Take 1 tablet (10 mg total) by mouth every 4 (four) hours as needed for Pain.    polyethylene glycol (GLYCOLAX) 17 gram PwPk Take 17 g by mouth 2 (two) times daily.    [START ON 3/18/2024] sodium bicarbonate 650 MG tablet Take 1 tablet (650 mg total) by mouth once daily.    sodium chloride 1,000 mg TbSO oral tablet Take 2 tablets (2,000 mg total) by mouth 3 (three) times daily.    [DISCONTINUED] gabapentin (NEURONTIN) 100 MG capsule Take 1 capsule (100 mg  total) by mouth 3 (three) times daily.     Family History       Problem Relation (Age of Onset)    Hypertension Mother          Tobacco Use    Smoking status: Every Day     Current packs/day: 0.50     Types: Cigarettes    Smokeless tobacco: Current    Tobacco comments:     3/4 PPD   Substance and Sexual Activity    Alcohol use: Not Currently     Comment: OCCASIONALLY    Drug use: Never    Sexual activity: Not Currently     Review of Systems   Constitutional:  Negative for activity change and appetite change.   HENT:  Negative for congestion and dental problem.    Eyes:  Negative for discharge and itching.   Respiratory:  Negative for shortness of breath.    Cardiovascular:  Negative for chest pain.   Gastrointestinal:  Negative for abdominal distention and abdominal pain.   Endocrine: Negative for cold intolerance.   Genitourinary:  Negative for difficulty urinating and dysuria.   Musculoskeletal:  Negative for arthralgias and back pain.   Skin:  Negative for color change.   Neurological:  Positive for weakness. Negative for dizziness and facial asymmetry.   Hematological:  Negative for adenopathy.   Psychiatric/Behavioral:  Negative for agitation and behavioral problems.      Objective:     Vital Signs (Most Recent):  Temp: 98.6 °F (37 °C) (03/17/24 1700)  Pulse: 98 (03/17/24 1800)  Resp: 20 (03/17/24 1800)  BP: (!) 94/53 (03/17/24 1700)  SpO2: 98 % (03/17/24 1800) Vital Signs (24h Range):  Temp:  [98 °F (36.7 °C)-98.8 °F (37.1 °C)] 98.6 °F (37 °C)  Pulse:  [] 98  Resp:  [14-20] 20  SpO2:  [94 %-98 %] 98 %  BP: ()/(53-69) 94/53     Weight: 56.5 kg (124 lb 9 oz)  Body mass index is 22.06 kg/m².     Physical Exam  Vitals and nursing note reviewed.   Constitutional:       General: She is not in acute distress.  HENT:      Head: Atraumatic.      Right Ear: External ear normal.      Left Ear: External ear normal.      Nose: Nose normal.      Mouth/Throat:      Mouth: Mucous membranes are dry.    Cardiovascular:      Rate and Rhythm: Normal rate.   Pulmonary:      Effort: Pulmonary effort is normal.   Abdominal:      Palpations: Abdomen is soft.   Musculoskeletal:      Cervical back: Normal range of motion.      Right lower leg: No edema.      Left lower leg: No edema.   Skin:     General: Skin is warm.   Neurological:      Mental Status: She is alert and oriented to person, place, and time.      Comments: Leg weakness                Significant Labs: All pertinent labs within the past 24 hours have been reviewed.  CBC:   Recent Labs   Lab 03/16/24  0716 03/17/24  0314   WBC 10.09 8.42   HGB 9.7* 9.0*   HCT 30.1* 28.7*   PLT 17* 35*     CMP:   Recent Labs   Lab 03/16/24  0716 03/17/24  0314    140   K 4.5 4.2    106   CO2 25 24   GLU 80 94   BUN 14 13   CREATININE 0.7 0.6   CALCIUM 9.0 8.8   PROT 5.5* 5.2*   ALBUMIN 2.2* 2.1*   BILITOT 0.7 0.7   ALKPHOS 503* 705*   * 112*   ALT 78* 83*   ANIONGAP 11 10       Significant Imaging: I have reviewed all pertinent imaging results/findings within the past 24 hours.

## 2024-03-17 NOTE — ASSESSMENT & PLAN NOTE
Advance Care Planning     Does patient have Capacity? Yes  Contact:  Pt has son w special needs and DTR who cannot help her. She makes decisions for herself  Goals/Wishes: wants NH placement, Understands she has a poor prognosis, wants comfort measures, if able to get support would consider palliative chemotherapy, Does not have assistance at home.   Code Status- DNR confirmed 12:30 pm on 3/14.  Pain management- stable, see pain management above  Prognosis-  less than six months.   Family discussions-   3/14- They are not present. We discussed her diagnosis and future care needs. She is aware of poor prognosis, We discussed code status and hospice. She would like to go to a NH as she can no longer care for herself independently.   3/15- Pt gives me permission to speak to her DTR,. Called Thais Hines (Daughter) [phone] (Mobile) and left VM. Dtr has stage 4 cancer and is undergoing chemotherapy, and cannot take care of patient. Thais's brother, who has autism is with her godmother. We discussed her care and condtion and discharge options. They have no other family members who can be with patient at home. We discussed that pt is having pain, and not able to transport easily which is needed for chemotherapy. Thais expressed understanding. We discussed that prognosis was poor, and dependent on pt's oral intake. Our patient has previously told Thais before admission that she wants DNR code status. Dtr wants to be informed of events and progress and willing to help make decision.   Functional Status - limited to bed    Post acute care plan:  Hospice is appropriate, NH with hospice is appropriate as well. CM working on Medicaid approval, supportive care, possible transfer to [facility] for palliative chemotherapy or other forms of support. .   Time spent on Goals of Care:  10 minutes at bedside on 3/14. 12 minutes on phone 3/15.

## 2024-03-17 NOTE — PLAN OF CARE
Patient is pending transfer to [facility] for continued care.    CM had sent referral for home hospice.  CM rec'd voicemail from [facility] with [facility] to advise that they can provide home hospcie for indigent patients [phone].  Rosa with [facility] had also left voicemail [phone] and Tamanna with [facility] had left voicemail as well [phone].    [facility] had also previously advised they are not able to provide indigent care.    2:00 Update - Tamanna with [facility] states they can provide indigent hospice care.  Per Dr. Burgess secure chat on Friday, patient does not have any caretakers available as daughter is also being treated for cancer.

## 2024-03-17 NOTE — ASSESSMENT & PLAN NOTE
Chronic, controlled. Latest blood pressure and vitals reviewed-     Temp:  [98 °F (36.7 °C)-98.6 °F (37 °C)]   Pulse:  []   Resp:  [10-18]   BP: ()/(52-67)   SpO2:  [89 %-98 %] .   Home meds for hypertension were reviewed and noted below.   Hypertension Medications               atenoloL (TENORMIN) 50 MG tablet Take 1 tablet (50 mg total) by mouth once daily.            While in the hospital, will manage blood pressure as follows; Adjust home antihypertensive regimen as follows- adjust as needed    Will utilize p.r.n. blood pressure medication only if patient's blood pressure greater than 180/110 and she develops symptoms such as worsening chest pain or shortness of breath.

## 2024-03-17 NOTE — PROVIDER TRANSFER
Outside Transfer Acceptance Note / Regional Referral Center    Referring facility: [facility]  Referring provider: SHADIA WATTERS MARIANNE  Accepting facility: [facility]   Admitting provider: Dr Edwards   Reason for transfer: Out of network  Transfer diagnosis: Metastatic SCLC  Transfer specialty requested: Palliative care  Transfer specialty notified: No  Transfer level: NUMBER 1-5: 2  Bed type requested: Med/Surg  Isolation status: No active isolations   Admission class or status: IP- Inpatient      Narrative     Patient is a 64 y/o female admitted on 2/27/24 as a transfer from [facility] for left temporal lobe mass with vasogenic edema and midline shift s/p craniotomy with tumor resection. Path consistent with small cell lung cancer. CT C/A/P shows numerous metastatic masses. She continues on dexamethasone 2mg q12h with goal to wean over 2 weeks. Keppra 500mg BID seizure ppx. Apixaban for PE.     She does not have emergent needs for inpatient chemotherapy. Unfortunately, patient does not have insurance, lives in a trailer in [place]. Ongoing goals of care discussion with primary and palliative care - inability to care for self, possibility for NH placement vs NH with hospice.      Patient to transfer to [facility] as she is out of network.     Objective     Vitals: Temp: 98.3 °F (36.8 °C) (03/16/24 1943)  Pulse: 91 (03/16/24 1943)  Resp: 18 (03/16/24 1943)  BP: (!) 97/56 (03/16/24 1943)  SpO2: 97 % (03/16/24 1943)  Recent Labs: All pertinent labs within the past 24 hours have been reviewed.  CBC:   Recent Labs   Lab 03/15/24  0717 03/16/24  0716   WBC 10.26 10.09   HGB 9.9* 9.7*   HCT 30.6* 30.1*   PLT 26* 17*     CMP:   Recent Labs   Lab 03/15/24  0717 03/16/24  0716   * 138   K 4.1 4.5    102   CO2 24 25   GLU 76 80   BUN 15 14   CREATININE 0.6 0.7   CALCIUM 9.0 9.0   PROT 5.6* 5.5*   ALBUMIN 2.2* 2.2*   BILITOT 0.7 0.7   ALKPHOS 525* 503*   * 100*   ALT 86* 78*    ANIONGAP 10 11          IV access:        Peripheral IV - Single Lumen 03/01/24 0644 22 G Anterior;Right Forearm (Active)   Site Assessment Clean;Dry;Intact 03/16/24 0754   Extremity Assessment Distal to IV No warmth;No swelling;No redness;No abnormal discoloration 03/16/24 0754   Line Status Flushed;Saline locked 03/16/24 0754   Dressing Status Clean;Dry 03/16/24 0754   Dressing Intervention Other (Comment) 03/16/24 0754   Dressing Change Due 03/11/24 03/16/24 0754   Site Change Due 03/11/24 03/16/24 0754   Reason Not Rotated Patient refused 03/16/24 0754            Midline Catheter Insertion/Assessment  - Single Lumen 02/28/24 1100 Left cephalic vein (lateral side of arm) (Active)   $ Midline Charges (Upon insertion) Midline Catheter (Supply) 03/16/24 0754   Site Assessment Clean;Dry;Intact 03/16/24 0754   IV Device Securement catheter securement device 03/16/24 0754   Line Status Infusing 03/16/24 0754   Dressing Type Central line dressing 03/16/24 0754   Dressing Status Clean;Dry;Intact 03/16/24 0754   Dressing Intervention Integrity maintained 03/16/24 0754   Dressing Change Due 03/21/24 03/16/24 0754   Site Change Due 03/21/24 03/16/24 0754   Reason Not Rotated Poor venous access 03/16/24 0754     Allergies: Review of patient's allergies indicates:  No Known Allergies   NPO: No    Anticoagulation:   Anticoagulants       Ordered     Route Frequency Start Stop    03/16/24 1203  apixaban         Oral 2 times daily 03/16/24 1300 --             Instructions      [facility]  Admit to Hospital Medicine  Upon patient arrival to floor, please contact Hospital Medicine on call.

## 2024-03-17 NOTE — HPI
Patient originally presented to [facility] ER on 2/24/24 with complaints of several months of diffuse chest, abdominal, and back pain.   CT C/A/P showed diffuse metastatic disease with lung, liver, spleen, and kidney lesions.    Also  L2 compression fracture for which Neurosurgery at [facility] was originally consulted   CT head was ordered, left temporal mass with significant edema was identified.   Steroids were initiated and patient was transferred on 2/27/24 to [facility] for Neurosurgery.    Patient underwent L temporal craniotomy tumor resection on 2/29/24.   Pathology  returned metastatic small cell lung CA.   Also developed PE, initially on heparin drip, now on eliquis.   Started on keppra for seizure prophylaxis.   Per radiation oncology, patient not a good candidate right now.   Per medical oncology, inpatient chemotherapy not emergent, but would need urgent f/u outpatient for palliative chemotherapy.   Palliative care was consulted. Patient agreed to DNR.   Situation complicated by patient not having insurance.   Patient now transferred back to [facility], plan for outpatient palliative chemo vs home with hospice vs nursing home with hospice.

## 2024-03-17 NOTE — ASSESSMENT & PLAN NOTE
Appreciate assistance  Pt needs supportive care [place]  Hospice is appropriate  May be a candidate for systemic chemotherapy which would be palliative, not a cure.  3/17- She cannot transport herself, likely too sick/ debilitated for chemotherapy. I discussed this w her DTR.. Pt also aware. Needs assistance w ADLs and all ambulation.

## 2024-03-17 NOTE — DISCHARGE SUMMARY
Mario Hsieh - Neurosurgery ([facility])  [facility] Medicine  Discharge Summary      Patient Name: Crow Hines  MRN: [id number]  BINTA: [id number]  Patient Class: IP- Inpatient  Admission Date: 2/27/2024  Hospital Length of Stay: 19 days  Discharge Date and Time: No discharge date for patient encounter.  Attending Physician: Huong Burgess MD   Discharging Provider: Huong Burgess MD  Primary Care Provider: Chika Powell MD  [facility] Medicine Team: [facility] Huong Burgess MD  Primary Care Team: [facility]    HPI:   62 y/o Female admitted as transfer from OSH for L temporal lobe mass presenting w/ difficulty walking. Lab work significant for alk phos 203, AST 57, sodium 132, platelets 586. CXR revealed linear and ill-defined hazy opacities of the right mid to lower lung, felt to reflect infiltrate. Diffuse abdominal pain with focal increased tenderness in mid-epigastric area prompted CT abd/pelvis revealing numerous metastatic masses in the liver, metastatic deposits in the spleen and bilateral adrenal masses, hypoattenuating and mildly enhancing mass of the upper pole L kidney. Also w/ intra-abdominal enlarged lymph nodes, consistent with metastatic infiltration, masses of the R lung base with consolidation of the visualized R renal lower lobe and RLL 9 mm nodule. L2 compression fracture w/ mild sclerosis of the vertebra noted, likely pathologic. CTH completed showing large 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 Pt underwent L temporal craniotomy tumor resection on 2/29. Path returned metastatic small cell lung CA. Heparin gtt for PE. CTH when therapeutic negative for hemorrhage.     Procedure(s) (LRB):  CRANIOTOMY, WITH NEOPLASM EXCISION USING COMPUTER-ASSISTED NAVIGATION (Left)      Hospital Course:   3/14- /71  SpO2 90%  other VSSComplicated social situation and discharge. [place] and has no transport. No insurance. Trying transferring to [facility] in [place]  per onc here since pt is [place] resident w/ no insurance; if that is not possible then back up option may be home hospice until her insurance is approved and then can re-evaluate if she wants to pursue palliative chemo. Has new Metastatic small cell ca, poor prognosis.  See [facility] note below.     3/15- pt getting confused. Will need to reach out to family. Likely too sick to receive chemo. Constipation. Start miralax and leah supp. Has abd pain. Wbc 12. On heparin to for PE, CMP stable. Appreciate PC and  follow up. See GOC below. Plts 26, 000 not bleeding.  If bleeds, dc heparin and transfuse platelets.     3/16- episode of choking with breakfast today. UA sent.  SLP swallow eval. not eating well.  need to monitor plts and Hb. Abd pain, and + large BMs this am. Plates still dropping. Check HIT. Dc heparin. Will start eliquis for PE. No bleeding. MS is stable.    - Started oxygen for sat 89%, nebs ordered. RL 250cc bolus for hypotension    3/17- may be transferred to [facility] access hospital. Ate 25%, on 2 liter per NC. BP 94/60. Pulse 89  SpO2 97%., abd pain improved after BM. Alk phos rising. DNR. Hospice is appropriate. Plts 36,000 stable, no bleeding on eliquis 5 bid.  Can wean off salt tablets, monitoring Na level. Or dc if MS declines and receiving palliative care     Goals of Care Treatment Preferences:  Code Status: DNR    Health care agent: daughterThais is NOK  Health care agent number: No value filed.          What is most important right now is to focus on curative/life-prolongation (regardless of treatment burdens).  Accordingly, we have decided that the best plan to meet the patient's goals includes continuing with treatment.      Consults:   Consults (From admission, onward)          Status Ordering Provider     Inpatient consult to Spiritual Care  Once        Provider:  (Not yet assigned)    JAYME Turk     Inpatient consult to Palliative Care  Once        Provider:  (Not yet  assigned)    Completed MAT NYE     Inpatient consult to Hematology/Oncology  Once        Provider:  (Not yet assigned)    Completed JOSEFA ROSAS     Inpatient consult to Neurosurgery  Once        Provider:  (Not yet assigned)    Completed JOSEFA ROSAS     Inpatient consult to Physical Medicine Rehab  Once        Provider:  (Not yet assigned)    Completed JOSEFA ROSAS     Inpatient consult to Registered Dietitian/Nutritionist  Once        Provider:  (Not yet assigned)    Completed JOSEFA ROSAS     IP consult to case management/social work  Once        Provider:  (Not yet assigned)    Acknowledged JOSEFA BECKER            Neuro  * Brain mass  -L temporal mass; status post tumor resection on 02/29, pathology showing met small cell lung CA   -scheduled dexamethasone. Continue dex 4q12 for vasogenic edema. Wean to off over 2 weeks  - Keppra 500 mg bid for seizure prophylaxis  - On hep gtt for PE. obtain head CT once therapeutic. If CTH is stable, okay to transition to oral AC upon discharge for treatment of PE.  -PT/OT  -blood pressure control  -pain control  -neurochecks    3/16- has [place] decline, some confusion that is waxing ans waning      Brain compression  See above      Compression fracture of lumbar spine, non-traumatic  Back brace      Cerebral edema  Due to metastatic small cell lung ca to the brain,  - on dexamethazone and keppra      Vasogenic brain edema  See above      Cardiac/Vascular  HTN (hypertension)  Chronic, controlled. Latest blood pressure and vitals reviewed-     Temp:  [98 °F (36.7 °C)-98.6 °F (37 °C)]   Pulse:  []   Resp:  [10-18]   BP: ()/(52-67)   SpO2:  [89 %-98 %] .   Home meds for hypertension were reviewed and noted below.   Hypertension Medications               atenoloL (TENORMIN) 50 MG tablet Take 1 tablet (50 mg total) by mouth once daily.            While in the hospital, will manage blood pressure as follows; Adjust home  "antihypertensive regimen as follows- adjust as needed    Will utilize p.r.n. blood pressure medication only if patient's blood pressure greater than 180/110 and she develops symptoms such as worsening chest pain or shortness of breath.    Renal/  Hyponatremia  Patient has hyponatremia which is controlled,We will aim to correct the sodium by 4-6mEq in 24 hours. We will monitor sodium Daily. The hyponatremia is due to Dehydration/hypovolemia. We will obtain the following studies: see labs. We will treat the hyponatremia with IV fluids. The patient's sodium results have been reviewed and are listed below.  Recent Labs   Lab 03/17/24  0314          - On salt tablets and dexamethazone.  Wean off if stable    Hematology  Thrombocytopenia  Found to have mets  Nsgy is following   Monitor  HIT lab  Dc heparin  Start eliquis for PE: will give maintenance 5mg bid since she has been on full dose heparin and has bleeding risk  3/17- plt 36,000      Acute pulmonary embolism without acute cor pulmonale  -Stable respiratory status  -Per Neurosurgery recommendation, awaiting for heparin infusion drip to be therapeutic and then to repeat head CT to rule out bleed    3/14- PTTs therapeutic, CT head -  Evolving postsurgical change from recent left temporal mass resection as above. No new hemorrhage or major vascular distribution infarct.  3/15- Will need oral anticoagulant, if able to take po and not hospice.   3/16- monitor cbc   3/17- tolerating eliquis 5 bid    Oncology  Cancer related pain  Due to small cell Cancer  See " pain" above      Metastatic neoplastic disease  -CT abd/pelvis w/ numerous metastatic masses in the liver, likely metastatic deposit in the spleen; bilateral adrenal masses, hypoattenuating and mildly enhancing mass of upper pole L kidney, intra-abdominal enlarged lymph nodes consistent w/ metastatic infiltration.   -masses to R lung base w/ consolidation of visualized R renal lower lobe; R lower lobe 9mm " nodule  -L2 vertebra compression fracture w/ mild sclerosis of the vertebra, likely reflecting a pathologic fracture; faint sclerosis of the superior endplate of S1 likely reflects metastatic infiltration  -deferring staging workup including MRI spine and labs to medical oncology  -medical oncology discussion, they affirmed that the patient is a fairly decent candidate for systemic chemotherapy but best for patient to be in [place] to initiate the cycle as she has no follow up options here in the state due to Payor source limitations and residency status  -per rad onc, pt not a good candidate at this time until possibly later in future if she gets chemotherapy.    Per onc, No evidence of visceral crisis currently requiring emergent initiation of inpatient chemotherapy, but pt needs urgent f/u for consideration of palliative systemic therapy if it can be arranged. Pt is a resident of [place] so arranging f/u at [facility] or one closer to home would be best.     Palliative care consulted  Pain management  3/17- alk phos rising with liver mets.    Endocrine  Moderate malnutrition  Nutrition consulted. Most recent weight and BMI monitored-     Measurements:  Wt Readings from Last 1 Encounters:   02/28/24 47.6 kg (105 lb)   Body mass index is 18.6 kg/m².    Patient has been screened and assessed by RD.    Malnutrition Type:  Context: chronic illness  Level: moderate    Malnutrition Characteristic Summary:  Weight Loss (Malnutrition): greater than 7.5% in 3 months (-15% x 3mo)  Subcutaneous Fat (Malnutrition): mild depletion  Muscle Mass (Malnutrition): mild depletion    Interventions/Recommendations (treatment strategy):  1. Continue Regular Diet as toelrated. 2. Recommend Boost Plus (or alternative) QD to help meet nutritional needs. 3. Monitor I/O's, weight and labs. 4. RD to monitor.      GI  Transaminitis  Monitor  Alk phos rising      Slow transit constipation  Miralz   Doculax  supp  3/16- enema today, + large BM       Palliative Care  Goals of care, counseling/discussion  Advance Care Planning    Does patient have Capacity? Yes  Contact:  Pt has son w special needs and DTR who cannot help her. She makes decisions for herself  Goals/Wishes: wants NH placement, Understands she has a poor prognosis, wants comfort measures, if able to get support would consider palliative chemotherapy, Does not have assistance at home.   Code Status- DNR confirmed 12:30 pm on 3/14.  Pain management- stable, see pain management above  Prognosis-  less than six months.   Family discussions-   3/14- They are not present. We discussed her diagnosis and future care needs. She is aware of poor prognosis, We discussed code status and hospice. She would like to go to a NH as she can no longer care for herself independently.   3/15- Pt gives me permission to speak to her DTR,. Called Thais Hines (Daughter) [phone] ([place]) and left VM. Dtr has stage 4 cancer and is undergoing chemotherapy, and cannot take care of patient. Thais's brother, who has autism is with her godmother. We discussed her care and condtion and discharge options. They have no other family members who can be with patient at home. We discussed that pt is having pain, and not able to transport easily which is needed for chemotherapy. Thais expressed understanding. We discussed that prognosis was poor, and dependent on pt's oral intake. Our patient has previously told Thais before admission that she wants DNR code status. Dtr wants to be informed of events and progress and willing to help make decision.   Functional Status - limited to bed    Post acute care plan:  Hospice is appropriate, NH with hospice is appropriate as well. CM working on Medicaid approval, supportive care, possible transfer to [place] [facility] for palliative chemotherapy or other forms of support. .   Time spent on Goals of Care:  10 minutes at bedside on 3/14. 12 minutes  on phone 3/15.          Advance care planning  See palliative care      Palliative care encounter  Appreciate assistance  Pt needs supportive care In [place]  Hospice is appropriate  May be a candidate for systemic chemotherapy which would be palliative, not a cure.  3/17- She cannot transport herself, likely too sick/ debilitated for chemotherapy. I discussed this w her DTR.. Pt also aware. Needs assistance w ADLs and all ambulation.       Other  Debility  Patient with Acute debility due to  cancer . Latest AMPAC and GEMS scores have been reviewed. Evaluation for etiology is complete. Plan includes  hospice care/ supportive care.  .    Pain  Scheduled: LA morphine 30 bid, gabapentin  PRN: Dilaudid, oxycodone, robaxin        Final Active Diagnoses:    Diagnosis Date Noted POA    PRINCIPAL PROBLEM:  Brain mass [G93.89] 02/25/2024 Yes    Slow transit constipation [K59.01] 03/15/2024 Yes    Metastatic neoplastic disease [C79.9] 02/24/2024 Yes    Cerebral edema [G93.6] 03/02/2024 Yes    Vasogenic brain edema [G93.6] 02/27/2024 Yes    Brain compression [G93.5] 02/27/2024 Yes    Compression fracture of lumbar spine, non-traumatic [M48.56XA] 02/24/2024 Yes    Acute pulmonary embolism without acute cor pulmonale [I26.99] 02/25/2024 Yes    Palliative care encounter [Z51.5] 03/05/2024 Not Applicable    Hyponatremia [E87.1] 03/06/2024 Yes    Pain [R52] 03/08/2024 Yes    Cancer related pain [G89.3] 03/05/2024 Yes    Debility [R53.81] 03/05/2024 Yes    Advance care planning [Z71.89] 03/05/2024 Not Applicable    Goals of care, counseling/discussion [Z71.89] 03/14/2024 Not Applicable    Transaminitis [R74.01] 03/06/2024 Yes    Thrombocytopenia [D69.6] 03/06/2024 Yes    Moderate malnutrition [E44.0] 02/27/2024 Yes    HTN (hypertension) [I10] 02/24/2024 Yes      Problems Resolved During this Admission:       Discharged Condition: good    Disposition: Another Health Care Inst*    Follow Up:    Patient Instructions:   No discharge  procedures on file.    Significant Diagnostic Studies: Labs: CMP   Recent Labs   Lab 03/16/24  0716 03/17/24 0314    140   K 4.5 4.2    106   CO2 25 24   GLU 80 94   BUN 14 13   CREATININE 0.7 0.6   CALCIUM 9.0 8.8   PROT 5.5* 5.2*   ALBUMIN 2.2* 2.1*   BILITOT 0.7 0.7   ALKPHOS 503* 705*   * 112*   ALT 78* 83*   ANIONGAP 11 10    and CBC   Recent Labs   Lab 03/16/24  0716 03/17/24 0314   WBC 10.09 8.42   HGB 9.7* 9.0*   HCT 30.1* 28.7*   PLT 17* 35*       Pending Diagnostic Studies:       Procedure Component Value Units Date/Time    CBC auto differential [[id number]] Collected: 03/13/24 0531    Order Status: Sent Lab Status: No result     Specimen: Blood     Heparin-induced platelet antibody [[id number]] Collected: 03/16/24 1205    Order Status: Sent Lab Status: No result     Specimen: Blood            Medications:  Reconciled Home Medications:      Medication List        START taking these medications      acetaminophen 325 MG tablet  Commonly known as: TYLENOL  Take 2 tablets (650 mg total) by mouth every 6 (six) hours as needed (temp greater than 99.5 F).     albuterol-ipratropium 2.5 mg-0.5 mg/3 mL nebulizer solution  Commonly known as: DUO-NEB  Take 3 mLs by nebulization every 6 (six) hours while awake. Rescue     apixaban 5 mg Tab  Commonly known as: ELIQUIS  Take 1 tablet (5 mg total) by mouth 2 (two) times daily.     bisacodyL 10 mg Supp  Commonly known as: DULCOLAX  Place 1 suppository (10 mg total) rectally daily as needed (Until bowel movement if patient has no bowel movement for 2 days).     dexAMETHasone 1 MG Tab  Commonly known as: DECADRON  Take 2 tablets (2 mg total) by mouth every 12 (twelve) hours. for 10 days     famotidine 20 MG tablet  Commonly known as: PEPCID  Take 1 tablet (20 mg total) by mouth 2 (two) times daily.     guaiFENesin 100 mg/5 ml 100 mg/5 mL syrup  Commonly known as: ROBITUSSIN  Take 10 mLs (200 mg total) by mouth every 4 (four) hours as needed for  Congestion.     levETIRAcetam 500 MG Tab  Commonly known as: KEPPRA  Take 1 tablet (500 mg total) by mouth 2 (two) times daily.     methocarbamoL 500 MG Tab  Commonly known as: ROBAXIN  Take 1 tablet (500 mg total) by mouth 3 (three) times daily as needed.     morphine 30 MG 12 hr tablet  Commonly known as: [place] CONTIN  Take 1 tablet (30 mg total) by mouth every 12 (twelve) hours.     oxyCODONE 10 mg Tab immediate release tablet  Commonly known as: ROXICODONE  Take 1 tablet (10 mg total) by mouth every 4 (four) hours as needed for Pain.     polyethylene glycol 17 gram Pwpk  Commonly known as: GLYCOLAX  Take 17 g by mouth 2 (two) times daily.     sodium bicarbonate 650 MG tablet  Take 1 tablet (650 mg total) by mouth once daily.  Start taking on: March 18, 2024     sodium chloride 1,000 mg Tbso oral tablet  Take 2 tablets (2,000 mg total) by mouth 3 (three) times daily.            CHANGE how you take these medications      gabapentin 300 MG capsule  Commonly known as: NEURONTIN  Take 1 capsule (300 mg total) by mouth 3 (three) times daily.  What changed:   medication strength  how much to take            CONTINUE taking these medications      atenoloL 50 MG tablet  Commonly known as: TENORMIN  Take 1 tablet (50 mg total) by mouth once daily.     folic acid 1 MG tablet  Commonly known as: FOLVITE  Take 1 tablet (1 mg total) by mouth once daily.              Indwelling Lines/Drains at time of discharge:   Lines/Drains/Airways       Drain  Duration             Female External Urinary Catheter w/ Suction 03/02/24 1422 14 days                    Time spent on the discharge of patient: 35 minutes         Huong Burgess MD  Department of Hospital Medicine  [facility] ([facility])

## 2024-03-17 NOTE — NURSING
Patient transfer to [facility]. Report called to receiving RN. PIV removed. Midline kept in place. Transport via ambulance and 2L NC. Patient has purse rolling walker rolling suit case and 3 clear patient bags. Cell phone and glasses.

## 2024-03-17 NOTE — H&P
[facility] Medicine  History & Physical    Patient Name: Crow Hines  MRN: [id number]  Patient Class: IP- Inpatient  Admission Date: 3/17/2024  Attending Physician: Osman Lou MD   Primary Care Provider: Chika Powell MD         Patient information was obtained from patient, past medical records, and ER records.     Subjective:     Principal Problem:Small cell carcinoma    Chief Complaint: No chief complaint on file.       HPI: Patient originally presented to [facility] ER on 2/24/24 with complaints of several months of diffuse chest, abdominal, and back pain.   CT C/A/P showed diffuse metastatic disease with lung, liver, spleen, and kidney lesions.    Also  L2 compression fracture for which Neurosurgery at [facility] was originally consulted   CT head was ordered, left temporal mass with significant edema was identified.   Steroids were initiated and patient was transferred on 2/27/24 to [facility] for Neurosurgery.    Patient underwent L temporal craniotomy tumor resection on 2/29/24.   Pathology  returned metastatic small cell lung CA.   Also developed PE, initially on heparin drip, now on eliquis.   Started on keppra for seizure prophylaxis.   Per radiation oncology, patient not a good candidate right now.   Per medical oncology, inpatient chemotherapy not emergent, but would need urgent f/u outpatient for palliative chemotherapy.   Palliative care was consulted. Patient agreed to DNR.   Situation complicated by patient not having insurance.   Patient now transferred back to [facility], plan for outpatient palliative chemo vs home with hospice vs nursing home with hospice.     Past Medical History:   Diagnosis Date    Arthritis        Past Surgical History:   Procedure Laterality Date    CRANIOTOMY, WITH NEOPLASM EXCISION USING COMPUTER-ASSISTED NAVIGATION Left 2/29/2024    Procedure: CRANIOTOMY, WITH NEOPLASM EXCISION USING COMPUTER-ASSISTED NAVIGATION;  Surgeon: Felix Szymanski DO;  Location:  [facility] OR 2ND FLR;  Service: Neurosurgery;  Laterality: Left;  (Left temporal crani for rescetion of tumor)  [place]  BRAIN LAB  Navigation - CT with contrast    TUBAL LIGATION  2002       Review of patient's allergies indicates:  No Known Allergies    Current Facility-Administered Medications on File Prior to Encounter   Medication    [DISCONTINUED] acetaminophen tablet 650 mg    [DISCONTINUED] albuterol-ipratropium 2.5 mg-0.5 mg/3 mL nebulizer solution 3 mL    [DISCONTINUED] apixaban tablet 5 mg    [DISCONTINUED] atenoloL tablet 50 mg    [DISCONTINUED] bisacodyL suppository 10 mg    [DISCONTINUED] bisacodyL suppository 10 mg    [DISCONTINUED] dexAMETHasone injection 2 mg    [DISCONTINUED] docusate sodium 1 enema    [DISCONTINUED] famotidine tablet 20 mg    [DISCONTINUED] gabapentin capsule 300 mg    [DISCONTINUED] guaiFENesin 100 mg/5 ml syrup 200 mg    [DISCONTINUED] hydrALAZINE injection 10 mg    [DISCONTINUED] HYDROmorphone injection 1 mg    [DISCONTINUED] labetalol 20 mg/4 mL (5 mg/mL) IV syring    [DISCONTINUED] levETIRAcetam tablet 500 mg    [DISCONTINUED] loperamide capsule 2 mg    [DISCONTINUED] methocarbamoL tablet 500 mg    [DISCONTINUED] morphine 12 hr tablet 30 mg    [DISCONTINUED] ondansetron injection 4 mg    [DISCONTINUED] oxyCODONE immediate release tablet 15 mg    [DISCONTINUED] oxyCODONE immediate release tablet Tab 10 mg    [DISCONTINUED] polyethylene glycol packet 17 g    [DISCONTINUED] sodium bicarbonate tablet 650 mg    [DISCONTINUED] sodium chloride oral tablet 2,000 mg     Current Outpatient Medications on File Prior to Encounter   Medication Sig    acetaminophen (TYLENOL) 325 MG tablet Take 2 tablets (650 mg total) by mouth every 6 (six) hours as needed (temp greater than 99.5 F).    albuterol-ipratropium (DUO-NEB) 2.5 mg-0.5 mg/3 mL nebulizer solution Take 3 mLs by nebulization every 6 (six) hours while awake. Rescue    apixaban (ELIQUIS) 5 mg Tab Take 1 tablet (5 mg total) by mouth 2  (two) times daily.    atenoloL (TENORMIN) 50 MG tablet Take 1 tablet (50 mg total) by mouth once daily.    bisacodyL (DULCOLAX) 10 mg Supp Place 1 suppository (10 mg total) rectally daily as needed (Until bowel movement if patient has no bowel movement for 2 days).    dexAMETHasone (DECADRON) 1 MG Tab Take 2 tablets (2 mg total) by mouth every 12 (twelve) hours. for 10 days    famotidine (PEPCID) 20 MG tablet Take 1 tablet (20 mg total) by mouth 2 (two) times daily.    folic acid (FOLVITE) 1 MG tablet Take 1 tablet (1 mg total) by mouth once daily.    gabapentin (NEURONTIN) 300 MG capsule Take 1 capsule (300 mg total) by mouth 3 (three) times daily.    guaiFENesin 100 mg/5 ml (ROBITUSSIN) 100 mg/5 mL syrup Take 10 mLs (200 mg total) by mouth every 4 (four) hours as needed for Congestion.    levETIRAcetam (KEPPRA) 500 MG Tab Take 1 tablet (500 mg total) by mouth 2 (two) times daily.    methocarbamoL (ROBAXIN) 500 MG Tab Take 1 tablet (500 mg total) by mouth 3 (three) times daily as needed.    morphine (MS CONTIN) 30 MG 12 hr tablet Take 1 tablet (30 mg total) by mouth every 12 (twelve) hours.    oxyCODONE (ROXICODONE) 10 mg Tab immediate release tablet Take 1 tablet (10 mg total) by mouth every 4 (four) hours as needed for Pain.    polyethylene glycol (GLYCOLAX) 17 gram PwPk Take 17 g by mouth 2 (two) times daily.    [START ON 3/18/2024] sodium bicarbonate 650 MG tablet Take 1 tablet (650 mg total) by mouth once daily.    sodium chloride 1,000 mg TbSO oral tablet Take 2 tablets (2,000 mg total) by mouth 3 (three) times daily.    [DISCONTINUED] gabapentin (NEURONTIN) 100 MG capsule Take 1 capsule (100 mg total) by mouth 3 (three) times daily.     Family History       Problem Relation (Age of Onset)    Hypertension Mother          Tobacco Use    Smoking status: Every Day     Current packs/day: 0.50     Types: Cigarettes    Smokeless tobacco: Current    Tobacco comments:     3/4 PPD   Substance and Sexual Activity     Alcohol use: Not Currently     Comment: OCCASIONALLY    Drug use: Never    Sexual activity: Not Currently     Review of Systems   Constitutional:  Negative for activity change and appetite change.   HENT:  Negative for congestion and dental problem.    Eyes:  Negative for discharge and itching.   Respiratory:  Negative for shortness of breath.    Cardiovascular:  Negative for chest pain.   Gastrointestinal:  Negative for abdominal distention and abdominal pain.   Endocrine: Negative for cold intolerance.   Genitourinary:  Negative for difficulty urinating and dysuria.   Musculoskeletal:  Negative for arthralgias and back pain.   Skin:  Negative for color change.   Neurological:  Positive for weakness. Negative for dizziness and facial asymmetry.   Hematological:  Negative for adenopathy.   Psychiatric/Behavioral:  Negative for agitation and behavioral problems.      Objective:     Vital Signs (Most Recent):  Temp: 98.6 °F (37 °C) (03/17/24 1700)  Pulse: 98 (03/17/24 1800)  Resp: 20 (03/17/24 1800)  BP: (!) 94/53 (03/17/24 1700)  SpO2: 98 % (03/17/24 1800) Vital Signs (24h Range):  Temp:  [98 °F (36.7 °C)-98.8 °F (37.1 °C)] 98.6 °F (37 °C)  Pulse:  [] 98  Resp:  [14-20] 20  SpO2:  [94 %-98 %] 98 %  BP: ()/(53-69) 94/53     Weight: 56.5 kg (124 lb 9 oz)  Body mass index is 22.06 kg/m².     Physical Exam  Vitals and nursing note reviewed.   Constitutional:       General: She is not in acute distress.  HENT:      Head: Atraumatic.      Right Ear: External ear normal.      Left Ear: External ear normal.      Nose: Nose normal.      Mouth/Throat:      Mouth: Mucous membranes are dry.   Cardiovascular:      Rate and Rhythm: Normal rate.   Pulmonary:      Effort: Pulmonary effort is normal.   Abdominal:      Palpations: Abdomen is soft.   Musculoskeletal:      Cervical back: Normal range of motion.      Right lower leg: No edema.      Left lower leg: No edema.   Skin:     General: Skin is warm.   Neurological:       Mental Status: She is alert and oriented to person, place, and time.      Comments: Leg weakness                Significant Labs: All pertinent labs within the past 24 hours have been reviewed.  CBC:   Recent Labs   Lab 03/16/24  0716 03/17/24 0314   WBC 10.09 8.42   HGB 9.7* 9.0*   HCT 30.1* 28.7*   PLT 17* 35*     CMP:   Recent Labs   Lab 03/16/24  0716 03/17/24 0314    140   K 4.5 4.2    106   CO2 25 24   GLU 80 94   BUN 14 13   CREATININE 0.7 0.6   CALCIUM 9.0 8.8   PROT 5.5* 5.2*   ALBUMIN 2.2* 2.1*   BILITOT 0.7 0.7   ALKPHOS 503* 705*   * 112*   ALT 78* 83*   ANIONGAP 11 10       Significant Imaging: I have reviewed all pertinent imaging results/findings within the past 24 hours.    Assessment/Plan:     * Small cell carcinoma  Metastatic lung small cell carcinoma   Poor prognosis  Medically stable  Pt has no insurance  SW/CM consulted for Placement       Cancer related pain  Maintain present pain medication regime      Acute pulmonary embolism without acute cor pulmonale  Maintain eliquis         VTE Risk Mitigation (From admission, onward)           Ordered     apixaban tablet 5 mg  2 times daily         03/17/24 1756     IP VTE HIGH RISK PATIENT  Once         03/17/24 1756     Place sequential compression device  Until discontinued         03/17/24 1756                                    Osman Lou MD  Department of Hospital Medicine  On license of [facility]

## 2024-03-17 NOTE — ASSESSMENT & PLAN NOTE
Metastatic lung small cell carcinoma   Poor prognosis  Medically stable  Pt has no insurance  SW/CM consulted for Placement

## 2024-03-17 NOTE — PROGRESS NOTES
Mario Hsieh - Neurosurgery ([facility])  [facility] Medicine  Progress Note    Patient Name: Crow Hines  MRN: [id number]  Patient Class: IP- Inpatient   Admission Date: 2/27/2024  Length of Stay: 19 days  Attending Physician: Huong Burgess MD  Primary Care Provider: Chika Powell MD        Subjective:     Principal Problem:Brain mass        HPI:  62 y/o Female admitted as transfer from OSH for L temporal lobe mass presenting w/ difficulty walking. Lab work significant for alk phos 203, AST 57, sodium 132, platelets 586. CXR revealed linear and ill-defined hazy opacities of the right mid to lower lung, felt to reflect infiltrate. Diffuse abdominal pain with focal increased tenderness in mid-epigastric area prompted CT abd/pelvis revealing numerous metastatic masses in the liver, metastatic deposits in the spleen and bilateral adrenal masses, hypoattenuating and mildly enhancing mass of the upper pole L kidney. Also w/ intra-abdominal enlarged lymph nodes, consistent with metastatic infiltration, masses of the R lung base with consolidation of the visualized R renal lower lobe and RLL 9 mm nodule. L2 compression fracture w/ mild sclerosis of the vertebra noted, likely pathologic. CTH completed showing large 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 Pt underwent L temporal craniotomy tumor resection on 2/29. Path returned metastatic small cell lung CA. Heparin gtt for PE. CTH when therapeutic negative for hemorrhage.     Overview/Hospital Course:  3/14- /71  SpO2 90%  other VSSComplicated social situation and discharge. Pt lives in trailer in [place] and has no transport. No insurance. Trying transferring to [facility] in [place] per onc here since pt is [place] resident w/ no insurance; if that is not possible then back up option may be home hospice until her insurance is approved and then can re-evaluate if she wants to pursue palliative chemo. Has new Metastatic small cell ca, poor prognosis.  See GOC  note below.     3/15- pt getting confused. Will need to reach out to family. Likely too sick to receive chemo. Constipation. Start miralax and leah supp. Has abd pain. Wbc 12. On heparin to for PE, CMP stable. Appreciate PC and  follow up. See GOC below. Plts 26, 000 not bleeding.  If bleeds, dc heparin and transfuse platelets.     3/16- episode of choking with breakfast today. UA sent.  SLP swallow eval. not eating well.  need to monitor plts and Hb. Abd pain, and + large BMs this am. Plates still dropping. Check HIT. Dc heparin. Will start eliquis for PE. No bleeding. MS is stable.    - Started oxygen for sat 89%, nebs ordered. RL 250cc bolus for hypotension    3/17- may be transferred to [facility]. Ate 25%, on 2 liter per NC. BP 94/60. Pulse 89  SpO2 97%., abd pain improved after BM. Alk phos rising. DNR. Hospice is appropriate. Plts 36,000 stable, no bleeding on eliquis 5 bid.     Interval History: see above    Review of Systems   Constitutional:  Positive for activity change. Negative for appetite change.   HENT:  Negative for trouble swallowing.    Respiratory:  Negative for shortness of breath.    Cardiovascular:  Negative for chest pain and leg swelling.   Gastrointestinal:  Negative for abdominal pain, constipation and diarrhea.   Genitourinary:  Negative for difficulty urinating.   Musculoskeletal:  Positive for back pain. Negative for gait problem.   Neurological:  Negative for numbness and headaches.   Psychiatric/Behavioral:  Negative for agitation, behavioral problems and confusion.      Objective:     Vital Signs (Most Recent):  Temp: 98.5 °F (36.9 °C) (03/17/24 0803)  Pulse: 89 (03/17/24 0813)  Resp: (P) 14 (03/17/24 0916)  BP: 94/60 (03/17/24 0803)  SpO2: 97 % (03/17/24 0813) Vital Signs (24h Range):  Temp:  [98 °F (36.7 °C)-98.6 °F (37 °C)] 98.5 °F (36.9 °C)  Pulse:  [] 89  Resp:  [10-18] (P) 14  SpO2:  [89 %-98 %] 97 %  BP: ()/(52-67) 94/60     Weight: 47.6 kg  (105 lb)  Body mass index is 18.6 kg/m².    Intake/Output Summary (Last 24 hours) at 3/17/2024 0921  Last data filed at 3/16/2024 2000  Gross per 24 hour   Intake --   Output 200 ml   Net -200 ml           Physical Exam  Constitutional:       General: She is not in acute distress.     Appearance: Normal appearance. She is ill-appearing. She is not toxic-appearing or diaphoretic.      Comments: underweight   HENT:      Head: Atraumatic.      Mouth/Throat:      Mouth: Mucous membranes are moist.      Pharynx: Oropharynx is clear. No posterior oropharyngeal erythema.   Eyes:      General: No scleral icterus.     Extraocular Movements: Extraocular movements intact.      Conjunctiva/sclera: Conjunctivae normal.      Pupils: Pupils are equal, round, and reactive to light.   Neck:      Vascular: No carotid bruit.   Cardiovascular:      Rate and Rhythm: Normal rate and regular rhythm.      Pulses: Normal pulses.      Heart sounds: Normal heart sounds. No murmur heard.     No friction rub. No gallop.   Pulmonary:      Effort: Pulmonary effort is normal. No respiratory distress.      Breath sounds: Normal breath sounds. No stridor. No wheezing, rhonchi or rales.   Chest:      Chest wall: No tenderness.   Abdominal:      General: Abdomen is flat. Bowel sounds are normal. There is no distension.      Palpations: Abdomen is soft. There is no mass.      Tenderness: There is no abdominal tenderness. There is no right CVA tenderness, left CVA tenderness, guarding or rebound.   Musculoskeletal:         General: No swelling, tenderness, deformity or signs of injury. Normal range of motion.      Cervical back: Normal range of motion and neck supple. No rigidity or tenderness.      Right lower leg: No edema.      Left lower leg: No edema.   Lymphadenopathy:      Cervical: No cervical adenopathy.   Skin:     General: Skin is warm and dry.      Coloration: Skin is not jaundiced or pale.      Findings: No bruising, erythema, lesion or  rash.   Neurological:      General: No focal deficit present.      Mental Status: She is alert and oriented to person, place, and time. Mental status is at baseline.      Cranial Nerves: No cranial nerve deficit.      Sensory: No sensory deficit.      Motor: No weakness.   Psychiatric:         Mood and Affect: Mood normal.         Behavior: Behavior normal.         Thought Content: Thought content normal.         Judgment: Judgment normal.             Significant Labs: All pertinent labs within the past 24 hours have been reviewed.  CBC:   Recent Labs   Lab 03/16/24  0716 03/17/24 0314   WBC 10.09 8.42   HGB 9.7* 9.0*   HCT 30.1* 28.7*   PLT 17* 35*       CMP:   Recent Labs   Lab 03/16/24  0716 03/17/24 0314    140   K 4.5 4.2    106   CO2 25 24   GLU 80 94   BUN 14 13   CREATININE 0.7 0.6   CALCIUM 9.0 8.8   PROT 5.5* 5.2*   ALBUMIN 2.2* 2.1*   BILITOT 0.7 0.7   ALKPHOS 503* 705*   * 112*   ALT 78* 83*   ANIONGAP 11 10         Significant Imaging: I have reviewed all pertinent imaging results/findings within the past 24 hours.    Assessment/Plan:      * Brain mass  -L temporal mass; status post tumor resection on 02/29, pathology showing met small cell lung CA   -scheduled dexamethasone. Continue dex 4q12 for vasogenic edema. Wean to off over 2 weeks  - Keppra 500 mg bid for seizure prophylaxis  - On hep gtt for PE. obtain head CT once therapeutic. If CTH is stable, okay to transition to oral AC upon discharge for treatment of PE.  -PT/OT  -blood pressure control  -pain control  -neurochecks    3/16- has [place] decline, some confusion that is waxing ans waning      Slow transit constipation  Miralz   Doculax supp  3/16- enema today, + large BM       Metastatic neoplastic disease  -CT abd/pelvis w/ numerous metastatic masses in the liver, likely metastatic deposit in the spleen; bilateral adrenal masses, hypoattenuating and mildly enhancing mass of upper pole L kidney, intra-abdominal enlarged lymph  nodes consistent w/ metastatic infiltration.   -masses to R lung base w/ consolidation of visualized R renal lower lobe; R lower lobe 9mm nodule  -L2 vertebra compression fracture w/ mild sclerosis of the vertebra, likely reflecting a pathologic fracture; faint sclerosis of the superior endplate of S1 likely reflects metastatic infiltration  -deferring staging workup including MRI spine and labs to medical oncology  -medical oncology discussion, they affirmed that the patient is a fairly decent candidate for systemic chemotherapy but best for patient to be in [place] to initiate the cycle as she has no follow up options here in the state due to Payor source limitations and residency status  -per rad onc, pt not a good candidate at this time until possibly later in future if she gets chemotherapy.    Per onc, No evidence of visceral crisis currently requiring emergent initiation of inpatient chemotherapy, but pt needs urgent f/u for consideration of palliative systemic therapy if it can be arranged. Pt is a resident of [place] so arranging f/u at [facility] or one closer to home would be best.     Palliative care consulted  Pain management  3/17- alk phos rising with liver mets.    Cerebral edema  Due to metastatic small cell lung ca to the brain,  - on dexamethazone and keppra      Vasogenic brain edema  See above      Brain compression  See above      Compression fracture of lumbar spine, non-traumatic  Back brace      Acute pulmonary embolism without acute cor pulmonale  -Stable respiratory status  -Per Neurosurgery recommendation, awaiting for heparin infusion drip to be therapeutic and then to repeat head CT to rule out bleed    3/14- PTTs therapeutic, CT head -  Evolving postsurgical change from recent left temporal mass resection as above. No new hemorrhage or major vascular distribution infarct.  3/15- Will need oral anticoagulant, if able to take po and not hospice.  "  3/16- monitor cbc   3/17- tolerating eliquis 5 bid    Palliative care encounter  Appreciate assistance  Pt needs supportive care In [place]  Hospice is appropriate  May be a candidate for systemic chemotherapy which would be palliative, not a cure.  3/17- She cannot transport herself, likely too sick/ debilitated for chemotherapy. I discussed this w her DTR.. Pt also aware. Needs assistance w ADLs and all ambulation.       Hyponatremia  Patient has hyponatremia which is controlled,We will aim to correct the sodium by 4-6mEq in 24 hours. We will monitor sodium Daily. The hyponatremia is due to Dehydration/hypovolemia. We will obtain the following studies: see labs. We will treat the hyponatremia with IV fluids. The patient's sodium results have been reviewed and are listed below.  Recent Labs   Lab 03/17/24  0314            Pain  Scheduled: LA morphine 30 bid, gabapentin  PRN: Dilaudid, oxycodone, robaxin      Cancer related pain  Due to small cell Cancer  See " pain" above      Debility  Patient with Acute debility due to  cancer . Latest AMPAC and GEMS scores have been reviewed. Evaluation for etiology is complete. Plan includes  hospice care/ supportive care.  .    Advance care planning  See palliative care      Goals of care, counseling/discussion  Advance Care Planning    Does patient have Capacity? Yes  Contact:  Pt has son w special needs and DTR who cannot help her. She makes decisions for herself  Goals/Wishes: wants NH placement, Understands she has a poor prognosis, wants comfort measures, if able to get support would consider palliative chemotherapy, Does not have assistance at home.   Code Status- DNR confirmed 12:30 pm on 3/14.  Pain management- stable, see pain management above  Prognosis-  less than six months.   Family discussions-   3/14- They are not present. We discussed her diagnosis and future care needs. She is aware of poor prognosis, We discussed code status and hospice. She would " like to go to a NH as she can no longer care for herself independently.   3/15- Pt gives me permission to speak to her DTR,. Called Thais Hines (Daughter) [phone] (Mobile) and left VM. Dtr has stage 4 cancer and is undergoing chemotherapy, and cannot take care of patient. Thais's brother, who has autism is with her godmother. We discussed her care and condtion and discharge options. They have no other family members who can be with patient at home. We discussed that pt is having pain, and not able to transport easily which is needed for chemotherapy. Thais expressed understanding. We discussed that prognosis was poor, and dependent on pt's oral intake. Our patient has previously told Thais before admission that she wants DNR code status. Dtr wants to be informed of events and progress and willing to help make decision.   Functional Status - limited to bed    Post acute care plan:  Hospice is appropriate, NH with hospice is appropriate as well. CM working on Medicaid approval, supportive care, possible transfer to [facility] for palliative chemotherapy or other forms of support. .   Time spent on Goals of Care:  10 minutes at bedside on 3/14. 12 minutes on phone 3/15.          Thrombocytopenia  Found to have mets  Nsgy is following   Monitor  HIT lab  Dc heparin  Start eliquis for PE: will give maintenance 5mg bid since she has been on full dose heparin and has bleeding risk  3/17- plt 36,000      Transaminitis  Monitor  Alk phos rising      Moderate malnutrition  Nutrition consulted. Most recent weight and BMI monitored-     Measurements:  Wt Readings from Last 1 Encounters:   02/28/24 47.6 kg (105 lb)   Body mass index is 18.6 kg/m².    Patient has been screened and assessed by RD.    Malnutrition Type:  Context: chronic illness  Level: moderate    Malnutrition Characteristic Summary:  Weight Loss (Malnutrition): greater than 7.5% in 3 months (-15% x 3mo)  Subcutaneous Fat (Malnutrition): mild  depletion  Muscle Mass (Malnutrition): mild depletion    Interventions/Recommendations (treatment strategy):  1. Continue Regular Diet as toelrated. 2. Recommend Boost Plus (or alternative) QD to help meet nutritional needs. 3. Monitor I/O's, weight and labs. 4. RD to monitor.      HTN (hypertension)  Chronic, controlled. Latest blood pressure and vitals reviewed-     Temp:  [98 °F (36.7 °C)-98.6 °F (37 °C)]   Pulse:  []   Resp:  [10-18]   BP: ()/(52-67)   SpO2:  [89 %-98 %] .   Home meds for hypertension were reviewed and noted below.   Hypertension Medications               atenoloL (TENORMIN) 50 MG tablet Take 1 tablet (50 mg total) by mouth once daily.            While in the hospital, will manage blood pressure as follows; Adjust home antihypertensive regimen as follows- adjust as needed    Will utilize p.r.n. blood pressure medication only if patient's blood pressure greater than 180/110 and she develops symptoms such as worsening chest pain or shortness of breath.      VTE Risk Mitigation (From admission, onward)           Ordered     apixaban tablet 5 mg  2 times daily         03/16/24 1203     Reason for No Pharmacological VTE Prophylaxis  Once        Question:  Reasons:  Answer:  Risk of Bleeding    02/27/24 0556     IP VTE HIGH RISK PATIENT  Once         02/27/24 0556     Place sequential compression device  Until discontinued         02/27/24 0556                    Discharge Planning   JUAN: 3/18/2024     Code Status: DNR   Is the patient medically ready for discharge?: Yes    Reason for patient still in hospital (select all that apply): Patient trending condition  Discharge Plan A: Hospital Transfer   Discharge Delays: (!) Post-Acute Set-up        Huong Burgess MD  Department of Hospital Medicine   [facility] Neurosurgery ([facility])

## 2024-03-17 NOTE — NURSING
Nurses Note -- 4 Eyes      3/17/2024   5:28 PM      Skin assessed during: Admit      [] No Altered Skin Integrity Present    []Prevention Measures Documented      [] Yes- Altered Skin Integrity Present or Discovered   [] LDA Added if Not in Epic (Describe Wound)   [] New Altered Skin Integrity was Present on Admit and Documented in LDA   [x] Wound Image Taken    Wound Care Consulted? Yes    Attending Nurse:  Rm Costa RN/Staff Member:  gita

## 2024-03-17 NOTE — SUBJECTIVE & OBJECTIVE
Interval History: see above    Review of Systems   Constitutional:  Positive for activity change. Negative for appetite change.   HENT:  Negative for trouble swallowing.    Respiratory:  Negative for shortness of breath.    Cardiovascular:  Negative for chest pain and leg swelling.   Gastrointestinal:  Negative for abdominal pain, constipation and diarrhea.   Genitourinary:  Negative for difficulty urinating.   Musculoskeletal:  Positive for back pain. Negative for gait problem.   Neurological:  Negative for numbness and headaches.   Psychiatric/Behavioral:  Negative for agitation, behavioral problems and confusion.      Objective:     Vital Signs (Most Recent):  Temp: 98.5 °F (36.9 °C) (03/17/24 0803)  Pulse: 89 (03/17/24 0813)  Resp: (P) 14 (03/17/24 0916)  BP: 94/60 (03/17/24 0803)  SpO2: 97 % (03/17/24 0813) Vital Signs (24h Range):  Temp:  [98 °F (36.7 °C)-98.6 °F (37 °C)] 98.5 °F (36.9 °C)  Pulse:  [] 89  Resp:  [10-18] (P) 14  SpO2:  [89 %-98 %] 97 %  BP: ()/(52-67) 94/60     Weight: 47.6 kg (105 lb)  Body mass index is 18.6 kg/m².    Intake/Output Summary (Last 24 hours) at 3/17/2024 0921  Last data filed at 3/16/2024 2000  Gross per 24 hour   Intake --   Output 200 ml   Net -200 ml           Physical Exam  Constitutional:       General: She is not in acute distress.     Appearance: Normal appearance. She is ill-appearing. She is not toxic-appearing or diaphoretic.      Comments: underweight   HENT:      Head: Atraumatic.      Mouth/Throat:      Mouth: Mucous membranes are moist.      Pharynx: Oropharynx is clear. No posterior oropharyngeal erythema.   Eyes:      General: No scleral icterus.     Extraocular Movements: Extraocular movements intact.      Conjunctiva/sclera: Conjunctivae normal.      Pupils: Pupils are equal, round, and reactive to light.   Neck:      Vascular: No carotid bruit.   Cardiovascular:      Rate and Rhythm: Normal rate and regular rhythm.      Pulses: Normal pulses.       Heart sounds: Normal heart sounds. No murmur heard.     No friction rub. No gallop.   Pulmonary:      Effort: Pulmonary effort is normal. No respiratory distress.      Breath sounds: Normal breath sounds. No stridor. No wheezing, rhonchi or rales.   Chest:      Chest wall: No tenderness.   Abdominal:      General: Abdomen is flat. Bowel sounds are normal. There is no distension.      Palpations: Abdomen is soft. There is no mass.      Tenderness: There is no abdominal tenderness. There is no right CVA tenderness, left CVA tenderness, guarding or rebound.   Musculoskeletal:         General: No swelling, tenderness, deformity or signs of injury. Normal range of motion.      Cervical back: Normal range of motion and neck supple. No rigidity or tenderness.      Right lower leg: No edema.      Left lower leg: No edema.   Lymphadenopathy:      Cervical: No cervical adenopathy.   Skin:     General: Skin is warm and dry.      Coloration: Skin is not jaundiced or pale.      Findings: No bruising, erythema, lesion or rash.   Neurological:      General: No focal deficit present.      Mental Status: She is alert and oriented to person, place, and time. Mental status is at baseline.      Cranial Nerves: No cranial nerve deficit.      Sensory: No sensory deficit.      Motor: No weakness.   Psychiatric:         Mood and Affect: Mood normal.         Behavior: Behavior normal.         Thought Content: Thought content normal.         Judgment: Judgment normal.             Significant Labs: All pertinent labs within the past 24 hours have been reviewed.  CBC:   Recent Labs   Lab 03/16/24  0716 03/17/24  0314   WBC 10.09 8.42   HGB 9.7* 9.0*   HCT 30.1* 28.7*   PLT 17* 35*       CMP:   Recent Labs   Lab 03/16/24  0716 03/17/24  0314    140   K 4.5 4.2    106   CO2 25 24   GLU 80 94   BUN 14 13   CREATININE 0.7 0.6   CALCIUM 9.0 8.8   PROT 5.5* 5.2*   ALBUMIN 2.2* 2.1*   BILITOT 0.7 0.7   ALKPHOS 503* 705*   * 112*    ALT 78* 83*   ANIONGAP 11 10         Significant Imaging: I have reviewed all pertinent imaging results/findings within the past 24 hours.

## 2024-03-17 NOTE — ASSESSMENT & PLAN NOTE
-Stable respiratory status  -Per Neurosurgery recommendation, awaiting for heparin infusion drip to be therapeutic and then to repeat head CT to rule out bleed    3/14- PTTs therapeutic, CT head -  Evolving postsurgical change from recent left temporal mass resection as above. No new hemorrhage or major vascular distribution infarct.  3/15- Will need oral anticoagulant, if able to take po and not hospice.   3/16- monitor cbc   3/17- tolerating eliquis 5 bid

## 2024-03-17 NOTE — CARE UPDATE
03/17/24 1800   Patient Assessment/Suction   Level of Consciousness (AVPU) alert   Respiratory Effort Unlabored   Expansion/Accessory Muscles/Retractions no use of accessory muscles   Rhythm/Pattern, Respiratory unlabored;pattern regular;depth regular   PRE-TX-O2   Device (Oxygen Therapy) nasal cannula   $ Is the patient on Low Flow Oxygen? Yes   Flow (L/min) 2   Oxygen Concentration (%) 28   SpO2 98 %   Pulse Oximetry Type Intermittent   $ Pulse Oximetry - Multiple Charge Pulse Oximetry - Multiple   Pulse 98   Resp 20   Education   $ Education Bronchodilator;15 min

## 2024-03-17 NOTE — ASSESSMENT & PLAN NOTE
Patient has hyponatremia which is controlled,We will aim to correct the sodium by 4-6mEq in 24 hours. We will monitor sodium Daily. The hyponatremia is due to Dehydration/hypovolemia. We will obtain the following studies: see labs. We will treat the hyponatremia with IV fluids. The patient's sodium results have been reviewed and are listed below.  Recent Labs   Lab 03/17/24  0314          - On salt tablets and dexamethazone.  Wean off if stable

## 2024-03-17 NOTE — ASSESSMENT & PLAN NOTE
Found to have mets  Nsgy is following   Monitor  HIT lab  Dc heparin  Start eliquis for PE: will give maintenance 5mg bid since she has been on full dose heparin and has bleeding risk  3/17- plt 36,000

## 2024-03-18 LAB
ALBUMIN SERPL BCP-MCNC: 2.8 G/DL (ref 3.5–5.2)
ALP SERPL-CCNC: 686 U/L (ref 55–135)
ALT SERPL W/O P-5'-P-CCNC: 70 U/L (ref 10–44)
ANION GAP SERPL CALC-SCNC: 11 MMOL/L (ref 8–16)
AST SERPL-CCNC: 99 U/L (ref 10–40)
BACTERIA UR CULT: ABNORMAL
BACTERIA UR CULT: ABNORMAL
BASOPHILS # BLD AUTO: 0.01 K/UL (ref 0–0.2)
BASOPHILS NFR BLD: 0.1 % (ref 0–1.9)
BILIRUB SERPL-MCNC: 0.9 MG/DL (ref 0.1–1)
BUN SERPL-MCNC: 17 MG/DL (ref 8–23)
CALCIUM SERPL-MCNC: 8.9 MG/DL (ref 8.7–10.5)
CHLORIDE SERPL-SCNC: 105 MMOL/L (ref 95–110)
CO2 SERPL-SCNC: 25 MMOL/L (ref 23–29)
CREAT SERPL-MCNC: 0.5 MG/DL (ref 0.5–1.4)
DIFFERENTIAL METHOD BLD: ABNORMAL
EOSINOPHIL # BLD AUTO: 0 K/UL (ref 0–0.5)
EOSINOPHIL NFR BLD: 0 % (ref 0–8)
ERYTHROCYTE [DISTWIDTH] IN BLOOD BY AUTOMATED COUNT: 15.5 % (ref 11.5–14.5)
EST. GFR  (NO RACE VARIABLE): >60 ML/MIN/1.73 M^2
GLUCOSE SERPL-MCNC: 86 MG/DL (ref 70–110)
HCT VFR BLD AUTO: 29.5 % (ref 37–48.5)
HGB BLD-MCNC: 9.2 G/DL (ref 12–16)
IMM GRANULOCYTES # BLD AUTO: 0.17 K/UL (ref 0–0.04)
IMM GRANULOCYTES NFR BLD AUTO: 2 % (ref 0–0.5)
LYMPHOCYTES # BLD AUTO: 0.3 K/UL (ref 1–4.8)
LYMPHOCYTES NFR BLD: 3.8 % (ref 18–48)
MAGNESIUM SERPL-MCNC: 2.1 MG/DL (ref 1.6–2.6)
MCH RBC QN AUTO: 32.1 PG (ref 27–31)
MCHC RBC AUTO-ENTMCNC: 31.2 G/DL (ref 32–36)
MCV RBC AUTO: 103 FL (ref 82–98)
MONOCYTES # BLD AUTO: 0.5 K/UL (ref 0.3–1)
MONOCYTES NFR BLD: 6.2 % (ref 4–15)
NEUTROPHILS # BLD AUTO: 7.4 K/UL (ref 1.8–7.7)
NEUTROPHILS NFR BLD: 87.9 % (ref 38–73)
NRBC BLD-RTO: 0 /100 WBC
PLATELET # BLD AUTO: 74 K/UL (ref 150–450)
PMV BLD AUTO: 11.4 FL (ref 9.2–12.9)
POTASSIUM SERPL-SCNC: 4.4 MMOL/L (ref 3.5–5.1)
PROT SERPL-MCNC: 5.3 G/DL (ref 6–8.4)
RBC # BLD AUTO: 2.87 M/UL (ref 4–5.4)
SODIUM SERPL-SCNC: 141 MMOL/L (ref 136–145)
TROPONIN I SERPL HS-MCNC: 16.4 PG/ML (ref 0–14.9)
WBC # BLD AUTO: 8.41 K/UL (ref 3.9–12.7)

## 2024-03-18 PROCEDURE — 27000221 HC OXYGEN, UP TO 24 HOURS

## 2024-03-18 PROCEDURE — 94761 N-INVAS EAR/PLS OXIMETRY MLT: CPT

## 2024-03-18 PROCEDURE — 84484 ASSAY OF TROPONIN QUANT: CPT

## 2024-03-18 PROCEDURE — 25000003 PHARM REV CODE 250: Performed by: INTERNAL MEDICINE

## 2024-03-18 PROCEDURE — 25000242 PHARM REV CODE 250 ALT 637 W/ HCPCS: Performed by: INTERNAL MEDICINE

## 2024-03-18 PROCEDURE — 63600175 PHARM REV CODE 636 W HCPCS: Mod: UD | Performed by: INTERNAL MEDICINE

## 2024-03-18 PROCEDURE — 36415 COLL VENOUS BLD VENIPUNCTURE: CPT | Performed by: INTERNAL MEDICINE

## 2024-03-18 PROCEDURE — 94640 AIRWAY INHALATION TREATMENT: CPT

## 2024-03-18 PROCEDURE — 93010 ELECTROCARDIOGRAM REPORT: CPT | Mod: ,,, | Performed by: INTERNAL MEDICINE

## 2024-03-18 PROCEDURE — 83735 ASSAY OF MAGNESIUM: CPT | Performed by: INTERNAL MEDICINE

## 2024-03-18 PROCEDURE — 93005 ELECTROCARDIOGRAM TRACING: CPT | Performed by: INTERNAL MEDICINE

## 2024-03-18 PROCEDURE — 36415 COLL VENOUS BLD VENIPUNCTURE: CPT

## 2024-03-18 PROCEDURE — 85025 COMPLETE CBC W/AUTO DIFF WBC: CPT | Performed by: INTERNAL MEDICINE

## 2024-03-18 PROCEDURE — 12000002 HC ACUTE/MED SURGE SEMI-PRIVATE ROOM

## 2024-03-18 PROCEDURE — 99900031 HC PATIENT EDUCATION (STAT)

## 2024-03-18 PROCEDURE — 80053 COMPREHEN METABOLIC PANEL: CPT | Performed by: INTERNAL MEDICINE

## 2024-03-18 PROCEDURE — 94760 N-INVAS EAR/PLS OXIMETRY 1: CPT

## 2024-03-18 PROCEDURE — 99223 1ST HOSP IP/OBS HIGH 75: CPT | Mod: ,,, | Performed by: INTERNAL MEDICINE

## 2024-03-18 RX ORDER — HYDROMORPHONE HYDROCHLORIDE 1 MG/ML
1 INJECTION, SOLUTION INTRAMUSCULAR; INTRAVENOUS; SUBCUTANEOUS
Status: DISCONTINUED | OUTPATIENT
Start: 2024-03-18 | End: 2024-03-22 | Stop reason: HOSPADM

## 2024-03-18 RX ORDER — ACETAMINOPHEN 500 MG
1000 TABLET ORAL EVERY 8 HOURS
Status: DISCONTINUED | OUTPATIENT
Start: 2024-03-18 | End: 2024-03-22 | Stop reason: HOSPADM

## 2024-03-18 RX ORDER — OXYCODONE HCL 10 MG/1
30 TABLET, FILM COATED, EXTENDED RELEASE ORAL EVERY 12 HOURS
Status: DISCONTINUED | OUTPATIENT
Start: 2024-03-18 | End: 2024-03-20

## 2024-03-18 RX ORDER — SENNOSIDES 8.6 MG/1
8.6 TABLET ORAL DAILY PRN
Status: DISCONTINUED | OUTPATIENT
Start: 2024-03-18 | End: 2024-03-22 | Stop reason: HOSPADM

## 2024-03-18 RX ADMIN — OXYCODONE HYDROCHLORIDE 10 MG: 10 TABLET ORAL at 12:03

## 2024-03-18 RX ADMIN — FOLIC ACID 1 MG: 1 TABLET ORAL at 09:03

## 2024-03-18 RX ADMIN — DEXAMETHASONE 2 MG: 2 TABLET ORAL at 08:03

## 2024-03-18 RX ADMIN — OXYCODONE HYDROCHLORIDE 30 MG: 10 TABLET, FILM COATED, EXTENDED RELEASE ORAL at 08:03

## 2024-03-18 RX ADMIN — FAMOTIDINE 20 MG: 20 TABLET ORAL at 09:03

## 2024-03-18 RX ADMIN — APIXABAN 5 MG: 5 TABLET, FILM COATED ORAL at 09:03

## 2024-03-18 RX ADMIN — IPRATROPIUM BROMIDE AND ALBUTEROL SULFATE 3 ML: 2.5; .5 SOLUTION RESPIRATORY (INHALATION) at 01:03

## 2024-03-18 RX ADMIN — DEXAMETHASONE 2 MG: 2 TABLET ORAL at 09:03

## 2024-03-18 RX ADMIN — OXYCODONE HYDROCHLORIDE 10 MG: 10 TABLET ORAL at 03:03

## 2024-03-18 RX ADMIN — LEVETIRACETAM 500 MG: 500 TABLET, FILM COATED ORAL at 09:03

## 2024-03-18 RX ADMIN — IPRATROPIUM BROMIDE AND ALBUTEROL SULFATE 3 ML: 2.5; .5 SOLUTION RESPIRATORY (INHALATION) at 07:03

## 2024-03-18 RX ADMIN — APIXABAN 5 MG: 5 TABLET, FILM COATED ORAL at 08:03

## 2024-03-18 RX ADMIN — MORPHINE SULFATE 30 MG: 15 TABLET, EXTENDED RELEASE ORAL at 09:03

## 2024-03-18 RX ADMIN — HYDROMORPHONE HYDROCHLORIDE 0.5 MG: 0.5 INJECTION, SOLUTION INTRAMUSCULAR; INTRAVENOUS; SUBCUTANEOUS at 07:03

## 2024-03-18 RX ADMIN — HYDROMORPHONE HYDROCHLORIDE 1 MG: 1 INJECTION, SOLUTION INTRAMUSCULAR; INTRAVENOUS; SUBCUTANEOUS at 05:03

## 2024-03-18 RX ADMIN — HYDROMORPHONE HYDROCHLORIDE 0.5 MG: 0.5 INJECTION, SOLUTION INTRAMUSCULAR; INTRAVENOUS; SUBCUTANEOUS at 02:03

## 2024-03-18 RX ADMIN — FAMOTIDINE 20 MG: 20 TABLET ORAL at 08:03

## 2024-03-18 RX ADMIN — LEVETIRACETAM 500 MG: 500 TABLET, FILM COATED ORAL at 08:03

## 2024-03-18 NOTE — SUBJECTIVE & OBJECTIVE
Interval History: See HPI    Past Medical History:   Diagnosis Date    Arthritis        Past Surgical History:   Procedure Laterality Date    CRANIOTOMY, WITH NEOPLASM EXCISION USING COMPUTER-ASSISTED NAVIGATION Left 2/29/2024    Procedure: CRANIOTOMY, WITH NEOPLASM EXCISION USING COMPUTER-ASSISTED NAVIGATION;  Surgeon: Felix Szymanski DO;  Location: [facility] OR [address] [place] [address];  Service: Neurosurgery;  Laterality: Left;  (Left temporal crani for rescetion of tumor)  [place]  BRAIN LAB  Navigation - CT with contrast    TUBAL LIGATION  2002       Review of patient's allergies indicates:  No Known Allergies    Medications:  Continuous Infusions:  Scheduled Meds:   albuterol-ipratropium  3 mL Nebulization Q6H WAKE    apixaban  5 mg Oral BID    dexAMETHasone  2 mg Oral Q12H    famotidine  20 mg Oral BID    folic acid  1 mg Oral Daily    levETIRAcetam  500 mg Oral BID    morphine  30 mg Oral Q12H    polyethylene glycol  17 g Oral BID     PRN Meds:aluminum-magnesium hydroxide-simethicone, HYDROmorphone, magnesium oxide, magnesium oxide, ondansetron, oxyCODONE, potassium bicarbonate, potassium bicarbonate, potassium bicarbonate, potassium, sodium phosphates, potassium, sodium phosphates, potassium, sodium phosphates    Family History       Problem Relation (Age of Onset)    Hypertension Mother          Tobacco Use    Smoking status: Every Day     Current packs/day: 0.50     Types: Cigarettes    Smokeless tobacco: Current    Tobacco comments:     3/4 PPD   Substance and Sexual Activity    Alcohol use: Not Currently     Comment: OCCASIONALLY    Drug use: Never    Sexual activity: Not Currently       Review of Systems  Objective:     Vital Signs (Most Recent):  Temp: 98.6 °F (37 °C) (03/18/24 1117)  Pulse: 95 (03/18/24 1326)  Resp: 18 (03/18/24 1446)  BP: 119/71 (03/18/24 1117)  SpO2: 95 % (03/18/24 1326) Vital Signs (24h Range):  Temp:  [98 °F (36.7 °C)-98.6 °F (37 °C)] 98.6 °F (37 °C)  Pulse:  [] 95  Resp:  [18-20] 18  SpO2:   [92 %-99 %] 95 %  BP: ()/(53-71) 119/71     Weight: 56.5 kg (124 lb 9 oz)  Body mass index is 22.06 kg/m².       Physical Exam  Vitals reviewed.   Constitutional:       General: She is in acute distress.      Appearance: She is ill-appearing.   HENT:      Head: Normocephalic and atraumatic.      Right Ear: External ear normal.      Left Ear: External ear normal.      Nose: Nose normal. No congestion.      Mouth/Throat:      Mouth: Mucous membranes are dry.   Eyes:      General:         Right eye: No discharge.         Left eye: No discharge.      Extraocular Movements: Extraocular movements intact.   Cardiovascular:      Rate and Rhythm: Tachycardia present.   Pulmonary:      Effort: Pulmonary effort is normal. No respiratory distress.   Abdominal:      General: There is no distension.      Palpations: Abdomen is soft.   Neurological:      General: No focal deficit present.      Mental Status: She is alert and oriented to person, place, and time.   Psychiatric:         Mood and Affect: Mood normal.         Behavior: Behavior normal.         Thought Content: Thought content normal.         Judgment: Judgment normal.            Review of Symptoms      Symptom Assessment (ESAS 0-10 Scale)  Pain:  10  Dyspnea:  1  Anxiety:  3  Nausea:  0  Depression:  0  Anorexia:  5  Fatigue:  7  Insomnia:  0  Restlessness:  0  Agitation:  0         Pain Assessment:    Location(s): back    Back       Location: lower and right        Quality: Stabbing, shooting and sharp        Quantity: 10/10 in intensity        Chronicity: Onset 4 week(s) ago, gradually worsening        Aggravating Factors: Activity        Alleviating Factors: Opiates and recumbency       Associated Symptoms: Anorexia    Performance Status:  20    Psychosocial/Cultural:   See Palliative Psychosocial Note: No  **Primary  to Follow**  Palliative Care  Consult: No        Advance Care Planning   Advance Directives:   Do Not Resuscitate  "Status: Yes      Decision Making:  Patient answered questions  Goals of Care: What is most important right now is to focus on comfort and QOL . Accordingly, we have decided that the best plan to meet the patient's goals includes enrolling in hospice care.         Significant Labs: BMP:   Recent Labs   Lab 03/18/24  0458   GLU 86      K 4.4      CO2 25   BUN 17   CREATININE 0.5   CALCIUM 8.9   MG 2.1     CBC:   Recent Labs   Lab 03/17/24  0314 03/18/24 0458   WBC 8.42 8.41   HGB 9.0* 9.2*   HCT 28.7* 29.5*   PLT 35* 74*     CBC:   Recent Labs   Lab 03/18/24 0458   WBC 8.41   HGB 9.2*   HCT 29.5*   *   PLT 74*     BMP:  Recent Labs   Lab 03/18/24  0458   GLU 86      K 4.4      CO2 25   BUN 17   CREATININE 0.5   CALCIUM 8.9   MG 2.1     LFT:  Lab Results   Component Value Date    AST 99 (H) 03/18/2024    GGT 1,385 (H) 03/05/2024    ALKPHOS 686 (H) 03/18/2024    BILITOT 0.9 03/18/2024     Albumin:   Albumin   Date Value Ref Range Status   03/18/2024 2.8 (L) 3.5 - 5.2 g/dL Final     Protein:   Total Protein   Date Value Ref Range Status   03/18/2024 5.3 (L) 6.0 - 8.4 g/dL Final     Lactic acid:   No results found for: "LACTATE"    Significant Imaging: I have reviewed all pertinent imaging results/findings within the past 24 hours.    "

## 2024-03-18 NOTE — ASSESSMENT & PLAN NOTE
Metastatic disease  Platelets improving  No signs of bleeding noted  Recent Labs   Lab 03/18/24  0458   PLT 74*

## 2024-03-18 NOTE — PLAN OF CARE
0976- BRANDT spoke with Palliative care team. Pt requesting nursing home placement with hospice. Pt would like to stay close to [place], [place].  blasted out referrals within 40 miles of [place].     received call from Mr. Paul ([facility], [phone]) regarding Pt's discharge plan. SW informed Mr. Paul Pt si new to CM team and was unaware of any dc planning needs. SW conducted chart review. Per previous note, Pt's previous care team planning for hospice. Pt is indigent and Pending [place] Medicaid. Previous Cm called [facility] [place] and [facility]. [facility] to follow for plan. SW to continue to follow.         03/18/24 0978   Post-Acute Status   Post-Acute Authorization Hospice   Post-Acute Placement Status Referrals Sent   Discharge Plan   Discharge Plan A Hospice/home   Discharge Plan B Hospice/home

## 2024-03-18 NOTE — CONSULTS
"Pt lying in bed in no acute distress. Complains of generalized pain. Flat affect; withdrawn. Introduced Palliative care and our services. She was agreeable to speaking. Pt AAOx3. Came from home; lives in a trailer. Spent weeks in bed prior to admission. Still unable to get OOB. Pt is refusing to eat. She understands she is terminal and prognosis is poor. She desires to be kept comfortable on hospice. She reports she does not have enough social support to return home or to start any type of cancer treatments. She has no one to care for her or bring her to her appointments. What's most important to her right now is having her pain controlled. She is also worried about her special needs Son who is staying with her Friend Prachi. For this reason she wants to be placed near [place], [place] to be close to him. Her grown daughter is currently going thru treatment for stage 4 liver cancer and she cannot depend on her for her care. Offered Spiritual care consult; pt refused.     Attempted to call pt's friend Prachi [phone] per her request. "Number not in service." Attempted to call pt's Daughter Thais [phone] per her request; no answer, left message.     Advance Care Planning     Date: 03/18/2024    [facility]  I engaged the patient in a voluntary conversation about advance care planning and we specifically addressed what the goals of care would be moving forward, in light of the patient's change in clinical status, lung cancer with mets.  We did specifically address the patient's likely prognosis, which is poor.  We explored the patient's values and preferences for future care.  The patient endorses that what is most important right now is to focus on comfort and QOL     Accordingly, we have decided that the best plan to meet the patient's goals includes enrolling in hospice care at a nursing home in [place], [place].     I did explain the role for hospice care at this stage of the patient's illness, including its ability to " help the patient live with the best quality of life possible.  We will be making a hospice referral.    I spent a total of 35 minutes engaging the patient in this advance care planning discussion.        Will have Dr. Turcios follow up for pain and symptom management. Will continue to try to reach family.

## 2024-03-18 NOTE — PT/OT/SLP PROGRESS
Occupational Therapy      Patient Name:  Crow Hines   MRN:  [id number]    Attempted OT evaluation. Patient stated she decided to go hospice care and does not require acute OT services. Discharging OT services per patient's request.    3/18/2024

## 2024-03-18 NOTE — HPI
"Per admit H&P: "Patient originally presented to [facility] ER on 2/24/24 with complaints of several months of diffuse chest, abdominal, and back pain.   CT C/A/P showed diffuse metastatic disease with lung, liver, spleen, and kidney lesions.    Also  L2 compression fracture for which Neurosurgery at [facility] was originally consulted   CT head was ordered, left temporal mass with significant edema was identified.   Steroids were initiated and patient was transferred on 2/27/24 to [facility] for Neurosurgery.    Patient underwent L temporal craniotomy tumor resection on 2/29/24.   Pathology  returned metastatic small cell lung CA.   Also developed PE, initially on heparin drip, now on eliquis.   Started on keppra for seizure prophylaxis.   Per radiation oncology, patient not a good candidate right now.   Per medical oncology, inpatient chemotherapy not emergent, but would need urgent f/u outpatient for palliative chemotherapy.   Palliative care was consulted. Patient agreed to DNR.   Situation complicated by patient not having insurance.   Patient now transferred back to [facility], plan for outpatient palliative chemo vs home with hospice vs nursing home with hospice."    We have been asked to assist with goals of care.  "

## 2024-03-18 NOTE — CARE UPDATE
03/17/24 1952   Patient Assessment/Suction   Level of Consciousness (AVPU) alert   Respiratory Effort Normal;Unlabored   Expansion/Accessory Muscles/Retractions no use of accessory muscles   All Lung Fields Breath Sounds diminished   Rhythm/Pattern, Respiratory unlabored;pattern regular   Cough Frequency infrequent   Cough Type weak   PRE-TX-O2   Device (Oxygen Therapy) nasal cannula   Flow (L/min) 2   SpO2 99 %   Pulse Oximetry Type Intermittent   $ Pulse Oximetry - Multiple Charge Pulse Oximetry - Multiple   Pulse 98   Resp 18   Aerosol Therapy   $ Aerosol Therapy Charges Aerosol Treatment   Daily Review of Necessity (SVN) completed   Respiratory Treatment Status (SVN) given   Treatment Route (SVN) mask;oxygen   Patient Position (SVN) HOB elevated   Post Treatment Assessment (SVN) breath sounds unchanged   Signs of Intolerance (SVN) none   Breath Sounds Post-Respiratory Treatment   Throughout All Fields Post-Treatment All Fields   Throughout All Fields Post-Treatment no change   Post-treatment Heart Rate (beats/min) 97   Post-treatment Resp Rate (breaths/min) 18   Education   $ Education Bronchodilator;15 min

## 2024-03-18 NOTE — PLAN OF CARE
BRANDT spoke with Guillermo ([facility]). [facility] able to take Pt as indigent. Guillermo to reach out to [facility], as Pt wishes to return to [place] at discharge, and give SW call back.

## 2024-03-18 NOTE — ASSESSMENT & PLAN NOTE
Metastatic lung small cell carcinoma   Poor prognosis  Medically stable  [place] Medicaid insurance pending  SW/CM consulted for placement   Palliative care team consulted

## 2024-03-18 NOTE — CONSULTS
"[facility]  Palliative Medicine  Consult Note    Patient Name: Crow Hines  MRN: [id number]  Admission Date: 3/17/2024  Hospital Length of Stay: 1 days  Code Status: DNR   Attending Provider: Jason Simmons MD  Consulting Provider: Carlin Turcios MD  Primary Care Physician: Chika Powell MD  Principal Problem:Small cell carcinoma    Patient information was obtained from patient, caregiver / friend, past medical records, and primary team.      Consults  Assessment/Plan:     Oncology  Cancer related pain  Pain is somatic in nature; some neuropathic components as well.    Pain is poorly controlled on current regimen.    - for simplicity, I will change [place] Contin to OxyContin 30mg scheduled BID  - we will provide p.r.n. oxycodone 10 mg for moderate pain and 15 mg for severe pain prn  - we will keep prn hydromorphone for severe breakthrough pain not responsive to p.o. oxycodone; we increased hydromorphone dosage to 1mg prn  -we will schedule a 1000 mg Tylenol t.i.d.    We will add senna p.r.n. for opioid induced constipation prophylaxis    Discussed the risks and benefits of the above regimen with the patient.  Specifically address risk of sedation, respiratory depression, and altered mentation.  She wishes to proceed.  Her desires her comfort above all else.        Thank you for your consult. I will follow-up with patient. Please contact us if you have any additional questions.    Subjective:     HPI:   Per admit H&P: "Patient originally presented to [facility] ER on 2/24/24 with complaints of several months of diffuse chest, abdominal, and back pain.   CT C/A/P showed diffuse metastatic disease with lung, liver, spleen, and kidney lesions.    Also  L2 compression fracture for which Neurosurgery at [facility] was originally consulted   CT head was ordered, left temporal mass with significant edema was identified.   Steroids were initiated and patient was transferred on 2/27/24 to [facility] for " "Neurosurgery.    Patient underwent L temporal craniotomy tumor resection on 2/29/24.   Pathology  returned metastatic small cell lung CA.   Also developed PE, initially on heparin drip, now on eliquis.   Started on keppra for seizure prophylaxis.   Per radiation oncology, patient not a good candidate right now.   Per medical oncology, inpatient chemotherapy not emergent, but would need urgent f/u outpatient for palliative chemotherapy.   Palliative care was consulted. Patient agreed to DNR.   Situation complicated by patient not having insurance.   Patient now transferred back to [facility], plan for outpatient palliative chemo vs home with hospice vs nursing home with hospice."    We have been asked to assist with goals of care.    Hospital Course:  No notes on file    Interval History: See HPI    Past Medical History:   Diagnosis Date    Arthritis        Past Surgical History:   Procedure Laterality Date    CRANIOTOMY, WITH NEOPLASM EXCISION USING COMPUTER-ASSISTED NAVIGATION Left 2/29/2024    Procedure: CRANIOTOMY, WITH NEOPLASM EXCISION USING COMPUTER-ASSISTED NAVIGATION;  Surgeon: Felix Szymanski DO;  Location: [facility] OR [address] [place] [address];  Service: Neurosurgery;  Laterality: Left;  (Left temporal crani for rescetion of tumor)  [place]  BRAIN LAB  Navigation - CT with contrast    TUBAL LIGATION  2002       Review of patient's allergies indicates:  No Known Allergies    Medications:  Continuous Infusions:  Scheduled Meds:   albuterol-ipratropium  3 mL Nebulization Q6H WAKE    apixaban  5 mg Oral BID    dexAMETHasone  2 mg Oral Q12H    famotidine  20 mg Oral BID    folic acid  1 mg Oral Daily    levETIRAcetam  500 mg Oral BID    morphine  30 mg Oral Q12H    polyethylene glycol  17 g Oral BID     PRN Meds:aluminum-magnesium hydroxide-simethicone, HYDROmorphone, magnesium oxide, magnesium oxide, ondansetron, oxyCODONE, potassium bicarbonate, potassium bicarbonate, potassium bicarbonate, potassium, sodium phosphates, potassium, " sodium phosphates, potassium, sodium phosphates    Family History       Problem Relation (Age of Onset)    Hypertension Mother          Tobacco Use    Smoking status: Every Day     Current packs/day: 0.50     Types: Cigarettes    Smokeless tobacco: Current    Tobacco comments:     3/4 PPD   Substance and Sexual Activity    Alcohol use: Not Currently     Comment: OCCASIONALLY    Drug use: Never    Sexual activity: Not Currently       Review of Systems  Objective:     Vital Signs (Most Recent):  Temp: 98.6 °F (37 °C) (03/18/24 1117)  Pulse: 95 (03/18/24 1326)  Resp: 18 (03/18/24 1446)  BP: 119/71 (03/18/24 1117)  SpO2: 95 % (03/18/24 1326) Vital Signs (24h Range):  Temp:  [98 °F (36.7 °C)-98.6 °F (37 °C)] 98.6 °F (37 °C)  Pulse:  [] 95  Resp:  [18-20] 18  SpO2:  [92 %-99 %] 95 %  BP: ()/(53-71) 119/71     Weight: 56.5 kg (124 lb 9 oz)  Body mass index is 22.06 kg/m².       Physical Exam  Vitals reviewed.   Constitutional:       General: She is in acute distress.      Appearance: She is ill-appearing.   HENT:      Head: Normocephalic and atraumatic.      Right Ear: External ear normal.      Left Ear: External ear normal.      Nose: Nose normal. No congestion.      Mouth/Throat:      Mouth: Mucous membranes are dry.   Eyes:      General:         Right eye: No discharge.         Left eye: No discharge.      Extraocular Movements: Extraocular movements intact.   Cardiovascular:      Rate and Rhythm: Tachycardia present.   Pulmonary:      Effort: Pulmonary effort is normal. No respiratory distress.   Abdominal:      General: There is no distension.      Palpations: Abdomen is soft.   Neurological:      General: No focal deficit present.      Mental Status: She is alert and oriented to person, place, and time.   Psychiatric:         Mood and Affect: Mood normal.         Behavior: Behavior normal.         Thought Content: Thought content normal.         Judgment: Judgment normal.            Review of  Symptoms      Symptom Assessment (ESAS 0-10 Scale)  Pain:  10  Dyspnea:  1  Anxiety:  3  Nausea:  0  Depression:  0  Anorexia:  5  Fatigue:  7  Insomnia:  0  Restlessness:  0  Agitation:  0         Pain Assessment:    Location(s): back    Back       Location: lower and right        Quality: Stabbing, shooting and sharp        Quantity: 10/10 in intensity        Chronicity: Onset 4 week(s) ago, gradually worsening        Aggravating Factors: Activity        Alleviating Factors: Opiates and recumbency       Associated Symptoms: Anorexia    Performance Status:  20    Psychosocial/Cultural:   See Palliative Psychosocial Note: No  **Primary  to Follow**  Palliative Care  Consult: No        Advance Care Planning  Advance Directives:   Do Not Resuscitate Status: Yes      Decision Making:  Patient answered questions  Goals of Care: What is most important right now is to focus on comfort and QOL . Accordingly, we have decided that the best plan to meet the patient's goals includes enrolling in hospice care.         Significant Labs: BMP:   Recent Labs   Lab 03/18/24 0458   GLU 86      K 4.4      CO2 25   BUN 17   CREATININE 0.5   CALCIUM 8.9   MG 2.1     CBC:   Recent Labs   Lab 03/17/24  0314 03/18/24 0458   WBC 8.42 8.41   HGB 9.0* 9.2*   HCT 28.7* 29.5*   PLT 35* 74*     CBC:   Recent Labs   Lab 03/18/24 0458   WBC 8.41   HGB 9.2*   HCT 29.5*   *   PLT 74*     BMP:  Recent Labs   Lab 03/18/24 0458   GLU 86      K 4.4      CO2 25   BUN 17   CREATININE 0.5   CALCIUM 8.9   MG 2.1     LFT:  Lab Results   Component Value Date    AST 99 (H) 03/18/2024    GGT 1,385 (H) 03/05/2024    ALKPHOS 686 (H) 03/18/2024    BILITOT 0.9 03/18/2024     Albumin:   Albumin   Date Value Ref Range Status   03/18/2024 2.8 (L) 3.5 - 5.2 g/dL Final     Protein:   Total Protein   Date Value Ref Range Status   03/18/2024 5.3 (L) 6.0 - 8.4 g/dL Final     Lactic acid:   No results found for:  ""LACTATE"    Significant Imaging: I have reviewed all pertinent imaging results/findings within the past 24 hours.      I spent a total of 80 minutes on the day of the visit. This includes face to face time in discussion of goals of care, symptom assessment, coordination of care and emotional support.  This also includes non-face to face time preparing to see the patient (eg, review of tests/imaging), obtaining and/or reviewing separately obtained history, documenting clinical information in the electronic or other health record, independently interpreting results and communicating results to the patient/family/caregiver, or care coordinator.    Carlin Turcios MD  Palliative Medicine  [facility]              "

## 2024-03-18 NOTE — PLAN OF CARE
[facility]  Initial Discharge Assessment       Primary Care Provider: Chika Powell MD    Admission Diagnosis: Small cell carcinoma [C80.1]  Brain mass [G93.89]    Admission Date: 3/17/2024  Expected Discharge Date:     Pt transferred out of [facility]. Plan to discharge to nursing home with hospice as Pt has no one at home that is able to assist in care. Pt is primary caregiver for special needs son; daughter is also being treated for cancer at this time. Pt's son has limited ability to assist Pt with ADLs. Referrals sent to NH in [facility] for review. PC team to meet with Pt and update family at this time per Pt request. SW to continue to follow.    Transition of Care Barriers: None    Payor: [place] MEDICAID / Plan: PENDING [place] MEDICAID / Product Type: Government /     Extended Emergency Contact Information  Primary Emergency Contact: dominick turner  Mobile Phone: [phone]  Relation: Daughter   needed? No    Discharge Plan A: Hospice/home  Discharge Plan B: Hospice/home      [id number] - [place], [place] - [address] AT [facility]  [address]  [place] [address]  Phone: [phone] Fax: [phone]      Initial Assessment (most recent)       Adult Discharge Assessment - 03/18/24 1017          Discharge Assessment    Assessment Type Discharge Planning Assessment     Confirmed Demographics Correct on Facesheet     Source of Information health record     Reason For Admission Small cell carcinoma     People in Home child(vin), adult   Son 18 is autistic and ahs limited involvement in care    Facility Arrived From: Transfer back from [facility] from home     Do you expect to return to your current living situation? Other (see comments)   NH placement with  hospice    Prior to hospitilization cognitive status: Unable to Assess     Current cognitive status: Unable to Assess     Walking or Climbing Stairs ambulation difficulty, assistance 1  person     Dressing/Bathing bathing difficulty, assistance 1 person     Readmission within 30 days? Yes     Patient currently being followed by outpatient case management? No     Do you currently have service(s) that help you manage your care at home? No     Do you take prescription medications? Yes     Do you have prescription coverage? No   Pt is pending medicaid    Do you have any problems affording any of your prescribed medications? Yes     Are you on dialysis? No     Do you take coumadin? No     Discharge Plan A Hospice/home     Discharge Plan B Hospice/home     DME Needed Upon Discharge  none     Transition of Care Barriers None

## 2024-03-18 NOTE — PROGRESS NOTES
[facility] Medicine  Progress Note    Patient Name: Crow Hines  MRN: [id number]  Patient Class: IP- Inpatient   Admission Date: 3/17/2024  Length of Stay: 1 days  Attending Physician: Jason Simmons MD  Primary Care Provider: Chika Powell MD        Subjective:     Principal Problem:Small cell carcinoma        HPI:  Patient originally presented to [facility] ER on 2/24/24 with complaints of several months of diffuse chest, abdominal, and back pain.   CT C/A/P showed diffuse metastatic disease with lung, liver, spleen, and kidney lesions.    Also  L2 compression fracture for which Neurosurgery at [facility] was originally consulted   CT head was ordered, left temporal mass with significant edema was identified.   Steroids were initiated and patient was transferred on 2/27/24 to [facility] for Neurosurgery.    Patient underwent L temporal craniotomy tumor resection on 2/29/24.   Pathology  returned metastatic small cell lung CA.   Also developed PE, initially on heparin drip, now on eliquis.   Started on keppra for seizure prophylaxis.   Per radiation oncology, patient not a good candidate right now.   Per medical oncology, inpatient chemotherapy not emergent, but would need urgent f/u outpatient for palliative chemotherapy.   Palliative care was consulted. Patient agreed to DNR.   Situation complicated by patient not having insurance.   Patient now transferred back to [facility], plan for outpatient palliative chemo vs home with hospice vs nursing home with hospice.     Overview/Hospital Course:  No notes on file    Interval History: Patient seen and examined. NAD. Complains of generalized pain- back, abdomen, chest. Rates pain 10/10. Reports PRN pain meds do not help and her pain is getting worse. Reports that she does not want to eat because of the amount of pain she is in. Palliative care nurse at bedside to have discussion with patient, palliative team to adjust pain meds.   1300: patient  reassessed with Dr. Simmons. Patient reports pain is improving. Has made decision to pursue hospice.     Review of Systems   Constitutional:  Positive for activity change and appetite change.   Respiratory:  Negative for shortness of breath.    Cardiovascular:  Positive for chest pain.   Gastrointestinal:  Positive for abdominal pain. Negative for nausea and vomiting.   Musculoskeletal:  Positive for back pain.     Objective:     Vital Signs (Most Recent):  Temp: 98.5 °F (36.9 °C) (03/18/24 0755)  Pulse: 109 (03/18/24 0755)  Resp: 18 (03/18/24 0917)  BP: 109/66 (03/18/24 0755)  SpO2: (!) 92 % (refused oxygen) (03/18/24 0755) Vital Signs (24h Range):  Temp:  [98 °F (36.7 °C)-98.8 °F (37.1 °C)] 98.5 °F (36.9 °C)  Pulse:  [] 109  Resp:  [14-20] 18  SpO2:  [92 %-99 %] 92 %  BP: ()/(53-69) 109/66     Weight: 56.5 kg (124 lb 9 oz)  Body mass index is 22.06 kg/m².    Intake/Output Summary (Last 24 hours) at 3/18/2024 1037  Last data filed at 3/18/2024 0951  Gross per 24 hour   Intake 360 ml   Output --   Net 360 ml         Physical Exam  Constitutional:       General: She is not in acute distress.     Appearance: She is ill-appearing.   HENT:      Head: Normocephalic.      Mouth/Throat:      Mouth: Mucous membranes are dry.   Cardiovascular:      Rate and Rhythm: Tachycardia present.      Heart sounds: Normal heart sounds.   Abdominal:      General: There is no distension.      Palpations: Abdomen is soft.      Tenderness: There is abdominal tenderness (generalized).   Skin:     General: Skin is warm.   Neurological:      General: No focal deficit present.      Mental Status: She is alert.   Psychiatric:      Comments: Flat affect; withdrawn             Significant Labs: All pertinent labs within the past 24 hours have been reviewed.  Recent Lab Results         03/18/24  0458        Albumin 2.8              ALT 70       Anion Gap 11       AST 99       Baso # 0.01       Basophil % 0.1       BILIRUBIN  TOTAL 0.9  Comment: For infants and newborns, interpretation of results should be based  on gestational age, weight and in agreement with clinical  observations.    Premature Infant recommended reference ranges:  Up to 24 hours.............<8.0 mg/dL  Up to 48 hours............<12.0 mg/dL  3-5 days..................<15.0 mg/dL  6-29 days.................<15.0 mg/dL         BUN 17       Calcium 8.9       Chloride 105       CO2 25       Creatinine 0.5       Differential Method Automated       eGFR >60.0       Eos # 0.0       Eos % 0.0       Glucose 86       Gran # (ANC) 7.4       Gran % 87.9       Hematocrit 29.5       Hemoglobin 9.2       Immature Grans (Abs) 0.17  Comment: Mild elevation in immature granulocytes is non specific and   can be seen in a variety of conditions including stress response,   acute inflammation, trauma and pregnancy. Correlation with other   laboratory and clinical findings is essential.         Immature Granulocytes 2.0       Lymph # 0.3       Lymph % 3.8       Magnesium  2.1       MCH 32.1       MCHC 31.2              Mono # 0.5       Mono % 6.2       MPV 11.4       nRBC 0       Platelet Count 74  Comment: Reviewed by Technologist.       Potassium 4.4       PROTEIN TOTAL 5.3       RBC 2.87       RDW 15.5       Sodium 141       WBC 8.41               Significant Imaging: I have reviewed all pertinent imaging results/findings within the past 24 hours.    Assessment/Plan:      * Small cell carcinoma  Metastatic lung small cell carcinoma   Poor prognosis  Medically stable  [place] Medicaid insurance pending  / consulted for placement   Palliative care team consulted      Thrombocytopenia  Metastatic disease  Platelets improving  No signs of bleeding noted  Recent Labs   Lab 03/18/24  0458   PLT 74*         Cancer related pain  Palliative care team consulted to adjust pain medication regimen      Acute pulmonary embolism without acute cor pulmonale  Maintain eliquis         VTE  Risk Mitigation (From admission, onward)           Ordered     apixaban tablet 5 mg  2 times daily         03/17/24 1756     IP VTE HIGH RISK PATIENT  Once         03/17/24 1756     Place sequential compression device  Until discontinued         03/17/24 1756                    Discharge Planning   JUAN:      Code Status: DNR   Is the patient medically ready for discharge?:     Reason for patient still in hospital (select all that apply): Patient trending condition and Pending disposition  Discharge Plan A: Hospice/home                  LISSETT Woodall  Department of Hospital Medicine   [facility]

## 2024-03-18 NOTE — SUBJECTIVE & OBJECTIVE
Interval History: Patient seen and examined. NAD. Complains of generalized pain- back, abdomen, chest. Rates pain 10/10. Reports PRN pain meds do not help and her pain is getting worse. Reports that she does not want to eat because of the amount of pain she is in. Palliative care nurse at bedside to have discussion with patient, palliative team to adjust pain meds.   1300: patient reassessed with Dr. Simmons. Patient reports pain is improving. Has made decision to pursue hospice.     Review of Systems   Constitutional:  Positive for activity change and appetite change.   Respiratory:  Negative for shortness of breath.    Cardiovascular:  Positive for chest pain.   Gastrointestinal:  Positive for abdominal pain. Negative for nausea and vomiting.   Musculoskeletal:  Positive for back pain.     Objective:     Vital Signs (Most Recent):  Temp: 98.5 °F (36.9 °C) (03/18/24 0755)  Pulse: 109 (03/18/24 0755)  Resp: 18 (03/18/24 0917)  BP: 109/66 (03/18/24 0755)  SpO2: (!) 92 % (refused oxygen) (03/18/24 0755) Vital Signs (24h Range):  Temp:  [98 °F (36.7 °C)-98.8 °F (37.1 °C)] 98.5 °F (36.9 °C)  Pulse:  [] 109  Resp:  [14-20] 18  SpO2:  [92 %-99 %] 92 %  BP: ()/(53-69) 109/66     Weight: 56.5 kg (124 lb 9 oz)  Body mass index is 22.06 kg/m².    Intake/Output Summary (Last 24 hours) at 3/18/2024 1037  Last data filed at 3/18/2024 0951  Gross per 24 hour   Intake 360 ml   Output --   Net 360 ml         Physical Exam  Constitutional:       General: She is not in acute distress.     Appearance: She is ill-appearing.   HENT:      Head: Normocephalic.      Mouth/Throat:      Mouth: Mucous membranes are dry.   Cardiovascular:      Rate and Rhythm: Tachycardia present.      Heart sounds: Normal heart sounds.   Abdominal:      General: There is no distension.      Palpations: Abdomen is soft.      Tenderness: There is abdominal tenderness (generalized).   Skin:     General: Skin is warm.   Neurological:      General: No  focal deficit present.      Mental Status: She is alert.   Psychiatric:      Comments: Flat affect; withdrawn             Significant Labs: All pertinent labs within the past 24 hours have been reviewed.  Recent Lab Results         03/18/24  0458        Albumin 2.8              ALT 70       Anion Gap 11       AST 99       Baso # 0.01       Basophil % 0.1       BILIRUBIN TOTAL 0.9  Comment: For infants and newborns, interpretation of results should be based  on gestational age, weight and in agreement with clinical  observations.    Premature Infant recommended reference ranges:  Up to 24 hours.............<8.0 mg/dL  Up to 48 hours............<12.0 mg/dL  3-5 days..................<15.0 mg/dL  6-29 days.................<15.0 mg/dL         BUN 17       Calcium 8.9       Chloride 105       CO2 25       Creatinine 0.5       Differential Method Automated       eGFR >60.0       Eos # 0.0       Eos % 0.0       Glucose 86       Gran # (ANC) 7.4       Gran % 87.9       Hematocrit 29.5       Hemoglobin 9.2       Immature Grans (Abs) 0.17  Comment: Mild elevation in immature granulocytes is non specific and   can be seen in a variety of conditions including stress response,   acute inflammation, trauma and pregnancy. Correlation with other   laboratory and clinical findings is essential.         Immature Granulocytes 2.0       Lymph # 0.3       Lymph % 3.8       Magnesium  2.1       MCH 32.1       MCHC 31.2              Mono # 0.5       Mono % 6.2       MPV 11.4       nRBC 0       Platelet Count 74  Comment: Reviewed by Technologist.       Potassium 4.4       PROTEIN TOTAL 5.3       RBC 2.87       RDW 15.5       Sodium 141       WBC 8.41               Significant Imaging: I have reviewed all pertinent imaging results/findings within the past 24 hours.

## 2024-03-18 NOTE — PLAN OF CARE
Problem: Adult Inpatient Plan of Care  Goal: Plan of Care Review  Outcome: Ongoing, Progressing  Goal: Patient-Specific Goal (Individualized)  Outcome: Ongoing, Progressing  Goal: Absence of Hospital-Acquired Illness or Injury  Outcome: Ongoing, Progressing  Goal: Optimal Comfort and Wellbeing  Outcome: Ongoing, Progressing  Goal: Readiness for Transition of Care  Outcome: Ongoing, Progressing     Problem: Infection  Goal: Absence of Infection Signs and Symptoms  Outcome: Ongoing, Progressing     Problem: Impaired Wound Healing  Goal: Optimal Wound Healing  Outcome: Ongoing, Progressing     Problem: Skin Injury Risk Increased  Goal: Skin Health and Integrity  Outcome: Ongoing, Progressing     Problem: Fall Injury Risk  Goal: Absence of Fall and Fall-Related Injury  Outcome: Ongoing, Progressing     Problem: Coping Ineffective  Goal: Effective Coping  Outcome: Ongoing, Progressing

## 2024-03-18 NOTE — ASSESSMENT & PLAN NOTE
Pain is somatic in nature; some neuropathic components as well.    Pain is poorly controlled on current regimen.    - for simplicity, I will change MS Contin to OxyContin 30mg scheduled BID  - we will provide p.r.n. oxycodone 10 mg for moderate pain and 15 mg for severe pain prn  - we will keep prn hydromorphone for severe breakthrough pain not responsive to p.o. oxycodone; we increased hydromorphone dosage to 1mg prn  -we will schedule a 1000 mg Tylenol t.i.d.    We will add senna p.r.n. for opioid induced constipation prophylaxis    Discussed the risks and benefits of the above regimen with the patient.  Specifically address risk of sedation, respiratory depression, and altered mentation.  She wishes to proceed.  Her desires her comfort above all else.

## 2024-03-18 NOTE — CARE UPDATE
03/18/24 0729   Patient Assessment/Suction   Level of Consciousness (AVPU) alert   Respiratory Effort Unlabored;Normal   Expansion/Accessory Muscles/Retractions no use of accessory muscles;no retractions;expansion symmetric   All Lung Fields Breath Sounds diminished   Rhythm/Pattern, Respiratory unlabored;pattern regular;depth regular;chest wiggle adequate;no shortness of breath reported   Cough Frequency no cough   PRE-TX-O2   Device (Oxygen Therapy) nasal cannula   $ Is the patient on Low Flow Oxygen? Yes   Flow (L/min) 2   SpO2 98 %   Pulse Oximetry Type Intermittent   $ Pulse Oximetry - Multiple Charge Pulse Oximetry - Multiple   Pulse 95   Resp 19   Aerosol Therapy   $ Aerosol Therapy Charges Aerosol Treatment   Daily Review of Necessity (SVN) completed   Respiratory Treatment Status (SVN) given   Treatment Route (SVN) oxygen;mask   Patient Position (SVN) HOB elevated   Post Treatment Assessment (SVN) increased aeration   Signs of Intolerance (SVN) none   Breath Sounds Post-Respiratory Treatment   Throughout All Fields Post-Treatment All Fields   Throughout All Fields Post-Treatment aeration increased   Post-treatment Heart Rate (beats/min) 98   Post-treatment Resp Rate (breaths/min) 19   Education   $ Education Bronchodilator;15 min

## 2024-03-18 NOTE — PT/OT/SLP PROGRESS
Physical Therapy      Patient Name:  Crow Hines   MRN:  [id number]    Patient not seen today secondary to  (Pt refused PT eval stattinmg she had decided on DC with Hospice Care).

## 2024-03-18 NOTE — NURSING
Nurses Note -- 4 Eyes      3/18/2024   7:17 AM      Skin assessed during: Q Shift Change      [] No Altered Skin Integrity Present    []Prevention Measures Documented      [x] Yes- Altered Skin Integrity Present or Discovered   [] LDA Added if Not in Epic (Describe Wound)   [] New Altered Skin Integrity was Present on Admit and Documented in LDA   [] Wound Image Taken    Wound Care Consulted? Yes wound care already consulted    Attending Nurse:  Pearl VELARDE    Second RN/Staff Member:   Tahmina

## 2024-03-18 NOTE — PROGRESS NOTES
Pt's friend Ms. Velarde returned my c all and updated on discharge plan for hospice at Nursing Home in [place], [place] to be closer to her son. Ms. Velarde verbalized understanding and appreciative of update. Prachi will update pt's Daughter Thais via phone.

## 2024-03-19 LAB
ALBUMIN SERPL BCP-MCNC: 2.9 G/DL (ref 3.5–5.2)
ALP SERPL-CCNC: 784 U/L (ref 55–135)
ALT SERPL W/O P-5'-P-CCNC: 78 U/L (ref 10–44)
ANION GAP SERPL CALC-SCNC: 13 MMOL/L (ref 8–16)
AST SERPL-CCNC: 104 U/L (ref 10–40)
BASOPHILS # BLD AUTO: 0.01 K/UL (ref 0–0.2)
BASOPHILS NFR BLD: 0.1 % (ref 0–1.9)
BILIRUB SERPL-MCNC: 1.6 MG/DL (ref 0.1–1)
BUN SERPL-MCNC: 21 MG/DL (ref 8–23)
CALCIUM SERPL-MCNC: 9.2 MG/DL (ref 8.7–10.5)
CHLORIDE SERPL-SCNC: 101 MMOL/L (ref 95–110)
CO2 SERPL-SCNC: 25 MMOL/L (ref 23–29)
CREAT SERPL-MCNC: 0.6 MG/DL (ref 0.5–1.4)
DIFFERENTIAL METHOD BLD: ABNORMAL
EOSINOPHIL # BLD AUTO: 0 K/UL (ref 0–0.5)
EOSINOPHIL NFR BLD: 0 % (ref 0–8)
ERYTHROCYTE [DISTWIDTH] IN BLOOD BY AUTOMATED COUNT: 15.6 % (ref 11.5–14.5)
EST. GFR  (NO RACE VARIABLE): >60 ML/MIN/1.73 M^2
GLUCOSE SERPL-MCNC: 111 MG/DL (ref 70–110)
HCT VFR BLD AUTO: 29.2 % (ref 37–48.5)
HGB BLD-MCNC: 9.1 G/DL (ref 12–16)
IMM GRANULOCYTES # BLD AUTO: 0.26 K/UL (ref 0–0.04)
IMM GRANULOCYTES NFR BLD AUTO: 2.5 % (ref 0–0.5)
LYMPHOCYTES # BLD AUTO: 0.3 K/UL (ref 1–4.8)
LYMPHOCYTES NFR BLD: 2.6 % (ref 18–48)
MAGNESIUM SERPL-MCNC: 2 MG/DL (ref 1.6–2.6)
MCH RBC QN AUTO: 31.8 PG (ref 27–31)
MCHC RBC AUTO-ENTMCNC: 31.2 G/DL (ref 32–36)
MCV RBC AUTO: 102 FL (ref 82–98)
MONOCYTES # BLD AUTO: 0.6 K/UL (ref 0.3–1)
MONOCYTES NFR BLD: 6.2 % (ref 4–15)
NEUTROPHILS # BLD AUTO: 9.2 K/UL (ref 1.8–7.7)
NEUTROPHILS NFR BLD: 88.6 % (ref 38–73)
NRBC BLD-RTO: 0 /100 WBC
PLATELET # BLD AUTO: 161 K/UL (ref 150–450)
PMV BLD AUTO: 10.5 FL (ref 9.2–12.9)
POTASSIUM SERPL-SCNC: 3.8 MMOL/L (ref 3.5–5.1)
PROT SERPL-MCNC: 5.6 G/DL (ref 6–8.4)
RBC # BLD AUTO: 2.86 M/UL (ref 4–5.4)
SODIUM SERPL-SCNC: 139 MMOL/L (ref 136–145)
WBC # BLD AUTO: 10.34 K/UL (ref 3.9–12.7)

## 2024-03-19 PROCEDURE — 94640 AIRWAY INHALATION TREATMENT: CPT

## 2024-03-19 PROCEDURE — 80053 COMPREHEN METABOLIC PANEL: CPT | Performed by: INTERNAL MEDICINE

## 2024-03-19 PROCEDURE — 63600175 PHARM REV CODE 636 W HCPCS: Mod: UD | Performed by: INTERNAL MEDICINE

## 2024-03-19 PROCEDURE — 83735 ASSAY OF MAGNESIUM: CPT | Performed by: INTERNAL MEDICINE

## 2024-03-19 PROCEDURE — 25000003 PHARM REV CODE 250: Performed by: INTERNAL MEDICINE

## 2024-03-19 PROCEDURE — 99232 SBSQ HOSP IP/OBS MODERATE 35: CPT | Mod: ,,, | Performed by: INTERNAL MEDICINE

## 2024-03-19 PROCEDURE — 36415 COLL VENOUS BLD VENIPUNCTURE: CPT | Performed by: INTERNAL MEDICINE

## 2024-03-19 PROCEDURE — 94761 N-INVAS EAR/PLS OXIMETRY MLT: CPT

## 2024-03-19 PROCEDURE — 25000242 PHARM REV CODE 250 ALT 637 W/ HCPCS: Performed by: INTERNAL MEDICINE

## 2024-03-19 PROCEDURE — 94760 N-INVAS EAR/PLS OXIMETRY 1: CPT

## 2024-03-19 PROCEDURE — 99900031 HC PATIENT EDUCATION (STAT)

## 2024-03-19 PROCEDURE — 27000221 HC OXYGEN, UP TO 24 HOURS

## 2024-03-19 PROCEDURE — 85025 COMPLETE CBC W/AUTO DIFF WBC: CPT | Performed by: INTERNAL MEDICINE

## 2024-03-19 PROCEDURE — 12000002 HC ACUTE/MED SURGE SEMI-PRIVATE ROOM

## 2024-03-19 RX ADMIN — IPRATROPIUM BROMIDE AND ALBUTEROL SULFATE 3 ML: 2.5; .5 SOLUTION RESPIRATORY (INHALATION) at 08:03

## 2024-03-19 RX ADMIN — OXYCODONE HYDROCHLORIDE 30 MG: 10 TABLET, FILM COATED, EXTENDED RELEASE ORAL at 09:03

## 2024-03-19 RX ADMIN — IPRATROPIUM BROMIDE AND ALBUTEROL SULFATE 3 ML: 2.5; .5 SOLUTION RESPIRATORY (INHALATION) at 07:03

## 2024-03-19 RX ADMIN — HYDROMORPHONE HYDROCHLORIDE 1 MG: 1 INJECTION, SOLUTION INTRAMUSCULAR; INTRAVENOUS; SUBCUTANEOUS at 05:03

## 2024-03-19 RX ADMIN — LEVETIRACETAM 500 MG: 500 TABLET, FILM COATED ORAL at 09:03

## 2024-03-19 RX ADMIN — HYDROMORPHONE HYDROCHLORIDE 1 MG: 1 INJECTION, SOLUTION INTRAMUSCULAR; INTRAVENOUS; SUBCUTANEOUS at 11:03

## 2024-03-19 RX ADMIN — DEXAMETHASONE 2 MG: 2 TABLET ORAL at 09:03

## 2024-03-19 RX ADMIN — FAMOTIDINE 20 MG: 20 TABLET ORAL at 09:03

## 2024-03-19 RX ADMIN — IPRATROPIUM BROMIDE AND ALBUTEROL SULFATE 3 ML: 2.5; .5 SOLUTION RESPIRATORY (INHALATION) at 02:03

## 2024-03-19 RX ADMIN — HYDROMORPHONE HYDROCHLORIDE 1 MG: 1 INJECTION, SOLUTION INTRAMUSCULAR; INTRAVENOUS; SUBCUTANEOUS at 09:03

## 2024-03-19 RX ADMIN — OXYCODONE HYDROCHLORIDE 15 MG: 10 TABLET ORAL at 07:03

## 2024-03-19 RX ADMIN — FOLIC ACID 1 MG: 1 TABLET ORAL at 09:03

## 2024-03-19 RX ADMIN — APIXABAN 5 MG: 5 TABLET, FILM COATED ORAL at 09:03

## 2024-03-19 NOTE — CONSULTS
Bilateral buttock stage 1, red non blanching no tissue loss, Triad applied covered with mepilex    Staples to left side of head. Patient would not let me touch it or clean or clean ear.  Patient refused to elevate heels, says make her butt hurt. No redness, waffle mattress in use.  Turned to the left side.

## 2024-03-19 NOTE — ASSESSMENT & PLAN NOTE
Metastatic lung small cell carcinoma   Poor prognosis  Medically stable  [place] Medicaid insurance pending  SW/CM consulted for placement   Palliative care team consulted, plan for nursing home with hospice

## 2024-03-19 NOTE — SUBJECTIVE & OBJECTIVE
Interval History: Patient seen and examined. NAD. Complains of generalized pain. Chronically ill appearing. Rates pain 7/10. Pain medications adjusted yesterday by palliative care team.     Review of Systems   Constitutional:  Positive for activity change and appetite change.   Respiratory:  Negative for shortness of breath.    Cardiovascular:  Negative for chest pain.   Gastrointestinal:  Positive for abdominal pain. Negative for nausea and vomiting.   Musculoskeletal:  Positive for back pain.     Objective:     Vital Signs (Most Recent):  Temp: 97.5 °F (36.4 °C) (03/19/24 0701)  Pulse: 109 (03/19/24 0756)  Resp: 18 (03/19/24 0907)  BP: 136/77 (03/19/24 0701)  SpO2: 99 % (03/19/24 0756) Vital Signs (24h Range):  Temp:  [97.5 °F (36.4 °C)-98.9 °F (37.2 °C)] 97.5 °F (36.4 °C)  Pulse:  [] 109  Resp:  [16-19] 18  SpO2:  [91 %-99 %] 99 %  BP: ()/(62-88) 136/77     Weight: 56.5 kg (124 lb 9 oz)  Body mass index is 22.06 kg/m².    Intake/Output Summary (Last 24 hours) at 3/19/2024 0953  Last data filed at 3/19/2024 0946  Gross per 24 hour   Intake 250 ml   Output 400 ml   Net -150 ml           Physical Exam  Constitutional:       General: She is not in acute distress.     Appearance: She is ill-appearing.   HENT:      Head: Normocephalic.      Mouth/Throat:      Mouth: Mucous membranes are dry.   Cardiovascular:      Rate and Rhythm: Tachycardia present.      Heart sounds: Normal heart sounds.   Abdominal:      General: There is no distension.      Palpations: Abdomen is soft.      Tenderness: There is abdominal tenderness (generalized).   Skin:     General: Skin is warm.   Neurological:      General: No focal deficit present.      Mental Status: She is alert.   Psychiatric:      Comments: Flat affect; withdrawn             Significant Labs: All pertinent labs within the past 24 hours have been reviewed.  Recent Lab Results         03/19/24  0741   03/18/24  1045        Albumin 2.9                  ALT  78         Anion Gap 13                  Baso # 0.01         Basophil % 0.1         BILIRUBIN TOTAL 1.6  Comment: For infants and newborns, interpretation of results should be based  on gestational age, weight and in agreement with clinical  observations.    Premature Infant recommended reference ranges:  Up to 24 hours.............<8.0 mg/dL  Up to 48 hours............<12.0 mg/dL  3-5 days..................<15.0 mg/dL  6-29 days.................<15.0 mg/dL           BUN 21         Calcium 9.2         Chloride 101         CO2 25         Creatinine 0.6         Differential Method Automated         eGFR >60.0         Eos # 0.0         Eos % 0.0         Glucose 111         Gran # (ANC) 9.2         Gran % 88.6  Comment: a myelocyte seen on scan         Hematocrit 29.2         Hemoglobin 9.1         Immature Grans (Abs) 0.26  Comment: Mild elevation in immature granulocytes is non specific and   can be seen in a variety of conditions including stress response,   acute inflammation, trauma and pregnancy. Correlation with other   laboratory and clinical findings is essential.           Immature Granulocytes 2.5         Lymph # 0.3         Lymph % 2.6         Magnesium  2.0         MCH 31.8         MCHC 31.2                  Mono # 0.6         Mono % 6.2         MPV 10.5         nRBC 0         Platelet Count 161  Comment: Reviewed by Technologist.         Potassium 3.8         PROTEIN TOTAL 5.6         RBC 2.86         RDW 15.6         Sodium 139         Troponin I High Sensitivity   16.4  Comment: Troponin results differ between methods. Do not use   results between Troponin methods interchangeably.    Alkaline Phospatase levels above 400 U/L may   cause false positive results.    Access hsTnI should not be used for patients taking   Asfotase donavan (Strensiq).         WBC 10.34                 Significant Imaging: I have reviewed all pertinent imaging results/findings within the past 24 hours.

## 2024-03-19 NOTE — PROGRESS NOTES
Pt reports her pain is better controlled today. PC team will continue to follow. Case management seeking placement.

## 2024-03-19 NOTE — ASSESSMENT & PLAN NOTE
Metastatic disease  Platelets improving  No signs of bleeding noted  Recent Labs   Lab 03/19/24  0741

## 2024-03-19 NOTE — PLAN OF CARE
sent out 34 referrals.    3/18- 1430- SW received called from Adelaide ([facility]). Per Adelaide, facility can't accept Pt due to being pending medicaid.    3/18- 1439- SW called [facility]. Facility unable to accept Pt due to Pending medicaid and facility at [facility] with hospice patients.    3/18 1500- SW called [facility] Phone: [phone] . No answer; left voicemail for callback.    3/19 0909-  called [facility]. SW informed admissions Pt has started disability alex and medicaid is pending. Cotton to review and call SW back,      0919-SW [facility]. No long term care beds available for hospice.    0921- SW spoke with admissions coordinator at [facility] to accept Pt.    0927-  called [facility] No answer left voicemail for callback [phone].    0930-SW called [facility] 2[facility] [phone]; no answer    0937-  spoke with Delma at [facility] [phone].. Delma to review but thinks they will be unable to accept.    0940- SW spoke with Gina Craft and faxed clinicals to [phone]. At pending medicaid max.    1020- SW spoke with charge nurse at [facility]. RN requested clinicals be faxed to [phone]. SW faxed clinicals; awaiting response. Left name and number for callback.    1245- SW spoke with Pinky ([facility]). Per Pinky, they cannot accept Pt due to Pending medicaid and not having discharge plan back up. Per Pinky, facility works on controlling symptoms and discharge back to community.    1500- SW called Robb ([facility]). Robb informed SW placement would be difficult with pending medicaid. SW sent referral via fax.       03/19/24 0904   Post-Acute Status   Post-Acute Authorization Placement   Post-Acute Placement Status Referrals Sent   Discharge Plan   Discharge Plan A Hospice/home   Discharge  Plan B Hospice/home

## 2024-03-19 NOTE — PLAN OF CARE
Problem: Adult Inpatient Plan of Care  Goal: Absence of Hospital-Acquired Illness or Injury  Outcome: Ongoing, Progressing     Problem: Fall Injury Risk  Goal: Absence of Fall and Fall-Related Injury  Outcome: Ongoing, Progressing     Problem: Adult Inpatient Plan of Care  Goal: Optimal Comfort and Wellbeing  Outcome: Adequate for Care Transition     Problem: Infection  Goal: Absence of Infection Signs and Symptoms  Outcome: Adequate for Care Transition     Problem: Impaired Wound Healing  Goal: Optimal Wound Healing  Outcome: Adequate for Care Transition

## 2024-03-19 NOTE — HOSPITAL COURSE
63 year old female with past medical history of rheumatoid arthritis and HTN presented as transfer from [facility].  Patient originally presented to [facility] ER on 2/24/24 with complaints of several months of diffuse chest, abdominal, and back pain. CT C/A/P showed diffuse metastatic disease with lung, liver, spleen, and kidney lesions.  Also revealing L2 compression fracture for which Neurosurgery at [facility] was originally consulted for. Patient had an episode of acute confusion and CT head was ordered, left temporal mass with significant edema was identified. Steroids were initiated and patient was transferred on 2/27/24 to [facility] for Neurosurgery.  Patient underwent L temporal craniotomy tumor resection on 2/29/24. Path returned metastatic small cell lung CA. Also developed PE, initially on heparin drip, now on eliquis. Started on keppra for seizure prophylaxis. Per radiation oncology, patient not a good candidate right now. Per medical oncology, inpatient chemotherapy not emergent, but would need urgent f/u outpatient for palliative chemotherapy. Palliative care was consulted at OSH. Patient agreed to DNR. Situation complicated by patient not having insurance. Patient then transferred back to [facility]. Palliative care team at [facility] saw patient yesterday, patient has opted to pursue nursing home with hospice.  following for discharge planning. Pain management & support. [facility] accepting inpatient. Will discharge when bed is ready.

## 2024-03-19 NOTE — CARE UPDATE
03/18/24 1953   Patient Assessment/Suction   Level of Consciousness (AVPU) alert   Respiratory Effort Normal;Unlabored   Expansion/Accessory Muscles/Retractions no use of accessory muscles;no retractions   HUANG Breath Sounds clear   LLL Breath Sounds clear   RUL Breath Sounds clear   RML Breath Sounds clear   RLL Breath Sounds clear   Rhythm/Pattern, Respiratory unlabored;pattern regular   Cough Frequency no cough   PRE-TX-O2   Device (Oxygen Therapy) room air  (she did not want to wear her nasal cannula)   SpO2 (!) 91 %   Pulse Oximetry Type Intermittent   $ Pulse Oximetry - Single Charge Pulse Oximetry - Single   Aerosol Therapy   $ Aerosol Therapy Charges Aerosol Treatment   Daily Review of Necessity (SVN) completed   Respiratory Treatment Status (SVN) given   Treatment Route (SVN) mask;oxygen   Patient Position (SVN) semi-Cruz's   Post Treatment Assessment (SVN) increased aeration   Signs of Intolerance (SVN) none   Education   $ Education Bronchodilator;15 min

## 2024-03-19 NOTE — PROGRESS NOTES
"[facility]  Adult Nutrition   Progress Note (Initial Assessment)     SUMMARY     Recommendations  Recommendation/Intervention:   1. Continue with Regular Diet as tolerated. 2. Adding Ensure + HP vanilla to assist in meeting EEN/EPN.    Goals: Pt to consumer >50% of meals and ONS by follow up  Nutrition Goal Status: new    Nutrition Diagnosis PES Statement: Inadequate energy intake related to decreased appetite due to pain and disease state as evidenced by recorded and observed PO intake < EEN/EPN.    Dietitian Rounds Brief  Pt is a 64 y/o female with small cell carcinoma, acute pulmanory embolism without acute cor pulmonale, cancer related pain, and moderate malnutrition seen today for initial assessment.     Ms. Hines reports decreased appetite over the past 6 weeks. She states that her UBW is 130#. Ms. Hines stated she had been eating around 50% of her meals, however, her lunch tray appeared untouched.     Adding Ensure + HP in vanilla to assist in meeting the pt's EEN/EPN.     Pt is pursuing hospice placement at this time.    Nutrition Related Social Determinants of Health: Unable to identify at this time               Diet order:   Current Diet Order: Regular Diet         Evaluation of Received Nutrient/Fluid Intake  Energy Calories Required: not meeting needs  Protein Required: not meeting needs  Fluid Required: not meeting needs  Tolerance: tolerating     % Intake of Estimated Energy Needs: 0 - 25 %  % Meal Intake: 0 - 25 %      Intake/Output Summary (Last 24 hours) at 3/19/2024 1005  Last data filed at 3/19/2024 0946  Gross per 24 hour   Intake 250 ml   Output 400 ml   Net -150 ml        Anthropometrics  Temp: 97.5 °F (36.4 °C)  Height Method: Stated  Height: 5' 3" (160 cm)  Height (inches): 63 in  Weight Method: Bed Scale  Weight: 56.5 kg (124 lb 9 oz)  Weight (lb): 124.56 lb  Ideal Body Weight (IBW), Female: 115 lb  % Ideal Body Weight, Female (lb): 108.31 %  BMI (Calculated): 22.1  Usual " Body Weight (UBW), k.09 kg  % Usual Body Weight: 95.82       Estimated/Assessed Needs  Weight Used For Calorie Calculations: 56.5 kg (124 lb 9 oz)  Energy Calorie Requirements (kcal): 1695kcal-1977kcal (30-35kcal/kg)  Energy Need Method: Kcal/kg  Protein Requirements: 84g-115g (1.5-2g/kg)  Weight Used For Protein Calculations: 56.5 kg (124 lb 9 oz)  Fluid Requirements (mL): 1695  Estimated Fluid Requirement Method: RDA Method  RDA Method (mL): 1695       Reason for Assessment  Reason For Assessment: identified at risk by screening criteria (MST 3)  Diagnosis: cancer diagnosis/related complications (small cell carcinoma)  Relevant Medical History: Small cell carcinoma, acute pulmonary embolism without acute cor pulmonale, moderate malnutrition (24)  Interdisciplinary Rounds: did not attend  General Information Comments: Pt pursuing hospice placement  Nutrition Discharge Planning: General    Nutrition/Diet History  Spiritual, Cultural Beliefs, Tenriism Practices, Values that Affect Care: no  Food Allergies: NKFA  Factors Affecting Nutritional Intake: decreased appetite    Nutrition Risk Screen  Nutrition Risk Screen: no indicators present     MST Score: 3  Have you recently lost weight without trying?: Yes: Unsure how much  Weight loss score: 2  Have you been eating poorly because of a decreased appetite?: Yes  Appetite score: 1       Weight History:  Wt Readings from Last 10 Encounters:   24 56.5 kg (124 lb 9 oz)   24 47.6 kg (105 lb)   24 47.6 kg (105 lb)   23 56 kg (123 lb 7.3 oz)   23 57 kg (125 lb 10.6 oz)   23 55.7 kg (122 lb 12.7 oz)   23 56.6 kg (124 lb 11.1 oz)   23 57.2 kg (126 lb 1.7 oz)   23 57.7 kg (127 lb 3.3 oz)   23 70.3 kg (155 lb)        Lab/Procedures/Meds: Pertinent Labs/Meds Reviewed    Medications:Pertinent Medications Reviewed  Scheduled Meds:   acetaminophen  1,000 mg Oral Q8H    albuterol-ipratropium  3 mL Nebulization Q6H  WAKE    apixaban  5 mg Oral BID    dexAMETHasone  2 mg Oral Q12H    famotidine  20 mg Oral BID    folic acid  1 mg Oral Daily    levETIRAcetam  500 mg Oral BID    oxyCODONE  30 mg Oral Q12H    polyethylene glycol  17 g Oral BID     Continuous Infusions:  PRN Meds:.aluminum-magnesium hydroxide-simethicone, HYDROmorphone, magnesium oxide, magnesium oxide, ondansetron, oxyCODONE, oxyCODONE, potassium bicarbonate, potassium bicarbonate, potassium bicarbonate, potassium, sodium phosphates, potassium, sodium phosphates, potassium, sodium phosphates, senna    Labs: Pertinent Labs Reviewed  Clinical Chemistry:  Recent Labs   Lab 03/15/24  0717 03/16/24  0716 03/17/24  0314 03/18/24  0458 03/19/24  0741   * 138 140 141 139   K 4.1 4.5 4.2 4.4 3.8    102 106 105 101   CO2 24 25 24 25 25   GLU 76 80 94 86 111*   BUN 15 14 13 17 21   CREATININE 0.6 0.7 0.6 0.5 0.6   CALCIUM 9.0 9.0 8.8 8.9 9.2   PROT 5.6* 5.5* 5.2* 5.3* 5.6*   ALBUMIN 2.2* 2.2* 2.1* 2.8* 2.9*   BILITOT 0.7 0.7 0.7 0.9 1.6*   ALKPHOS 525* 503* 705* 686* 784*   * 100* 112* 99* 104*   ALT 86* 78* 83* 70* 78*   ANIONGAP 10 11 10 11 13   MG 2.3 2.2 2.2 2.1 2.0   PHOS 3.1 3.1 2.4*  --   --      CBC:   Recent Labs   Lab 03/19/24  0741   WBC 10.34   RBC 2.86*   HGB 9.1*   HCT 29.2*      *   MCH 31.8*   MCHC 31.2*         Monitor and Evaluation  Food and Nutrient Intake: energy intake  Food and Nutrient Adminstration: diet order  Knowledge/Beliefs/Attitudes: food and nutrition knowledge/skill, beliefs and attitudes  Physical Activity and Function: nutrition-related ADLs and IADLs, factors affecting access to physical activity  Anthropometric Measurements: weight, weight change, body mass index  Biochemical Data, Medical Tests and Procedures: electrolyte and renal panel, gastrointestinal profile, glucose/endocrine profile, inflammatory profile, lipid profile  Nutrition-Focused Physical Findings: overall appearance     Nutrition  Risk  Level of Risk/Frequency of Follow-up:  (1x weekly)     Nutrition Follow-Up  RD Follow-up?: Yes      Kaushik Flowers, Provisional Licensed Dietitian 03/19/2024 10:05 AM

## 2024-03-19 NOTE — SUBJECTIVE & OBJECTIVE
Interval History: No major changes. Sleeping comfortably.    Past Medical History:   Diagnosis Date    Arthritis        Past Surgical History:   Procedure Laterality Date    CRANIOTOMY, WITH NEOPLASM EXCISION USING COMPUTER-ASSISTED NAVIGATION Left 2/29/2024    Procedure: CRANIOTOMY, WITH NEOPLASM EXCISION USING COMPUTER-ASSISTED NAVIGATION;  Surgeon: Felix Szymanski DO;  Location: [facility] OR [address] [place] [address];  Service: Neurosurgery;  Laterality: Left;  (Left temporal crani for rescetion of tumor)  [place]  BRAIN LAB  Navigation - CT with contrast    TUBAL LIGATION  2002       Review of patient's allergies indicates:  No Known Allergies    Medications:  Continuous Infusions:  Scheduled Meds:   acetaminophen  1,000 mg Oral Q8H    albuterol-ipratropium  3 mL Nebulization Q6H WAKE    apixaban  5 mg Oral BID    dexAMETHasone  2 mg Oral Q12H    famotidine  20 mg Oral BID    folic acid  1 mg Oral Daily    levETIRAcetam  500 mg Oral BID    oxyCODONE  30 mg Oral Q12H    polyethylene glycol  17 g Oral BID     PRN Meds:aluminum-magnesium hydroxide-simethicone, HYDROmorphone, magnesium oxide, magnesium oxide, ondansetron, oxyCODONE, oxyCODONE, potassium bicarbonate, potassium bicarbonate, potassium bicarbonate, potassium, sodium phosphates, potassium, sodium phosphates, potassium, sodium phosphates, senna    Family History       Problem Relation (Age of Onset)    Hypertension Mother          Tobacco Use    Smoking status: Every Day     Current packs/day: 0.50     Types: Cigarettes    Smokeless tobacco: Current    Tobacco comments:     3/4 PPD   Substance and Sexual Activity    Alcohol use: Not Currently     Comment: OCCASIONALLY    Drug use: Never    Sexual activity: Not Currently       Review of Systems  Objective:     Vital Signs (Most Recent):  Temp: 97.8 °F (36.6 °C) (03/19/24 1100)  Pulse: (!) 118 (03/19/24 1427)  Resp: 17 (03/19/24 1427)  BP: 127/69 (03/19/24 1100)  SpO2: 95 % (03/19/24 1427) Vital Signs (24h Range):  Temp:  [97.5  °F (36.4 °C)-98.9 °F (37.2 °C)] 97.8 °F (36.6 °C)  Pulse:  [109-119] 118  Resp:  [16-19] 17  SpO2:  [91 %-99 %] 95 %  BP: ()/(62-88) 127/69     Weight: 56.5 kg (124 lb 9 oz)  Body mass index is 22.06 kg/m².       Physical Exam  Vitals reviewed.   Constitutional:       General: She is in acute distress.      Appearance: She is ill-appearing.   HENT:      Head: Normocephalic and atraumatic.      Right Ear: External ear normal.      Left Ear: External ear normal.      Nose: Nose normal. No congestion.      Mouth/Throat:      Mouth: Mucous membranes are dry.   Eyes:      General:         Right eye: No discharge.         Left eye: No discharge.      Extraocular Movements: Extraocular movements intact.   Cardiovascular:      Rate and Rhythm: Tachycardia present.   Pulmonary:      Effort: Pulmonary effort is normal. No respiratory distress.   Abdominal:      General: There is no distension.      Palpations: Abdomen is soft.   Neurological:      General: No focal deficit present.      Mental Status: She is alert and oriented to person, place, and time.   Psychiatric:         Mood and Affect: Mood normal.         Behavior: Behavior normal.         Thought Content: Thought content normal.         Judgment: Judgment normal.            Review of Symptoms      Symptom Assessment (ESAS 0-10 Scale)  Pain:  0  Dyspnea:  0  Anxiety:  0         Pain Assessment:    Location(s): back    Back       Location: lower and right        Quality: Stabbing, shooting and sharp        Quantity: 0/10 in intensity        Chronicity: Onset 4 week(s) ago, controlled        Aggravating Factors: Activity        Alleviating Factors: Opiates and recumbency       Associated Symptoms: Anorexia    Performance Status:  20    Psychosocial/Cultural:   See Palliative Psychosocial Note: No  **Primary  to Follow**  Palliative Care  Consult: No        Advance Care Planning   Advance Directives:   Do Not Resuscitate Status: Yes   "    Decision Making:  Patient answered questions  Goals of Care: What is most important right now is to focus on comfort and QOL . Accordingly, we have decided that the best plan to meet the patient's goals includes enrolling in hospice care.         Significant Labs: BMP:   Recent Labs   Lab 03/19/24  0741   *      K 3.8      CO2 25   BUN 21   CREATININE 0.6   CALCIUM 9.2   MG 2.0       CBC:   Recent Labs   Lab 03/18/24  0458 03/19/24  0741   WBC 8.41 10.34   HGB 9.2* 9.1*   HCT 29.5* 29.2*   PLT 74* 161       CBC:   Recent Labs   Lab 03/19/24  0741   WBC 10.34   HGB 9.1*   HCT 29.2*   *          BMP:  Recent Labs   Lab 03/19/24  0741   *      K 3.8      CO2 25   BUN 21   CREATININE 0.6   CALCIUM 9.2   MG 2.0       LFT:  Lab Results   Component Value Date     (H) 03/19/2024    GGT 1,385 (H) 03/05/2024    ALKPHOS 784 (H) 03/19/2024    BILITOT 1.6 (H) 03/19/2024     Albumin:   Albumin   Date Value Ref Range Status   03/19/2024 2.9 (L) 3.5 - 5.2 g/dL Final     Protein:   Total Protein   Date Value Ref Range Status   03/19/2024 5.6 (L) 6.0 - 8.4 g/dL Final     Lactic acid:   No results found for: "LACTATE"    Significant Imaging: I have reviewed all pertinent imaging results/findings within the past 24 hours.    "

## 2024-03-19 NOTE — CARE UPDATE
03/19/24 0756   Patient Assessment/Suction   Level of Consciousness (AVPU) alert   Respiratory Effort Normal;Unlabored   Expansion/Accessory Muscles/Retractions no retractions;no use of accessory muscles   All Lung Fields Breath Sounds clear;diminished   Rhythm/Pattern, Respiratory unlabored;pattern regular;depth regular   Cough Frequency no cough   PRE-TX-O2   Device (Oxygen Therapy) nasal cannula   $ Is the patient on Low Flow Oxygen? Yes   Flow (L/min) 2  (decreased to 1LPM)   SpO2 99 %   Pulse Oximetry Type Intermittent   $ Pulse Oximetry - Single Charge Pulse Oximetry - Single   Pulse 109   Resp 18   Aerosol Therapy   $ Aerosol Therapy Charges Aerosol Treatment   Daily Review of Necessity (SVN) completed   Respiratory Treatment Status (SVN) given   Treatment Route (SVN) mask;oxygen   Patient Position (SVN) HOB elevated   Post Treatment Assessment (SVN) breath sounds unchanged   Signs of Intolerance (SVN) none   Breath Sounds Post-Respiratory Treatment   Throughout All Fields Post-Treatment All Fields   Throughout All Fields Post-Treatment no change   Post-treatment Heart Rate (beats/min) 109   Post-treatment Resp Rate (breaths/min) 18   Education   $ Education 15 min;Bronchodilator

## 2024-03-19 NOTE — PLAN OF CARE
SW sent referral to [facility] via [facility]. SW is waiting for a response.

## 2024-03-19 NOTE — NURSING
Nurses Note -- 4 Eyes      3/19/2024   7:15 AM      Skin assessed during: Q Shift Change      [] No Altered Skin Integrity Present    []Prevention Measures Documented      [x] Yes- Altered Skin Integrity Present or Discovered   [] LDA Added if Not in Epic (Describe Wound)   [] New Altered Skin Integrity was Present on Admit and Documented in LDA   [] Wound Image Taken    Wound Care Consulted? Yes already consulted    Attending Nurse:  Pearl Costa RN/Staff Member:     Brigida        Statement Selected

## 2024-03-19 NOTE — PROGRESS NOTES
"[facility]  Palliative Medicine  Progress Note    Patient Name: Crow Hines  MRN: [id number]  Admission Date: 3/17/2024  Hospital Length of Stay: 2 days  Code Status: DNR   Attending Provider: Jason Simmons MD  Consulting Provider: Carlin Turcios MD  Primary Care Physician: Chika Powell MD  Principal Problem:Small cell carcinoma    Patient information was obtained from patient, past medical records, and primary team.      Assessment/Plan:     Oncology  Cancer related pain  Pain is somatic in nature; some neuropathic components as well.    Pain is well-controlled based on bedside exam and discussion with bedside RN.  Keep current regimen.  Reassess tomorrow    Keep senna for opioid induced constipation prophylaxis.        I will follow-up with patient. Please contact us if you have any additional questions.    Subjective:     Chief Complaint: No chief complaint on file.      HPI:   Per admit H&P: "Patient originally presented to [facility] ER on 2/24/24 with complaints of several months of diffuse chest, abdominal, and back pain.   CT C/A/P showed diffuse metastatic disease with lung, liver, spleen, and kidney lesions.    Also  L2 compression fracture for which Neurosurgery at [facility] was originally consulted   CT head was ordered, left temporal mass with significant edema was identified.   Steroids were initiated and patient was transferred on 2/27/24 to [facility] for Neurosurgery.    Patient underwent L temporal craniotomy tumor resection on 2/29/24.   Pathology  returned metastatic small cell lung CA.   Also developed PE, initially on heparin drip, now on eliquis.   Started on keppra for seizure prophylaxis.   Per radiation oncology, patient not a good candidate right now.   Per medical oncology, inpatient chemotherapy not emergent, but would need urgent f/u outpatient for palliative chemotherapy.   Palliative care was consulted. Patient agreed to DNR.   Situation complicated by patient not " "having insurance.   Patient now transferred back to [facility], plan for outpatient palliative chemo vs home with hospice vs nursing home with hospice."    We have been asked to assist with goals of care.    Hospital Course:  No notes on file    Interval History: No major changes. Sleeping comfortably.    Past Medical History:   Diagnosis Date    Arthritis        Past Surgical History:   Procedure Laterality Date    CRANIOTOMY, WITH NEOPLASM EXCISION USING COMPUTER-ASSISTED NAVIGATION Left 2/29/2024    Procedure: CRANIOTOMY, WITH NEOPLASM EXCISION USING COMPUTER-ASSISTED NAVIGATION;  Surgeon: Felix Szymanski DO;  Location: [facility] OR [address] [place] [address];  Service: Neurosurgery;  Laterality: Left;  (Left temporal crani for rescetion of tumor)  [place]  BRAIN LAB  Navigation - CT with contrast    TUBAL LIGATION  2002       Review of patient's allergies indicates:  No Known Allergies    Medications:  Continuous Infusions:  Scheduled Meds:   acetaminophen  1,000 mg Oral Q8H    albuterol-ipratropium  3 mL Nebulization Q6H WAKE    apixaban  5 mg Oral BID    dexAMETHasone  2 mg Oral Q12H    famotidine  20 mg Oral BID    folic acid  1 mg Oral Daily    levETIRAcetam  500 mg Oral BID    oxyCODONE  30 mg Oral Q12H    polyethylene glycol  17 g Oral BID     PRN Meds:aluminum-magnesium hydroxide-simethicone, HYDROmorphone, magnesium oxide, magnesium oxide, ondansetron, oxyCODONE, oxyCODONE, potassium bicarbonate, potassium bicarbonate, potassium bicarbonate, potassium, sodium phosphates, potassium, sodium phosphates, potassium, sodium phosphates, senna    Family History       Problem Relation (Age of Onset)    Hypertension Mother          Tobacco Use    Smoking status: Every Day     Current packs/day: 0.50     Types: Cigarettes    Smokeless tobacco: Current    Tobacco comments:     3/4 PPD   Substance and Sexual Activity    Alcohol use: Not Currently     Comment: OCCASIONALLY    Drug use: Never    Sexual activity: Not Currently       Review of " Systems  Objective:     Vital Signs (Most Recent):  Temp: 97.8 °F (36.6 °C) (03/19/24 1100)  Pulse: (!) 118 (03/19/24 1427)  Resp: 17 (03/19/24 1427)  BP: 127/69 (03/19/24 1100)  SpO2: 95 % (03/19/24 1427) Vital Signs (24h Range):  Temp:  [97.5 °F (36.4 °C)-98.9 °F (37.2 °C)] 97.8 °F (36.6 °C)  Pulse:  [109-119] 118  Resp:  [16-19] 17  SpO2:  [91 %-99 %] 95 %  BP: ()/(62-88) 127/69     Weight: 56.5 kg (124 lb 9 oz)  Body mass index is 22.06 kg/m².       Physical Exam  Vitals reviewed.   Constitutional:       General: She is in acute distress.      Appearance: She is ill-appearing.   HENT:      Head: Normocephalic and atraumatic.      Right Ear: External ear normal.      Left Ear: External ear normal.      Nose: Nose normal. No congestion.      Mouth/Throat:      Mouth: Mucous membranes are dry.   Eyes:      General:         Right eye: No discharge.         Left eye: No discharge.      Extraocular Movements: Extraocular movements intact.   Cardiovascular:      Rate and Rhythm: Tachycardia present.   Pulmonary:      Effort: Pulmonary effort is normal. No respiratory distress.   Abdominal:      General: There is no distension.      Palpations: Abdomen is soft.   Neurological:      General: No focal deficit present.      Mental Status: She is alert and oriented to person, place, and time.   Psychiatric:         Mood and Affect: Mood normal.         Behavior: Behavior normal.         Thought Content: Thought content normal.         Judgment: Judgment normal.            Review of Symptoms      Symptom Assessment (ESAS 0-10 Scale)  Pain:  0  Dyspnea:  0  Anxiety:  0         Pain Assessment:    Location(s): back    Back       Location: lower and right        Quality: Stabbing, shooting and sharp        Quantity: 0/10 in intensity        Chronicity: Onset 4 week(s) ago, controlled        Aggravating Factors: Activity        Alleviating Factors: Opiates and recumbency       Associated Symptoms: Anorexia    Performance  "Status:  20    Psychosocial/Cultural:   See Palliative Psychosocial Note: No  **Primary  to Follow**  Palliative Care  Consult: No        Advance Care Planning  Advance Directives:   Do Not Resuscitate Status: Yes      Decision Making:  Patient answered questions  Goals of Care: What is most important right now is to focus on comfort and QOL . Accordingly, we have decided that the best plan to meet the patient's goals includes enrolling in hospice care.         Significant Labs: BMP:   Recent Labs   Lab 03/19/24  0741   *      K 3.8      CO2 25   BUN 21   CREATININE 0.6   CALCIUM 9.2   MG 2.0       CBC:   Recent Labs   Lab 03/18/24  0458 03/19/24  0741   WBC 8.41 10.34   HGB 9.2* 9.1*   HCT 29.5* 29.2*   PLT 74* 161       CBC:   Recent Labs   Lab 03/19/24  0741   WBC 10.34   HGB 9.1*   HCT 29.2*   *          BMP:  Recent Labs   Lab 03/19/24  0741   *      K 3.8      CO2 25   BUN 21   CREATININE 0.6   CALCIUM 9.2   MG 2.0       LFT:  Lab Results   Component Value Date     (H) 03/19/2024    GGT 1,385 (H) 03/05/2024    ALKPHOS 784 (H) 03/19/2024    BILITOT 1.6 (H) 03/19/2024     Albumin:   Albumin   Date Value Ref Range Status   03/19/2024 2.9 (L) 3.5 - 5.2 g/dL Final     Protein:   Total Protein   Date Value Ref Range Status   03/19/2024 5.6 (L) 6.0 - 8.4 g/dL Final     Lactic acid:   No results found for: "LACTATE"    Significant Imaging: I have reviewed all pertinent imaging results/findings within the past 24 hours.    > 35 min visit spent in chart review, face to face discussion of goals of care,  symptom assessment, coordination of care and emotional support.    Carlin Turcios MD  Palliative Medicine  [facility]                "

## 2024-03-19 NOTE — PROGRESS NOTES
Formerly [facility], [facility] Medicine  Progress Note    Patient Name: Crow Hines  MRN: [id number]  Patient Class: IP- Inpatient   Admission Date: 3/17/2024  Length of Stay: 2 days  Attending Physician: Jason Simmons MD  Primary Care Provider: Chika Powell MD        Subjective:     Principal Problem:Small cell carcinoma        HPI:  Patient originally presented to [facility] ER on 2/24/24 with complaints of several months of diffuse chest, abdominal, and back pain.   CT C/A/P showed diffuse metastatic disease with lung, liver, spleen, and kidney lesions.    Also  L2 compression fracture for which Neurosurgery at [facility] was originally consulted   CT head was ordered, left temporal mass with significant edema was identified.   Steroids were initiated and patient was transferred on 2/27/24 to [facility] for Neurosurgery.    Patient underwent L temporal craniotomy tumor resection on 2/29/24.   Pathology  returned metastatic small cell lung CA.   Also developed PE, initially on heparin drip, now on eliquis.   Started on keppra for seizure prophylaxis.   Per radiation oncology, patient not a good candidate right now.   Per medical oncology, inpatient chemotherapy not emergent, but would need urgent f/u outpatient for palliative chemotherapy.   Palliative care was consulted. Patient agreed to DNR.   Situation complicated by patient not having insurance.   Patient now transferred back to [facility], plan for outpatient palliative chemo vs home with hospice vs nursing home with hospice.     Overview/Hospital Course:  63 year old female with past medical history of rheumatoid arthritis and HTN presented as transfer from [facility].  Patient originally presented to [facility] ER on 2/24/24 with complaints of several months of diffuse chest, abdominal, and back pain. CT C/A/P showed diffuse metastatic disease with lung, liver, spleen, and kidney lesions.  Also revealing L2 compression fracture for which Neurosurgery at [facility] was  originally consulted for. Patient had an episode of acute confusion and CT head was ordered, left temporal mass with significant edema was identified. Steroids were initiated and patient was transferred on 2/27/24 to [facility] for Neurosurgery.  Patient underwent L temporal craniotomy tumor resection on 2/29/24. Path returned metastatic small cell lung CA. Also developed PE, initially on heparin drip, now on eliquis. Started on keppra for seizure prophylaxis. Per radiation oncology, patient not a good candidate right now. Per medical oncology, inpatient chemotherapy not emergent, but would need urgent f/u outpatient for palliative chemotherapy. Palliative care was consulted at OSH. Patient agreed to DNR. Situation complicated by patient not having insurance. Patient then transferred back to [facility]. Palliative care team at [facility] saw patient yesterday, patient decided to pursue nursing home with hospice.  is following for discharge planning. Pain medications adjusted per palliative care team.    Interval History: Patient seen and examined. NAD. Complains of generalized pain. Chronically ill appearing. Rates pain 7/10. Pain medications adjusted yesterday by palliative care team.     Review of Systems   Constitutional:  Positive for activity change and appetite change.   Respiratory:  Negative for shortness of breath.    Cardiovascular:  Negative for chest pain.   Gastrointestinal:  Positive for abdominal pain. Negative for nausea and vomiting.   Musculoskeletal:  Positive for back pain.     Objective:     Vital Signs (Most Recent):  Temp: 97.5 °F (36.4 °C) (03/19/24 0701)  Pulse: 109 (03/19/24 0756)  Resp: 18 (03/19/24 0907)  BP: 136/77 (03/19/24 0701)  SpO2: 99 % (03/19/24 0756) Vital Signs (24h Range):  Temp:  [97.5 °F (36.4 °C)-98.9 °F (37.2 °C)] 97.5 °F (36.4 °C)  Pulse:  [] 109  Resp:  [16-19] 18  SpO2:  [91 %-99 %] 99 %  BP: ()/(62-88) 136/77     Weight: 56.5 kg (124 lb 9 oz)  Body mass  index is 22.06 kg/m².    Intake/Output Summary (Last 24 hours) at 3/19/2024 0953  Last data filed at 3/19/2024 0946  Gross per 24 hour   Intake 250 ml   Output 400 ml   Net -150 ml           Physical Exam  Constitutional:       General: She is not in acute distress.     Appearance: She is ill-appearing.   HENT:      Head: Normocephalic.      Mouth/Throat:      Mouth: Mucous membranes are dry.   Cardiovascular:      Rate and Rhythm: Tachycardia present.      Heart sounds: Normal heart sounds.   Abdominal:      General: There is no distension.      Palpations: Abdomen is soft.      Tenderness: There is abdominal tenderness (generalized).   Skin:     General: Skin is warm.   Neurological:      General: No focal deficit present.      Mental Status: She is alert.   Psychiatric:      Comments: Flat affect; withdrawn             Significant Labs: All pertinent labs within the past 24 hours have been reviewed.  Recent Lab Results         03/19/24  0741   03/18/24  1045        Albumin 2.9                  ALT 78         Anion Gap 13                  Baso # 0.01         Basophil % 0.1         BILIRUBIN TOTAL 1.6  Comment: For infants and newborns, interpretation of results should be based  on gestational age, weight and in agreement with clinical  observations.    Premature Infant recommended reference ranges:  Up to 24 hours.............<8.0 mg/dL  Up to 48 hours............<12.0 mg/dL  3-5 days..................<15.0 mg/dL  6-29 days.................<15.0 mg/dL           BUN 21         Calcium 9.2         Chloride 101         CO2 25         Creatinine 0.6         Differential Method Automated         eGFR >60.0         Eos # 0.0         Eos % 0.0         Glucose 111         Gran # (ANC) 9.2         Gran % 88.6  Comment: a myelocyte seen on scan         Hematocrit 29.2         Hemoglobin 9.1         Immature Grans (Abs) 0.26  Comment: Mild elevation in immature granulocytes is non specific and   can be seen in  a variety of conditions including stress response,   acute inflammation, trauma and pregnancy. Correlation with other   laboratory and clinical findings is essential.           Immature Granulocytes 2.5         Lymph # 0.3         Lymph % 2.6         Magnesium  2.0         MCH 31.8         MCHC 31.2                  Mono # 0.6         Mono % 6.2         MPV 10.5         nRBC 0         Platelet Count 161  Comment: Reviewed by Technologist.         Potassium 3.8         PROTEIN TOTAL 5.6         RBC 2.86         RDW 15.6         Sodium 139         Troponin I High Sensitivity   16.4  Comment: Troponin results differ between methods. Do not use   results between Troponin methods interchangeably.    Alkaline Phospatase levels above 400 U/L may   cause false positive results.    Access hsTnI should not be used for patients taking   Asfotase donavan (Strensiq).         WBC 10.34                 Significant Imaging: I have reviewed all pertinent imaging results/findings within the past 24 hours.    Assessment/Plan:      * Small cell carcinoma  Metastatic lung small cell carcinoma   Poor prognosis  Medically stable  [place] Medicaid insurance pending  / consulted for placement   Palliative care team consulted, plan for nursing home with hospice      Thrombocytopenia  Metastatic disease  Platelets improving  No signs of bleeding noted  Recent Labs   Lab 03/19/24  0741              Cancer related pain  Palliative care team consulted, medication regimen adjusted 3/18    Acute pulmonary embolism without acute cor pulmonale  Maintain eliquis         VTE Risk Mitigation (From admission, onward)           Ordered     apixaban tablet 5 mg  2 times daily         03/17/24 1756     IP VTE HIGH RISK PATIENT  Once         03/17/24 1756     Place sequential compression device  Until discontinued         03/17/24 1756                    Discharge Planning   JUAN:      Code Status: DNR   Is the patient medically ready for  discharge?:     Reason for patient still in hospital (select all that apply): Pending disposition  Discharge Plan A: Hospice/home                  LISSETT Woodall  Department of Hospital Medicine   [facility]

## 2024-03-19 NOTE — ASSESSMENT & PLAN NOTE
Pain is somatic in nature; some neuropathic components as well.    Pain is well-controlled based on bedside exam and discussion with bedside RN.  Keep current regimen.  Reassess tomorrow    Keep senna for opioid induced constipation prophylaxis.

## 2024-03-20 PROCEDURE — 25000003 PHARM REV CODE 250: Performed by: INTERNAL MEDICINE

## 2024-03-20 PROCEDURE — 99900035 HC TECH TIME PER 15 MIN (STAT)

## 2024-03-20 PROCEDURE — 63600175 PHARM REV CODE 636 W HCPCS: Mod: UD | Performed by: INTERNAL MEDICINE

## 2024-03-20 PROCEDURE — 94640 AIRWAY INHALATION TREATMENT: CPT

## 2024-03-20 PROCEDURE — 99232 SBSQ HOSP IP/OBS MODERATE 35: CPT | Mod: ,,, | Performed by: INTERNAL MEDICINE

## 2024-03-20 PROCEDURE — 94760 N-INVAS EAR/PLS OXIMETRY 1: CPT

## 2024-03-20 PROCEDURE — 12000002 HC ACUTE/MED SURGE SEMI-PRIVATE ROOM

## 2024-03-20 PROCEDURE — 25000242 PHARM REV CODE 250 ALT 637 W/ HCPCS: Performed by: INTERNAL MEDICINE

## 2024-03-20 PROCEDURE — C1751 CATH, INF, PER/CENT/MIDLINE: HCPCS

## 2024-03-20 PROCEDURE — 99900031 HC PATIENT EDUCATION (STAT)

## 2024-03-20 RX ORDER — FENTANYL 50 UG/1
1 PATCH TRANSDERMAL
Status: DISCONTINUED | OUTPATIENT
Start: 2024-03-20 | End: 2024-03-22 | Stop reason: HOSPADM

## 2024-03-20 RX ADMIN — HYDROMORPHONE HYDROCHLORIDE 1 MG: 1 INJECTION, SOLUTION INTRAMUSCULAR; INTRAVENOUS; SUBCUTANEOUS at 11:03

## 2024-03-20 RX ADMIN — HYDROMORPHONE HYDROCHLORIDE 1 MG: 1 INJECTION, SOLUTION INTRAMUSCULAR; INTRAVENOUS; SUBCUTANEOUS at 03:03

## 2024-03-20 RX ADMIN — FENTANYL 1 PATCH: 50 PATCH TRANSDERMAL at 02:03

## 2024-03-20 RX ADMIN — HYDROMORPHONE HYDROCHLORIDE 1 MG: 1 INJECTION, SOLUTION INTRAMUSCULAR; INTRAVENOUS; SUBCUTANEOUS at 06:03

## 2024-03-20 RX ADMIN — HYDROMORPHONE HYDROCHLORIDE 1 MG: 1 INJECTION, SOLUTION INTRAMUSCULAR; INTRAVENOUS; SUBCUTANEOUS at 01:03

## 2024-03-20 RX ADMIN — IPRATROPIUM BROMIDE AND ALBUTEROL SULFATE 3 ML: 2.5; .5 SOLUTION RESPIRATORY (INHALATION) at 01:03

## 2024-03-20 RX ADMIN — HYDROMORPHONE HYDROCHLORIDE 1 MG: 1 INJECTION, SOLUTION INTRAMUSCULAR; INTRAVENOUS; SUBCUTANEOUS at 08:03

## 2024-03-20 RX ADMIN — HYDROMORPHONE HYDROCHLORIDE 1 MG: 1 INJECTION, SOLUTION INTRAMUSCULAR; INTRAVENOUS; SUBCUTANEOUS at 05:03

## 2024-03-20 RX ADMIN — HYDROMORPHONE HYDROCHLORIDE 1 MG: 1 INJECTION, SOLUTION INTRAMUSCULAR; INTRAVENOUS; SUBCUTANEOUS at 07:03

## 2024-03-20 NOTE — ASSESSMENT & PLAN NOTE
Pain is somatic in nature; some neuropathic components as well.    Given that she is no longer reliability taking PO, will change long acting opioid to fentanyl patch.    Keep prns as currently prescribed.    Keep senna for opioid induced constipation prophylaxis.    She is on an obviously declining trajectory with a prognosis of days to weeks. SW working on placement with hospice.

## 2024-03-20 NOTE — NURSING
"Patient in pain. She states "this is to much, this is why I can't eat anything. I am ready to die, I am ready to go to hell"  "

## 2024-03-20 NOTE — CARE UPDATE
03/20/24 1358   Patient Assessment/Suction   Level of Consciousness (AVPU) alert   Respiratory Effort Unlabored;Normal   Expansion/Accessory Muscles/Retractions no use of accessory muscles;no retractions   All Lung Fields Breath Sounds clear   PRE-TX-O2   Device (Oxygen Therapy) room air   SpO2 (!) 94 %   Pulse Oximetry Type Intermittent   Pulse (!) 129   Resp 18   Aerosol Therapy   $ Aerosol Therapy Charges Aerosol Treatment   Daily Review of Necessity (SVN) completed   Respiratory Treatment Status (SVN) given   Treatment Route (SVN) mask;oxygen   Patient Position (SVN) HOB elevated   Post Treatment Assessment (SVN) breath sounds unchanged   Signs of Intolerance (SVN) none   Breath Sounds Post-Respiratory Treatment   Throughout All Fields Post-Treatment All Fields   Throughout All Fields Post-Treatment no change   Post-treatment Heart Rate (beats/min) 135   Post-treatment Resp Rate (breaths/min) 18   Education   $ Education 15 min;Bronchodilator

## 2024-03-20 NOTE — PLAN OF CARE
BRANDT spoke with Farnk Mccauley. Frank working with agencies in [place] to assist with finding placement. Insurance still a barrier as facilities in [place] cannot get reimbursed for pending [place] medicaid.       Per Frank, Pt placed on list at the hospice house [place].

## 2024-03-20 NOTE — PLAN OF CARE
Problem: Adult Inpatient Plan of Care  Goal: Absence of Hospital-Acquired Illness or Injury  Outcome: Ongoing, Progressing     Problem: Infection  Goal: Absence of Infection Signs and Symptoms  Outcome: Ongoing, Progressing     Problem: Fall Injury Risk  Goal: Absence of Fall and Fall-Related Injury  Outcome: Ongoing, Progressing     Problem: Adult Inpatient Plan of Care  Goal: Optimal Comfort and Wellbeing  Outcome: Ongoing, Not Progressing     Problem: Adult Inpatient Plan of Care  Goal: Readiness for Transition of Care  Outcome: Adequate for Care Transition

## 2024-03-20 NOTE — SUBJECTIVE & OBJECTIVE
Interval History: DNR, awaiting hospice placement    Review of Systems   Constitutional:  Positive for activity change, appetite change and fatigue.   Cardiovascular:  Negative for chest pain and leg swelling.   Gastrointestinal:  Negative for diarrhea.   Neurological:  Positive for weakness.     Objective:     Vital Signs (Most Recent):  Temp: 99.1 °F (37.3 °C) (03/20/24 0701)  Pulse: (!) 125 (03/20/24 0720)  Resp: 18 (03/20/24 0852)  BP: 120/76 (03/20/24 0701)  SpO2: 98 % (03/20/24 0720) Vital Signs (24h Range):  Temp:  [97.8 °F (36.6 °C)-99.1 °F (37.3 °C)] 99.1 °F (37.3 °C)  Pulse:  [] 125  Resp:  [16-22] 18  SpO2:  [92 %-99 %] 98 %  BP: (114-145)/(66-77) 120/76     Weight: 56.5 kg (124 lb 9 oz)  Body mass index is 22.06 kg/m².    Intake/Output Summary (Last 24 hours) at 3/20/2024 1056  Last data filed at 3/20/2024 0858  Gross per 24 hour   Intake 340 ml   Output --   Net 340 ml         Physical Exam  Constitutional:       Appearance: She is ill-appearing.      Comments: Body mass index is 22.06 kg/m².   Skin:     General: Skin is warm and dry.   Neurological:      Mental Status: She is alert.             Significant Labs: All pertinent labs within the past 24 hours have been reviewed.  CBC:   Recent Labs   Lab 03/19/24  0741   WBC 10.34   HGB 9.1*   HCT 29.2*        CMP:   Recent Labs   Lab 03/19/24  0741      K 3.8      CO2 25   *   BUN 21   CREATININE 0.6   CALCIUM 9.2   PROT 5.6*   ALBUMIN 2.9*   BILITOT 1.6*   ALKPHOS 784*   *   ALT 78*   ANIONGAP 13       TSH:   Recent Labs   Lab 02/27/24  0611   TSH 0.775         Significant Imaging: I have reviewed all pertinent imaging results/findings within the past 24 hours.

## 2024-03-20 NOTE — SUBJECTIVE & OBJECTIVE
Interval History: Issues with agitation. Not consistently taking PO meds. Eyes open, but did not participate in conversation today.    Past Medical History:   Diagnosis Date    Arthritis        Past Surgical History:   Procedure Laterality Date    CRANIOTOMY, WITH NEOPLASM EXCISION USING COMPUTER-ASSISTED NAVIGATION Left 2/29/2024    Procedure: CRANIOTOMY, WITH NEOPLASM EXCISION USING COMPUTER-ASSISTED NAVIGATION;  Surgeon: Felix Szymanski DO;  Location: [facility] OR [address] [place] [address];  Service: Neurosurgery;  Laterality: Left;  (Left temporal crani for rescetion of tumor)  [place]  BRAIN LAB  Navigation - CT with contrast    TUBAL LIGATION  2002       Review of patient's allergies indicates:  No Known Allergies    Medications:  Continuous Infusions:  Scheduled Meds:   acetaminophen  1,000 mg Oral Q8H    albuterol-ipratropium  3 mL Nebulization Q6H WAKE    apixaban  5 mg Oral BID    dexAMETHasone  2 mg Oral Q12H    famotidine  20 mg Oral BID    fentaNYL  1 patch Transdermal Q72H    folic acid  1 mg Oral Daily    levETIRAcetam  500 mg Oral BID    polyethylene glycol  17 g Oral BID     PRN Meds:aluminum-magnesium hydroxide-simethicone, HYDROmorphone, magnesium oxide, magnesium oxide, ondansetron, oxyCODONE, oxyCODONE, potassium bicarbonate, potassium bicarbonate, potassium bicarbonate, potassium, sodium phosphates, potassium, sodium phosphates, potassium, sodium phosphates, senna    Family History       Problem Relation (Age of Onset)    Hypertension Mother          Tobacco Use    Smoking status: Every Day     Current packs/day: 0.50     Types: Cigarettes    Smokeless tobacco: Current    Tobacco comments:     3/4 PPD   Substance and Sexual Activity    Alcohol use: Not Currently     Comment: OCCASIONALLY    Drug use: Never    Sexual activity: Not Currently       Review of Systems  Objective:     Vital Signs (Most Recent):  Temp: 99.3 °F (37.4 °C) (03/20/24 1100)  Pulse: (!) 127 (Notified Nurse) (03/20/24 1100)  Resp: 18 (03/20/24  1145)  BP: 129/89 (03/20/24 1100)  SpO2: (!) 94 % (Notiofied Nurse) (03/20/24 1100) Vital Signs (24h Range):  Temp:  [98 °F (36.7 °C)-99.3 °F (37.4 °C)] 99.3 °F (37.4 °C)  Pulse:  [] 127  Resp:  [16-22] 18  SpO2:  [92 %-99 %] 94 %  BP: (114-145)/(66-89) 129/89     Weight: 56.5 kg (124 lb 9 oz)  Body mass index is 22.06 kg/m².       Physical Exam  Vitals reviewed.   Constitutional:       General: She is in acute distress.      Appearance: She is ill-appearing.   HENT:      Head: Normocephalic and atraumatic.      Right Ear: External ear normal.      Left Ear: External ear normal.      Nose: Nose normal. No congestion.      Mouth/Throat:      Mouth: Mucous membranes are dry.   Eyes:      General:         Right eye: No discharge.         Left eye: No discharge.      Extraocular Movements: Extraocular movements intact.   Cardiovascular:      Rate and Rhythm: Tachycardia present.   Pulmonary:      Effort: Pulmonary effort is normal. No respiratory distress.   Abdominal:      General: There is no distension.      Palpations: Abdomen is soft.   Neurological:      General: No focal deficit present.      Mental Status: She is alert and oriented to person, place, and time.   Psychiatric:         Mood and Affect: Mood normal.         Behavior: Behavior normal.         Thought Content: Thought content normal.         Judgment: Judgment normal.            Review of Symptoms      Symptom Assessment (ESAS 0-10 Scale)  Unable to complete assessment due to Mental status change         Pain Assessment:    Location(s): back      Pain Assessment in Advanced Demential Scale (PAINAD)   Breathing - Independent of vocalization:  0  Negative vocalization:  0  Facial expression:  0  Body language:  0  Consolability:  0  Total:  0    Performance Status:  10    Psychosocial/Cultural:   See Palliative Psychosocial Note: No  **Primary  to Follow**  Palliative Care  Consult: No        Advance Care Planning  "  Advance Directives:   Do Not Resuscitate Status: Yes      Decision Making:  Patient answered questions  Goals of Care: What is most important right now is to focus on comfort and QOL . Accordingly, we have decided that the best plan to meet the patient's goals includes enrolling in hospice care.         Significant Labs: BMP:   Recent Labs   Lab 03/19/24  0741   *      K 3.8      CO2 25   BUN 21   CREATININE 0.6   CALCIUM 9.2   MG 2.0       CBC:   Recent Labs   Lab 03/19/24  0741   WBC 10.34   HGB 9.1*   HCT 29.2*          CBC:   Recent Labs   Lab 03/19/24  0741   WBC 10.34   HGB 9.1*   HCT 29.2*   *          BMP:  No results for input(s): "GLU", "NA", "K", "CL", "CO2", "BUN", "CREATININE", "CALCIUM", "MG" in the last 24 hours.    LFT:  Lab Results   Component Value Date     (H) 03/19/2024    GGT 1,385 (H) 03/05/2024    ALKPHOS 784 (H) 03/19/2024    BILITOT 1.6 (H) 03/19/2024     Albumin:   Albumin   Date Value Ref Range Status   03/19/2024 2.9 (L) 3.5 - 5.2 g/dL Final     Protein:   Total Protein   Date Value Ref Range Status   03/19/2024 5.6 (L) 6.0 - 8.4 g/dL Final     Lactic acid:   No results found for: "LACTATE"    Significant Imaging: I have reviewed all pertinent imaging results/findings within the past 24 hours.    "

## 2024-03-20 NOTE — CARE UPDATE
03/20/24 0720   Patient Assessment/Suction   Level of Consciousness (AVPU) alert   Respiratory Effort Normal;Unlabored   Expansion/Accessory Muscles/Retractions no use of accessory muscles   All Lung Fields Breath Sounds clear   PRE-TX-O2   Device (Oxygen Therapy) room air   SpO2 98 %   Pulse Oximetry Type Intermittent   $ Pulse Oximetry - Single Charge Pulse Oximetry - Single   Pulse (!) 125   Resp 16   Aerosol Therapy   $ Aerosol Therapy Charges On hold   Daily Review of Necessity (SVN) completed   Respiratory Treatment Status (SVN) on hold   Education   $ Education 15 min;Bronchodilator     Rt held treatment due to HR

## 2024-03-20 NOTE — RESPIRATORY THERAPY
03/19/24 2043   Patient Assessment/Suction   Level of Consciousness (AVPU) alert   Respiratory Effort Normal;Unlabored   Expansion/Accessory Muscles/Retractions no retractions;no use of accessory muscles   All Lung Fields Breath Sounds Anterior:;Posterior:;Lateral:;equal bilaterally;clear   Rhythm/Pattern, Respiratory depth regular;pattern regular;unlabored   Cough Frequency no cough   PRE-TX-O2   Device (Oxygen Therapy) room air   $ Is the patient on Low Flow Oxygen? Yes  (on standby in room for PRN use)   SpO2 98 %   Pulse Oximetry Type Intermittent   $ Pulse Oximetry - Multiple Charge Pulse Oximetry - Multiple   Pulse (!) 127   Resp 18   Aerosol Therapy   $ Aerosol Therapy Charges Aerosol Treatment   Daily Review of Necessity (SVN) completed   Respiratory Treatment Status (SVN) given   Treatment Route (SVN) oxygen;mask   Patient Position (SVN) Cruz's   Post Treatment Assessment (SVN) breath sounds improved   Signs of Intolerance (SVN) none   Breath Sounds Post-Respiratory Treatment   Throughout All Fields Post-Treatment All Fields   Throughout All Fields Post-Treatment aeration increased   Post-treatment Heart Rate (beats/min) 120   Post-treatment Resp Rate (breaths/min) 18   Education   $ Education Bronchodilator;15 min

## 2024-03-20 NOTE — PROGRESS NOTES
[facility] Medicine  Progress Note    Patient Name: Crow Hines  MRN: [id number]  Patient Class: IP- Inpatient   Admission Date: 3/17/2024  Length of Stay: 3 days  Attending Physician: Jason Simmons MD  Primary Care Provider: Chika Powell MD        Subjective:     Principal Problem:Small cell carcinoma        HPI:  Patient originally presented to [facility] ER on 2/24/24 with complaints of several months of diffuse chest, abdominal, and back pain.   CT C/A/P showed diffuse metastatic disease with lung, liver, spleen, and kidney lesions.    Also  L2 compression fracture for which Neurosurgery at [facility] was originally consulted   CT head was ordered, left temporal mass with significant edema was identified.   Steroids were initiated and patient was transferred on 2/27/24 to [facility] for Neurosurgery.    Patient underwent L temporal craniotomy tumor resection on 2/29/24.   Pathology  returned metastatic small cell lung CA.   Also developed PE, initially on heparin drip, now on eliquis.   Started on keppra for seizure prophylaxis.   Per radiation oncology, patient not a good candidate right now.   Per medical oncology, inpatient chemotherapy not emergent, but would need urgent f/u outpatient for palliative chemotherapy.   Palliative care was consulted. Patient agreed to DNR.   Situation complicated by patient not having insurance.   Patient now transferred back to [facility], plan for outpatient palliative chemo vs home with hospice vs nursing home with hospice.     Overview/Hospital Course:  63 year old female with past medical history of rheumatoid arthritis and HTN presented as transfer from [facility].  Patient originally presented to [facility] ER on 2/24/24 with complaints of several months of diffuse chest, abdominal, and back pain. CT C/A/P showed diffuse metastatic disease with lung, liver, spleen, and kidney lesions.  Also revealing L2 compression fracture for which Neurosurgery at [facility] was  originally consulted for. Patient had an episode of acute confusion and CT head was ordered, left temporal mass with significant edema was identified. Steroids were initiated and patient was transferred on 2/27/24 to [facility] for Neurosurgery.  Patient underwent L temporal craniotomy tumor resection on 2/29/24. Path returned metastatic small cell lung CA. Also developed PE, initially on heparin drip, now on eliquis. Started on keppra for seizure prophylaxis. Per radiation oncology, patient not a good candidate right now. Per medical oncology, inpatient chemotherapy not emergent, but would need urgent f/u outpatient for palliative chemotherapy. Palliative care was consulted at OSH. Patient agreed to DNR. Situation complicated by patient not having insurance. Patient then transferred back to [facility]. Palliative care team at [facility] saw patient yesterday, patient has opted to pursue nursing home with hospice.  following for discharge planning. Pain medications adjusted per palliative care team.    Interval History: DNR, awaiting hospice placement    Review of Systems   Constitutional:  Positive for activity change, appetite change and fatigue.   Cardiovascular:  Negative for chest pain and leg swelling.   Gastrointestinal:  Negative for diarrhea.   Neurological:  Positive for weakness.     Objective:     Vital Signs (Most Recent):  Temp: 99.1 °F (37.3 °C) (03/20/24 0701)  Pulse: (!) 125 (03/20/24 0720)  Resp: 18 (03/20/24 0852)  BP: 120/76 (03/20/24 0701)  SpO2: 98 % (03/20/24 0720) Vital Signs (24h Range):  Temp:  [97.8 °F (36.6 °C)-99.1 °F (37.3 °C)] 99.1 °F (37.3 °C)  Pulse:  [] 125  Resp:  [16-22] 18  SpO2:  [92 %-99 %] 98 %  BP: (114-145)/(66-77) 120/76     Weight: 56.5 kg (124 lb 9 oz)  Body mass index is 22.06 kg/m².    Intake/Output Summary (Last 24 hours) at 3/20/2024 1057  Last data filed at 3/20/2024 0858  Gross per 24 hour   Intake 340 ml   Output --   Net 340 ml         Physical  Exam  Constitutional:       Appearance: She is ill-appearing.      Comments: Body mass index is 22.06 kg/m².   Skin:     General: Skin is warm and dry.   Neurological:      Mental Status: She is alert.             Significant Labs: All pertinent labs within the past 24 hours have been reviewed.  CBC:   Recent Labs   Lab 03/19/24  0741   WBC 10.34   HGB 9.1*   HCT 29.2*        CMP:   Recent Labs   Lab 03/19/24  0741      K 3.8      CO2 25   *   BUN 21   CREATININE 0.6   CALCIUM 9.2   PROT 5.6*   ALBUMIN 2.9*   BILITOT 1.6*   ALKPHOS 784*   *   ALT 78*   ANIONGAP 13       TSH:   Recent Labs   Lab 02/27/24  0611   TSH 0.775         Significant Imaging: I have reviewed all pertinent imaging results/findings within the past 24 hours.    Assessment/Plan:      * Small cell carcinoma  Metastatic lung small cell carcinoma   Poor prognosis  Medically stable  [place] Medicaid insurance pending  / consulted for placement   Palliative care team consulted, plan for nursing home with hospice      Metastatic neoplastic disease  Patient has metastatic cancer of lung, liver, spleen primary.The patient is not under the care of an outpatient oncologist. The patient is not undergoing active chemotherapy. .Their staging information is listed below.   Cancer Staging   No matching staging information was found for the patient.    CT C/A/P showed diffuse metastatic disease with lung, liver, spleen, and kidney lesions.    CT head showed left temporal mass with significant edema was identified.   Patient underwent L temporal craniotomy tumor resection on 2/29/24.   Pathology  returned metastatic small cell lung CA.   Per radiation oncology, patient not a good candidate  Per medical oncology, inpatient chemotherapy not emergent, but would need urgent f/u outpatient for palliative chemotherapy.   Palliative care was consulted. Patient agreed to DNR.   Situation complicated by patient not having  insurance.   Awaiting hospice placement    Debility  Patient with Acute debility due to  Cancer and weakness . Latest AMPAC and GEMS scores have been reviewed. Evaluation for etiology is complete. Plan includes  Hospice placement .    Thrombocytopenia  Metastatic disease  Platelets improving  No signs of bleeding noted  Recent Labs   Lab 03/19/24  0741              Cancer related pain  Palliative care team consulted, medication regimen adjusted 3/18    Acute pulmonary embolism without acute cor pulmonale  Maintain eliquis         VTE Risk Mitigation (From admission, onward)           Ordered     apixaban tablet 5 mg  2 times daily         03/17/24 1756     IP VTE HIGH RISK PATIENT  Once         03/17/24 1756     Place sequential compression device  Until discontinued         03/17/24 1756                    Discharge Planning   JUAN:  unknown    Code Status: DNR   Is the patient medically ready for discharge?:     Reason for patient still in hospital (select all that apply): Patient unstable, Patient trending condition, and Pending disposition  Discharge Plan A: Hospice/home              Beatrice Mendoza, SHINE, APRN, FNP-C  [facility] Medicine  [facility]  [email or web address]

## 2024-03-20 NOTE — PROGRESS NOTES
"[facility]  Palliative Medicine  Progress Note    Patient Name: Crow Hines  MRN: [id number]  Admission Date: 3/17/2024  Hospital Length of Stay: 3 days  Code Status: DNR   Attending Provider: Jason Simmons MD  Consulting Provider: Carlin Turcios MD  Primary Care Physician: Chika Powell MD  Principal Problem:Small cell carcinoma    Patient information was obtained from patient, past medical records, and primary team.      Assessment/Plan:     Oncology  Cancer related pain  Pain is somatic in nature; some neuropathic components as well.    Given that she is no longer reliability taking PO, will change long acting opioid to fentanyl patch.    Keep prns as currently prescribed.    Keep senna for opioid induced constipation prophylaxis.    She is on an obviously declining trajectory with a prognosis of days to weeks. SW working on placement with hospice.        I will follow-up with patient. Please contact us if you have any additional questions.    Subjective:     Chief Complaint: No chief complaint on file.      HPI:   Per admit H&P: "Patient originally presented to [facility] ER on 2/24/24 with complaints of several months of diffuse chest, abdominal, and back pain.   CT C/A/P showed diffuse metastatic disease with lung, liver, spleen, and kidney lesions.    Also  L2 compression fracture for which Neurosurgery at [facility] was originally consulted   CT head was ordered, left temporal mass with significant edema was identified.   Steroids were initiated and patient was transferred on 2/27/24 to [facility] for Neurosurgery.    Patient underwent L temporal craniotomy tumor resection on 2/29/24.   Pathology  returned metastatic small cell lung CA.   Also developed PE, initially on heparin drip, now on eliquis.   Started on keppra for seizure prophylaxis.   Per radiation oncology, patient not a good candidate right now.   Per medical oncology, inpatient chemotherapy not emergent, but would need urgent " "f/u outpatient for palliative chemotherapy.   Palliative care was consulted. Patient agreed to DNR.   Situation complicated by patient not having insurance.   Patient now transferred back to [facility], plan for outpatient palliative chemo vs home with hospice vs nursing home with hospice."    We have been asked to assist with goals of care.    Hospital Course:  No notes on file    Interval History: Issues with agitation. Not consistently taking PO meds. Eyes open, but did not participate in conversation today.    Past Medical History:   Diagnosis Date    Arthritis        Past Surgical History:   Procedure Laterality Date    CRANIOTOMY, WITH NEOPLASM EXCISION USING COMPUTER-ASSISTED NAVIGATION Left 2/29/2024    Procedure: CRANIOTOMY, WITH NEOPLASM EXCISION USING COMPUTER-ASSISTED NAVIGATION;  Surgeon: Felix Szymanski DO;  Location: [facility] OR [address] [place] [address];  Service: Neurosurgery;  Laterality: Left;  (Left temporal crani for rescetion of tumor)  [place]  BRAIN LAB  Navigation - CT with contrast    TUBAL LIGATION  2002       Review of patient's allergies indicates:  No Known Allergies    Medications:  Continuous Infusions:  Scheduled Meds:   acetaminophen  1,000 mg Oral Q8H    albuterol-ipratropium  3 mL Nebulization Q6H WAKE    apixaban  5 mg Oral BID    dexAMETHasone  2 mg Oral Q12H    famotidine  20 mg Oral BID    fentaNYL  1 patch Transdermal Q72H    folic acid  1 mg Oral Daily    levETIRAcetam  500 mg Oral BID    polyethylene glycol  17 g Oral BID     PRN Meds:aluminum-magnesium hydroxide-simethicone, HYDROmorphone, magnesium oxide, magnesium oxide, ondansetron, oxyCODONE, oxyCODONE, potassium bicarbonate, potassium bicarbonate, potassium bicarbonate, potassium, sodium phosphates, potassium, sodium phosphates, potassium, sodium phosphates, senna    Family History       Problem Relation (Age of Onset)    Hypertension Mother          Tobacco Use    Smoking status: Every Day     Current packs/day: 0.50     Types: Cigarettes    " Smokeless tobacco: Current    Tobacco comments:     3/4 PPD   Substance and Sexual Activity    Alcohol use: Not Currently     Comment: OCCASIONALLY    Drug use: Never    Sexual activity: Not Currently       Review of Systems  Objective:     Vital Signs (Most Recent):  Temp: 99.3 °F (37.4 °C) (03/20/24 1100)  Pulse: (!) 127 (Notified Nurse) (03/20/24 1100)  Resp: 18 (03/20/24 1145)  BP: 129/89 (03/20/24 1100)  SpO2: (!) 94 % (Notiofied Nurse) (03/20/24 1100) Vital Signs (24h Range):  Temp:  [98 °F (36.7 °C)-99.3 °F (37.4 °C)] 99.3 °F (37.4 °C)  Pulse:  [] 127  Resp:  [16-22] 18  SpO2:  [92 %-99 %] 94 %  BP: (114-145)/(66-89) 129/89     Weight: 56.5 kg (124 lb 9 oz)  Body mass index is 22.06 kg/m².       Physical Exam  Vitals reviewed.   Constitutional:       General: She is in acute distress.      Appearance: She is ill-appearing.   HENT:      Head: Normocephalic and atraumatic.      Right Ear: External ear normal.      Left Ear: External ear normal.      Nose: Nose normal. No congestion.      Mouth/Throat:      Mouth: Mucous membranes are dry.   Eyes:      General:         Right eye: No discharge.         Left eye: No discharge.      Extraocular Movements: Extraocular movements intact.   Cardiovascular:      Rate and Rhythm: Tachycardia present.   Pulmonary:      Effort: Pulmonary effort is normal. No respiratory distress.   Abdominal:      General: There is no distension.      Palpations: Abdomen is soft.   Neurological:      General: No focal deficit present.      Mental Status: She is alert and oriented to person, place, and time.   Psychiatric:         Mood and Affect: Mood normal.         Behavior: Behavior normal.         Thought Content: Thought content normal.         Judgment: Judgment normal.            Review of Symptoms      Symptom Assessment (ESAS 0-10 Scale)  Unable to complete assessment due to Mental status change         Pain Assessment:    Location(s): back      Pain Assessment in Advanced  "Demential Scale (PAINAD)   Breathing - Independent of vocalization:  0  Negative vocalization:  0  Facial expression:  0  Body language:  0  Consolability:  0  Total:  0    Performance Status:  10    Psychosocial/Cultural:   See Palliative Psychosocial Note: No  **Primary  to Follow**  Palliative Care  Consult: No        Advance Care Planning  Advance Directives:   Do Not Resuscitate Status: Yes      Decision Making:  Patient answered questions  Goals of Care: What is most important right now is to focus on comfort and QOL . Accordingly, we have decided that the best plan to meet the patient's goals includes enrolling in hospice care.         Significant Labs: BMP:   Recent Labs   Lab 03/19/24  0741   *      K 3.8      CO2 25   BUN 21   CREATININE 0.6   CALCIUM 9.2   MG 2.0       CBC:   Recent Labs   Lab 03/19/24  0741   WBC 10.34   HGB 9.1*   HCT 29.2*          CBC:   Recent Labs   Lab 03/19/24  0741   WBC 10.34   HGB 9.1*   HCT 29.2*   *          BMP:  No results for input(s): "GLU", "NA", "K", "CL", "CO2", "BUN", "CREATININE", "CALCIUM", "MG" in the last 24 hours.    LFT:  Lab Results   Component Value Date     (H) 03/19/2024    GGT 1,385 (H) 03/05/2024    ALKPHOS 784 (H) 03/19/2024    BILITOT 1.6 (H) 03/19/2024     Albumin:   Albumin   Date Value Ref Range Status   03/19/2024 2.9 (L) 3.5 - 5.2 g/dL Final     Protein:   Total Protein   Date Value Ref Range Status   03/19/2024 5.6 (L) 6.0 - 8.4 g/dL Final     Lactic acid:   No results found for: "LACTATE"    Significant Imaging: I have reviewed all pertinent imaging results/findings within the past 24 hours.    > 35 min visit spent in chart review, face to face discussion of goals of care,  symptom assessment, coordination of care and emotional support.    Carlin Turcios MD  Palliative Medicine  [facility]                "

## 2024-03-20 NOTE — ASSESSMENT & PLAN NOTE
Patient has metastatic cancer of lung, liver, spleen primary.The patient is not under the care of an outpatient oncologist. The patient is not undergoing active chemotherapy. .Their staging information is listed below.   Cancer Staging   No matching staging information was found for the patient.    CT C/A/P showed diffuse metastatic disease with lung, liver, spleen, and kidney lesions.    CT head showed left temporal mass with significant edema was identified.   Patient underwent L temporal craniotomy tumor resection on 2/29/24.   Pathology  returned metastatic small cell lung CA.   Per radiation oncology, patient not a good candidate  Per medical oncology, inpatient chemotherapy not emergent, but would need urgent f/u outpatient for palliative chemotherapy.   Palliative care was consulted. Patient agreed to DNR.   Situation complicated by patient not having insurance.   Awaiting hospice placement

## 2024-03-21 LAB
ALBUMIN SERPL BCP-MCNC: 2.9 G/DL (ref 3.5–5.2)
ALP SERPL-CCNC: 850 U/L (ref 55–135)
ALT SERPL W/O P-5'-P-CCNC: 114 U/L (ref 10–44)
ANION GAP SERPL CALC-SCNC: 13 MMOL/L (ref 8–16)
AST SERPL-CCNC: 178 U/L (ref 10–40)
BILIRUB SERPL-MCNC: 2.3 MG/DL (ref 0.1–1)
BUN SERPL-MCNC: 29 MG/DL (ref 8–23)
CALCIUM SERPL-MCNC: 9.6 MG/DL (ref 8.7–10.5)
CHLORIDE SERPL-SCNC: 107 MMOL/L (ref 95–110)
CO2 SERPL-SCNC: 24 MMOL/L (ref 23–29)
CREAT SERPL-MCNC: 0.6 MG/DL (ref 0.5–1.4)
ERYTHROCYTE [DISTWIDTH] IN BLOOD BY AUTOMATED COUNT: 16.4 % (ref 11.5–14.5)
EST. GFR  (NO RACE VARIABLE): >60 ML/MIN/1.73 M^2
GLUCOSE SERPL-MCNC: 114 MG/DL (ref 70–110)
HCT VFR BLD AUTO: 28.8 % (ref 37–48.5)
HGB BLD-MCNC: 9.1 G/DL (ref 12–16)
MCH RBC QN AUTO: 32 PG (ref 27–31)
MCHC RBC AUTO-ENTMCNC: 31.6 G/DL (ref 32–36)
MCV RBC AUTO: 101 FL (ref 82–98)
PLATELET # BLD AUTO: 273 K/UL (ref 150–450)
PLATELET BLD QL SMEAR: ABNORMAL
PMV BLD AUTO: 9.6 FL (ref 9.2–12.9)
POTASSIUM SERPL-SCNC: 4 MMOL/L (ref 3.5–5.1)
PROT SERPL-MCNC: 5.4 G/DL (ref 6–8.4)
RBC # BLD AUTO: 2.84 M/UL (ref 4–5.4)
SODIUM SERPL-SCNC: 144 MMOL/L (ref 136–145)
WBC # BLD AUTO: 13.28 K/UL (ref 3.9–12.7)

## 2024-03-21 PROCEDURE — 85027 COMPLETE CBC AUTOMATED: CPT | Performed by: NURSE PRACTITIONER

## 2024-03-21 PROCEDURE — 12000002 HC ACUTE/MED SURGE SEMI-PRIVATE ROOM

## 2024-03-21 PROCEDURE — 25000242 PHARM REV CODE 250 ALT 637 W/ HCPCS: Performed by: INTERNAL MEDICINE

## 2024-03-21 PROCEDURE — 80053 COMPREHEN METABOLIC PANEL: CPT | Performed by: NURSE PRACTITIONER

## 2024-03-21 PROCEDURE — 25000003 PHARM REV CODE 250: Performed by: INTERNAL MEDICINE

## 2024-03-21 PROCEDURE — 99900031 HC PATIENT EDUCATION (STAT)

## 2024-03-21 PROCEDURE — 63600175 PHARM REV CODE 636 W HCPCS: Mod: UD | Performed by: INTERNAL MEDICINE

## 2024-03-21 PROCEDURE — 99232 SBSQ HOSP IP/OBS MODERATE 35: CPT | Mod: ,,, | Performed by: INTERNAL MEDICINE

## 2024-03-21 PROCEDURE — 36415 COLL VENOUS BLD VENIPUNCTURE: CPT | Performed by: NURSE PRACTITIONER

## 2024-03-21 PROCEDURE — 94640 AIRWAY INHALATION TREATMENT: CPT

## 2024-03-21 PROCEDURE — 99900035 HC TECH TIME PER 15 MIN (STAT)

## 2024-03-21 PROCEDURE — 94761 N-INVAS EAR/PLS OXIMETRY MLT: CPT

## 2024-03-21 PROCEDURE — 27000221 HC OXYGEN, UP TO 24 HOURS

## 2024-03-21 RX ADMIN — HYDROMORPHONE HYDROCHLORIDE 1 MG: 1 INJECTION, SOLUTION INTRAMUSCULAR; INTRAVENOUS; SUBCUTANEOUS at 04:03

## 2024-03-21 RX ADMIN — HYDROMORPHONE HYDROCHLORIDE 1 MG: 1 INJECTION, SOLUTION INTRAMUSCULAR; INTRAVENOUS; SUBCUTANEOUS at 09:03

## 2024-03-21 RX ADMIN — HYDROMORPHONE HYDROCHLORIDE 1 MG: 1 INJECTION, SOLUTION INTRAMUSCULAR; INTRAVENOUS; SUBCUTANEOUS at 06:03

## 2024-03-21 RX ADMIN — IPRATROPIUM BROMIDE AND ALBUTEROL SULFATE 3 ML: 2.5; .5 SOLUTION RESPIRATORY (INHALATION) at 07:03

## 2024-03-21 NOTE — PROGRESS NOTES
"[facility]  Palliative Medicine  Progress Note    Patient Name: Crow Hines  MRN: [id number]  Admission Date: 3/17/2024  Hospital Length of Stay: 4 days  Code Status: DNR   Attending Provider: Jason Simmons MD  Consulting Provider: Carlin Turcios MD  Primary Care Physician: Chika Powell MD  Principal Problem:Small cell carcinoma    Patient information was obtained from patient, past medical records, and primary team.      Assessment/Plan:     Oncology  Cancer related pain  Pain is somatic in nature; some neuropathic components as well.    She appears to be as comfortable as could be be expected. Will continue current regimen.    Our palliative RN did speak with family friend and updated her on her declining condition. Prognosis appears to be days to weeks.    She is on an obviously declining trajectory with a prognosis of days to weeks. SW working on placement with hospice.        I will follow-up with patient. Please contact us if you have any additional questions.    Subjective:     Chief Complaint: No chief complaint on file.      HPI:   Per admit H&P: "Patient originally presented to [facility] ER on 2/24/24 with complaints of several months of diffuse chest, abdominal, and back pain.   CT C/A/P showed diffuse metastatic disease with lung, liver, spleen, and kidney lesions.    Also  L2 compression fracture for which Neurosurgery at [facility] was originally consulted   CT head was ordered, left temporal mass with significant edema was identified.   Steroids were initiated and patient was transferred on 2/27/24 to [facility] for Neurosurgery.    Patient underwent L temporal craniotomy tumor resection on 2/29/24.   Pathology  returned metastatic small cell lung CA.   Also developed PE, initially on heparin drip, now on eliquis.   Started on keppra for seizure prophylaxis.   Per radiation oncology, patient not a good candidate right now.   Per medical oncology, inpatient chemotherapy not emergent, " "but would need urgent f/u outpatient for palliative chemotherapy.   Palliative care was consulted. Patient agreed to DNR.   Situation complicated by patient not having insurance.   Patient now transferred back to [facility], plan for outpatient palliative chemo vs home with hospice vs nursing home with hospice."    We have been asked to assist with goals of care.    Hospital Course:  No notes on file    Interval History: Minimally responsive without any major changes. Requesting ice water which I provided. Otherwise no interaction.    Past Medical History:   Diagnosis Date    Arthritis        Past Surgical History:   Procedure Laterality Date    CRANIOTOMY, WITH NEOPLASM EXCISION USING COMPUTER-ASSISTED NAVIGATION Left 2/29/2024    Procedure: CRANIOTOMY, WITH NEOPLASM EXCISION USING COMPUTER-ASSISTED NAVIGATION;  Surgeon: Felix Szymanski DO;  Location: [facility] OR [address] [place] [address];  Service: Neurosurgery;  Laterality: Left;  (Left temporal crani for rescetion of tumor)  [place]  BRAIN LAB  Navigation - CT with contrast    TUBAL LIGATION  2002       Review of patient's allergies indicates:  No Known Allergies    Medications:  Continuous Infusions:  Scheduled Meds:   acetaminophen  1,000 mg Oral Q8H    albuterol-ipratropium  3 mL Nebulization Q6H WAKE    apixaban  5 mg Oral BID    dexAMETHasone  2 mg Oral Q12H    famotidine  20 mg Oral BID    fentaNYL  1 patch Transdermal Q72H    folic acid  1 mg Oral Daily    levETIRAcetam  500 mg Oral BID    polyethylene glycol  17 g Oral BID     PRN Meds:aluminum-magnesium hydroxide-simethicone, HYDROmorphone, magnesium oxide, magnesium oxide, ondansetron, oxyCODONE, oxyCODONE, potassium bicarbonate, potassium bicarbonate, potassium bicarbonate, potassium, sodium phosphates, potassium, sodium phosphates, potassium, sodium phosphates, senna    Family History       Problem Relation (Age of Onset)    Hypertension Mother          Tobacco Use    Smoking status: Every Day     Current packs/day: 0.50     " Types: Cigarettes    Smokeless tobacco: Current    Tobacco comments:     3/4 PPD   Substance and Sexual Activity    Alcohol use: Not Currently     Comment: OCCASIONALLY    Drug use: Never    Sexual activity: Not Currently       Review of Systems  Objective:     Vital Signs (Most Recent):  Temp: 98.5 °F (36.9 °C) (03/21/24 0738)  Pulse: (!) 132 (nurse alerted) (03/21/24 0738)  Resp: 18 (03/21/24 0931)  BP: 106/78 (03/21/24 0738)  SpO2: 97 % (03/21/24 0800) Vital Signs (24h Range):  Temp:  [98.1 °F (36.7 °C)-98.5 °F (36.9 °C)] 98.5 °F (36.9 °C)  Pulse:  [] 132  Resp:  [16-19] 18  SpO2:  [91 %-97 %] 97 %  BP: (106-156)/(78-97) 106/78     Weight: 56.5 kg (124 lb 9 oz)  Body mass index is 22.06 kg/m².       Physical Exam  Vitals reviewed.   Constitutional:       General: She is in acute distress.      Appearance: She is ill-appearing.   HENT:      Head: Normocephalic and atraumatic.      Right Ear: External ear normal.      Left Ear: External ear normal.      Nose: Nose normal. No congestion.      Mouth/Throat:      Mouth: Mucous membranes are dry.   Eyes:      General:         Right eye: No discharge.         Left eye: No discharge.      Extraocular Movements: Extraocular movements intact.   Cardiovascular:      Rate and Rhythm: Tachycardia present.   Pulmonary:      Effort: Pulmonary effort is normal. No respiratory distress.   Abdominal:      General: There is no distension.      Palpations: Abdomen is soft.   Neurological:      Mental Status: She is alert.   Psychiatric:      Comments: Unable to assess as she was minimally responsive today.            Review of Symptoms      Symptom Assessment (ESAS 0-10 Scale)  Unable to complete assessment due to Mental status change         Pain Assessment:    Location(s): back      Pain Assessment in Advanced Demential Scale (PAINAD)   Breathing - Independent of vocalization:  0  Negative vocalization:  0  Facial expression:  0  Body language:  1  Consolability:  1  Total:   "2    Performance Status:  10    Psychosocial/Cultural:   See Palliative Psychosocial Note: No  **Primary  to Follow**  Palliative Care  Consult: No        Advance Care Planning  Advance Directives:   Do Not Resuscitate Status: Yes      Decision Making:  Patient unable to communicate due to disease severity/cognitive impairment  Goals of Care: What is most important right now is to focus on comfort and QOL . Accordingly, we have decided that the best plan to meet the patient's goals includes enrolling in hospice care.         Significant Labs: BMP:   Recent Labs   Lab 03/21/24  0805   *      K 4.0      CO2 24   BUN 29*   CREATININE 0.6   CALCIUM 9.6       CBC:   Recent Labs   Lab 03/21/24  0805   WBC 13.28*   HGB 9.1*   HCT 28.8*          CBC:   Recent Labs   Lab 03/21/24  0805   WBC 13.28*   HGB 9.1*   HCT 28.8*   *          BMP:  Recent Labs   Lab 03/21/24  0805   *      K 4.0      CO2 24   BUN 29*   CREATININE 0.6   CALCIUM 9.6       LFT:  Lab Results   Component Value Date     (H) 03/21/2024    GGT 1,385 (H) 03/05/2024    ALKPHOS 850 (H) 03/21/2024    BILITOT 2.3 (H) 03/21/2024     Albumin:   Albumin   Date Value Ref Range Status   03/21/2024 2.9 (L) 3.5 - 5.2 g/dL Final     Protein:   Total Protein   Date Value Ref Range Status   03/21/2024 5.4 (L) 6.0 - 8.4 g/dL Final     Lactic acid:   No results found for: "LACTATE"    Significant Imaging: I have reviewed all pertinent imaging results/findings within the past 24 hours.    > 35 min visit spent in chart review, face to face discussion of goals of care,  symptom assessment, coordination of care and emotional support.    Carlin Turcios MD  Palliative Medicine  [facility]                "

## 2024-03-21 NOTE — ASSESSMENT & PLAN NOTE
Pain is somatic in nature; some neuropathic components as well.    She appears to be as comfortable as could be be expected. Will continue current regimen.    Our palliative RN did speak with family friend and updated her on her declining condition. Prognosis appears to be days to weeks.    She is on an obviously declining trajectory with a prognosis of days to weeks. SW working on placement with hospice.

## 2024-03-21 NOTE — SUBJECTIVE & OBJECTIVE
Interval History: Continued pain management - more awake this morning - requested pain meds.    Review of Systems   Constitutional:  Positive for activity change, appetite change and fatigue.   Musculoskeletal:  Positive for back pain and gait problem.   Neurological:  Positive for weakness.   Psychiatric/Behavioral:  Positive for decreased concentration.      Objective:     Vital Signs (Most Recent):  Temp: 98.5 °F (36.9 °C) (03/21/24 0738)  Pulse: (!) 132 (nurse alerted) (03/21/24 0738)  Resp: 18 (03/21/24 0931)  BP: 106/78 (03/21/24 0738)  SpO2: 97 % (03/21/24 0800) Vital Signs (24h Range):  Temp:  [98.1 °F (36.7 °C)-98.5 °F (36.9 °C)] 98.5 °F (36.9 °C)  Pulse:  [] 132  Resp:  [16-19] 18  SpO2:  [91 %-97 %] 97 %  BP: (106-156)/(78-97) 106/78     Weight: 56.5 kg (124 lb 9 oz)  Body mass index is 22.06 kg/m².    Intake/Output Summary (Last 24 hours) at 3/21/2024 1133  Last data filed at 3/21/2024 0840  Gross per 24 hour   Intake 0 ml   Output --   Net 0 ml         Physical Exam  Constitutional:       Appearance: She is ill-appearing.      Comments: Somnelence; Body mass index is 22.06 kg/m². cachetic   HENT:      Mouth/Throat:      Mouth: Mucous membranes are dry.   Abdominal:      General: Abdomen is flat.      Palpations: Abdomen is soft.      Comments: sunken   Skin:     Coloration: Skin is pale.   Neurological:      Mental Status: She is alert.             Significant Labs: All pertinent labs within the past 24 hours have been reviewed.    Significant Imaging: I have reviewed all pertinent imaging results/findings within the past 24 hours.

## 2024-03-21 NOTE — PROGRESS NOTES
Pt somnolent today. Still not eating per nursing staff. No signs of pain or acute distress noted. CM still working on hospice placement options. Called friend Prachi; no answer left message. PC team will continue to follow.       7539- Friend Prachi returned my call and was updated on pt condition declining. Encouraged Family to visit soon if they desire. Ms. Velarde verbalized understanding.

## 2024-03-21 NOTE — PLAN OF CARE
03/21/24 0800   Patient Assessment/Suction   Respiratory Effort Unlabored   Expansion/Accessory Muscles/Retractions no use of accessory muscles;no retractions   PRE-TX-O2   Device (Oxygen Therapy) nasal cannula   $ Is the patient on Low Flow Oxygen? Yes   Flow (L/min) 2   SpO2 97 %   Pulse Oximetry Type Intermittent   $ Pulse Oximetry - Multiple Charge Pulse Oximetry - Multiple   Aerosol Therapy   $ Aerosol Therapy Charges On hold   Daily Review of Necessity (SVN)   (hr 130's)

## 2024-03-21 NOTE — ASSESSMENT & PLAN NOTE
Patient with Acute debility due to  Cancer and weakness . Latest AMPAC and GEMS scores have been reviewed. Evaluation for etiology is complete. Plan includes  Hospice placement .

## 2024-03-21 NOTE — SUBJECTIVE & OBJECTIVE
Interval History: Minimally responsive without any major changes. Requesting ice water which I provided. Otherwise no interaction.    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)  [place]  BRAIN LAB  Navigation - CT with contrast    TUBAL LIGATION  2002       Review of patient's allergies indicates:  No Known Allergies    Medications:  Continuous Infusions:  Scheduled Meds:   acetaminophen  1,000 mg Oral Q8H    albuterol-ipratropium  3 mL Nebulization Q6H WAKE    apixaban  5 mg Oral BID    dexAMETHasone  2 mg Oral Q12H    famotidine  20 mg Oral BID    fentaNYL  1 patch Transdermal Q72H    folic acid  1 mg Oral Daily    levETIRAcetam  500 mg Oral BID    polyethylene glycol  17 g Oral BID     PRN Meds:aluminum-magnesium hydroxide-simethicone, HYDROmorphone, magnesium oxide, magnesium oxide, ondansetron, oxyCODONE, oxyCODONE, potassium bicarbonate, potassium bicarbonate, potassium bicarbonate, potassium, sodium phosphates, potassium, sodium phosphates, potassium, sodium phosphates, senna    Family History       Problem Relation (Age of Onset)    Hypertension Mother          Tobacco Use    Smoking status: Every Day     Current packs/day: 0.50     Types: Cigarettes    Smokeless tobacco: Current    Tobacco comments:     3/4 PPD   Substance and Sexual Activity    Alcohol use: Not Currently     Comment: OCCASIONALLY    Drug use: Never    Sexual activity: Not Currently       Review of Systems  Objective:     Vital Signs (Most Recent):  Temp: 98.5 °F (36.9 °C) (03/21/24 0738)  Pulse: (!) 132 (nurse alerted) (03/21/24 0738)  Resp: 18 (03/21/24  0931)  BP: 106/78 (03/21/24 0738)  SpO2: 97 % (03/21/24 0800) Vital Signs (24h Range):  Temp:  [98.1 °F (36.7 °C)-98.5 °F (36.9 °C)] 98.5 °F (36.9 °C)  Pulse:  [] 132  Resp:  [16-19] 18  SpO2:  [91 %-97 %] 97 %  BP: (106-156)/(78-97) 106/78     Weight: 56.5 kg (124 lb 9 oz)  Body mass index is 22.06 kg/m².       Physical Exam  Vitals reviewed.   Constitutional:       General: She is in acute distress.      Appearance: She is ill-appearing.   HENT:      Head: Normocephalic and atraumatic.      Right Ear: External ear normal.      Left Ear: External ear normal.      Nose: Nose normal. No congestion.      Mouth/Throat:      Mouth: Mucous membranes are dry.   Eyes:      General:         Right eye: No discharge.         Left eye: No discharge.      Extraocular Movements: Extraocular movements intact.   Cardiovascular:      Rate and Rhythm: Tachycardia present.   Pulmonary:      Effort: Pulmonary effort is normal. No respiratory distress.   Abdominal:      General: There is no distension.      Palpations: Abdomen is soft.   Neurological:      Mental Status: She is alert.   Psychiatric:      Comments: Unable to assess as she was minimally responsive today.            Review of Symptoms      Symptom Assessment (ESAS 0-10 Scale)  Unable to complete assessment due to Mental status change         Pain Assessment:    Location(s): back      Pain Assessment in Advanced Demential Scale (PAINAD)   Breathing - Independent of vocalization:  0  Negative vocalization:  0  Facial expression:  0  Body language:  1  Consolability:  1  Total:  2    Performance Status:  10    Psychosocial/Cultural:   See Palliative Psychosocial Note: No  **Primary  to Follow**  Palliative Care  Consult: No        Advance Care Planning   Advance Directives:   Do Not Resuscitate Status: Yes      Decision Making:  Patient unable to communicate due to disease severity/cognitive impairment  Goals of Care: What is most important  "right now is to focus on comfort and QOL . Accordingly, we have decided that the best plan to meet the patient's goals includes enrolling in hospice care.         Significant Labs: BMP:   Recent Labs   Lab 03/21/24  0805   *      K 4.0      CO2 24   BUN 29*   CREATININE 0.6   CALCIUM 9.6       CBC:   Recent Labs   Lab 03/21/24  0805   WBC 13.28*   HGB 9.1*   HCT 28.8*          CBC:   Recent Labs   Lab 03/21/24  0805   WBC 13.28*   HGB 9.1*   HCT 28.8*   *          BMP:  Recent Labs   Lab 03/21/24  0805   *      K 4.0      CO2 24   BUN 29*   CREATININE 0.6   CALCIUM 9.6       LFT:  Lab Results   Component Value Date     (H) 03/21/2024    GGT 1,385 (H) 03/05/2024    ALKPHOS 850 (H) 03/21/2024    BILITOT 2.3 (H) 03/21/2024     Albumin:   Albumin   Date Value Ref Range Status   03/21/2024 2.9 (L) 3.5 - 5.2 g/dL Final     Protein:   Total Protein   Date Value Ref Range Status   03/21/2024 5.4 (L) 6.0 - 8.4 g/dL Final     Lactic acid:   No results found for: "LACTATE"    Significant Imaging: I have reviewed all pertinent imaging results/findings within the past 24 hours.    "

## 2024-03-22 VITALS
BODY MASS INDEX: 22.07 KG/M2 | WEIGHT: 124.56 LBS | RESPIRATION RATE: 18 BRPM | HEART RATE: 135 BPM | TEMPERATURE: 99 F | DIASTOLIC BLOOD PRESSURE: 72 MMHG | SYSTOLIC BLOOD PRESSURE: 113 MMHG | OXYGEN SATURATION: 96 % | HEIGHT: 63 IN

## 2024-03-22 PROCEDURE — 63600175 PHARM REV CODE 636 W HCPCS: Mod: UD | Performed by: INTERNAL MEDICINE

## 2024-03-22 PROCEDURE — 27000221 HC OXYGEN, UP TO 24 HOURS

## 2024-03-22 PROCEDURE — 94761 N-INVAS EAR/PLS OXIMETRY MLT: CPT

## 2024-03-22 PROCEDURE — 99231 SBSQ HOSP IP/OBS SF/LOW 25: CPT | Mod: ,,, | Performed by: INTERNAL MEDICINE

## 2024-03-22 PROCEDURE — 99900035 HC TECH TIME PER 15 MIN (STAT)

## 2024-03-22 PROCEDURE — 63600175 PHARM REV CODE 636 W HCPCS: Mod: UD | Performed by: NURSE PRACTITIONER

## 2024-03-22 RX ORDER — FENTANYL 50 UG/1
1 PATCH TRANSDERMAL
Qty: 30 PATCH | Refills: 0 | Status: SHIPPED | OUTPATIENT
Start: 2024-03-23

## 2024-03-22 RX ORDER — DEXAMETHASONE SODIUM PHOSPHATE 4 MG/ML
2 INJECTION, SOLUTION INTRA-ARTICULAR; INTRALESIONAL; INTRAMUSCULAR; INTRAVENOUS; SOFT TISSUE EVERY 12 HOURS
Qty: 30 ML | Refills: 3 | Status: SHIPPED | OUTPATIENT
Start: 2024-03-22

## 2024-03-22 RX ORDER — DEXAMETHASONE SODIUM PHOSPHATE 4 MG/ML
2 INJECTION, SOLUTION INTRA-ARTICULAR; INTRALESIONAL; INTRAMUSCULAR; INTRAVENOUS; SOFT TISSUE EVERY 12 HOURS
Status: DISCONTINUED | OUTPATIENT
Start: 2024-03-22 | End: 2024-03-22 | Stop reason: HOSPADM

## 2024-03-22 RX ADMIN — DEXAMETHASONE SODIUM PHOSPHATE 2 MG: 4 INJECTION, SOLUTION INTRA-ARTICULAR; INTRALESIONAL; INTRAMUSCULAR; INTRAVENOUS; SOFT TISSUE at 10:03

## 2024-03-22 RX ADMIN — HYDROMORPHONE HYDROCHLORIDE 1 MG: 1 INJECTION, SOLUTION INTRAMUSCULAR; INTRAVENOUS; SUBCUTANEOUS at 11:03

## 2024-03-22 RX ADMIN — HYDROMORPHONE HYDROCHLORIDE 1 MG: 1 INJECTION, SOLUTION INTRAMUSCULAR; INTRAVENOUS; SUBCUTANEOUS at 03:03

## 2024-03-22 RX ADMIN — HYDROMORPHONE HYDROCHLORIDE 1 MG: 1 INJECTION, SOLUTION INTRAMUSCULAR; INTRAVENOUS; SUBCUTANEOUS at 02:03

## 2024-03-22 RX ADMIN — HYDROMORPHONE HYDROCHLORIDE 1 MG: 1 INJECTION, SOLUTION INTRAMUSCULAR; INTRAVENOUS; SUBCUTANEOUS at 08:03

## 2024-03-22 NOTE — SUBJECTIVE & OBJECTIVE
Interval History: Minimally responsive without any major changes. Requesting ice water which I provided. Otherwise no interaction.    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)  [place]  BRAIN LAB  Navigation - CT with contrast    TUBAL LIGATION  2002       Review of patient's allergies indicates:  No Known Allergies    Medications:  Continuous Infusions:  Scheduled Meds:   acetaminophen  1,000 mg Oral Q8H    albuterol-ipratropium  3 mL Nebulization Q6H WAKE    apixaban  5 mg Oral BID    dexAMETHasone  2 mg Intravenous Q12H    famotidine  20 mg Oral BID    fentaNYL  1 patch Transdermal Q72H    folic acid  1 mg Oral Daily    levETIRAcetam  500 mg Oral BID    polyethylene glycol  17 g Oral BID     PRN Meds:aluminum-magnesium hydroxide-simethicone, HYDROmorphone, magnesium oxide, magnesium oxide, ondansetron, potassium bicarbonate, potassium bicarbonate, potassium bicarbonate, potassium, sodium phosphates, potassium, sodium phosphates, potassium, sodium phosphates, senna    Family History       Problem Relation (Age of Onset)    Hypertension Mother          Tobacco Use    Smoking status: Every Day     Current packs/day: 0.50     Types: Cigarettes    Smokeless tobacco: Current    Tobacco comments:     3/4 PPD   Substance and Sexual Activity    Alcohol use: Not Currently     Comment: OCCASIONALLY    Drug use: Never    Sexual activity: Not Currently       Review of Systems  Objective:     Vital Signs (Most Recent):  Temp: 98.7 °F (37.1 °C) (03/22/24 0732)  Pulse: (!) 135 (03/22/24 1400)  Resp: 18 (03/22/24 1518)  BP: 113/72 (03/22/24  0732)  SpO2: 96 % (03/22/24 1400) Vital Signs (24h Range):  Temp:  [97.9 °F (36.6 °C)-99.9 °F (37.7 °C)] 98.7 °F (37.1 °C)  Pulse:  [128-136] 135  Resp:  [16-20] 18  SpO2:  [94 %-100 %] 96 %  BP: (103-120)/(72-78) 113/72     Weight: 56.5 kg (124 lb 9 oz)  Body mass index is 22.06 kg/m².       Physical Exam  Vitals reviewed.   Constitutional:       General: She is in acute distress.      Appearance: She is ill-appearing.   HENT:      Head: Normocephalic and atraumatic.      Right Ear: External ear normal.      Left Ear: External ear normal.      Nose: Nose normal. No congestion.      Mouth/Throat:      Mouth: Mucous membranes are dry.   Eyes:      General:         Right eye: No discharge.         Left eye: No discharge.      Extraocular Movements: Extraocular movements intact.   Cardiovascular:      Rate and Rhythm: Tachycardia present.   Pulmonary:      Effort: Pulmonary effort is normal. No respiratory distress.   Abdominal:      General: There is no distension.      Palpations: Abdomen is soft.   Neurological:      Mental Status: She is alert.   Psychiatric:      Comments: Unable to assess as she was minimally responsive today.            Review of Symptoms      Symptom Assessment (ESAS 0-10 Scale)  Unable to complete assessment due to Mental status change         Pain Assessment:    Location(s): back      Pain Assessment in Advanced Demential Scale (PAINAD)   Breathing - Independent of vocalization:  0  Negative vocalization:  0  Facial expression:  0  Body language:  1  Consolability:  1  Total:  2    Performance Status:  10    Psychosocial/Cultural:   See Palliative Psychosocial Note: No  **Primary  to Follow**  Palliative Care  Consult: No        Advance Care Planning   Advance Directives:   Do Not Resuscitate Status: Yes      Decision Making:  Patient unable to communicate due to disease severity/cognitive impairment  Goals of Care: What is most important right now is to focus on  "comfort and QOL . Accordingly, we have decided that the best plan to meet the patient's goals includes enrolling in hospice care.         Significant Labs: BMP:   Recent Labs   Lab 03/21/24  0805   *      K 4.0      CO2 24   BUN 29*   CREATININE 0.6   CALCIUM 9.6       CBC:   Recent Labs   Lab 03/21/24  0805   WBC 13.28*   HGB 9.1*   HCT 28.8*          CBC:   Recent Labs   Lab 03/21/24  0805   WBC 13.28*   HGB 9.1*   HCT 28.8*   *          BMP:  No results for input(s): "GLU", "NA", "K", "CL", "CO2", "BUN", "CREATININE", "CALCIUM", "MG" in the last 24 hours.    LFT:  Lab Results   Component Value Date     (H) 03/21/2024    GGT 1,385 (H) 03/05/2024    ALKPHOS 850 (H) 03/21/2024    BILITOT 2.3 (H) 03/21/2024     Albumin:   Albumin   Date Value Ref Range Status   03/21/2024 2.9 (L) 3.5 - 5.2 g/dL Final     Protein:   Total Protein   Date Value Ref Range Status   03/21/2024 5.4 (L) 6.0 - 8.4 g/dL Final     Lactic acid:   No results found for: "LACTATE"    Significant Imaging: I have reviewed all pertinent imaging results/findings within the past 24 hours.    "

## 2024-03-22 NOTE — ASSESSMENT & PLAN NOTE
Pain is reasonably controlled on current regimen; will continue.    Discussed CM whom is working towards inpatient hospice placement. I think this will serve the patient and family best. I hope this comes to fruition.    Please message if I can offer any help with symptom management over the weekend. Happy to be available remotely.

## 2024-03-22 NOTE — NURSING
Upon discharge, proper information given to paramedic. Pt's midline remains intact per facility's request. Pt transported off unit via stretcher. Belongings given to paramedic.

## 2024-03-22 NOTE — PLAN OF CARE
Patient approved for Hospice at [facility] per Shauna.  This has been the only accepting facility for pt who has pending Medicaid.  Nursing sent number for report, [phone], via secure chat.  Pt daughter has signed forms for admission.  CM to set up ambulance transport via [id number] order.  Pt to discharge to [facility] in [place].  Discharge orders and chart reviewed with no further post-acute discharge needs identified at this time.  At this time, patient is cleared for discharge from Case Management standpoint.            03/22/24 1442   Final Note   Assessment Type Final Discharge Note   Anticipated Discharge Disposition [facility]   Post-Acute Status   Post-Acute Authorization Hospice   Hospice Status Set-up Complete/Auth obtained   Discharge Delays None known at this time

## 2024-03-22 NOTE — RESPIRATORY THERAPY
03/21/24 1945   Patient Assessment/Suction   Level of Consciousness (AVPU) alert   Respiratory Effort Normal;Unlabored   Expansion/Accessory Muscles/Retractions no retractions;no use of accessory muscles   All Lung Fields Breath Sounds Anterior:;Posterior:;Lateral:;equal bilaterally;clear   HUANG Breath Sounds clear   LLL Breath Sounds clear   RUL Breath Sounds rhonchi  (inspiratory)   RML Breath Sounds rhonchi  (inspiratory)   RLL Breath Sounds rhonchi  (inspiratory)   Rhythm/Pattern, Respiratory depth regular;pattern regular;unlabored   Cough Frequency no cough  (patient will not cough to clear rhonci)   Skin Integrity   $ Wound Care Tech Time 15 min   Area Observed Left;Right;Behind ear;Cheek;Upper lip;Nares   Skin Appearance without discoloration   PRE-TX-O2   Device (Oxygen Therapy) nasal cannula   $ Is the patient on Low Flow Oxygen? Yes   Flow (L/min) 2   Oxygen Concentration (%) 28   SpO2 100 %   Pulse Oximetry Type Intermittent   $ Pulse Oximetry - Multiple Charge Pulse Oximetry - Multiple   Pulse (!) 134   Resp 18   Aerosol Therapy   $ Aerosol Therapy Charges Aerosol Treatment   Daily Review of Necessity (SVN) completed   Respiratory Treatment Status (SVN) given   Treatment Route (SVN) oxygen;mask   Patient Position (SVN) HOB elevated   Post Treatment Assessment (SVN) breath sounds improved   Signs of Intolerance (SVN) none   Breath Sounds Post-Respiratory Treatment   Throughout All Fields Post-Treatment All Fields   Throughout All Fields Post-Treatment aeration increased   Post-treatment Heart Rate (beats/min) 135   Post-treatment Resp Rate (breaths/min) 18   Education   $ Education Bronchodilator;15 min

## 2024-03-22 NOTE — DISCHARGE INSTRUCTIONS
"Discharge Instructions    The hospital and staff members extend their best wishes to you as you are discharged. Because we are most concerned with your health, we suggest you carefully read the following instructions.    Activity Instructions    YOU HAVE NO RESTRICTIONS UNLESS INSTRUCTED OTHERWISE    Restrictions: n/a    YOU ARE TO CONTINUE PREADMISSION DIET UNLESS OTHERWISE INSTRUCTED    Diet: Pleasure    Urination: diaper    Drain/Tubes: IV access for pain support/dexamethasone    Bowels: prn enema/diaper         Pain Assessment         Duration:  acute    Current pain management regimen- Instructed: yes    IF PAIN INTENSIFIES OR PAIN IS UNRELIEVED WITH MEDICATIONS AS ORDERED, CALL YOUR DOCTOR - Instructed: yes  ____________________________________________________________________________      ____________________________________________________________________________    Individual Instructions/Custom Documents/Teaching Sheet  Use this area to list any individualized instructions, appointment, continued therapies, etc, that the patient/family is to continue after discharge. If additional space is needed, use "Additional Instructions"    Additional Instructions related to continuing or unresolved problems    Weight Management is important to your health. Call your physician if you experience sudden weight loss or weight gain.    Smoking is hazardous to your health. Second hand smoke is hazardous to those around you. If you smoke, you can contact your physician for smoking cessation information and classes.    IV Sites - notify your doctor for any extreme redness or observed drainage from any old IV site.  "

## 2024-03-22 NOTE — PLAN OF CARE
CM spoke with Robb and Shauna with [facility].  Additional clinical has been discussed today and sent via [employer] yesterday afternoon.  Shauna to speak with pt daughter to see if she is able/willing to sign admission papers and will review clinical.  To inform CM if they are able to accept pt to inpatient unit.       03/22/24 1057   Post-Acute Status   Post-Acute Authorization Hospice   Post-Acute Placement Status Pending post-acute provider review/more information requested

## 2024-03-22 NOTE — PROGRESS NOTES
[facility] Medicine  Progress Note    Patient Name: Crow Hines  MRN: [id number]  Patient Class: IP- Inpatient   Admission Date: 3/17/2024  Length of Stay: 5 days  Attending Physician: Jason Simmons MD  Primary Care Provider: Chika Powell MD        Subjective:     Principal Problem:Small cell carcinoma        HPI:  Patient originally presented to [facility] ER on 2/24/24 with complaints of several months of diffuse chest, abdominal, and back pain.   CT C/A/P showed diffuse metastatic disease with lung, liver, spleen, and kidney lesions.    Also  L2 compression fracture for which Neurosurgery at [facility] was originally consulted   CT head was ordered, left temporal mass with significant edema was identified.   Steroids were initiated and patient was transferred on 2/27/24 to [facility] for Neurosurgery.    Patient underwent L temporal craniotomy tumor resection on 2/29/24.   Pathology  returned metastatic small cell lung CA.   Also developed PE, initially on heparin drip, now on eliquis.   Started on keppra for seizure prophylaxis.   Per radiation oncology, patient not a good candidate right now.   Per medical oncology, inpatient chemotherapy not emergent, but would need urgent f/u outpatient for palliative chemotherapy.   Palliative care was consulted. Patient agreed to DNR.   Situation complicated by patient not having insurance.   Patient now transferred back to [facility], plan for outpatient palliative chemo vs home with hospice vs nursing home with hospice.     Overview/Hospital Course:  63 year old female with past medical history of rheumatoid arthritis and HTN presented as transfer from [facility].  Patient originally presented to [facility] ER on 2/24/24 with complaints of several months of diffuse chest, abdominal, and back pain. CT C/A/P showed diffuse metastatic disease with lung, liver, spleen, and kidney lesions.  Also revealing L2 compression fracture for which Neurosurgery at [facility] was  originally consulted for. Patient had an episode of acute confusion and CT head was ordered, left temporal mass with significant edema was identified. Steroids were initiated and patient was transferred on 2/27/24 to [facility] for Neurosurgery.  Patient underwent L temporal craniotomy tumor resection on 2/29/24. Path returned metastatic small cell lung CA. Also developed PE, initially on heparin drip, now on eliquis. Started on keppra for seizure prophylaxis. Per radiation oncology, patient not a good candidate right now. Per medical oncology, inpatient chemotherapy not emergent, but would need urgent f/u outpatient for palliative chemotherapy. Palliative care was consulted at OSH. Patient agreed to DNR. Situation complicated by patient not having insurance. Patient then transferred back to [facility]. Palliative care team at [facility] saw patient yesterday, patient has opted to pursue nursing home with hospice.  following for discharge planning. Pain management & support.    Interval History: No acute changes    Review of Systems   Constitutional:  Positive for activity change, appetite change and fatigue.   Musculoskeletal:  Positive for back pain and gait problem.        Left lateral anteriorly seated scalp staples   Neurological:  Positive for weakness.   Psychiatric/Behavioral:  Positive for decreased concentration.      Objective:     Vital Signs (Most Recent):  Temp: 98.7 °F (37.1 °C) (03/22/24 0732)  Pulse: (!) 136 (03/22/24 0732)  Resp: 18 (03/22/24 0828)  BP: 113/72 (03/22/24 0732)  SpO2: (!) 94 % (03/22/24 0732) Vital Signs (24h Range):  Temp:  [97.9 °F (36.6 °C)-99.9 °F (37.7 °C)] 98.7 °F (37.1 °C)  Pulse:  [128-136] 136  Resp:  [16-20] 18  SpO2:  [94 %-100 %] 94 %  BP: (103-120)/(72-78) 113/72     Weight: 56.5 kg (124 lb 9 oz)  Body mass index is 22.06 kg/m².    Intake/Output Summary (Last 24 hours) at 3/22/2024 6378  Last data filed at 3/22/2024 7367  Gross per 24 hour   Intake 370 ml   Output 250 ml    Net 120 ml           Physical Exam  Constitutional:       Appearance: She is ill-appearing.      Comments: Somnelence; Body mass index is 22.06 kg/m². cachetic   HENT:      Mouth/Throat:      Mouth: Mucous membranes are dry.   Abdominal:      General: Abdomen is flat.      Palpations: Abdomen is soft.      Comments: sunken   Skin:     Coloration: Skin is pale.   Neurological:      Mental Status: She is alert.             Significant Labs: All pertinent labs within the past 24 hours have been reviewed.    Significant Imaging: I have reviewed all pertinent imaging results/findings within the past 24 hours.    Assessment/Plan:      * Small cell carcinoma  Metastatic lung small cell carcinoma   Poor prognosis  Medically stable  [place] Medicaid insurance pending  SW/CM consulted for placement   Palliative care team consulted, plan for nursing home with hospice      Metastatic neoplastic disease  Patient has metastatic cancer of lung, liver, spleen primary.The patient is not under the care of an outpatient oncologist. The patient is not undergoing active chemotherapy. .Their staging information is listed below.    Cancer Staging   No matching staging information was found for the patient.    CT C/A/P showed diffuse metastatic disease with lung, liver, spleen, and kidney lesions.    CT head showed left temporal mass with significant edema was identified.   Patient underwent L temporal craniotomy tumor resection on 2/29/24.   Pathology  returned metastatic small cell lung CA.   Per radiation oncology, patient not a good candidate  Per medical oncology, inpatient chemotherapy not emergent, but would need urgent f/u outpatient for palliative chemotherapy.   Palliative care was consulted. Patient agreed to DNR.   Situation complicated by patient not having insurance.   Awaiting hospice placement (medicaid pending)    Debility  Patient with Acute debility due to  Cancer and weakness . Latest AMPAC and GEMS scores have  been reviewed. Evaluation for etiology is complete. Plan includes  Hospice placement .    Thrombocytopenia  Metastatic disease  Platelets improving  No signs of bleeding noted  Recent Labs   Lab 03/19/24  0741              Cancer related pain  Palliative care team consulted, medication regimen adjusted 3/18    Acute pulmonary embolism without acute cor pulmonale  Maintain eliquis         VTE Risk Mitigation (From admission, onward)           Ordered     apixaban tablet 5 mg  2 times daily         03/17/24 1756     IP VTE HIGH RISK PATIENT  Once         03/17/24 1756     Place sequential compression device  Until discontinued         03/17/24 1756                    Discharge Planning   JUAN:  unknown    Code Status: DNR   Is the patient medically ready for discharge?:     Reason for patient still in hospital (select all that apply): Pending disposition  Discharge Plan A: Hospice/home            Beatrice Mendoza, SHINE, APRN, FNP-C  [facility]  [facility]  [email or web address]

## 2024-03-22 NOTE — CARE UPDATE
03/22/24 1400   Patient Assessment/Suction   Level of Consciousness (AVPU) alert   Respiratory Effort Unlabored   Expansion/Accessory Muscles/Retractions no use of accessory muscles   All Lung Fields Breath Sounds Anterior:;Lateral:;clear;coarse   Rhythm/Pattern, Respiratory unlabored;pattern regular;depth regular   Cough Frequency infrequent   Cough Type dry;nonproductive   PRE-TX-O2   Device (Oxygen Therapy) nasal cannula   $ Is the patient on Low Flow Oxygen? Yes   Flow (L/min) 2   Oxygen Concentration (%) 28   SpO2 96 %   Pulse Oximetry Type Intermittent   $ Pulse Oximetry - Multiple Charge Pulse Oximetry - Multiple   Pulse (!) 135   Resp 20   Aerosol Therapy   $ Aerosol Therapy Charges On hold

## 2024-03-22 NOTE — PLAN OF CARE
Problem: Adult Inpatient Plan of Care  Goal: Plan of Care Review  Outcome: Met  Goal: Patient-Specific Goal (Individualized)  Outcome: Met  Goal: Absence of Hospital-Acquired Illness or Injury  Outcome: Met  Goal: Optimal Comfort and Wellbeing  Outcome: Met  Goal: Readiness for Transition of Care  Outcome: Met     Problem: Infection  Goal: Absence of Infection Signs and Symptoms  Outcome: Met     Problem: Impaired Wound Healing  Goal: Optimal Wound Healing  Outcome: Met     Problem: Skin Injury Risk Increased  Goal: Skin Health and Integrity  Outcome: Met     Problem: Fall Injury Risk  Goal: Absence of Fall and Fall-Related Injury  Outcome: Met     Problem: Coping Ineffective  Goal: Effective Coping  Outcome: Met

## 2024-03-22 NOTE — CARE UPDATE
03/22/24 0800   Patient Assessment/Suction   Level of Consciousness (AVPU) alert   Respiratory Effort Unlabored   Expansion/Accessory Muscles/Retractions no use of accessory muscles   All Lung Fields Breath Sounds Anterior:;Lateral:;clear;equal bilaterally   Rhythm/Pattern, Respiratory unlabored;pattern regular;depth regular   Cough Frequency infrequent   Cough Type dry;nonproductive   PRE-TX-O2   Device (Oxygen Therapy) nasal cannula   $ Is the patient on Low Flow Oxygen? Yes   Flow (L/min) 2   Oxygen Concentration (%) 28   SpO2 98 %   Pulse Oximetry Type Intermittent   $ Pulse Oximetry - Multiple Charge Pulse Oximetry - Multiple   Pulse (!) 133   Resp 20   Aerosol Therapy   $ Aerosol Therapy Charges On hold  (Heart rate of 133)

## 2024-03-22 NOTE — NURSING
Report called to SYD Sanchez at [facility]. Call back number left with nurse for any questions/concerns.

## 2024-03-22 NOTE — NURSING
Patient repositioned, PRN pain med given IV, pt requesting ice water-oral swabs used until swallowing eval this AM.

## 2024-03-22 NOTE — PROGRESS NOTES
"[facility]  Palliative Medicine  Progress Note    Patient Name: Crow Hines  MRN: [id number]  Admission Date: 3/17/2024  Hospital Length of Stay: 5 days  Code Status: DNR   Attending Provider: Jason Simmons MD  Consulting Provider: Carlin Turcios MD  Primary Care Physician: Chika Powell MD  Principal Problem:Small cell carcinoma    Patient information was obtained from patient, past medical records, and primary team.      Assessment/Plan:     Oncology  Cancer related pain  Pain is reasonably controlled on current regimen; will continue.    Discussed CM whom is working towards inpatient hospice placement. I think this will serve the patient and family best. I hope this comes to fruition.    Please message if I can offer any help with symptom management over the weekend. Happy to be available remotely.        I will follow-up with patient. Please contact us if you have any additional questions.    Subjective:     Chief Complaint: No chief complaint on file.      HPI:   Per admit H&P: "Patient originally presented to [facility] ER on 2/24/24 with complaints of several months of diffuse chest, abdominal, and back pain.   CT C/A/P showed diffuse metastatic disease with lung, liver, spleen, and kidney lesions.    Also  L2 compression fracture for which Neurosurgery at [facility] was originally consulted   CT head was ordered, left temporal mass with significant edema was identified.   Steroids were initiated and patient was transferred on 2/27/24 to [facility] for [facility].    Patient underwent L temporal craniotomy tumor resection on 2/29/24.   Pathology  returned metastatic small cell lung CA.   Also developed PE, initially on heparin drip, now on eliquis.   Started on keppra for seizure prophylaxis.   Per radiation oncology, patient not a good candidate right now.   Per medical oncology, inpatient chemotherapy not emergent, but would need urgent f/u outpatient for palliative chemotherapy. " "  Palliative care was consulted. Patient agreed to DNR.   Situation complicated by patient not having insurance.   Patient now transferred back to [facility], plan for outpatient palliative chemo vs home with hospice vs nursing home with hospice."    We have been asked to assist with goals of care.    Hospital Course:  No notes on file    Interval History: Minimally responsive without any major changes. Requesting ice water which I provided. Otherwise no interaction.    Past Medical History:   Diagnosis Date    Arthritis        Past Surgical History:   Procedure Laterality Date    CRANIOTOMY, WITH NEOPLASM EXCISION USING COMPUTER-ASSISTED NAVIGATION Left 2/29/2024    Procedure: CRANIOTOMY, WITH NEOPLASM EXCISION USING COMPUTER-ASSISTED NAVIGATION;  Surgeon: Felix Szymanski DO;  Location: [facility] OR [address] [place] [address];  Service: Neurosurgery;  Laterality: Left;  (Left temporal crani for rescetion of tumor)  [place]  BRAIN LAB  Navigation - CT with contrast    TUBAL LIGATION  2002       Review of patient's allergies indicates:  No Known Allergies    Medications:  Continuous Infusions:  Scheduled Meds:   acetaminophen  1,000 mg Oral Q8H    albuterol-ipratropium  3 mL Nebulization Q6H WAKE    apixaban  5 mg Oral BID    dexAMETHasone  2 mg Intravenous Q12H    famotidine  20 mg Oral BID    fentaNYL  1 patch Transdermal Q72H    folic acid  1 mg Oral Daily    levETIRAcetam  500 mg Oral BID    polyethylene glycol  17 g Oral BID     PRN Meds:aluminum-magnesium hydroxide-simethicone, HYDROmorphone, magnesium oxide, magnesium oxide, ondansetron, potassium bicarbonate, potassium bicarbonate, potassium bicarbonate, potassium, sodium phosphates, potassium, sodium phosphates, potassium, sodium phosphates, senna    Family History       Problem Relation (Age of Onset)    Hypertension Mother          Tobacco Use    Smoking status: Every Day     Current packs/day: 0.50     Types: Cigarettes    Smokeless tobacco: Current    Tobacco comments:     3/4 PPD "   Substance and Sexual Activity    Alcohol use: Not Currently     Comment: OCCASIONALLY    Drug use: Never    Sexual activity: Not Currently       Review of Systems  Objective:     Vital Signs (Most Recent):  Temp: 98.7 °F (37.1 °C) (03/22/24 0732)  Pulse: (!) 135 (03/22/24 1400)  Resp: 18 (03/22/24 1518)  BP: 113/72 (03/22/24 0732)  SpO2: 96 % (03/22/24 1400) Vital Signs (24h Range):  Temp:  [97.9 °F (36.6 °C)-99.9 °F (37.7 °C)] 98.7 °F (37.1 °C)  Pulse:  [128-136] 135  Resp:  [16-20] 18  SpO2:  [94 %-100 %] 96 %  BP: (103-120)/(72-78) 113/72     Weight: 56.5 kg (124 lb 9 oz)  Body mass index is 22.06 kg/m².       Physical Exam  Vitals reviewed.   Constitutional:       General: She is in acute distress.      Appearance: She is ill-appearing.   HENT:      Head: Normocephalic and atraumatic.      Right Ear: External ear normal.      Left Ear: External ear normal.      Nose: Nose normal. No congestion.      Mouth/Throat:      Mouth: Mucous membranes are dry.   Eyes:      General:         Right eye: No discharge.         Left eye: No discharge.      Extraocular Movements: Extraocular movements intact.   Cardiovascular:      Rate and Rhythm: Tachycardia present.   Pulmonary:      Effort: Pulmonary effort is normal. No respiratory distress.   Abdominal:      General: There is no distension.      Palpations: Abdomen is soft.   Neurological:      Mental Status: She is alert.   Psychiatric:      Comments: Unable to assess as she was minimally responsive today.            Review of Symptoms      Symptom Assessment (ESAS 0-10 Scale)  Unable to complete assessment due to Mental status change         Pain Assessment:    Location(s): back      Pain Assessment in Advanced Demential Scale (PAINAD)   Breathing - Independent of vocalization:  0  Negative vocalization:  0  Facial expression:  0  Body language:  1  Consolability:  1  Total:  2    Performance Status:  10    Psychosocial/Cultural:   See Palliative Psychosocial Note:  "No  **Primary  to Follow**  Palliative Care  Consult: No        Advance Care Planning  Advance Directives:   Do Not Resuscitate Status: Yes      Decision Making:  Patient unable to communicate due to disease severity/cognitive impairment  Goals of Care: What is most important right now is to focus on comfort and QOL . Accordingly, we have decided that the best plan to meet the patient's goals includes enrolling in hospice care.         Significant Labs: BMP:   Recent Labs   Lab 03/21/24  0805   *      K 4.0      CO2 24   BUN 29*   CREATININE 0.6   CALCIUM 9.6       CBC:   Recent Labs   Lab 03/21/24  0805   WBC 13.28*   HGB 9.1*   HCT 28.8*          CBC:   Recent Labs   Lab 03/21/24  0805   WBC 13.28*   HGB 9.1*   HCT 28.8*   *          BMP:  No results for input(s): "GLU", "NA", "K", "CL", "CO2", "BUN", "CREATININE", "CALCIUM", "MG" in the last 24 hours.    LFT:  Lab Results   Component Value Date     (H) 03/21/2024    GGT 1,385 (H) 03/05/2024    ALKPHOS 850 (H) 03/21/2024    BILITOT 2.3 (H) 03/21/2024     Albumin:   Albumin   Date Value Ref Range Status   03/21/2024 2.9 (L) 3.5 - 5.2 g/dL Final     Protein:   Total Protein   Date Value Ref Range Status   03/21/2024 5.4 (L) 6.0 - 8.4 g/dL Final     Lactic acid:   No results found for: "LACTATE"    Significant Imaging: I have reviewed all pertinent imaging results/findings within the past 24 hours.    > 25 min visit spent in chart review, face to face discussion of goals of care,  symptom assessment, coordination of care and emotional support.    Carlin Turcios MD  Palliative Medicine  [facility]                "

## 2024-03-22 NOTE — ASSESSMENT & PLAN NOTE
Patient has metastatic cancer of lung, liver, spleen primary.The patient is not under the care of an outpatient oncologist. The patient is not undergoing active chemotherapy. .Their staging information is listed below.    Cancer Staging   No matching staging information was found for the patient.    CT C/A/P showed diffuse metastatic disease with lung, liver, spleen, and kidney lesions.    CT head showed left temporal mass with significant edema was identified.   Patient underwent L temporal craniotomy tumor resection on 2/29/24.   Pathology  returned metastatic small cell lung CA.   Per radiation oncology, patient not a good candidate  Per medical oncology, inpatient chemotherapy not emergent, but would need urgent f/u outpatient for palliative chemotherapy.   Palliative care was consulted. Patient agreed to DNR.   Situation complicated by patient not having insurance.   Awaiting hospice placement (medicaid pending)

## 2024-03-22 NOTE — PLAN OF CARE
[facility] has accepted pt clinically and have faxed admission forms to daughter who will be signing and returning forms for pt admission. CM and NP met with pt this am who did not respond verbally to questions.  Discharge clinical sent in careport to [facility] and [facility].       03/22/24 7845   Post-Acute Status   Post-Acute Authorization Hospice   Hospice Status Pending post acute provider review/more information requested   Discharge Plan   Discharge Plan A Inpatient Hospice   Discharge Plan B Inpatient Hospice        Medical Decision Making

## 2024-03-22 NOTE — DISCHARGE SUMMARY
[facility] Medicine  Discharge Summary      Patient Name: Crow Hines  MRN: [id number]  [facility]: [id number]  Patient Class: IP- Inpatient  Admission Date: 3/17/2024  Hospital Length of Stay: 5 days  Discharge Date and Time:  03/22/2024 12:00 PM  Attending Physician: Jason Simmons MD   Discharging Provider: LISSETT Samano  Primary Care Provider: Chika Powell MD    Primary Care Team: Networked reference to record PCT     HPI:   Patient originally presented to [facility] ER on 2/24/24 with complaints of several months of diffuse chest, abdominal, and back pain.   CT C/A/P showed diffuse metastatic disease with lung, liver, spleen, and kidney lesions.    Also  L2 compression fracture for which Neurosurgery at [facility] was originally consulted   CT head was ordered, left temporal mass with significant edema was identified.   Steroids were initiated and patient was transferred on 2/27/24 to [facility] for Neurosurgery.    Patient underwent L temporal craniotomy tumor resection on 2/29/24.   Pathology  returned metastatic small cell lung CA.   Also developed PE, initially on heparin drip, now on eliquis.   Started on keppra for seizure prophylaxis.   Per radiation oncology, patient not a good candidate right now.   Per medical oncology, inpatient chemotherapy not emergent, but would need urgent f/u outpatient for palliative chemotherapy.   Palliative care was consulted. Patient agreed to DNR.   Situation complicated by patient not having insurance.   Patient now transferred back to [facility], plan for outpatient palliative chemo vs home with hospice vs nursing home with hospice.     * No surgery found *      Hospital Course:   63 year old female with past medical history of rheumatoid arthritis and HTN presented as transfer from [facility].  Patient originally presented to [facility] ER on 2/24/24 with complaints of several months of diffuse chest, abdominal, and back pain. CT C/A/P showed diffuse  metastatic disease with lung, liver, spleen, and kidney lesions.  Also revealing L2 compression fracture for which Neurosurgery at [facility] was originally consulted for. Patient had an episode of acute confusion and CT head was ordered, left temporal mass with significant edema was identified. Steroids were initiated and patient was transferred on 2/27/24 to [facility] for Neurosurgery.  Patient underwent L temporal craniotomy tumor resection on 2/29/24. Path returned metastatic small cell lung CA. Also developed PE, initially on heparin drip, now on eliquis. Started on keppra for seizure prophylaxis. Per radiation oncology, patient not a good candidate right now. Per medical oncology, inpatient chemotherapy not emergent, but would need urgent f/u outpatient for palliative chemotherapy. Palliative care was consulted at OSH. Patient agreed to DNR. Situation complicated by patient not having insurance. Patient then transferred back to [facility]. Palliative care team at [facility] saw patient yesterday, patient has opted to pursue nursing home with hospice.  following for discharge planning. Pain management & support. [facility] accepting inpatient. Will discharge when bed is ready.     Goals of Care Treatment Preferences:  Code Status: DNR    Health care agent: Thais michele is NOK  Health care agent number: No value filed.          What is most important right now is to focus on comfort and QOL .  Accordingly, we have decided that the best plan to meet the patient's goals includes enrolling in hospice care.      Consults:   Consults (From admission, onward)          Status Ordering Provider     Inpatient consult to Palliative Care  Once        Provider:  Carlin Turcios MD    Completed BRETT CLAIRE     Inpatient consult to Social Work/Case Management  Once        Provider:  (Not yet assigned)    Completed ROMY DAVIS            No new Assessment & Plan notes have been filed under this hospital  "service since the last note was generated.  Service: Hospital Medicine    Final Active Diagnoses:    Diagnosis Date Noted POA    PRINCIPAL PROBLEM:  Small cell carcinoma [C80.1] 03/17/2024 Yes    Metastatic neoplastic disease [C79.9] 02/24/2024 Yes    Debility [R53.81] 03/05/2024 Yes    Thrombocytopenia [D69.6] 03/06/2024 Yes    Cancer related pain [G89.3] 03/05/2024 Yes    Acute pulmonary embolism without acute cor pulmonale [I26.99] 02/25/2024 Yes      Problems Resolved During this Admission:       Discharged Condition: stable    Disposition: Hospice/Medical Facility    Follow Up:    Patient Instructions:      Diet Adult Regular   Order Comments: pleasure     Activity as tolerated       Significant Diagnostic Studies: Labs: CMP   Recent Labs   Lab 03/21/24  0805      K 4.0      CO2 24   *   BUN 29*   CREATININE 0.6   CALCIUM 9.6   PROT 5.4*   ALBUMIN 2.9*   BILITOT 2.3*   ALKPHOS 850*   *   *   ANIONGAP 13   , CBC   Recent Labs   Lab 03/21/24  0805   WBC 13.28*   HGB 9.1*   HCT 28.8*      , INR   Lab Results   Component Value Date    INR 1.0 03/05/2024    INR 1.0 02/27/2024   , Lipid Panel   Lab Results   Component Value Date    CHOL 181 02/27/2024    HDL 48 02/27/2024    LDLCALC 103.8 02/27/2024    TRIG 146 02/27/2024    CHOLHDL 26.5 02/27/2024   , Troponin No results for input(s): "TROPONINI" in the last 168 hours., and A1C:   Recent Labs   Lab 02/27/24  0611   HGBA1C 5.5       Pending Diagnostic Studies:       None           Medications:  Reconciled Home Medications:      Medication List        START taking these medications      dexAMETHasone 4 mg/mL injection  Commonly known as: DECADRON  Inject 0.5 mLs (2 mg total) into the vein every 12 (twelve) hours.     fentaNYL 50 mcg/hr  Commonly known as: DURAGESIC  Place 1 patch onto the skin every 72 hours.  Start taking on: March 23, 2024            CONTINUE taking these medications      albuterol-ipratropium 2.5 mg-0.5 mg/3 " mL nebulizer solution  Commonly known as: DUO-NEB  Take 3 mLs by nebulization every 6 (six) hours while awake. Rescue     apixaban 5 mg Tab  Commonly known as: ELIQUIS  Take 1 tablet (5 mg total) by mouth 2 (two) times daily.     bisacodyL 10 mg Supp  Commonly known as: DULCOLAX  Place 1 suppository (10 mg total) rectally daily as needed (Until bowel movement if patient has no bowel movement for 2 days).     levETIRAcetam 500 MG Tab  Commonly known as: KEPPRA  Take 1 tablet (500 mg total) by mouth 2 (two) times daily.            STOP taking these medications      acetaminophen 325 MG tablet  Commonly known as: TYLENOL     atenoloL 50 MG tablet  Commonly known as: TENORMIN     dexAMETHasone 1 MG Tab  Commonly known as: DECADRON     famotidine 20 MG tablet  Commonly known as: PEPCID     folic acid 1 MG tablet  Commonly known as: FOLVITE     gabapentin 300 MG capsule  Commonly known as: NEURONTIN     guaiFENesin 100 mg/5 ml 100 mg/5 mL syrup  Commonly known as: ROBITUSSIN     methocarbamoL 500 MG Tab  Commonly known as: ROBAXIN     morphine 30 MG 12 hr tablet  Commonly known as: MS CONTIN     oxyCODONE 10 mg Tab immediate release tablet  Commonly known as: ROXICODONE     polyethylene glycol 17 gram Pwpk  Commonly known as: GLYCOLAX     sodium bicarbonate 650 MG tablet     sodium chloride 1,000 mg Tbso oral tablet              Indwelling Lines/Drains at time of discharge:   Lines/Drains/Airways       Drain  Duration             Female External Urinary Catheter w/ Suction 03/02/24 1422 19 days                    Time spent on the discharge of patient: 45 minutes             Beatrice Mendoza, SHINE, APRN, FNP-C  [facility]  [facility]  [email or web address]

## 2024-03-22 NOTE — SUBJECTIVE & OBJECTIVE
Interval History: No acute changes    Review of Systems   Constitutional:  Positive for activity change, appetite change and fatigue.   Musculoskeletal:  Positive for back pain and gait problem.        Left lateral anteriorly seated scalp staples   Neurological:  Positive for weakness.   Psychiatric/Behavioral:  Positive for decreased concentration.      Objective:     Vital Signs (Most Recent):  Temp: 98.7 °F (37.1 °C) (03/22/24 0732)  Pulse: (!) 136 (03/22/24 0732)  Resp: 18 (03/22/24 0828)  BP: 113/72 (03/22/24 0732)  SpO2: (!) 94 % (03/22/24 0732) Vital Signs (24h Range):  Temp:  [97.9 °F (36.6 °C)-99.9 °F (37.7 °C)] 98.7 °F (37.1 °C)  Pulse:  [128-136] 136  Resp:  [16-20] 18  SpO2:  [94 %-100 %] 94 %  BP: (103-120)/(72-78) 113/72     Weight: 56.5 kg (124 lb 9 oz)  Body mass index is 22.06 kg/m².    Intake/Output Summary (Last 24 hours) at 3/22/2024 0923  Last data filed at 3/22/2024 0552  Gross per 24 hour   Intake 370 ml   Output 250 ml   Net 120 ml           Physical Exam  Constitutional:       Appearance: She is ill-appearing.      Comments: Somnelence; Body mass index is 22.06 kg/m². cachetic   HENT:      Mouth/Throat:      Mouth: Mucous membranes are dry.   Abdominal:      General: Abdomen is flat.      Palpations: Abdomen is soft.      Comments: sunken   Skin:     Coloration: Skin is pale.   Neurological:      Mental Status: She is alert.             Significant Labs: All pertinent labs within the past 24 hours have been reviewed.    Significant Imaging: I have reviewed all pertinent imaging results/findings within the past 24 hours.

## 2024-04-08 LAB
OHS QRS DURATION: 78 MS
OHS QTC CALCULATION: 411 MS

## 2024-06-17 PROBLEM — I26.99 ACUTE PULMONARY EMBOLISM WITHOUT ACUTE COR PULMONALE: Status: RESOLVED | Noted: 2024-02-25 | Resolved: 2024-06-17

## 2025-06-09 NOTE — PROGRESS NOTES
"Nurse Triage SBAR    Is this a 2nd Level Triage? NO    Situation: Shortness of breath    Background: Pt has hx of copd, asthma, cardiomyopathy. Discharged from [facility] 6/7 for acute respiratory failure with hypoxia and hypercapnia. Has appt with Sadia Luu PA-C tomorrow.     Assessment: Pt calling stating \"I'm walking slow.\" This writer attempted  clarifying questions to determine other symptoms, pt endorsed shortness of breath. States she uses 2-3L oxygen at baseline, currently on 3L and SpO2 90%. Pt mother could be heard in background stating \"you don't need to go to the hospital.\" Pt breathing does sound short of breath on phone to this writer. Pt denies dizziness and chest pain but states she feels \"unbalanced.\" Denies fever. Pt states she took a PRN albuterol neb approx 1 hour ago. Pt then gave phone to mother and mother states \"She feels short of breath walking, she never walks. Her walker just sits there\" and \"she doesn't need to go to the hospital.\"      Protocol Recommended Disposition:   Go to ED Now    Recommendation: This writer advised that based on severity of symptoms, pt should be evaluated in ED. Pt and mother wondering if she can wait to be evaluated during appt tomorrow. This writer reinforced that with the severity of her symptoms the pt should present to ED, but would send message to PCP to advise if can wait for appt in the morning.     Routed to provider    Does the patient meet one of the following criteria for ADS visit consideration? 16+ years old, with an [facility] PCP     TIP  Providers, please consider if this condition is appropriate for management at one of our Acute and Diagnostic Services sites.     If patient is a good candidate, please use dotphrase <dot>triageresponse and select Refer to ADS to document.     Reason for Disposition   MODERATE difficulty breathing (e.g., speaks in phrases, SOB even at rest, pulse 100-120) of new-onset or worse than normal    Additional " [facility] Medicine  Progress Note    Patient Name: Crow Hines  MRN: [id number]  Patient Class: IP- Inpatient   Admission Date: 3/17/2024  Length of Stay: 4 days  Attending Physician: Jason Simmons MD  Primary Care Provider: Chika Powell MD        Subjective:     Principal Problem:Small cell carcinoma        HPI:  Patient originally presented to [facility] ER on 2/24/24 with complaints of several months of diffuse chest, abdominal, and back pain.   CT C/A/P showed diffuse metastatic disease with lung, liver, spleen, and kidney lesions.    Also  L2 compression fracture for which Neurosurgery at [facility] was originally consulted   CT head was ordered, left temporal mass with significant edema was identified.   Steroids were initiated and patient was transferred on 2/27/24 to [facility] for Neurosurgery.    Patient underwent L temporal craniotomy tumor resection on 2/29/24.   Pathology  returned metastatic small cell lung CA.   Also developed PE, initially on heparin drip, now on eliquis.   Started on keppra for seizure prophylaxis.   Per radiation oncology, patient not a good candidate right now.   Per medical oncology, inpatient chemotherapy not emergent, but would need urgent f/u outpatient for palliative chemotherapy.   Palliative care was consulted. Patient agreed to DNR.   Situation complicated by patient not having insurance.   Patient now transferred back to [facility], plan for outpatient palliative chemo vs home with hospice vs nursing home with hospice.     Overview/Hospital Course:  63 year old female with past medical history of rheumatoid arthritis and HTN presented as transfer from [facility].  Patient originally presented to [facility] ER on 2/24/24 with complaints of several months of diffuse chest, abdominal, and back pain. CT C/A/P showed diffuse metastatic disease with lung, liver, spleen, and kidney lesions.  Also revealing L2 compression fracture for which Neurosurgery at [facility] was  "Information   Negative: SEVERE difficulty breathing (e.g., struggling for each breath, speaks in single words, pulse > 120)   Negative: Breathing stopped and hasn't returned   Negative: Choking on something   Negative: Bluish (or gray) lips or face   Negative: Difficult to awaken or acting confused (e.g., disoriented, slurred speech)   Negative: Passed out (e.g., fainted, lost consciousness, blacked out and was not responding)   Negative: Wheezing started suddenly after medicine, an allergic food, or bee sting   Negative: Stridor (harsh sound while breathing in)   Negative: Slow, shallow and weak breathing   Negative: Sounds like a life-threatening emergency to the triager   Negative: Chest pain   Negative: Wheezing (high pitched whistling sound) and previous asthma attacks or use of asthma medicines   Negative: Breathing difficulty and within 14 days of COVID-19 EXPOSURE (close contact) with someone diagnosed with COVID-19 (e.g., COVID test positive)   Negative: Breathing difficulty and COVID-19 is widespread in the community   Negative: Breathing diffculty and only present when coughing   Negative: Breathing difficulty and only from stuffy nose   Negative: Breathing diffculty and only from stuffy nose or runny nose from common cold    Answer Assessment - Initial Assessment Questions  1. RESPIRATORY STATUS: \"Describe your breathing?\" (e.g., wheezing, shortness of breath, unable to speak, severe coughing)       Sounds short of breath to writer, writer can hear heavy breathing,  2. ONSET: \"When did this breathing problem begin?\"       Been going on all day  3. PATTERN \"Does the difficult breathing come and go, or has it been constant since it started?\"       Constant  4. SEVERITY: \"How bad is your breathing?\" (e.g., mild, moderate, severe)       Sounds heavy, \"it's hard for me to breath\"  5. RECURRENT SYMPTOM: \"Have you had difficulty breathing before?\" If Yes, ask: \"When was the last time?\" and \"What happened that " originally consulted for. Patient had an episode of acute confusion and CT head was ordered, left temporal mass with significant edema was identified. Steroids were initiated and patient was transferred on 2/27/24 to [facility] for Neurosurgery.  Patient underwent L temporal craniotomy tumor resection on 2/29/24. Path returned metastatic small cell lung CA. Also developed PE, initially on heparin drip, now on eliquis. Started on keppra for seizure prophylaxis. Per radiation oncology, patient not a good candidate right now. Per medical oncology, inpatient chemotherapy not emergent, but would need urgent f/u outpatient for palliative chemotherapy. Palliative care was consulted at OSH. Patient agreed to DNR. Situation complicated by patient not having insurance. Patient then transferred back to [facility]. Palliative care team at [facility] saw patient yesterday, patient has opted to pursue nursing home with hospice.  following for discharge planning. Pain management & support.    Interval History: Continued pain management - more awake this morning - requested pain meds.    Review of Systems   Constitutional:  Positive for activity change, appetite change and fatigue.   Musculoskeletal:  Positive for back pain and gait problem.   Neurological:  Positive for weakness.   Psychiatric/Behavioral:  Positive for decreased concentration.      Objective:     Vital Signs (Most Recent):  Temp: 98.5 °F (36.9 °C) (03/21/24 0738)  Pulse: (!) 132 (nurse alerted) (03/21/24 0738)  Resp: 18 (03/21/24 0931)  BP: 106/78 (03/21/24 0738)  SpO2: 97 % (03/21/24 0800) Vital Signs (24h Range):  Temp:  [98.1 °F (36.7 °C)-98.5 °F (36.9 °C)] 98.5 °F (36.9 °C)  Pulse:  [] 132  Resp:  [16-19] 18  SpO2:  [91 %-97 %] 97 %  BP: (106-156)/(78-97) 106/78     Weight: 56.5 kg (124 lb 9 oz)  Body mass index is 22.06 kg/m².    Intake/Output Summary (Last 24 hours) at 3/21/2024 1133  Last data filed at 3/21/2024 0840  Gross per 24 hour   Intake 0 ml    Output --   Net 0 ml         Physical Exam  Constitutional:       Appearance: She is ill-appearing.      Comments: Somnelence; Body mass index is 22.06 kg/m². cachetic   HENT:      Mouth/Throat:      Mouth: Mucous membranes are dry.   Abdominal:      General: Abdomen is flat.      Palpations: Abdomen is soft.      Comments: sunken   Skin:     Coloration: Skin is pale.   Neurological:      Mental Status: She is alert.             Significant Labs: All pertinent labs within the past 24 hours have been reviewed.    Significant Imaging: I have reviewed all pertinent imaging results/findings within the past 24 hours.    Assessment/Plan:      * Small cell carcinoma  Metastatic lung small cell carcinoma   Poor prognosis  Medically stable  [place] Medicaid insurance pending  /CM consulted for placement   Palliative care team consulted, plan for nursing home with hospice      Metastatic neoplastic disease  Patient has metastatic cancer of lung, liver, spleen primary.The patient is not under the care of an outpatient oncologist. The patient is not undergoing active chemotherapy. .Their staging information is listed below.    Cancer Staging   No matching staging information was found for the patient.    CT C/A/P showed diffuse metastatic disease with lung, liver, spleen, and kidney lesions.    CT head showed left temporal mass with significant edema was identified.   Patient underwent L temporal craniotomy tumor resection on 2/29/24.   Pathology  returned metastatic small cell lung CA.   Per radiation oncology, patient not a good candidate  Per medical oncology, inpatient chemotherapy not emergent, but would need urgent f/u outpatient for palliative chemotherapy.   Palliative care was consulted. Patient agreed to DNR.   Situation complicated by patient not having insurance.   Awaiting hospice placement    Debility  Patient with Acute debility due to  Cancer and weakness . Latest AMPAC and GEMS scores have been  "time?\"       Just discharged from hospital two days ago for respiratory failure  6. CARDIAC HISTORY: \"Do you have any history of heart disease?\" (e.g., heart attack, angina, bypass surgery, angioplasty)       cardiomyopathy  7. LUNG HISTORY: \"Do you have any history of lung disease?\"  (e.g., pulmonary embolus, asthma, emphysema)      Copd, asthma  8. CAUSE: \"What do you think is causing the breathing problem?\"       *No Answer*  9. OTHER SYMPTOMS: \"Do you have any other symptoms?\" (e.g., chest pain, cough, dizziness, fever, runny nose)      \"I'm walking slow\" \"I feel like I'm gonna fall\" \"I feel off off balanced\"  10. O2 SATURATION MONITOR:  \"Do you use an oxygen saturation monitor (pulse oximeter) at home?\" If Yes, ask: \"What is your reading (oxygen level) today?\" \"What is your usual oxygen saturation reading?\" (e.g., 95%)        90% on 3 L    Protocols used: Breathing Difficulty-A-OH    " reviewed. Evaluation for etiology is complete. Plan includes  Hospice placement .    Thrombocytopenia  Metastatic disease  Platelets improving  No signs of bleeding noted  Recent Labs   Lab 03/19/24  0741              Cancer related pain  Palliative care team consulted, medication regimen adjusted 3/18    Acute pulmonary embolism without acute cor pulmonale  Maintain eliquis         VTE Risk Mitigation (From admission, onward)           Ordered     apixaban tablet 5 mg  2 times daily         03/17/24 1756     IP VTE HIGH RISK PATIENT  Once         03/17/24 1756     Place sequential compression device  Until discontinued         03/17/24 1756                    Discharge Planning   JUAN:      Code Status: DNR   Is the patient medically ready for discharge?:     Reason for patient still in hospital (select all that apply): Treatment, Consult recommendations, and Pending disposition  Discharge Plan A: Hospice/home            Beatrice Mendoza, SHINE, APRN, FNP-C  [facility]  [facility]  [email or web address]

## (undated) DEVICE — DRESSING SURGICAL 1/2X1/2

## (undated) DEVICE — SPONGE NEURO 1/4X1/4

## (undated) DEVICE — BLADE SURG CARBON STEEL SZ11

## (undated) DEVICE — DRAPE OPMI STERILE

## (undated) DEVICE — SUT 4/0 18IN NUROLON BLK B

## (undated) DEVICE — SUT VICRYL 2 0 CT 2

## (undated) DEVICE — CORD BIPOLAR 12 FOOT

## (undated) DEVICE — TAPE SURG MEDIPORE 6X72IN

## (undated) DEVICE — TOWEL OR DISP STRL BLUE 4/PK

## (undated) DEVICE — ELECTRODE REM PLYHSV RETURN 9

## (undated) DEVICE — SPONGE PATTY SURGICAL .5X3IN

## (undated) DEVICE — PINS SKULL ADULT MAYFIELD
Type: IMPLANTABLE DEVICE | Site: CRANIAL | Status: NON-FUNCTIONAL
Removed: 2024-02-29

## (undated) DEVICE — DRAPE CORETEMP FLD WRM 56X62IN

## (undated) DEVICE — HOOK STAY ELAS 5MM 8EA/PK

## (undated) DEVICE — DRAPE CRANIOTOMY T SURG STRL

## (undated) DEVICE — DRESSING SURGICAL 1X1

## (undated) DEVICE — SPHERE MARKER REFLECTIVE DISP

## (undated) DEVICE — DRAPE T THYROID STERILE

## (undated) DEVICE — KIT SURGIFLO HEMOSTATIC MATRIX

## (undated) DEVICE — MARKER SKIN STND TIP BLUE BARR

## (undated) DEVICE — BIT PERFORATOR LARGE

## (undated) DEVICE — RUBBERBAND STERILE 3X1/8IN

## (undated) DEVICE — CONTAINER SPECIMEN STRL 4OZ

## (undated) DEVICE — HEMOSTAT SURGICEL 4X8IN

## (undated) DEVICE — TUBE FRAZIER 5MM 2FT SOFT TIP

## (undated) DEVICE — TRAY NEURO OMC

## (undated) DEVICE — SPRAY MASTISOL

## (undated) DEVICE — ROUTER TAPERED 2.3MM